# Patient Record
Sex: MALE | Race: WHITE | NOT HISPANIC OR LATINO | Employment: FULL TIME | URBAN - METROPOLITAN AREA
[De-identification: names, ages, dates, MRNs, and addresses within clinical notes are randomized per-mention and may not be internally consistent; named-entity substitution may affect disease eponyms.]

---

## 2023-05-21 ENCOUNTER — HOSPITAL ENCOUNTER (EMERGENCY)
Facility: HOSPITAL | Age: 43
Discharge: HOME | End: 2023-05-21
Attending: FAMILY MEDICINE

## 2023-05-21 ENCOUNTER — APPOINTMENT (EMERGENCY)
Dept: CT IMAGING | Facility: HOSPITAL | Age: 43
End: 2023-05-21

## 2023-05-21 VITALS
HEIGHT: 67 IN | HEART RATE: 98 BPM | RESPIRATION RATE: 18 BRPM | OXYGEN SATURATION: 98 % | TEMPERATURE: 99.7 F | BODY MASS INDEX: 32.18 KG/M2 | WEIGHT: 205 LBS | DIASTOLIC BLOOD PRESSURE: 78 MMHG | SYSTOLIC BLOOD PRESSURE: 145 MMHG

## 2023-05-21 DIAGNOSIS — K57.92 DIVERTICULITIS: Primary | ICD-10-CM

## 2023-05-21 DIAGNOSIS — R10.9 ABDOMINAL PAIN: ICD-10-CM

## 2023-05-21 LAB
ALBUMIN SERPL BCP-MCNC: 4.4 G/DL (ref 3.5–5)
ALP SERPL-CCNC: 79 U/L (ref 34–104)
ALT SERPL W P-5'-P-CCNC: 19 U/L (ref 7–52)
ANION GAP SERPL CALCULATED.3IONS-SCNC: 9 MMOL/L (ref 4–13)
AST SERPL W P-5'-P-CCNC: 19 U/L (ref 13–39)
BASOPHILS # BLD AUTO: 0.02 THOUSANDS/ÂΜL (ref 0–0.1)
BASOPHILS NFR BLD AUTO: 0 % (ref 0–1)
BILIRUB SERPL-MCNC: 1.06 MG/DL (ref 0.2–1)
BUN SERPL-MCNC: 10 MG/DL (ref 5–25)
CALCIUM SERPL-MCNC: 9.6 MG/DL (ref 8.4–10.2)
CHLORIDE SERPL-SCNC: 97 MMOL/L (ref 96–108)
CO2 SERPL-SCNC: 26 MMOL/L (ref 21–32)
CREAT SERPL-MCNC: 0.96 MG/DL (ref 0.6–1.3)
EOSINOPHIL # BLD AUTO: 0.06 THOUSAND/ÂΜL (ref 0–0.61)
EOSINOPHIL NFR BLD AUTO: 0 % (ref 0–6)
ERYTHROCYTE [DISTWIDTH] IN BLOOD BY AUTOMATED COUNT: 12.8 % (ref 11.6–15.1)
GFR SERPL CREATININE-BSD FRML MDRD: 98 ML/MIN/1.73SQ M
GLUCOSE SERPL-MCNC: 154 MG/DL (ref 65–140)
HCT VFR BLD AUTO: 47.6 % (ref 36.5–49.3)
HGB BLD-MCNC: 15.5 G/DL (ref 12–17)
IMM GRANULOCYTES # BLD AUTO: 0.04 THOUSAND/UL (ref 0–0.2)
IMM GRANULOCYTES NFR BLD AUTO: 0 % (ref 0–2)
INR PPP: 0.98 (ref 0.84–1.19)
LYMPHOCYTES # BLD AUTO: 1.47 THOUSANDS/ÂΜL (ref 0.6–4.47)
LYMPHOCYTES NFR BLD AUTO: 11 % (ref 14–44)
MAGNESIUM SERPL-MCNC: 2.2 MG/DL (ref 1.9–2.7)
MCH RBC QN AUTO: 29.9 PG (ref 26.8–34.3)
MCHC RBC AUTO-ENTMCNC: 32.6 G/DL (ref 31.4–37.4)
MCV RBC AUTO: 92 FL (ref 82–98)
MONOCYTES # BLD AUTO: 1.22 THOUSAND/ÂΜL (ref 0.17–1.22)
MONOCYTES NFR BLD AUTO: 9 % (ref 4–12)
NEUTROPHILS # BLD AUTO: 10.77 THOUSANDS/ÂΜL (ref 1.85–7.62)
NEUTS SEG NFR BLD AUTO: 80 % (ref 43–75)
NRBC BLD AUTO-RTO: 0 /100 WBCS
PLATELET # BLD AUTO: 238 THOUSANDS/UL (ref 149–390)
PMV BLD AUTO: 10.8 FL (ref 8.9–12.7)
POTASSIUM SERPL-SCNC: 3.7 MMOL/L (ref 3.5–5.3)
PROT SERPL-MCNC: 7.6 G/DL (ref 6.4–8.4)
PROTHROMBIN TIME: 13 SECONDS (ref 11.6–14.5)
RBC # BLD AUTO: 5.19 MILLION/UL (ref 3.88–5.62)
SODIUM SERPL-SCNC: 132 MMOL/L (ref 135–147)
WBC # BLD AUTO: 13.58 THOUSAND/UL (ref 4.31–10.16)

## 2023-05-21 RX ORDER — AMOXICILLIN AND CLAVULANATE POTASSIUM 875; 125 MG/1; MG/1
1 TABLET, FILM COATED ORAL EVERY 12 HOURS
Qty: 14 TABLET | Refills: 0 | Status: SHIPPED | OUTPATIENT
Start: 2023-05-21 | End: 2023-05-28

## 2023-05-21 RX ORDER — AMOXICILLIN AND CLAVULANATE POTASSIUM 875; 125 MG/1; MG/1
1 TABLET, FILM COATED ORAL ONCE
Status: COMPLETED | OUTPATIENT
Start: 2023-05-21 | End: 2023-05-21

## 2023-05-21 RX ADMIN — SODIUM CHLORIDE 1000 ML: 0.9 INJECTION, SOLUTION INTRAVENOUS at 11:42

## 2023-05-21 RX ADMIN — AMOXICILLIN AND CLAVULANATE POTASSIUM 1 TABLET: 875; 125 TABLET, FILM COATED ORAL at 14:05

## 2023-05-21 RX ADMIN — IOHEXOL 100 ML: 350 INJECTION, SOLUTION INTRAVENOUS at 12:53

## 2023-05-21 RX ADMIN — SODIUM CHLORIDE 80 MG: 9 INJECTION, SOLUTION INTRAVENOUS at 11:53

## 2023-05-21 NOTE — ED PROVIDER NOTES
"History  Chief Complaint   Patient presents with   • Abdominal Pain   • Diarrhea     Pt presents to ER from home for reports of \"mucousy\" stools x3 weeks, now reports lower abd aching x3 days and small amount of blood in stool  History provided by:  Patient   used: No    This Is a 44-year-old male presenting with complaint abdominal pain  Patient states has been having mucousy stool for 3 weeks and started with blood in his stool for past 3 days  He is also complaining of intermittent abdominal pain rating his pain 5 out of 10 at this time  States nothing makes the pain better or worse and he did not take anything for pain  Patient denies any chest pain shortness of breath  Denies any vomiting  He denies any constipation states has not had to strain when having a bowel movement  None       History reviewed  No pertinent past medical history  History reviewed  No pertinent surgical history  History reviewed  No pertinent family history  I have reviewed and agree with the history as documented  E-Cigarette/Vaping     E-Cigarette/Vaping Substances     Social History     Tobacco Use   • Smoking status: Every Day     Packs/day: 0 50     Types: Cigarettes   Substance Use Topics   • Alcohol use: Never   • Drug use: Never       Review of Systems   Constitutional: Negative  HENT: Negative  Eyes: Negative  Respiratory: Negative  Cardiovascular: Negative  Gastrointestinal: Positive for abdominal pain and blood in stool  Negative for rectal pain and vomiting  Endocrine: Negative  Genitourinary: Negative  Musculoskeletal: Negative  Skin: Negative  Allergic/Immunologic: Negative  Neurological: Negative  Hematological: Negative  Psychiatric/Behavioral: Negative  Physical Exam  Physical Exam  Vitals and nursing note reviewed  Constitutional:       General: He is not in acute distress  Appearance: He is well-developed   He is not " diaphoretic  HENT:      Head: Normocephalic and atraumatic  Right Ear: External ear normal       Left Ear: External ear normal       Nose: Nose normal    Eyes:      Conjunctiva/sclera: Conjunctivae normal       Pupils: Pupils are equal, round, and reactive to light  Cardiovascular:      Rate and Rhythm: Normal rate and regular rhythm  Pulmonary:      Effort: Pulmonary effort is normal  No respiratory distress  Breath sounds: Normal breath sounds  No wheezing  Abdominal:      General: Bowel sounds are normal  There is no distension  Tenderness: There is generalized abdominal tenderness  There is no guarding or rebound  Genitourinary:     Rectum: Guaiac result positive  Musculoskeletal:         General: Normal range of motion  Cervical back: Normal range of motion and neck supple  Lymphadenopathy:      Cervical: No cervical adenopathy  Skin:     General: Skin is warm and dry  Capillary Refill: Capillary refill takes less than 2 seconds  Neurological:      General: No focal deficit present  Mental Status: He is alert and oriented to person, place, and time     Psychiatric:         Behavior: Behavior normal          Vital Signs  ED Triage Vitals [05/21/23 1132]   Temperature Pulse Respirations Blood Pressure SpO2   99 7 °F (37 6 °C) (!) 112 20 (!) 173/91 96 %      Temp Source Heart Rate Source Patient Position - Orthostatic VS BP Location FiO2 (%)   Oral Monitor Sitting Left arm --      Pain Score       3           Vitals:    05/21/23 1200 05/21/23 1300 05/21/23 1330 05/21/23 1400   BP: (!) 172/95 145/78 148/82 145/78   Pulse: 105 101 100 98   Patient Position - Orthostatic VS: Sitting Lying Lying Lying         Visual Acuity      ED Medications  Medications   sodium chloride 0 9 % bolus 1,000 mL (0 mL Intravenous Stopped 5/21/23 1242)   pantoprazole (PROTONIX) 80 mg in sodium chloride 0 9 % 100 mL IVPB (0 mg Intravenous Stopped 5/21/23 1208)   iohexol (OMNIPAQUE) 350 MG/ML injection (SINGLE-DOSE) 100 mL (100 mL Intravenous Given 5/21/23 1253)   amoxicillin-clavulanate (AUGMENTIN) 875-125 mg per tablet 1 tablet (1 tablet Oral Given 5/21/23 1405)       Diagnostic Studies  Results Reviewed     Procedure Component Value Units Date/Time    Comprehensive metabolic panel [[id number]]  (Abnormal) Collected: 05/21/23 1142    Lab Status: Final result Specimen: Blood from Arm, Right Updated: 05/21/23 1227     Sodium 132 mmol/L      Potassium 3 7 mmol/L      Chloride 97 mmol/L      CO2 26 mmol/L      ANION GAP 9 mmol/L      BUN 10 mg/dL      Creatinine 0 96 mg/dL      Glucose 154 mg/dL      Calcium 9 6 mg/dL      AST 19 U/L      ALT 19 U/L      Alkaline Phosphatase 79 U/L      Total Protein 7 6 g/dL      Albumin 4 4 g/dL      Total Bilirubin 1 06 mg/dL      eGFR 98 ml/min/1 73sq m     Narrative:      [place] guidelines for Chronic Kidney Disease (CKD):   •  Stage 1 with normal or high GFR (GFR > 90 mL/min/1 73 square meters)  •  Stage 2 Mild CKD (GFR = 60-89 mL/min/1 73 square meters)  •  Stage 3A Moderate CKD (GFR = 45-59 mL/min/1 73 square meters)  •  Stage 3B Moderate CKD (GFR = 30-44 mL/min/1 73 square meters)  •  Stage 4 Severe CKD (GFR = 15-29 mL/min/1 73 square meters)  •  Stage 5 End Stage CKD (GFR <15 mL/min/1 73 square meters)  Note: GFR calculation is accurate only with a steady state creatinine    Magnesium [[id number]]  (Normal) Collected: 05/21/23 1142    Lab Status: Final result Specimen: Blood from Arm, Right Updated: 05/21/23 1227     Magnesium 2 2 mg/dL     Protime-INR [[id number]]  (Normal) Collected: 05/21/23 1142    Lab Status: Final result Specimen: Blood from Arm, Right Updated: 05/21/23 1202     Protime 13 0 seconds      INR 0 98    CBC and differential [[id number]]  (Abnormal) Collected: 05/21/23 1142    Lab Status: Final result Specimen: Blood from Arm, Right Updated: 05/21/23 1154     WBC 13 58 Thousand/uL      RBC 5 19 Million/uL      Hemoglobin 15 5 g/dL      Hematocrit 47 6 %      MCV 92 fL      MCH 29 9 pg      MCHC 32 6 g/dL      RDW 12 8 %      MPV 10 8 fL      Platelets 857 Thousands/uL      nRBC 0 /100 WBCs      Neutrophils Relative 80 %      Immat GRANS % 0 %      Lymphocytes Relative 11 %      Monocytes Relative 9 %      Eosinophils Relative 0 %      Basophils Relative 0 %      Neutrophils Absolute 10 77 Thousands/µL      Immature Grans Absolute 0 04 Thousand/uL      Lymphocytes Absolute 1 47 Thousands/µL      Monocytes Absolute 1 22 Thousand/µL      Eosinophils Absolute 0 06 Thousand/µL      Basophils Absolute 0 02 Thousands/µL                  CT high volume bleeding scan abdomen pelvis   Final Result by Sania Lynn MD (05/21 1326)      No CT evidence of active high volume gastrointestinal hemorrhage  Large amount of pericolonic fat stranding surrounding the sigmoid colon, where there is a probable inflamed diverticulum, and a short segment of bowel wall thickening  (Series 601 images 47-78 ) These findings most likely represent diverticulitis,    without abscess or free air  If not already performed recently, colonoscopy after the acute illness is recommended to rule out possibility of colon cancer mimicking diverticulitis  Moderate fatty liver  There is a 1 cm arterial phase hyperenhancing lesion in caudate lobe of the liver (series 6 image 30 ) Recommend characterization with abdomen MRI with and without IV contrast on a nonemergent basis  Workstation performed: [id number]                    Procedures  Procedures         ED Course  ED Course as of 05/21/23 1421   Sun May 21, 2023   1345    No CT evidence of active high volume gastrointestinal hemorrhage      Large amount of pericolonic fat stranding surrounding the sigmoid colon, where there is a probable inflamed diverticulum, and a short segment of bowel wall thickening   (Series 601 images 47-78 ) These findings most likely represent diverticulitis,   without abscess or free air  If not already performed recently, colonoscopy after the acute illness is recommended to rule out possibility of colon cancer mimicking diverticulitis      Moderate fatty liver  67 Discussed with general surgery Dr Motley agree with discharge home with antibiotics and follow-up outpatient  1359 Reached out to GI Noemi Hendrix waiting for call back    [address] Call ([facility]))  [employer]  I agree with discharge on antibiotics  I will arrange GI follow up  He will need colonoscopy   2:01 PM  20 days left   [address] [facility]-Call (Jerrod Motley([facility]))  [facility], I think it's reasonable for him to be discharged  Just close follow up with G I  as outpatient  He'll need colonoscopy  Also, you know make it clear that if he starts to feel worse, he should come back to the ER immediately  Also, I would tell him to stay on clear liquids for the next couple of days just to give us Maru us  But if he's OK with being discharged antibiotics, I think that's reasonable  The CT scan does show some information but it's an isolated segment and I don't see anything that makes me think that there's something terrible going on  SBIRT 22yo+    Flowsheet Row Most Recent Value   Initial Alcohol Screen: US AUDIT-C     1  How often do you have a drink containing alcohol? 0 Filed at: 05/21/2023 1137   2  How many drinks containing alcohol do you have on a typical day you are drinking? 0 Filed at: 05/21/2023 1137   3a  Male UNDER 65: How often do you have five or more drinks on one occasion? 0 Filed at: 05/21/2023 1137   Audit-C Score 0 Filed at: 05/21/2023 1137   WILLA: How many times in the past year have you    Used an illegal drug or used a prescription medication for non-medical reasons?  Never Filed at: 05/21/2023 1137                    Medical Decision Making  56-year-old male presented with abdominal pain and bright red blood per rectum  History is taken from patient  Will obtain labs and CT for GI bleed  Patient started on Protonix 80 mg IV x1  Labs CT reviewed Labs shows WBC of 13,000 CT shows acute diverticulitis without any perforation or abscess  I have discussed this case with the general surgery Dr Avani Feliciano and GI on-call Caden Munguia recommending discharge on Augmentin and follow-up as an outpatient  I had detailed discussion with patient regarding his his CT finding as well as precaution  Strict precaution regarding return if symptoms does not improve or worsen  I also recommend that he should be on a clear liquid diet  Amount and/or Complexity of Data Reviewed  Labs: ordered  Radiology: ordered  Risk  Prescription drug management  Disposition  Final diagnoses:   Diverticulitis   Abdominal pain     Time reflects when diagnosis was documented in both MDM as applicable and the Disposition within this note     Time User Action Codes Description Comment    5/21/2023  2:04 PM Bowen Odonnell [K57 92] Diverticulitis     5/21/2023  2:04 PM Nikki Lagos Add [R10 9] Abdominal pain       ED Disposition     ED Disposition   Discharge    Condition   Stable    Date/Time   Sun May 21, 2023  2:15 PM    Comment   [facility] discharge to home/self care                 Follow-up Information     Follow up With Specialties Details Why Contact Info    Tiki Simon MD Gastroenterology Schedule an appointment as soon as possible for a visit in 2 days for follow up [address] [facility] [address] [phone]            Patient's Medications   Discharge Prescriptions    AMOXICILLIN-CLAVULANATE (AUGMENTIN) 875-125 MG PER TABLET    Take 1 tablet by mouth every 12 (twelve) hours for 7 days       Start Date: 5/21/2023 End Date: 5/28/2023       Order Dose: 1 tablet       Quantity: 14 tablet    Refills: 0           PDMP Review     None          ED Provider  Electronically Signed by           Philip Bo MD  05/21/23 1385

## 2023-05-21 NOTE — Clinical Note
Kate Pena was seen and treated in our emergency department on 5/21/2023  Diagnosis:     Amador Mendoza  may return to work on return date  He may return on this date: 05/23/2023         If you have any questions or concerns, please don't hesitate to call        Carla Mahan MD    ______________________________           _______________          _______________  JO ANN [facility] Representative                              Date                                Time

## 2023-05-21 NOTE — DISCHARGE INSTRUCTIONS
Make sure you have close follow up with FELECIA  I  you will need colonoscopy  Please return to the ED if you start starts to feel worse, come back to the ER immediately    Patient is advised to stay on clear liquids for the next couple of days just to give us bowel rest

## 2023-05-22 ENCOUNTER — TELEPHONE (OUTPATIENT)
Dept: GASTROENTEROLOGY | Facility: CLINIC | Age: 43
End: 2023-05-22

## 2023-05-22 NOTE — TELEPHONE ENCOUNTER
Pt scheduled for next soonest     ----- Message from Roberto Mccartney MD sent at 5/21/2023  2:02 PM EDT -----  Please schedule GI clinic follow-up within a month  Patient presented with diverticulitis and will need colonoscopy  Thanks

## 2023-05-30 ENCOUNTER — APPOINTMENT (EMERGENCY)
Dept: CT IMAGING | Facility: HOSPITAL | Age: 43
DRG: 379 | End: 2023-05-30
Payer: OTHER GOVERNMENT

## 2023-05-30 ENCOUNTER — HOSPITAL ENCOUNTER (INPATIENT)
Facility: HOSPITAL | Age: 43
LOS: 2 days | Discharge: HOME | DRG: 379 | End: 2023-06-01
Attending: EMERGENCY MEDICINE | Admitting: SURGERY
Payer: OTHER GOVERNMENT

## 2023-05-30 ENCOUNTER — APPOINTMENT (INPATIENT)
Dept: CT IMAGING | Facility: HOSPITAL | Age: 43
DRG: 379 | End: 2023-05-30
Attending: INTERNAL MEDICINE
Payer: OTHER GOVERNMENT

## 2023-05-30 DIAGNOSIS — K57.92 DIVERTICULITIS: ICD-10-CM

## 2023-05-30 DIAGNOSIS — K57.20 COLONIC DIVERTICULAR ABSCESS: ICD-10-CM

## 2023-05-30 DIAGNOSIS — K57.80 DIVERTICULITIS OF INTESTINE WITH ABSCESS: ICD-10-CM

## 2023-05-30 DIAGNOSIS — K92.1 BLOOD IN STOOL: ICD-10-CM

## 2023-05-30 DIAGNOSIS — K57.21 DIVERTICULITIS OF LARGE INTESTINE WITH ABSCESS WITH BLEEDING: Primary | ICD-10-CM

## 2023-05-30 LAB
ALBUMIN SERPL BCP-MCNC: 4.3 G/DL (ref 3.5–5)
ALP SERPL-CCNC: 89 U/L (ref 34–104)
ALT SERPL W P-5'-P-CCNC: 18 U/L (ref 7–52)
ANION GAP SERPL CALCULATED.3IONS-SCNC: 6 MMOL/L (ref 4–13)
AST SERPL W P-5'-P-CCNC: 16 U/L (ref 13–39)
BASOPHILS # BLD AUTO: 0.01 THOUSANDS/ÂΜL (ref 0–0.1)
BASOPHILS NFR BLD AUTO: 0 % (ref 0–1)
BILIRUB SERPL-MCNC: 0.51 MG/DL (ref 0.2–1)
BUN SERPL-MCNC: 8 MG/DL (ref 5–25)
CALCIUM SERPL-MCNC: 9.4 MG/DL (ref 8.4–10.2)
CHLORIDE SERPL-SCNC: 99 MMOL/L (ref 96–108)
CO2 SERPL-SCNC: 31 MMOL/L (ref 21–32)
CREAT SERPL-MCNC: 0.91 MG/DL (ref 0.6–1.3)
EOSINOPHIL # BLD AUTO: 0.08 THOUSAND/ÂΜL (ref 0–0.61)
EOSINOPHIL NFR BLD AUTO: 1 % (ref 0–6)
ERYTHROCYTE [DISTWIDTH] IN BLOOD BY AUTOMATED COUNT: 12.6 % (ref 11.6–15.1)
GFR SERPL CREATININE-BSD FRML MDRD: 105 ML/MIN/1.73SQ M
GLUCOSE SERPL-MCNC: 103 MG/DL (ref 65–140)
HCT VFR BLD AUTO: 44.3 % (ref 36.5–49.3)
HCT VFR BLD AUTO: 47.7 % (ref 36.5–49.3)
HGB BLD-MCNC: 14.5 G/DL (ref 12–17)
HGB BLD-MCNC: 15.3 G/DL (ref 12–17)
IMM GRANULOCYTES # BLD AUTO: 0.03 THOUSAND/UL (ref 0–0.2)
IMM GRANULOCYTES NFR BLD AUTO: 0 % (ref 0–2)
LYMPHOCYTES # BLD AUTO: 1.45 THOUSANDS/ÂΜL (ref 0.6–4.47)
LYMPHOCYTES NFR BLD AUTO: 14 % (ref 14–44)
MAGNESIUM SERPL-MCNC: 2.5 MG/DL (ref 1.9–2.7)
MCH RBC QN AUTO: 29.4 PG (ref 26.8–34.3)
MCHC RBC AUTO-ENTMCNC: 32.1 G/DL (ref 31.4–37.4)
MCV RBC AUTO: 92 FL (ref 82–98)
MONOCYTES # BLD AUTO: 0.74 THOUSAND/ÂΜL (ref 0.17–1.22)
MONOCYTES NFR BLD AUTO: 7 % (ref 4–12)
NEUTROPHILS # BLD AUTO: 8.03 THOUSANDS/ÂΜL (ref 1.85–7.62)
NEUTS SEG NFR BLD AUTO: 78 % (ref 43–75)
NRBC BLD AUTO-RTO: 0 /100 WBCS
PLATELET # BLD AUTO: 265 THOUSANDS/UL (ref 149–390)
PMV BLD AUTO: 10.6 FL (ref 8.9–12.7)
POTASSIUM SERPL-SCNC: 4.3 MMOL/L (ref 3.5–5.3)
PROT SERPL-MCNC: 7.5 G/DL (ref 6.4–8.4)
RBC # BLD AUTO: 5.2 MILLION/UL (ref 3.88–5.62)
SODIUM SERPL-SCNC: 136 MMOL/L (ref 135–147)
WBC # BLD AUTO: 10.34 THOUSAND/UL (ref 4.31–10.16)

## 2023-05-30 PROCEDURE — 85018 HEMOGLOBIN: CPT | Performed by: PHYSICIAN ASSISTANT

## 2023-05-30 PROCEDURE — 96360 HYDRATION IV INFUSION INIT: CPT

## 2023-05-30 PROCEDURE — 85025 COMPLETE CBC W/AUTO DIFF WBC: CPT

## 2023-05-30 PROCEDURE — 99152 MOD SED SAME PHYS/QHP 5/>YRS: CPT | Performed by: INTERNAL MEDICINE

## 2023-05-30 PROCEDURE — 0D9N30Z DRAINAGE OF SIGMOID COLON WITH DRAINAGE DEVICE, PERCUTANEOUS APPROACH: ICD-10-PCS | Performed by: INTERNAL MEDICINE

## 2023-05-30 PROCEDURE — 74177 CT ABD & PELVIS W/CONTRAST: CPT

## 2023-05-30 PROCEDURE — 99285 EMERGENCY DEPT VISIT HI MDM: CPT

## 2023-05-30 PROCEDURE — 87205 SMEAR GRAM STAIN: CPT | Performed by: SURGERY

## 2023-05-30 PROCEDURE — 99223 1ST HOSP IP/OBS HIGH 75: CPT | Performed by: SURGERY

## 2023-05-30 PROCEDURE — 10030 IMG GID FLU COLL DRG SFT TIS: CPT

## 2023-05-30 PROCEDURE — 83735 ASSAY OF MAGNESIUM: CPT

## 2023-05-30 PROCEDURE — 85014 HEMATOCRIT: CPT | Performed by: PHYSICIAN ASSISTANT

## 2023-05-30 PROCEDURE — NC001 PR NO CHARGE: Performed by: INTERNAL MEDICINE

## 2023-05-30 PROCEDURE — C1769 GUIDE WIRE: HCPCS

## 2023-05-30 PROCEDURE — 36415 COLL VENOUS BLD VENIPUNCTURE: CPT

## 2023-05-30 PROCEDURE — 87070 CULTURE OTHR SPECIMN AEROBIC: CPT | Performed by: SURGERY

## 2023-05-30 PROCEDURE — 99153 MOD SED SAME PHYS/QHP EA: CPT

## 2023-05-30 PROCEDURE — 87186 SC STD MICRODIL/AGAR DIL: CPT | Performed by: SURGERY

## 2023-05-30 PROCEDURE — 80053 COMPREHEN METABOLIC PANEL: CPT

## 2023-05-30 PROCEDURE — 87077 CULTURE AEROBIC IDENTIFY: CPT | Performed by: SURGERY

## 2023-05-30 PROCEDURE — 87147 CULTURE TYPE IMMUNOLOGIC: CPT | Performed by: SURGERY

## 2023-05-30 PROCEDURE — 99152 MOD SED SAME PHYS/QHP 5/>YRS: CPT

## 2023-05-30 PROCEDURE — 96361 HYDRATE IV INFUSION ADD-ON: CPT

## 2023-05-30 PROCEDURE — 49406 IMAGE CATH FLUID PERI/RETRO: CPT | Performed by: INTERNAL MEDICINE

## 2023-05-30 PROCEDURE — C1729 CATH, DRAINAGE: HCPCS

## 2023-05-30 RX ORDER — KETOROLAC TROMETHAMINE 30 MG/ML
15 INJECTION, SOLUTION INTRAMUSCULAR; INTRAVENOUS EVERY 8 HOURS SCHEDULED
Status: DISCONTINUED | OUTPATIENT
Start: 2023-05-30 | End: 2023-05-30

## 2023-05-30 RX ORDER — MIDAZOLAM HYDROCHLORIDE 2 MG/2ML
INJECTION, SOLUTION INTRAMUSCULAR; INTRAVENOUS AS NEEDED
Status: COMPLETED | OUTPATIENT
Start: 2023-05-30 | End: 2023-05-30

## 2023-05-30 RX ORDER — SODIUM CHLORIDE, SODIUM LACTATE, POTASSIUM CHLORIDE, CALCIUM CHLORIDE 600; 310; 30; 20 MG/100ML; MG/100ML; MG/100ML; MG/100ML
75 INJECTION, SOLUTION INTRAVENOUS CONTINUOUS
Status: DISCONTINUED | OUTPATIENT
Start: 2023-05-30 | End: 2023-06-01

## 2023-05-30 RX ORDER — NICOTINE 21 MG/24HR
1 PATCH, TRANSDERMAL 24 HOURS TRANSDERMAL DAILY
Status: DISCONTINUED | OUTPATIENT
Start: 2023-05-30 | End: 2023-06-01 | Stop reason: HOSPADM

## 2023-05-30 RX ORDER — LIDOCAINE HYDROCHLORIDE 10 MG/ML
INJECTION, SOLUTION EPIDURAL; INFILTRATION; INTRACAUDAL; PERINEURAL AS NEEDED
Status: COMPLETED | OUTPATIENT
Start: 2023-05-30 | End: 2023-05-30

## 2023-05-30 RX ORDER — HEPARIN SODIUM 5000 [USP'U]/ML
5000 INJECTION, SOLUTION INTRAVENOUS; SUBCUTANEOUS EVERY 8 HOURS SCHEDULED
Status: DISCONTINUED | OUTPATIENT
Start: 2023-05-31 | End: 2023-05-30

## 2023-05-30 RX ORDER — FENTANYL CITRATE 50 UG/ML
INJECTION, SOLUTION INTRAMUSCULAR; INTRAVENOUS AS NEEDED
Status: COMPLETED | OUTPATIENT
Start: 2023-05-30 | End: 2023-05-30

## 2023-05-30 RX ORDER — SODIUM CHLORIDE 9 MG/ML
10 INJECTION INTRAVENOUS DAILY
Qty: 300 ML | Refills: 3 | Status: ON HOLD | OUTPATIENT
Start: 2023-05-30 | End: 2023-06-08

## 2023-05-30 RX ORDER — ONDANSETRON 2 MG/ML
4 INJECTION INTRAMUSCULAR; INTRAVENOUS EVERY 6 HOURS PRN
Status: DISCONTINUED | OUTPATIENT
Start: 2023-05-30 | End: 2023-06-01 | Stop reason: HOSPADM

## 2023-05-30 RX ADMIN — FENTANYL CITRATE 50 MCG: 50 INJECTION, SOLUTION INTRAMUSCULAR; INTRAVENOUS at 17:47

## 2023-05-30 RX ADMIN — SODIUM CHLORIDE 1000 ML: 0.9 INJECTION, SOLUTION INTRAVENOUS at 10:29

## 2023-05-30 RX ADMIN — PIPERACILLIN AND TAZOBACTAM 4.5 G: 4; .5 INJECTION, POWDER, FOR SOLUTION INTRAVENOUS at 15:43

## 2023-05-30 RX ADMIN — MIDAZOLAM HYDROCHLORIDE 1 MG: 1 INJECTION, SOLUTION INTRAMUSCULAR; INTRAVENOUS at 17:47

## 2023-05-30 RX ADMIN — IOHEXOL 100 ML: 350 INJECTION, SOLUTION INTRAVENOUS at 11:19

## 2023-05-30 RX ADMIN — LIDOCAINE HYDROCHLORIDE 10 ML: 10 INJECTION, SOLUTION EPIDURAL; INFILTRATION; INTRACAUDAL; PERINEURAL at 17:51

## 2023-05-30 RX ADMIN — FENTANYL CITRATE 50 MCG: 50 INJECTION, SOLUTION INTRAMUSCULAR; INTRAVENOUS at 17:54

## 2023-05-30 RX ADMIN — MIDAZOLAM HYDROCHLORIDE 1 MG: 1 INJECTION, SOLUTION INTRAMUSCULAR; INTRAVENOUS at 17:54

## 2023-05-30 RX ADMIN — SODIUM CHLORIDE, SODIUM LACTATE, POTASSIUM CHLORIDE, AND CALCIUM CHLORIDE 125 ML/HR: .6; .31; .03; .02 INJECTION, SOLUTION INTRAVENOUS at 16:09

## 2023-05-30 RX ADMIN — PIPERACILLIN AND TAZOBACTAM 4.5 G: 4; .5 INJECTION, POWDER, FOR SOLUTION INTRAVENOUS at 21:40

## 2023-05-30 NOTE — TELEMEDICINE
e-Consult (IPC)  - Interventional Radiology  Kevin Plascencia 38 y o  male MRN: [id number]  Unit/Bed#: ED 13 Encounter: [id number]          Interventional Radiology has been consulted to evaluate Delonad to IR  Consult performed by: Ted Causey MD  Consult ordered by: Juana Enamorado PA-C        05/30/23    Assessment/Recommendation:     38year old male with an enlarging diverticular abscess identified on CT from today and a week prior  Plan for CT guided drain placement  Labs/coags acceptable to proceed  Patient has been NPO today  11-20 minutes, >50% of the total time devoted to medical consultative verbal/EMR discussion between providers  Written report will be generated in the EMR  Thank you for allowing Interventional Radiology to participate in the care of [address]  Please don't hesitate to call or TigerText us with any questions       Ted Causey MD

## 2023-05-30 NOTE — ASSESSMENT & PLAN NOTE
37 yo M returns to the ER with s/sx complicated diverticulitis now with abscess on CT and ongoing blood per rectum  Pain 2/10, no N/V, no diarrhea  Abdomen flat/soft TTP in lower abdomen with guarding  AFVSS  WBC 10 34  Assessment:  Acute sigmoid diverticulitis with abscess  Lower GI bleed suspect 2/2 diverticulitis  Plan:  Hemodynamically stable without evidence of large volume GI bleed  IR consult for drain placement for diverticular abscess  Admit for Abx, bowel rest, pain control, and close observation  Will hold on Toradol/Heparin due to suspected bleeding

## 2023-05-30 NOTE — H&P
Kota U  68   H&P  Name: Manju Peacock 38 y o  male I MRN: [id number]  Unit/Bed#: ED 15 I Date of Admission: 5/30/2023   Date of Service: 5/30/2023 I Hospital Day: 0      Assessment/Plan   * Diverticulitis of large intestine with abscess with bleeding  Assessment & Plan  39 yo M returns to the ER with s/sx complicated diverticulitis now with abscess on CT and ongoing blood per rectum  Pain 2/10, no N/V, no diarrhea  Abdomen flat/soft TTP in lower abdomen with guarding  AFVSS  WBC 10 34  Assessment:  Acute sigmoid diverticulitis with abscess  Lower GI bleed suspect 2/2 diverticulitis  Plan:  Hemodynamically stable without evidence of large volume GI bleed  IR consult for drain placement for diverticular abscess  Admit for Abx, bowel rest, pain control, and close observation  Will hold on Toradol/Heparin due to suspected bleeding  History of Present Illness   HPI:  Manju Peacock is a 38 y o  male who presents with persistent lower abdominal pain and blood per rectum after previous diagnosis of diverticulitis on 5/21  Reports symptoms returned after resuming solid food diet  No N/V  No fever  No history of diverticulitis or colonosocpy  Review of Systems   Gastrointestinal: Positive for abdominal pain and blood in stool  Negative for constipation, diarrhea, nausea and vomiting  All other systems reviewed and are negative  Historical Information   History reviewed  No pertinent past medical history  History reviewed  No pertinent surgical history  Social History   Social History     Substance and Sexual Activity   Alcohol Use Never     Social History     Substance and Sexual Activity   Drug Use Never     Social History     Tobacco Use   Smoking Status Every Day   • Packs/day: 0 50   • Types: Cigarettes   Smokeless Tobacco Not on file     E-Cigarette/Vaping     E-Cigarette/Vaping Substances     Family History: History reviewed   No pertinent family "history  Meds/Allergies   PTA meds:   None     No Known Allergies    Objective   First Vitals:   Blood Pressure: 158/83 (05/30/23 0919)  Pulse: 80 (05/30/23 0919)  Temperature: 98 2 °F (36 8 °C) (05/30/23 0919)  Temp Source: Oral (05/30/23 0919)  Respirations: 16 (05/30/23 0919)  Height: 5' 7\" (170 2 cm) (05/30/23 0955)  Weight - Scale: 93 kg (205 lb) (05/30/23 0955)  SpO2: 98 % (05/30/23 0919)    Current Vitals:   Blood Pressure: 120/77 (05/30/23 1400)  Pulse: 77 (05/30/23 1400)  Temperature: 98 2 °F (36 8 °C) (05/30/23 0919)  Temp Source: Oral (05/30/23 0919)  Respirations: 18 (05/30/23 1400)  Height: 5' 7\" (170 2 cm) (05/30/23 0955)  Weight - Scale: 93 kg (205 lb) (05/30/23 0955)  SpO2: 96 % (05/30/23 1400)    No intake or output data in the 24 hours ending 05/30/23 1453    Invasive Devices     Peripheral Intravenous Line  Duration           Peripheral IV 05/30/23 Distal;Right;Upper;Ventral (anterior) Arm <1 day                Physical Exam  Vitals and nursing note reviewed  Constitutional:       General: He is not in acute distress  Appearance: He is well-developed  He is not diaphoretic  HENT:      Head: Normocephalic and atraumatic  Eyes:      Conjunctiva/sclera: Conjunctivae normal       Pupils: Pupils are equal, round, and reactive to light  Cardiovascular:      Rate and Rhythm: Normal rate  Heart sounds: No murmur heard  Pulmonary:      Effort: Pulmonary effort is normal  No respiratory distress  Breath sounds: Normal breath sounds  Abdominal:      General: Abdomen is flat  Palpations: Abdomen is soft  Tenderness: There is abdominal tenderness (RLQ, LLQ, suprapubic, voluntary guarding)  Musculoskeletal:         General: Normal range of motion  Cervical back: Normal range of motion  Skin:     General: Skin is warm and dry  Capillary Refill: Capillary refill takes less than 2 seconds     Neurological:      Mental Status: He is alert and oriented to person, " place, and time  Psychiatric:         Behavior: Behavior normal          Lab Results:   I have personally reviewed pertinent lab results  , CBC:   Lab Results   Component Value Date    HCT 47 7 05/30/2023    HGB 15 3 05/30/2023    MCH 29 4 05/30/2023    MCHC 32 1 05/30/2023    MCV 92 05/30/2023    MPV 10 6 05/30/2023    NRBC 0 05/30/2023     05/30/2023    RBC 5 20 05/30/2023    RDW 12 6 05/30/2023    WBC 10 34 (H) 05/30/2023   , CMP:   Lab Results   Component Value Date    ALKPHOS 89 05/30/2023    ALT 18 05/30/2023    AST 16 05/30/2023    BUN 8 05/30/2023    CALCIUM 9 4 05/30/2023    CL 99 05/30/2023    CO2 31 05/30/2023    CREATININE 0 91 05/30/2023    EGFR 105 05/30/2023    K 4 3 05/30/2023    SODIUM 136 05/30/2023     Imaging: I have personally reviewed pertinent reports  EKG, Pathology, and Other Studies: I have personally reviewed pertinent reports  Code Status: Level 1 - Full Code  Advance Directive and Living Will:      Power of :    POLST:      Counseling / Coordination of Care  Total floor / unit time spent today 15 minutes  Greater than 50% of total time was spent with the patient and / or family counseling and / or coordination of care  A description of the counseling / coordination of care: treatment planning

## 2023-05-30 NOTE — ED PROVIDER NOTES
Biliary drain, 8 Korean placed per Dr. Romeo without difficulty, patient tolerated well. Fentanyl 50 mcg and versed 1 mg total given for procedure. 1 cc bloody fluid aspirated and sent to lab for cultures per Dr. Romeo. Patient transferred to room via cart in stable condition, report given to Esther CREWS on floor. See also post procedure orders placed per Dr. Romeo.   History  Chief Complaint   Patient presents with   • Rectal Bleeding     Patient is a 45-year-old male with a recent diagnosis of diverticulitis last week  Patient reports that he completed his antibiotics, and changed his diet to clear liquids for 3 days as he was recommended  Patient states that when he advance his diet back the pain returned  Patient reports bloody bowel movements also returned  Patient states that he has bloody diarrhea  Patient states no rectal pain  Patient reports that he has never had diverticulitis prior to last week  Patient reports no history of colon cancer in his family  Patient denies no prior colonoscopies  Patient denies any fevers or chills  Patient denies any difficulty urinating  Patient denies any pain to testes  None       History reviewed  No pertinent past medical history  History reviewed  No pertinent surgical history  History reviewed  No pertinent family history  I have reviewed and agree with the history as documented  E-Cigarette/Vaping   • E-Cigarette Use Never User      E-Cigarette/Vaping Substances   • Nicotine No    • THC No    • CBD No    • Flavoring No    • Other No    • Unknown No      Social History     Tobacco Use   • Smoking status: Every Day     Packs/day: 0 50     Years: 24 00     Total pack years: 12 00     Types: Cigarettes   Vaping Use   • Vaping Use: Never used   Substance Use Topics   • Alcohol use: Not Currently   • Drug use: Never       Review of Systems   Constitutional: Negative  HENT: Negative  Eyes: Negative  Respiratory: Negative  Cardiovascular: Negative  Negative for chest pain  Gastrointestinal: Positive for abdominal pain and blood in stool  Negative for constipation, nausea and vomiting  Endocrine: Negative  Genitourinary: Negative  Musculoskeletal: Negative  Allergic/Immunologic: Negative  Neurological: Negative  Hematological: Negative  Psychiatric/Behavioral: Negative      All other systems reviewed and are negative  Physical Exam  Physical Exam  Vitals and nursing note reviewed  Constitutional:       Appearance: Normal appearance  He is normal weight  HENT:      Head: Normocephalic  Right Ear: External ear normal       Left Ear: External ear normal       Nose: Nose normal       Mouth/Throat:      Mouth: Mucous membranes are moist    Eyes:      General:         Right eye: No discharge  Extraocular Movements: Extraocular movements intact  Conjunctiva/sclera: Conjunctivae normal       Pupils: Pupils are equal, round, and reactive to light  Cardiovascular:      Rate and Rhythm: Normal rate and regular rhythm  Pulses: Normal pulses  Heart sounds: Normal heart sounds  Pulmonary:      Effort: Pulmonary effort is normal       Breath sounds: Normal breath sounds  Abdominal:      General: Abdomen is flat  Bowel sounds are normal       Palpations: Abdomen is soft  Tenderness: There is abdominal tenderness in the right lower quadrant and left lower quadrant  Musculoskeletal:      Cervical back: Normal range of motion and neck supple  Skin:     General: Skin is warm  Capillary Refill: Capillary refill takes less than 2 seconds  Neurological:      General: No focal deficit present  Mental Status: He is alert and oriented to person, place, and time     Psychiatric:         Mood and Affect: Mood normal          Vital Signs  ED Triage Vitals   Temperature Pulse Respirations Blood Pressure SpO2   05/30/23 0919 05/30/23 0919 05/30/23 0919 05/30/23 0919 05/30/23 0919   98 2 °F (36 8 °C) 80 16 158/83 98 %      Temp Source Heart Rate Source Patient Position - Orthostatic VS BP Location FiO2 (%)   05/30/23 0919 05/30/23 0919 05/30/23 1201 05/30/23 0919 --   Oral Monitor Sitting Right arm       Pain Score       05/30/23 0955       3           Vitals:    05/30/23 1805 05/30/23 1810 05/30/23 1815 05/30/23 1921   BP: 121/59 127/70 127/70 138/93 Pulse: 71 70 71 71   Patient Position - Orthostatic VS:    Lying         Visual Acuity      ED Medications  Medications   lactated ringers infusion (125 mL/hr Intravenous New Bag 5/30/23 1609)   ondansetron (ZOFRAN) injection 4 mg (has no administration in time range)   nicotine (NICODERM CQ) 14 mg/24hr TD 24 hr patch 1 patch (0 patches Transdermal Hold 5/30/23 1543)   piperacillin-tazobactam (ZOSYN) IVPB 4 5 g (4 5 g Intravenous New Bag 5/30/23 2140)   sodium chloride 0 9 % bolus 1,000 mL (0 mL Intravenous Stopped 5/30/23 1501)   iohexol (OMNIPAQUE) 350 MG/ML injection (SINGLE-DOSE) 100 mL (100 mL Intravenous Given 5/30/23 1119)   midazolam (VERSED) injection (1 mg Intravenous Given 5/30/23 1754)   fentanyl citrate (PF) 100 MCG/2ML (50 mcg Intravenous Given 5/30/23 1754)   lidocaine (PF) (XYLOCAINE-MPF) 1 % injection (10 mL Infiltration Given 5/30/23 1751)       Diagnostic Studies  Results Reviewed     Procedure Component Value Units Date/Time    Hemoglobin and hematocrit, blood [[id number]]  (Normal) Collected: 05/30/23 1951    Lab Status: Final result Specimen: Blood from Arm, Left Updated: 05/30/23 2000     Hemoglobin 14 5 g/dL      Hematocrit 44 3 %     Body fluid culture and Gram stain [[id number]] Collected: 05/30/23 1819    Lab Status: In process Specimen:  Body Fluid from Abscess Updated: 05/30/23 1824    CMP [[id number]] Collected: 05/30/23 1030    Lab Status: Final result Specimen: Blood from Arm, Right Updated: 05/30/23 1107     Sodium 136 mmol/L      Potassium 4 3 mmol/L      Chloride 99 mmol/L      CO2 31 mmol/L      ANION GAP 6 mmol/L      BUN 8 mg/dL      Creatinine 0 91 mg/dL      Glucose 103 mg/dL      Calcium 9 4 mg/dL      AST 16 U/L      ALT 18 U/L      Alkaline Phosphatase 89 U/L      Total Protein 7 5 g/dL      Albumin 4 3 g/dL      Total Bilirubin 0 51 mg/dL      eGFR 105 ml/min/1 73sq m     Narrative:      [place] guidelines for Chronic Kidney Disease (CKD):   • Stage 1 with normal or high GFR (GFR > 90 mL/min/1 73 square meters)  •  Stage 2 Mild CKD (GFR = 60-89 mL/min/1 73 square meters)  •  Stage 3A Moderate CKD (GFR = 45-59 mL/min/1 73 square meters)  •  Stage 3B Moderate CKD (GFR = 30-44 mL/min/1 73 square meters)  •  Stage 4 Severe CKD (GFR = 15-29 mL/min/1 73 square meters)  •  Stage 5 End Stage CKD (GFR <15 mL/min/1 73 square meters)  Note: GFR calculation is accurate only with a steady state creatinine    Magnesium [[id number]]  (Normal) Collected: 05/30/23 1030    Lab Status: Final result Specimen: Blood from Arm, Right Updated: 05/30/23 1107     Magnesium 2 5 mg/dL     CBC and differential [[id number]]  (Abnormal) Collected: 05/30/23 1030    Lab Status: Final result Specimen: Blood from Arm, Right Updated: 05/30/23 1034     WBC 10 34 Thousand/uL      RBC 5 20 Million/uL      Hemoglobin 15 3 g/dL      Hematocrit 47 7 %      MCV 92 fL      MCH 29 4 pg      MCHC 32 1 g/dL      RDW 12 6 %      MPV 10 6 fL      Platelets 025 Thousands/uL      nRBC 0 /100 WBCs      Neutrophils Relative 78 %      Immat GRANS % 0 %      Lymphocytes Relative 14 %      Monocytes Relative 7 %      Eosinophils Relative 1 %      Basophils Relative 0 %      Neutrophils Absolute 8 03 Thousands/µL      Immature Grans Absolute 0 03 Thousand/uL      Lymphocytes Absolute 1 45 Thousands/µL      Monocytes Absolute 0 74 Thousand/µL      Eosinophils Absolute 0 08 Thousand/µL      Basophils Absolute 0 01 Thousands/µL                  CT Abdomen pelvis with contrast   Final Result by Yara Lassiter MD (05/30 1222)      Persistent advanced focal inflammatory changes of the proximal sigmoid colon at the site of recently suspected acute colonic diverticulitis  Colonic malignancy is part of the differential diagnosis  Extensive colonic intramural fluid at the site of inflammation in keeping with and intramural abscess   This measures a maximum of 4 9 x 4 8 cm on #3/132 and is contiguous with a 2 2 cm paracolonic abscess on the same image  The study was marked in [facility] for immediate notification  Workstation performed: [id number]         IR drainage tube placement    (Results Pending)   IR drainage tube check/change/reposition/reinsertion/upsize    (Results Pending)              Procedures  Procedures  Conscious Sedation Assessment    Flowsheet Row Classification Score   ASA Scale Assessment 2-Mild to moderate systemic disease, medically well controlled, with no functional limitation filed at 05/30/2023 1735   Mallampati Classification Class II: soft palate, uvula, fauces visible - No Difficulty filed at 05/30/2023 1735             ED Course  ED Course as of 05/30/23 2258   Tue May 30, 2023   1113 Pt to CT                                             Medical Decision Making  DDx: anemia, electrolyte abnormality, diverticulitis with abscess   Patient arriving today with return of symptoms after resolution including abdominal pain, bloody BMs  Patient is tender in his right and left lower quadrants, worse in the right  Patient declines pain medication  Due to return of symptoms will repeat CT scan a/p  Patient has a very mild leukocytosis, no anemia, no chaitanya, no electrolyte abnormality  CT findings: Persistent advanced focal inflammatory changes of the proximal sigmoid colon at the site of recently suspected acute colonic diverticulitis  Colonic malignancy is part of the differential diagnosis  Extensive colonic intramural fluid at the site of inflammation in keeping with and intramural abscess  This measures a maximum of 4 9 x 4 8 cm on #3/132 and is contiguous with a 2 2 cm paracolonic abscess on the same image  Results discussed with patient  Patient continued to deny need for pain medication  Patient stable throughout ED evaluation as he has no hypotension, no tachycardia, no fever     These findings were discussed with Dr Sil Spears and general surgery AP with recommendation for admission to general surgery service, reporting patient to go to IR for procedure  Blood in stool: acute illness or injury  Diverticulitis of intestine with abscess: acute illness or injury  Diverticulitis: acute illness or injury  Amount and/or Complexity of Data Reviewed  Labs: ordered  Radiology: ordered  Risk  Prescription drug management  Decision regarding hospitalization  Disposition  Final diagnoses:   Diverticulitis   Diverticulitis of intestine with abscess   Blood in stool     Time reflects when diagnosis was documented in both MDM as applicable and the Disposition within this note     Time User Action Codes Description Comment    5/30/2023  1:34 PM Lyssa Enamorado Add [K57 20] Colonic diverticular abscess     5/30/2023  1:41 PM Brenda Madrigal Add [K57 92] Diverticulitis     5/30/2023  1:42 PM Brenda Madrigal Add [K57 80] Diverticulitis of intestine with abscess     5/30/2023  1:42 PM Brenda Trevizo [K92 1] Blood in stool       ED Disposition     ED Disposition   Admit    Condition   Stable    Date/Time   Tue May 30, 2023  1:41 PM    Comment   Case was discussed with Dr Kiesha Joaquin and the patient's admission status was agreed to be Admission Status: inpatient status to the service of Dr Ventura Orellana   Follow-up Information    None         Current Discharge Medication List      START taking these medications    Details   sodium chloride, PF, 0 9 % 10 mL by Intracatheter route daily for 120 doses Intracatheter flushing daily  May substitute prefilled syringe with normal saline 10 mL vials, 10 mL syringes, and 18 g blunt needles  Qty: 300 mL, Refills: 3    Associated Diagnoses: Colonic diverticular abscess             Outpatient Discharge Orders   IR drainage tube check/change/reposition/reinsertion/upsize   Standing Status: Future Standing Exp   Date: 05/30/27     Drainage Tube Home Care - (Does not include  Asept/Tenkhoff/PD catheters and G/GJ tubes)       PDMP Review     None          ED Provider  Electronically Signed by           DEBBIE Juarez  05/30/23 9382

## 2023-05-30 NOTE — DISCHARGE INSTRUCTIONS
"[facility]  Interventional Radiology  [phone] after your procedure:    Resume your normal diet  Small sips of flat soda will help with nausea  1  The properly functioning catheter should be forward flushed once (1x) daily with 10ml of normal saline using clean technique  You will be given a prescription for flushes  To flush the tube, clean both connections with alcohol swab  Twist off the drainage bag/ bulb  tubing and twist the saline syringe into the drainage tube and flush  Remove the syringe and twist the drainage bag / bulb tubing tubing back on     2  The drainage bag/bulb may be emptied as necessary  Keep a record of the amount of fluid you drain from your tube  This should be done with clean technique as well  3  A fresh dressing should be applied daily over the tube insertion site  4  As the tube is secured to the skin with only a suture,try not to pull on your tube  Tub baths are not permitted  Showers are permitted if the patient's skin entry site is prevented from getting wet  Similarly, washcloth \"baths\" are acceptable  Contact Interventional Radiology at [phone] Gio PATIENTS: Contact Interventional Radiology at [phone]) Jc Patel PATIENTS: Contact Interventional Radiology at [phone]) if:    1  Leakage or large amounts of liquid around the catheter  2  Fever of 101 degrees lasting several hours without other obvious cause (such as sore throat, flu, etc)  3  Persistent nausea or vomiting  4  Diminished drainage, which may be associated with pressure or pain  Or when the     drainage from your tube is less than 10mls for 48 hours  5  Catheter pulled back or falls out  The following pharmacies carry the flush syringes         [facility]                     [place] Salas [address] " [address]  Phone [phone]            Phone [phone] [address] [facility]                                [phone] [address] [facility] [place]                      [place]  Phone [phone]            Phone [phone]                      Michael Schroeder                                                                                                          [phone]  [facility]  [facility] [address]    [address]  Phone [phone]

## 2023-05-30 NOTE — BRIEF OP NOTE (RAD/CATH)
INTERVENTIONAL RADIOLOGY PROCEDURE NOTE    Date: 5/30/2023    Procedure:   Procedure Summary     Date:  Room / Location:     Anesthesia Start:  Anesthesia Stop:     Procedure:  Diagnosis:     Scheduled Providers:  Responsible Provider:     Anesthesia Type: Not recorded ASA Status: Not recorded          Preoperative diagnosis:   1  Colonic diverticular abscess    2  Diverticulitis    3  Diverticulitis of intestine with abscess    4  Blood in stool         Postoperative diagnosis: Same  Surgeon: Dayana Vera MD     Assistant: None  No qualified resident was available  Blood loss: Minimal    Specimens:     3cc hemorrhagic/purulent  Findings:     CT guided 10F drain placement within a sigmoid diverticular abscess  The wire / drain easily slipped into bowel, indicating a wide fistula  Minimal aspirate able to be obtained  Complications: None immediate      Anesthesia: conscious sedation

## 2023-05-31 LAB
ANION GAP SERPL CALCULATED.3IONS-SCNC: 7 MMOL/L (ref 4–13)
BUN SERPL-MCNC: 9 MG/DL (ref 5–25)
CALCIUM SERPL-MCNC: 8.6 MG/DL (ref 8.4–10.2)
CHLORIDE SERPL-SCNC: 101 MMOL/L (ref 96–108)
CO2 SERPL-SCNC: 27 MMOL/L (ref 21–32)
CREAT SERPL-MCNC: 0.89 MG/DL (ref 0.6–1.3)
ERYTHROCYTE [DISTWIDTH] IN BLOOD BY AUTOMATED COUNT: 12.5 % (ref 11.6–15.1)
GFR SERPL CREATININE-BSD FRML MDRD: 106 ML/MIN/1.73SQ M
GLUCOSE SERPL-MCNC: 90 MG/DL (ref 65–140)
HCT VFR BLD AUTO: 46.5 % (ref 36.5–49.3)
HGB BLD-MCNC: 15.1 G/DL (ref 12–17)
MCH RBC QN AUTO: 29.8 PG (ref 26.8–34.3)
MCHC RBC AUTO-ENTMCNC: 32.5 G/DL (ref 31.4–37.4)
MCV RBC AUTO: 92 FL (ref 82–98)
PLATELET # BLD AUTO: 247 THOUSANDS/UL (ref 149–390)
PMV BLD AUTO: 10.7 FL (ref 8.9–12.7)
POTASSIUM SERPL-SCNC: 3.8 MMOL/L (ref 3.5–5.3)
RBC # BLD AUTO: 5.07 MILLION/UL (ref 3.88–5.62)
SODIUM SERPL-SCNC: 135 MMOL/L (ref 135–147)
WBC # BLD AUTO: 9.32 THOUSAND/UL (ref 4.31–10.16)

## 2023-05-31 PROCEDURE — 85027 COMPLETE CBC AUTOMATED: CPT | Performed by: PHYSICIAN ASSISTANT

## 2023-05-31 PROCEDURE — 99232 SBSQ HOSP IP/OBS MODERATE 35: CPT | Performed by: SURGERY

## 2023-05-31 PROCEDURE — 80048 BASIC METABOLIC PNL TOTAL CA: CPT | Performed by: PHYSICIAN ASSISTANT

## 2023-05-31 RX ORDER — KETOROLAC TROMETHAMINE 30 MG/ML
15 INJECTION, SOLUTION INTRAMUSCULAR; INTRAVENOUS EVERY 6 HOURS SCHEDULED
Status: DISCONTINUED | OUTPATIENT
Start: 2023-05-31 | End: 2023-06-01 | Stop reason: HOSPADM

## 2023-05-31 RX ADMIN — PIPERACILLIN AND TAZOBACTAM 4.5 G: 4; .5 INJECTION, POWDER, FOR SOLUTION INTRAVENOUS at 10:27

## 2023-05-31 RX ADMIN — PIPERACILLIN AND TAZOBACTAM 4.5 G: 4; .5 INJECTION, POWDER, FOR SOLUTION INTRAVENOUS at 21:28

## 2023-05-31 RX ADMIN — PIPERACILLIN AND TAZOBACTAM 4.5 G: 4; .5 INJECTION, POWDER, FOR SOLUTION INTRAVENOUS at 03:33

## 2023-05-31 RX ADMIN — SODIUM CHLORIDE, SODIUM LACTATE, POTASSIUM CHLORIDE, AND CALCIUM CHLORIDE 125 ML/HR: .6; .31; .03; .02 INJECTION, SOLUTION INTRAVENOUS at 02:12

## 2023-05-31 RX ADMIN — KETOROLAC TROMETHAMINE 15 MG: 30 INJECTION, SOLUTION INTRAMUSCULAR; INTRAVENOUS at 18:07

## 2023-05-31 RX ADMIN — SODIUM CHLORIDE, SODIUM LACTATE, POTASSIUM CHLORIDE, AND CALCIUM CHLORIDE 75 ML/HR: .6; .31; .03; .02 INJECTION, SOLUTION INTRAVENOUS at 12:12

## 2023-05-31 RX ADMIN — PIPERACILLIN AND TAZOBACTAM 4.5 G: 4; .5 INJECTION, POWDER, FOR SOLUTION INTRAVENOUS at 16:33

## 2023-05-31 RX ADMIN — KETOROLAC TROMETHAMINE 15 MG: 30 INJECTION, SOLUTION INTRAMUSCULAR; INTRAVENOUS at 12:10

## 2023-05-31 NOTE — PLAN OF CARE
Problem: INFECTION - ADULT  Goal: Absence or prevention of progression during hospitalization  Description: INTERVENTIONS:  - Assess and monitor for signs and symptoms of infection  - Monitor lab/diagnostic results  - Monitor all insertion sites, i e  indwelling lines, tubes, and drains  - Monitor endotracheal if appropriate and nasal secretions for changes in amount and color  - [place] appropriate cooling/warming therapies per order  - Administer medications as ordered  - Instruct and encourage patient and family to use good hand hygiene technique  - Identify and instruct in appropriate isolation precautions for identified infection/condition  Monitor lab values and administer antibiotic as ordered  Outcome: Progressing  Goal: Absence of fever/infection during neutropenic period  Description: INTERVENTIONS:  - Monitor WBC    Outcome: Progressing

## 2023-05-31 NOTE — UTILIZATION REVIEW
NOTIFICATION OF INPATIENT ADMISSION   AUTHORIZATION REQUEST   SERVICING FACILITY:   [facility] [address]  [address] [facility] AFFILIATED WITH [facility], [address]  Tax ID: [id number]  NPI: [id number]   ATTENDING PROVIDER:  Attending Name and NPI#: Tran Mo Md [[id number]]  Address: [address] [place] [address] AFFILIATED WITH [facility], [address]  Phone: [phone]     ADMISSION INFORMATION:  Place of Service: Inpatient [address] [facility]  [address]  Place of Service Code: 21  Inpatient Admission Date/Time: 5/30/23  1:43 PM  Discharge Date/Time: No discharge date for patient encounter  Admitting Diagnosis Code/Description:  Blood in stool [K92 1]  Diverticulitis [K57 92]  Rectal bleeding [K62 5]  Colonic diverticular abscess [K57 20]  Diverticulitis of intestine with abscess [K57 80]     UTILIZATION REVIEW CONTACT:  Sowmya Hernández Utilization   Network Utilization Review Department  Phone: [phone]  Fax [phone]  Email: [email or web address]  Contact for approvals/pending authorizations, clinical reviews, and discharge  PHYSICIAN ADVISORY SERVICES:  Medical Necessity Denial & [employer] Review  Phone: [phone]  Fax: [phone]  Email: [email or web address]

## 2023-05-31 NOTE — UTILIZATION REVIEW
Initial Clinical Review    Admission: Date/Time/Statement:   Admission Orders (From admission, onward)     Ordered        05/30/23 1342  INPATIENT ADMISSION  Once                      Orders Placed This Encounter   Procedures   • INPATIENT ADMISSION     Standing Status:   Standing     Number of Occurrences:   1     Order Specific Question:   Level of Care     Answer:   Med Surg [16]     Order Specific Question:   Estimated length of stay     Answer:   More than 2 Midnights     Order Specific Question:   Certification     Answer:   I certify that inpatient services are medically necessary for this patient for a duration of greater than two midnights  See H&P and MD Progress Notes for additional information about the patient's course of treatment  ED Arrival Information     Expected   -    Arrival   5/30/2023 09:09    Acuity   Urgent            Means of arrival   Walk-In    Escorted by   Spouse    Service   Surgery-General    Admission type   Emergency            Arrival complaint   bloody stools           Chief Complaint   Patient presents with   • Rectal Bleeding       Initial Presentation: 38 y o  male to the ED from home with complaints of rectal bleeding  Admitted to inpatient for diverticulitis of large intenstine with abscess and bleeding  Diagnosed with diverticulitis the week prior, completed his abx  Bleeding started when he changed from clear liquid to regular diet  Arrives with lower abdominal pain  CT a/p shows diverticulitis, intramural abscess  IR consult for drainage  IN the ED, given IV fentanyl, started on iv fluids, iv abx  hgb stable  BRIEF OP note:  5/30    Findings:     CT guided 10F drain placement within a sigmoid diverticular abscess  The wire / drain easily slipped into bowel, indicating a wide fistula  Minimal aspirate able to be obtained       Date: 5/31   Day 2:   Continue ir drain to bulb suction  Continue iv abx  Start toradol  Hold heparin  hgb stable  Pain controlled  Abdominal exam:Flat and soft, normoactive bowel sounds, mild-moderate TTP in lower abdomen  Drain in place with scant output  ED Triage Vitals   Temperature Pulse Respirations Blood Pressure SpO2   05/30/23 0919 05/30/23 0919 05/30/23 0919 05/30/23 0919 05/30/23 0919   98 2 °F (36 8 °C) 80 16 158/83 98 %      Temp Source Heart Rate Source Patient Position - Orthostatic VS BP Location FiO2 (%)   05/30/23 0919 05/30/23 0919 05/30/23 1201 05/30/23 0919 --   Oral Monitor Sitting Right arm       Pain Score       05/30/23 0955       3          Wt Readings from Last 1 Encounters:   05/30/23 86 1 kg (189 lb 13 1 oz)     Additional Vital Signs:   Date/Time Temp Pulse Resp BP MAP (mmHg) SpO2 O2 Device Patient Position - Orthostatic VS   05/31/23 07:32:43 99 4 °F (37 4 °C) 75 17 133/86 102 96 % -- --   05/31/23 03:21:41 98 4 °F (36 9 °C) 71 18 132/85 101 96 % -- --   05/30/23 23:27:59 98 1 °F (36 7 °C) 69 18 140/80 100 96 % -- --   05/30/23 2017 -- -- -- -- -- -- None (Room air) --   05/30/23 19:21:47 97 6 °F (36 4 °C) 71 19 138/93 108 97 % None (Room air) Lying   05/30/23 1815 -- 71 18 127/70 93 97 % None (Room air) --   05/30/23 1810 -- 70 18 127/70 93 96 % None (Room air) --   05/30/23 1805 -- 71 18 121/59 84 97 % None (Room air) --   05/30/23 1800 -- 73 20 122/63 88 95 % None (Room air) --   05/30/23 1755 -- 73 16 122/63 88 97 % None (Room air) --   05/30/23 1750 -- 75 17 123/67 90 98 % None (Room air) --   05/30/23 1745 -- 70 17 131/72 96 99 % None (Room air) --   05/30/23 1740 -- 71 18 131/72 -- 99 % None (Room air) --   05/30/23 1400 -- 77 18 120/77 94 96 % -- --   05/30/23 1201 -- 76 18 125/75 -- 98 % None (Room air) Sitting   05/30/23 0919 98 2 °F (36 8 °C) 80 16 158/83 -- 98 % None (Room air) --       Pertinent Labs/Diagnostic Test Results:     IR drainage tube placement   Final Result by Steffi Lugo MD (05/31 4514)      CT-guided abscess drainage catheter placement        PLAN:      Note that the drain has traversed through the adjacent collection and entered the bowel  This is likely due to a large defect in the bowel wall, as the wire easily traversed the abscess to enter the bowel  This was discussed with Dr Feng Hartman from    surgery  Workstation performed: [id number]         CT Abdomen pelvis with contrast   Final Result by Majel Cowden, MD (05/30 1222)      Persistent advanced focal inflammatory changes of the proximal sigmoid colon at the site of recently suspected acute colonic diverticulitis  Colonic malignancy is part of the differential diagnosis  Extensive colonic intramural fluid at the site of inflammation in keeping with and intramural abscess  This measures a maximum of 4 9 x 4 8 cm on #3/132 and is contiguous with a 2 2 cm paracolonic abscess on the same image  The study was marked in [facility] for immediate notification              Workstation performed: [id number]         IR drainage tube check/change/reposition/reinsertion/upsize    (Results Pending)         Results from last 7 days   Lab Units 05/31/23  0506 05/30/23  1951 05/30/23  1030   HEMATOCRIT % 46 5 44 3 47 7   HEMOGLOBIN g/dL 15 1 14 5 15 3   NEUTROS ABS Thousands/µL  --   --  8 03*   PLATELETS Thousands/uL 247  --  265   WBC Thousand/uL 9 32  --  10 34*         Results from last 7 days   Lab Units 05/31/23  0506 05/30/23  1030   ANION GAP mmol/L 7 6   BUN mg/dL 9 8   CALCIUM mg/dL 8 6 9 4   CHLORIDE mmol/L 101 99   CO2 mmol/L 27 31   CREATININE mg/dL 0 89 0 91   EGFR ml/min/1 73sq m 106 105   POTASSIUM mmol/L 3 8 4 3   MAGNESIUM mg/dL  --  2 5   SODIUM mmol/L 135 136     Results from last 7 days   Lab Units 05/30/23  1030   ALBUMIN g/dL 4 3   ALK PHOS U/L 89   ALT U/L 18   AST U/L 16   TOTAL BILIRUBIN mg/dL 0 51   TOTAL PROTEIN g/dL 7 5         Results from last 7 days   Lab Units 05/31/23  0506 05/30/23  1030   GLUCOSE RANDOM mg/dL 90 103             Results from last 7 days   Lab Units 05/30/23  1819   GRAM STAIN RESULT  3+ Polys*  2+ Gram positive rods*  2+ Gram positive cocci in pairs*     ED Treatment:   Medication Administration from 05/30/2023 0909 to 05/30/2023 1914       Date/Time Order Dose Route Action     05/30/2023 1029 EDT sodium chloride 0 9 % bolus 1,000 mL 1,000 mL Intravenous New Bag     05/30/2023 1609 EDT lactated ringers infusion 125 mL/hr Intravenous New Bag     05/30/2023 1543 EDT piperacillin-tazobactam (ZOSYN) IVPB 4 5 g 4 5 g Intravenous New Bag     05/30/2023 1754 EDT midazolam (VERSED) injection 1 mg Intravenous Given     05/30/2023 1747 EDT midazolam (VERSED) injection 1 mg Intravenous Given     05/30/2023 1754 EDT fentanyl citrate (PF) 100 MCG/2ML 50 mcg Intravenous Given     05/30/2023 1747 EDT fentanyl citrate (PF) 100 MCG/2ML 50 mcg Intravenous Given     05/30/2023 1751 EDT lidocaine (PF) (XYLOCAINE-MPF) 1 % injection 10 mL Infiltration Given        History reviewed  No pertinent past medical history  Present on Admission:  • Diverticulitis of large intestine with abscess with bleeding      Admitting Diagnosis: Blood in stool [K92 1]  Diverticulitis [K57 92]  Rectal bleeding [K62 5]  Colonic diverticular abscess [K57 20]  Diverticulitis of intestine with abscess [K57 80]  Age/Sex: 38 y o  male  Admission Orders:  SCDs   up and oob  Clear liquid diet  Scheduled Medications:  ketorolac, 15 mg, Intravenous, Q6H [address]  nicotine, 1 patch, Transdermal, Daily  piperacillin-tazobactam, 4 5 g, Intravenous, Q6H      Continuous IV Infusions:  lactated ringers, 75 mL/hr, Intravenous, Continuous      PRN Meds:  ondansetron, 4 mg, Intravenous, Q6H PRN        INPATIENT CONSULT TO IR  IP CONSULT TO CASE MANAGEMENT    Network Utilization Review Department  ATTENTION: Please call with any questions or concerns to [phone] and carefully listen to the prompts so that you are directed to the right person   All voicemails are confidential   Martjackie Rain all requests for admission clinical reviews, approved or denied determinations and any other requests to dedicated fax number below belonging to the campus where the patient is receiving treatment   List of dedicated fax numbers for the Facilities:  [address] [place] [address] DENIALS (Administrative/Medical Necessity) [phone]   [address] (Maternity/NICU/Pediatrics) [phone]   [address] [phone]   [facility] [address] [phone]   [phone]   [address] [facility] 134 815 [facility] [phone]

## 2023-05-31 NOTE — PLAN OF CARE
Problem: PAIN - ADULT  Goal: Verbalizes/displays adequate comfort level or baseline comfort level  Description: Interventions:  - Encourage patient to monitor pain and request assistance  - Assess pain using appropriate pain scale  - Administer analgesics based on type and severity of pain and evaluate response  - Implement non-pharmacological measures as appropriate and evaluate response  - Consider cultural and social influences on pain and pain management  - Notify physician/advanced practitioner if interventions unsuccessful or patient reports new pain  Outcome: Progressing     Problem: INFECTION - ADULT  Goal: Absence or prevention of progression during hospitalization  Description: INTERVENTIONS:  - Assess and monitor for signs and symptoms of infection  - Monitor lab/diagnostic results  - Monitor all insertion sites, i e  indwelling lines, tubes, and drains  - Monitor endotracheal if appropriate and nasal secretions for changes in amount and color  - [place] appropriate cooling/warming therapies per order  - Administer medications as ordered  - Instruct and encourage patient and family to use good hand hygiene technique  - Identify and instruct in appropriate isolation precautions for identified infection/condition  Outcome: Progressing  Goal: Absence of fever/infection during neutropenic period  Description: INTERVENTIONS:  - Monitor WBC    Outcome: Progressing     Problem: SAFETY ADULT  Goal: Patient will remain free of falls  Description: INTERVENTIONS:  - Educate patient/family on patient safety including physical limitations  - Instruct patient to call for assistance with activity   - Consult OT/PT to assist with strengthening/mobility   - Keep Call bell within reach  - Keep bed low and locked with side rails adjusted as appropriate  - Keep care items and personal belongings within reach  - Initiate and maintain comfort rounds  - Make Fall Risk Sign visible to staff  - Offer Toileting every 2 Hours, in advance of need  - Apply yellow socks and bracelet for high fall risk patients  - Consider moving patient to room near nurses station  Outcome: Progressing  Goal: Maintain or return to baseline ADL function  Description: INTERVENTIONS:  -  Assess patient's ability to carry out ADLs; assess patient's baseline for ADL function and identify physical deficits which impact ability to perform ADLs (bathing, care of mouth/teeth, toileting, grooming, dressing, etc )  - Assess/evaluate cause of self-care deficits   - Assess range of motion  - Assess patient's mobility; develop plan if impaired  - Assess patient's need for assistive devices and provide as appropriate  - Encourage maximum independence but intervene and supervise when necessary  - Involve family in performance of ADLs  - Assess for home care needs following discharge   - Consider OT consult to assist with ADL evaluation and planning for discharge  - Provide patient education as appropriate  Outcome: Progressing  Goal: Maintains/Returns to pre admission functional level  Description: INTERVENTIONS:  - Perform BMAT or MOVE assessment daily    - Set and communicate daily mobility goal to care team and patient/family/caregiver  - Collaborate with rehabilitation services on mobility goals if consulted  - Perform Range of Motion 3 times a day  - Reposition patient every 2 hours    - Dangle patient 3 times a day  - Stand patient 3 times a day  - Ambulate patient 3 times a day  - Out of bed to chair 3 times a day   - Out of bed for meals 3 times a day  - Out of bed for toileting  - Record patient progress and toleration of activity level   Outcome: Progressing     Problem: DISCHARGE PLANNING  Goal: Discharge to home or other facility with appropriate resources  Description: INTERVENTIONS:  - Identify barriers to discharge w/patient and caregiver  - Arrange for needed discharge resources and transportation as appropriate  - Identify discharge learning needs (meds, wound care, etc )  - Arrange for interpretive services to assist at discharge as needed  - Refer to Case Management Department for coordinating discharge planning if the patient needs post-hospital services based on physician/advanced practitioner order or complex needs related to functional status, cognitive ability, or social support system  Outcome: Progressing     Problem: Knowledge Deficit  Goal: Patient/family/caregiver demonstrates understanding of disease process, treatment plan, medications, and discharge instructions  Description: Complete learning assessment and assess knowledge base    Interventions:  - Provide teaching at level of understanding  - Provide teaching via preferred learning methods  Outcome: Progressing

## 2023-05-31 NOTE — CASE MANAGEMENT
Case Management Assessment & Discharge Planning Note    Patient name Manju Peacock  Location /-89 MRN [id number]  : 1980 Date 2023       Current Admission Date: 2023  Current Admission Diagnosis:Diverticulitis of large intestine with abscess with bleeding   Patient Active Problem List    Diagnosis Date Noted   • Diverticulitis of large intestine with abscess with bleeding 2023      LOS (days): 1  Geometric Mean LOS (GMLOS) (days):   Days to GMLOS:     OBJECTIVE:    Risk of Unplanned Readmission Score: 4 4         Current admission status: Inpatient  Referral Reason: Other    Preferred Pharmacy:   [address] [place] [address] [address] [facility] [place], [facility] 2  [address] [facility] [address] [facility] [address]  Phone: [phone] Fax: [phone]    Primary Care Provider: No primary care provider on file  Primary Insurance: ERWIN MCNALLY  Secondary Insurance:     ASSESSMENT:  [place] Proxies    There are no active Health Care Proxies on file  Readmission Root Cause  30 Day Readmission: No    Patient Information  Admitted from[de-identified] Home  Mental Status: Alert  During Assessment patient was accompanied by: Not accompanied during assessment  Assessment information provided by[de-identified] Patient  Primary Caregiver: Self  Support Systems: Spouse/significant other  [place] of Residence: Other (specify in comment box) (Oniel)  What city do you live in?: [facility] entry access options   Select all that apply : Stairs  Type of Current Residence: 2 story home  In the last 12 months, was there a time when you were not able to pay the mortgage or rent on time?: No  In the last 12 months, how many places have you lived?: 1  In the last 12 months, was there a time when you did not have a steady place to sleep or slept in a shelter (including now)?: No  Homeless/housing insecurity resource given?: N/A  Living Arrangements: Lives w/ Spouse/significant other  Is patient a ?: No    Activities of Daily Living Prior to Admission  Functional Status: Independent  Completes ADLs independently?: Yes  Ambulates independently?: Yes  Does patient use assisted devices?: No  Does patient currently own DME?: No  Does patient have a history of Outpatient Therapy (PT/OT)?: No  Does the patient have a history of Short-Term Rehab?: No  Does patient have a history of HHC?: No  [facility] No         Patient Information Continued  Income Source: Employed  Does patient have prescription coverage?: Yes  Within the past 12 months, you worried that your food would run out before you got the money to buy more : Never true  Within the past 12 months, the food you bought just didn't last and you didn't have money to get more : Never true  Food insecurity resource given?: N/A  Does patient receive dialysis treatments?: No  Does patient have a history of substance abuse?: No  Does patient have a history of Mental Health Diagnosis?: No         Means of Transportation  Means of Transport to Appts[de-identified] Drives Self  In the past 12 months, has lack of transportation kept you from medical appointments or from getting medications?: No  In the past 12 months, has lack of transportation kept you from meetings, work, or from getting things needed for daily living?: No  Was application for public transport provided?: N/A        DISCHARGE DETAILS:    Discharge planning discussed with[de-identified] Patient  Freedom of Choice: Yes     CM contacted family/caregiver?: Yes  Were Treatment Team discharge recommendations reviewed with patient/caregiver?: Yes  Did patient/caregiver verbalize understanding of patient care needs?: Yes  Were patient/caregiver advised of the risks associated with not following Treatment Team discharge recommendations?: Yes    Contacts  Patient Contacts: Jerzy Bass - spouse  Relationship to Patient[de-identified] Family  Contact Method:  In Person  Reason/Outcome: Discharge [address]         Is the patient interested in [facility] at discharge?: Yes  [address] requested[de-identified] [facility] Provider[de-identified] PCP  Home Health Services Needed[de-identified] Other (comment) (drain care)  Homebound Criteria Met[de-identified] Requires the Assistance of Another Person for Safe Ambulation or to Leave the Home  Supporting Clincal Findings[de-identified] Fatigues Easliy in [employer], [employer] Endurance    DME Referral Provided  Referral made for DME?: No              Treatment Team Recommendation: Home with [address]  Discharge Destination Plan[de-identified] Home with [place] at Discharge : Family (Spouse)                                      Additional Comments: CM received consult from surgery for [facility] for drain care  CM met with pt and spouse at bedside  Both agreeable to [facility]  Further agreeable to blanket referrals  Referral sent as requested  Awaiting accepting agency

## 2023-05-31 NOTE — PROGRESS NOTES
"[facility]  Progress Note  Name: Sally Beaver  MRN: [id number]  Unit/Bed#: -01 I Date of Admission: 5/30/2023   Date of Service: 5/31/2023 I Hospital Day: 1    Assessment/Plan   * Diverticulitis of large intestine with abscess with bleeding  Assessment & Plan  39 yo M POD 1 s/p IR drain placement for diverticular abscess  Pain ongoing, but controlled  Abdomen soft with moderate TTP in lower abdomen, drain with scant output  AFVSS  WBC 9 32, Hgb 15 1    Assessment:  Acute sigmoid diverticulitis with abscess  Lower GI bleed suspect 2/2 diverticulitis  Plan:  Hemodynamically stable without evidence of large volume GI bleed  Continue IR drain to bulb suction, flush tube per IR instruction  Continue Zosyn day#2, bowel rest, pain control, and close observation  Will start Toradol today for anti-inflammatory properties, monitor for bleeding  Hold on Heparin for now  Subjective/Objective   Chief Complaint: pain ongoing    Subjective: ongoing mild-moderate pain, no N/V, no further BM since admission    Objective: IR drain last night    Blood pressure 133/86, pulse 75, temperature 99 4 °F (37 4 °C), resp  rate 17, height 5' 7\" (1 702 m), weight 86 1 kg (189 lb 13 1 oz), SpO2 96 %  ,Body mass index is 29 73 kg/m²  Intake/Output Summary (Last 24 hours) at 5/31/2023 0906  Last data filed at 5/31/2023 0810  Gross per 24 hour   Intake 1261 25 ml   Output 1010 ml   Net 251 25 ml       Invasive Devices     Peripheral Intravenous Line  Duration           Peripheral IV 05/30/23 Distal;Right;Upper;Ventral (anterior) Arm <1 day          Drain  Duration           Abscess Drain LLQ <1 day                Physical Exam  Vitals and nursing note reviewed  Constitutional:       General: He is not in acute distress  Appearance: He is well-developed  He is not diaphoretic  HENT:      Head: Normocephalic and atraumatic     Eyes:      Conjunctiva/sclera: Conjunctivae " normal       Pupils: Pupils are equal, round, and reactive to light  Pulmonary:      Effort: No respiratory distress  Abdominal:      Comments: Flat and soft, normoactive bowel sounds, mild-moderate TTP in lower abdomen  Drain in place with scant output  Genitourinary:     Comments: External rectal exam normal   Musculoskeletal:         General: Normal range of motion  Cervical back: Normal range of motion  Skin:     General: Skin is warm and dry  Capillary Refill: Capillary refill takes less than 2 seconds  Neurological:      Mental Status: He is alert and oriented to person, place, and time  Psychiatric:         Behavior: Behavior normal            Lab, Imaging and other studies:  I have personally reviewed pertinent lab results    , CBC:   Lab Results   Component Value Date    HCT 46 5 05/31/2023    HGB 15 1 05/31/2023    MCH 29 8 05/31/2023    MCHC 32 5 05/31/2023    MCV 92 05/31/2023    MPV 10 7 05/31/2023    NRBC 0 05/30/2023     05/31/2023    RBC 5 07 05/31/2023    RDW 12 5 05/31/2023    WBC 9 32 05/31/2023   , CMP:   Lab Results   Component Value Date    ALKPHOS 89 05/30/2023    ALT 18 05/30/2023    AST 16 05/30/2023    BUN 9 05/31/2023    CALCIUM 8 6 05/31/2023     05/31/2023    CO2 27 05/31/2023    CREATININE 0 89 05/31/2023    EGFR 106 05/31/2023    K 3 8 05/31/2023    SODIUM 135 05/31/2023     VTE Pharmacologic Prophylaxis: Reason for no pharmacologic prophylaxis GI bleed  VTE Mechanical Prophylaxis: sequential compression device

## 2023-05-31 NOTE — ASSESSMENT & PLAN NOTE
37 yo M POD 1 s/p IR drain placement for diverticular abscess  Pain ongoing, but controlled  Abdomen soft with moderate TTP in lower abdomen, drain with scant output  AFVSS  WBC 9 32, Hgb 15 1    Assessment:  Acute sigmoid diverticulitis with abscess  Lower GI bleed suspect 2/2 diverticulitis  Plan:  Hemodynamically stable without evidence of large volume GI bleed  Continue IR drain to bulb suction, flush tube per IR instruction  Continue Zosyn day#2, bowel rest, pain control, and close observation  Will start Toradol today for anti-inflammatory properties, monitor for bleeding  Hold on Heparin for now

## 2023-06-01 VITALS
HEIGHT: 67 IN | SYSTOLIC BLOOD PRESSURE: 120 MMHG | HEART RATE: 71 BPM | DIASTOLIC BLOOD PRESSURE: 78 MMHG | WEIGHT: 189.82 LBS | TEMPERATURE: 98.1 F | RESPIRATION RATE: 18 BRPM | OXYGEN SATURATION: 94 % | BODY MASS INDEX: 29.79 KG/M2

## 2023-06-01 LAB
ANION GAP SERPL CALCULATED.3IONS-SCNC: 8 MMOL/L (ref 4–13)
BACTERIA SPEC BFLD CULT: ABNORMAL
BUN SERPL-MCNC: 8 MG/DL (ref 5–25)
CALCIUM SERPL-MCNC: 8.5 MG/DL (ref 8.4–10.2)
CHLORIDE SERPL-SCNC: 103 MMOL/L (ref 96–108)
CO2 SERPL-SCNC: 27 MMOL/L (ref 21–32)
CREAT SERPL-MCNC: 0.99 MG/DL (ref 0.6–1.3)
ERYTHROCYTE [DISTWIDTH] IN BLOOD BY AUTOMATED COUNT: 12.4 % (ref 11.6–15.1)
GFR SERPL CREATININE-BSD FRML MDRD: 94 ML/MIN/1.73SQ M
GLUCOSE SERPL-MCNC: 90 MG/DL (ref 65–140)
GRAM STN SPEC: ABNORMAL
HCT VFR BLD AUTO: 42.6 % (ref 36.5–49.3)
HGB BLD-MCNC: 14 G/DL (ref 12–17)
MCH RBC QN AUTO: 30 PG (ref 26.8–34.3)
MCHC RBC AUTO-ENTMCNC: 32.9 G/DL (ref 31.4–37.4)
MCV RBC AUTO: 91 FL (ref 82–98)
PLATELET # BLD AUTO: 228 THOUSANDS/UL (ref 149–390)
PMV BLD AUTO: 11 FL (ref 8.9–12.7)
POTASSIUM SERPL-SCNC: 3.8 MMOL/L (ref 3.5–5.3)
RBC # BLD AUTO: 4.66 MILLION/UL (ref 3.88–5.62)
SODIUM SERPL-SCNC: 138 MMOL/L (ref 135–147)
WBC # BLD AUTO: 8.95 THOUSAND/UL (ref 4.31–10.16)

## 2023-06-01 PROCEDURE — 99232 SBSQ HOSP IP/OBS MODERATE 35: CPT | Performed by: SURGERY

## 2023-06-01 PROCEDURE — NC001 PR NO CHARGE: Performed by: SURGERY

## 2023-06-01 PROCEDURE — 85027 COMPLETE CBC AUTOMATED: CPT | Performed by: PHYSICIAN ASSISTANT

## 2023-06-01 PROCEDURE — 80048 BASIC METABOLIC PNL TOTAL CA: CPT | Performed by: PHYSICIAN ASSISTANT

## 2023-06-01 RX ORDER — AMOXICILLIN AND CLAVULANATE POTASSIUM 875; 125 MG/1; MG/1
1 TABLET, FILM COATED ORAL EVERY 12 HOURS SCHEDULED
Qty: 14 TABLET | Refills: 0 | Status: ON HOLD | OUTPATIENT
Start: 2023-06-01 | End: 2023-06-08

## 2023-06-01 RX ADMIN — PIPERACILLIN AND TAZOBACTAM 4.5 G: 4; .5 INJECTION, POWDER, FOR SOLUTION INTRAVENOUS at 09:09

## 2023-06-01 RX ADMIN — KETOROLAC TROMETHAMINE 15 MG: 30 INJECTION, SOLUTION INTRAMUSCULAR; INTRAVENOUS at 00:02

## 2023-06-01 RX ADMIN — SODIUM CHLORIDE, SODIUM LACTATE, POTASSIUM CHLORIDE, AND CALCIUM CHLORIDE 75 ML/HR: .6; .31; .03; .02 INJECTION, SOLUTION INTRAVENOUS at 03:14

## 2023-06-01 RX ADMIN — KETOROLAC TROMETHAMINE 15 MG: 30 INJECTION, SOLUTION INTRAMUSCULAR; INTRAVENOUS at 05:57

## 2023-06-01 RX ADMIN — PIPERACILLIN AND TAZOBACTAM 4.5 G: 4; .5 INJECTION, POWDER, FOR SOLUTION INTRAVENOUS at 03:14

## 2023-06-01 NOTE — ASSESSMENT & PLAN NOTE
39 yo M POD 2 s/p IR drain placement for diverticular abscess  Assessment:  Acute sigmoid diverticulitis with abscess  Lower GI bleed 2/2 diverticulitis, resolved  Plan:  Stable after IR drain placement with improved pain and abdominal exam   Will discuss with Dr Maximino Werner regarding d/c home today on oral Augmentin x 7 days, low fiber diet  Follow-up with surgery in 1 week  Follow-up with IR for tube check in 2 weeks  Discussed with Dr Maximino Werner  D/C home today

## 2023-06-01 NOTE — PLAN OF CARE
Problem: PAIN - ADULT  Goal: Verbalizes/displays adequate comfort level or baseline comfort level  Description: Interventions:  - Encourage patient to monitor pain and request assistance  - Assess pain using appropriate pain scale  - Administer analgesics based on type and severity of pain and evaluate response  - Implement non-pharmacological measures as appropriate and evaluate response  - Consider cultural and social influences on pain and pain management  - Notify physician/advanced practitioner if interventions unsuccessful or patient reports new pain  Outcome: Progressing     Problem: INFECTION - ADULT  Goal: Absence or prevention of progression during hospitalization  Description: INTERVENTIONS:  - Assess and monitor for signs and symptoms of infection  - Monitor lab/diagnostic results  - Monitor all insertion sites, i e  indwelling lines, tubes, and drains  - Monitor endotracheal if appropriate and nasal secretions for changes in amount and color  - [place] appropriate cooling/warming therapies per order  - Administer medications as ordered  - Instruct and encourage patient and family to use good hand hygiene technique  - Identify and instruct in appropriate isolation precautions for identified infection/condition  Outcome: Progressing  Goal: Absence of fever/infection during neutropenic period  Description: INTERVENTIONS:  - Monitor WBC    Outcome: Progressing     Problem: SAFETY ADULT  Goal: Patient will remain free of falls  Description: INTERVENTIONS:  - Educate patient/family on patient safety including physical limitations  - Instruct patient to call for assistance with activity   - Consult OT/PT to assist with strengthening/mobility   - Keep Call bell within reach  - Keep bed low and locked with side rails adjusted as appropriate  - Keep care items and personal belongings within reach  - Initiate and maintain comfort rounds  - Make Fall Risk Sign visible to staff  - Offer Toileting every 2 Hours, in advance of need  - Apply yellow socks and bracelet for high fall risk patients  - Consider moving patient to room near nurses station  Outcome: Progressing  Goal: Maintain or return to baseline ADL function  Description: INTERVENTIONS:  -  Assess patient's ability to carry out ADLs; assess patient's baseline for ADL function and identify physical deficits which impact ability to perform ADLs (bathing, care of mouth/teeth, toileting, grooming, dressing, etc )  - Assess/evaluate cause of self-care deficits   - Assess range of motion  - Assess patient's mobility; develop plan if impaired  - Assess patient's need for assistive devices and provide as appropriate  - Encourage maximum independence but intervene and supervise when necessary  - Involve family in performance of ADLs  - Assess for home care needs following discharge   - Consider OT consult to assist with ADL evaluation and planning for discharge  - Provide patient education as appropriate  Outcome: Progressing  Goal: Maintains/Returns to pre admission functional level  Description: INTERVENTIONS:  - Perform BMAT or MOVE assessment daily    - Set and communicate daily mobility goal to care team and patient/family/caregiver  - Collaborate with rehabilitation services on mobility goals if consulted  - Perform Range of Motion 3 times a day  - Reposition patient every 2 hours    - Dangle patient 3 times a day  - Stand patient 3 times a day  - Ambulate patient 3 times a day  - Out of bed to chair 3 times a day   - Out of bed for meals 3 times a day  - Out of bed for toileting  - Record patient progress and toleration of activity level   Outcome: Progressing     Problem: DISCHARGE PLANNING  Goal: Discharge to home or other facility with appropriate resources  Description: INTERVENTIONS:  - Identify barriers to discharge w/patient and caregiver  - Arrange for needed discharge resources and transportation as appropriate  - Identify discharge learning needs (meds, wound care, etc )  - Arrange for interpretive services to assist at discharge as needed  - Refer to Case Management Department for coordinating discharge planning if the patient needs post-hospital services based on physician/advanced practitioner order or complex needs related to functional status, cognitive ability, or social support system  Outcome: Progressing     Problem: Knowledge Deficit  Goal: Patient/family/caregiver demonstrates understanding of disease process, treatment plan, medications, and discharge instructions  Description: Complete learning assessment and assess knowledge base    Interventions:  - Provide teaching at level of understanding  - Provide teaching via preferred learning methods  Outcome: Progressing

## 2023-06-01 NOTE — ASSESSMENT & PLAN NOTE
37 yo M POD 2 s/p IR drain placement for diverticular abscess  Assessment:  Acute sigmoid diverticulitis with abscess  Lower GI bleed 2/2 diverticulitis, resolved  Plan:  Stable after IR drain placement with improved pain and abdominal exam   Will discuss with Dr Adry Dodge regarding d/c home today on oral Augmentin x 7 days, low fiber diet  Follow-up with surgery in 1 week  Follow-up with IR for tube check in 2 weeks

## 2023-06-01 NOTE — DISCHARGE SUMMARY
Yeison 47  Discharge- [address] [place] [address] 1980, 38 y o  male MRN: [id number]  Unit/Bed#: MS Cain-01 Encounter: [id number]  Primary Care Provider: No primary care provider on file  Date and time admitted to hospital: 5/30/2023  9:52 AM    * Diverticulitis of large intestine with abscess with bleeding  Assessment & Plan  39 yo M POD 2 s/p IR drain placement for diverticular abscess  Assessment:  Acute sigmoid diverticulitis with abscess  Lower GI bleed 2/2 diverticulitis, resolved  Plan:  Stable after IR drain placement with improved pain and abdominal exam   Will discuss with Dr Fifi Flores regarding d/c home today on oral Augmentin x 7 days, low fiber diet  Follow-up with surgery in 1 week  Follow-up with IR for tube check in 2 weeks  Discussed with Dr Fifi Flores  D/C home today  Medical Problems     Resolved Problems  Date Reviewed: 5/30/2023   None         Admission Date:   Admission Orders (From admission, onward)     Ordered        05/30/23 1342  INPATIENT ADMISSION  Once                        Admitting Diagnosis: Blood in stool [K92 1]  Diverticulitis [K57 92]  Rectal bleeding [K62 5]  Colonic diverticular abscess [K57 20]  Diverticulitis of intestine with abscess [K57 80]    HPI: 39 yo M initially seen in the ER for uncomplicated diverticulitis returning to the ER with worsening pain where CT revealed complicated diverticulitis  Procedures Performed: No orders of the defined types were placed in this encounter  Summary of Hospital Course: IR consulted for drain placement which was done on day of admission  Procedural findings more compatible with phlegmon rather than true abscess  Kept in the hospital for 2 night for IV abx, pain control, bowel rest, and IVF  On HD#3 found to be clinically improved for d/c home with drain in place, Darío Fatima for drain care, oral antibiotics, 1 week surgical follow-up, 2 week IR follow-up      Significant Findings, Care, Treatment and Services Provided: IR drain placement, IV abx for complicated diverticulitis    Complications: none    Condition at Discharge: stable         Discharge instructions/Information to patient and family:   See after visit summary for information provided to patient and family  Provisions for Follow-Up Care:  See after visit summary for information related to follow-up care and any pertinent home health orders  PCP: No primary care provider on file  Disposition: Home    Planned Readmission: No    Discharge Statement   I spent 5 minutes discharging the patient  This time was spent on the day of discharge  I had direct contact with the patient on the day of discharge  Additional documentation is required if more than 30 minutes were spent on discharge  Discharge Medications:  See after visit summary for reconciled discharge medications provided to patient and family

## 2023-06-01 NOTE — CASE MANAGEMENT
Case Management Discharge Planning Note    Patient name Reed Mccrary  Location /-24 MRN [id number]  : 1980 Date 2023       Current Admission Date: 2023  Current Admission Diagnosis:Diverticulitis of large intestine with abscess with bleeding   Patient Active Problem List    Diagnosis Date Noted   • Diverticulitis of large intestine with abscess with bleeding 2023      LOS (days): 2  Geometric Mean LOS (GMLOS) (days):   Days to GMLOS:     OBJECTIVE:  Risk of Unplanned Readmission Score: 4 25         Current admission status: Inpatient   Preferred Pharmacy:   [address] [place] [address] [facility], [address] [place] [address]  Phone: [phone] Fax: [phone]    Primary Care Provider: No primary care provider on file  Primary Insurance: ERWIN MCNALLY  Secondary Insurance:     DISCHARGE DETAILS:    [address] [facility] Name[de-identified]  [facility] Provider[de-identified] Referring Provider         Other Referral/Resources/Interventions Provided:  Interventions: PCP, Darío Fatima  Referral Comments: Pt shown Darío Fatima list from [address] [facility] unable to accept as they do not contract with pt insurance  Pt chose [facility] and they were reserved viai Natalie BARTH also provided pt with  [employer] card to establish new PCP           Treatment Team [facility]: [facility]  Discharge Destination Plan[de-identified] Home with [place] at Discharge : Family (Spouse)

## 2023-06-01 NOTE — PROGRESS NOTES
"[place]  68   Progress Note  Name: Courtney Christianson  MRN: [id number]  Unit/Bed#: -01 I Date of Admission: 5/30/2023   Date of Service: 6/1/2023 I Hospital Day: 2    Assessment/Plan   * Diverticulitis of large intestine with abscess with bleeding  Assessment & Plan  39 yo M POD 2 s/p IR drain placement for diverticular abscess  Assessment:  Acute sigmoid diverticulitis with abscess  Lower GI bleed 2/2 diverticulitis, resolved  Plan:  Stable after IR drain placement with improved pain and abdominal exam   Will discuss with Dr Ventura Orellana regarding d/c home today on oral Augmentin x 7 days, low fiber diet  Follow-up with surgery in 1 week  Follow-up with IR for tube check in 2 weeks  Subjective/Objective   Chief Complaint: none    Subjective: feeling well, overall improvement since admission, no N/V, semiformed BM today without blood    Objective: no overnight events    Blood pressure 120/78, pulse 71, temperature 98 1 °F (36 7 °C), resp  rate 18, height 5' 7\" (1 702 m), weight 86 1 kg (189 lb 13 1 oz), SpO2 94 %  ,Body mass index is 29 73 kg/m²  Intake/Output Summary (Last 24 hours) at 6/1/2023 0948  Last data filed at 6/1/2023 0851  Gross per 24 hour   Intake 2795 42 ml   Output 25 ml   Net 2770 42 ml       Invasive Devices     Peripheral Intravenous Line  Duration           Peripheral IV 05/30/23 Distal;Right;Upper;Ventral (anterior) Arm 1 day          Drain  Duration           Abscess Drain LLQ 1 day                Physical Exam  Vitals and nursing note reviewed  Constitutional:       General: He is not in acute distress  Appearance: He is well-developed  He is not diaphoretic  HENT:      Head: Normocephalic and atraumatic  Eyes:      Conjunctiva/sclera: Conjunctivae normal       Pupils: Pupils are equal, round, and reactive to light  Pulmonary:      Effort: No respiratory distress     Abdominal:      Comments: Flat, soft, mild focal TTP in " suprapubic region without guarding or rigidity  Musculoskeletal:         General: Normal range of motion  Cervical back: Normal range of motion  Skin:     General: Skin is warm and dry  Capillary Refill: Capillary refill takes less than 2 seconds  Neurological:      Mental Status: He is alert and oriented to person, place, and time  Psychiatric:         Behavior: Behavior normal            Lab, Imaging and other studies:  I have personally reviewed pertinent lab results    , CBC:   Lab Results   Component Value Date    HCT 42 6 06/01/2023    HGB 14 0 06/01/2023    MCH 30 0 06/01/2023    MCHC 32 9 06/01/2023    MCV 91 06/01/2023    MPV 11 0 06/01/2023     06/01/2023    RBC 4 66 06/01/2023    RDW 12 4 06/01/2023    WBC 8 95 06/01/2023   , CMP:   Lab Results   Component Value Date    BUN 8 06/01/2023    CALCIUM 8 5 06/01/2023     06/01/2023    CO2 27 06/01/2023    CREATININE 0 99 06/01/2023    EGFR 94 06/01/2023    K 3 8 06/01/2023    SODIUM 138 06/01/2023     VTE Pharmacologic Prophylaxis: Reason for no pharmacologic prophylaxis GI bleed, ambulatory  VTE Mechanical Prophylaxis: sequential compression device

## 2023-06-01 NOTE — APP STUDENT NOTE
Pt is a 37 yo male with significant pmh of acute sigmoid diverticulitis complicated with abscess  Pt is on hospital day 2 s/p IR guided drain placement  Today pt is overall doing well, he reports 0/10 pain when resting and 2/10 pain with movement  He reports loose BM this morning and urinating appropriately  He is able to ambulate without difficulty and tolerating full liquid diet  He denies fever, chills, chest pain, SOB, nausea, vomiting  On exam patient is alert and oriented, lying comfortable in bed  Heart rrr with no murmurs, rubs or gallops  Lung equal expansion with inspiration and clear to auscultation bilaterally, no adventitious sounds of wheezes, rales or rhonchi  Abdomen is soft and non distended  Drain is intact with dry dressing in LLQ  Suprapubic and RLQ tenderness to deep palpation  +BS in all 4 quadrants  Total drain output yesterday, 25cc  CBC unremarkable  Assessment is a 37 yo male with sigmoid diverticulitis complicated with abscess s/p IR guided drain placement  Pt is clinically stable to discharge today with drain  Plan is to DC patient today following transition to oral pain medication and antibiotics  He should advance diet from full liquids at tolerated and continue ambulating  He is to f/u with GI at scheduled appointment for colonoscopy  F/u with surgery in one week and IR for drain check

## 2023-06-01 NOTE — NURSING NOTE
Discharge instructions given to Pt  Pt verbalizes understanding of instructions  Pt left with all belongings, in no acute distress  Pt was educated on how to flush and empty [place] drain, and daily dressing changes  Pt verbalized understanding  Pt walked out of facility

## 2023-06-02 NOTE — UTILIZATION REVIEW
NOTIFICATION OF ADMISSION DISCHARGE   This is a Notification of Discharge from [address]  Please be advised that this patient has been discharge from our facility  Below you will find the admission and discharge date and time including the patient’s disposition  UTILIZATION REVIEW CONTACT:  Laurent Kelley  Utilization   Network Utilization Review Department  Phone: [phone] carefully listen to the prompts  All voicemails are confidential   Email: [email or web address]     ADMISSION INFORMATION  PRESENTATION DATE: 5/30/2023  9:52 AM  OBERVATION ADMISSION DATE:   INPATIENT ADMISSION DATE: 5/30/23  1:43 PM   DISCHARGE DATE: 6/1/2023 11:16 AM   DISPOSITION:Home/Self Care    IMPORTANT INFORMATION:  Send all requests for admission clinical reviews, approved or denied determinations and any other requests to dedicated fax number below belonging to the campus where the patient is receiving treatment   List of dedicated fax numbers:  [address] DENIALS (Administrative/Medical Necessity) [phone]   [address] (Maternity/NICU/Pediatrics) [phone]   [facility] [phone]   [facility] [phone]   [facility] [address] [phone]   [facility] [phone] 90 [facility] [phone]   [facility] 119 [phone]   [facility]  [phone] [facility] [phone]   [facility] [address] [facility] [phone]

## 2023-06-04 ENCOUNTER — APPOINTMENT (EMERGENCY)
Dept: CT IMAGING | Facility: HOSPITAL | Age: 43
DRG: 854 | End: 2023-06-04
Payer: OTHER GOVERNMENT

## 2023-06-04 ENCOUNTER — HOSPITAL ENCOUNTER (INPATIENT)
Facility: HOSPITAL | Age: 43
LOS: 6 days | Discharge: HOME HEALTH | DRG: 854 | End: 2023-06-10
Attending: EMERGENCY MEDICINE | Admitting: STUDENT IN AN ORGANIZED HEALTH CARE EDUCATION/TRAINING PROGRAM
Payer: OTHER GOVERNMENT

## 2023-06-04 DIAGNOSIS — R10.9 ABDOMINAL PAIN: Primary | ICD-10-CM

## 2023-06-04 DIAGNOSIS — K57.21 DIVERTICULITIS OF LARGE INTESTINE WITH ABSCESS WITH BLEEDING: ICD-10-CM

## 2023-06-04 DIAGNOSIS — L02.211 ABDOMINAL WALL ABSCESS: ICD-10-CM

## 2023-06-04 DIAGNOSIS — K57.92 DIVERTICULITIS: ICD-10-CM

## 2023-06-04 LAB
ALBUMIN SERPL BCP-MCNC: 4.3 G/DL (ref 3.5–5)
ALP SERPL-CCNC: 92 U/L (ref 34–104)
ALT SERPL W P-5'-P-CCNC: 23 U/L (ref 7–52)
ANION GAP SERPL CALCULATED.3IONS-SCNC: 10 MMOL/L (ref 4–13)
APTT PPP: 30 SECONDS (ref 23–37)
AST SERPL W P-5'-P-CCNC: 23 U/L (ref 13–39)
ATRIAL RATE: 113 BPM
BACTERIA UR QL AUTO: ABNORMAL /HPF
BASOPHILS # BLD AUTO: 0.03 THOUSANDS/ÂΜL (ref 0–0.1)
BASOPHILS NFR BLD AUTO: 0 % (ref 0–1)
BILIRUB SERPL-MCNC: 0.7 MG/DL (ref 0.2–1)
BILIRUB UR QL STRIP: NEGATIVE
BUN SERPL-MCNC: 8 MG/DL (ref 5–25)
CALCIUM SERPL-MCNC: 9.5 MG/DL (ref 8.4–10.2)
CHLORIDE SERPL-SCNC: 96 MMOL/L (ref 96–108)
CLARITY UR: CLEAR
CO2 SERPL-SCNC: 28 MMOL/L (ref 21–32)
COLOR UR: YELLOW
CREAT SERPL-MCNC: 0.98 MG/DL (ref 0.6–1.3)
EOSINOPHIL # BLD AUTO: 0.09 THOUSAND/ÂΜL (ref 0–0.61)
EOSINOPHIL NFR BLD AUTO: 1 % (ref 0–6)
ERYTHROCYTE [DISTWIDTH] IN BLOOD BY AUTOMATED COUNT: 12.5 % (ref 11.6–15.1)
GFR SERPL CREATININE-BSD FRML MDRD: 96 ML/MIN/1.73SQ M
GLUCOSE SERPL-MCNC: 112 MG/DL (ref 65–140)
GLUCOSE UR STRIP-MCNC: NEGATIVE MG/DL
HCT VFR BLD AUTO: 44.7 % (ref 36.5–49.3)
HGB BLD-MCNC: 14.7 G/DL (ref 12–17)
HGB UR QL STRIP.AUTO: ABNORMAL
IMM GRANULOCYTES # BLD AUTO: 0.06 THOUSAND/UL (ref 0–0.2)
IMM GRANULOCYTES NFR BLD AUTO: 0 % (ref 0–2)
INR PPP: 1.05 (ref 0.84–1.19)
KETONES UR STRIP-MCNC: NEGATIVE MG/DL
LACTATE SERPL-SCNC: 1.4 MMOL/L (ref 0.5–2)
LEUKOCYTE ESTERASE UR QL STRIP: NEGATIVE
LYMPHOCYTES # BLD AUTO: 1.35 THOUSANDS/ÂΜL (ref 0.6–4.47)
LYMPHOCYTES NFR BLD AUTO: 10 % (ref 14–44)
MCH RBC QN AUTO: 29.9 PG (ref 26.8–34.3)
MCHC RBC AUTO-ENTMCNC: 32.9 G/DL (ref 31.4–37.4)
MCV RBC AUTO: 91 FL (ref 82–98)
MONOCYTES # BLD AUTO: 1.36 THOUSAND/ÂΜL (ref 0.17–1.22)
MONOCYTES NFR BLD AUTO: 10 % (ref 4–12)
NEUTROPHILS # BLD AUTO: 10.99 THOUSANDS/ÂΜL (ref 1.85–7.62)
NEUTS SEG NFR BLD AUTO: 79 % (ref 43–75)
NITRITE UR QL STRIP: NEGATIVE
NON-SQ EPI CELLS URNS QL MICRO: ABNORMAL /HPF
NRBC BLD AUTO-RTO: 0 /100 WBCS
P AXIS: 42 DEGREES
PH UR STRIP.AUTO: 6.5 [PH]
PLATELET # BLD AUTO: 332 THOUSANDS/UL (ref 149–390)
PMV BLD AUTO: 10.6 FL (ref 8.9–12.7)
POTASSIUM SERPL-SCNC: 4 MMOL/L (ref 3.5–5.3)
PR INTERVAL: 146 MS
PROCALCITONIN SERPL-MCNC: 0.09 NG/ML
PROT SERPL-MCNC: 7.7 G/DL (ref 6.4–8.4)
PROT UR STRIP-MCNC: NEGATIVE MG/DL
PROTHROMBIN TIME: 13.8 SECONDS (ref 11.6–14.5)
QRS AXIS: 37 DEGREES
QRSD INTERVAL: 92 MS
QT INTERVAL: 322 MS
QTC INTERVAL: 441 MS
RBC # BLD AUTO: 4.92 MILLION/UL (ref 3.88–5.62)
RBC #/AREA URNS AUTO: ABNORMAL /HPF
SODIUM SERPL-SCNC: 134 MMOL/L (ref 135–147)
SP GR UR STRIP.AUTO: <=1.005
T WAVE AXIS: 62 DEGREES
UROBILINOGEN UR QL STRIP.AUTO: 0.2 E.U./DL
VENTRICULAR RATE: 113 BPM
WBC # BLD AUTO: 13.88 THOUSAND/UL (ref 4.31–10.16)
WBC #/AREA URNS AUTO: ABNORMAL /HPF

## 2023-06-04 PROCEDURE — 81001 URINALYSIS AUTO W/SCOPE: CPT | Performed by: STUDENT IN AN ORGANIZED HEALTH CARE EDUCATION/TRAINING PROGRAM

## 2023-06-04 PROCEDURE — 93005 ELECTROCARDIOGRAM TRACING: CPT

## 2023-06-04 PROCEDURE — 96375 TX/PRO/DX INJ NEW DRUG ADDON: CPT

## 2023-06-04 PROCEDURE — 74177 CT ABD & PELVIS W/CONTRAST: CPT

## 2023-06-04 PROCEDURE — 80053 COMPREHEN METABOLIC PANEL: CPT | Performed by: PHYSICIAN ASSISTANT

## 2023-06-04 PROCEDURE — 85025 COMPLETE CBC W/AUTO DIFF WBC: CPT | Performed by: PHYSICIAN ASSISTANT

## 2023-06-04 PROCEDURE — 85610 PROTHROMBIN TIME: CPT | Performed by: PHYSICIAN ASSISTANT

## 2023-06-04 PROCEDURE — 36415 COLL VENOUS BLD VENIPUNCTURE: CPT | Performed by: PHYSICIAN ASSISTANT

## 2023-06-04 PROCEDURE — 99284 EMERGENCY DEPT VISIT MOD MDM: CPT

## 2023-06-04 PROCEDURE — 83605 ASSAY OF LACTIC ACID: CPT | Performed by: PHYSICIAN ASSISTANT

## 2023-06-04 PROCEDURE — 93010 ELECTROCARDIOGRAM REPORT: CPT | Performed by: INTERNAL MEDICINE

## 2023-06-04 PROCEDURE — 96365 THER/PROPH/DIAG IV INF INIT: CPT

## 2023-06-04 PROCEDURE — 84145 PROCALCITONIN (PCT): CPT | Performed by: PHYSICIAN ASSISTANT

## 2023-06-04 PROCEDURE — 87040 BLOOD CULTURE FOR BACTERIA: CPT | Performed by: PHYSICIAN ASSISTANT

## 2023-06-04 PROCEDURE — 85730 THROMBOPLASTIN TIME PARTIAL: CPT | Performed by: PHYSICIAN ASSISTANT

## 2023-06-04 RX ORDER — CEFTRIAXONE 1 G/50ML
1000 INJECTION, SOLUTION INTRAVENOUS EVERY 24 HOURS
Status: DISCONTINUED | OUTPATIENT
Start: 2023-06-04 | End: 2023-06-08

## 2023-06-04 RX ORDER — NICOTINE 21 MG/24HR
1 PATCH, TRANSDERMAL 24 HOURS TRANSDERMAL DAILY
Status: DISCONTINUED | OUTPATIENT
Start: 2023-06-05 | End: 2023-06-10 | Stop reason: HOSPADM

## 2023-06-04 RX ORDER — ACETAMINOPHEN 325 MG/1
650 TABLET ORAL EVERY 6 HOURS PRN
Status: DISCONTINUED | OUTPATIENT
Start: 2023-06-04 | End: 2023-06-09

## 2023-06-04 RX ORDER — SODIUM CHLORIDE, SODIUM LACTATE, POTASSIUM CHLORIDE, CALCIUM CHLORIDE 600; 310; 30; 20 MG/100ML; MG/100ML; MG/100ML; MG/100ML
125 INJECTION, SOLUTION INTRAVENOUS CONTINUOUS
Status: DISCONTINUED | OUTPATIENT
Start: 2023-06-04 | End: 2023-06-06

## 2023-06-04 RX ORDER — HYDROMORPHONE HCL/PF 1 MG/ML
0.5 SYRINGE (ML) INJECTION ONCE
Status: COMPLETED | OUTPATIENT
Start: 2023-06-04 | End: 2023-06-04

## 2023-06-04 RX ORDER — METRONIDAZOLE 500 MG/100ML
500 INJECTION, SOLUTION INTRAVENOUS EVERY 8 HOURS
Status: DISCONTINUED | OUTPATIENT
Start: 2023-06-04 | End: 2023-06-08

## 2023-06-04 RX ORDER — KETOROLAC TROMETHAMINE 30 MG/ML
15 INJECTION, SOLUTION INTRAMUSCULAR; INTRAVENOUS EVERY 6 HOURS PRN
Status: DISCONTINUED | OUTPATIENT
Start: 2023-06-04 | End: 2023-06-05

## 2023-06-04 RX ORDER — HYDROMORPHONE HCL/PF 1 MG/ML
0.5 SYRINGE (ML) INJECTION
Status: DISCONTINUED | OUTPATIENT
Start: 2023-06-04 | End: 2023-06-09

## 2023-06-04 RX ADMIN — SODIUM CHLORIDE 1000 ML: 0.9 INJECTION, SOLUTION INTRAVENOUS at 16:27

## 2023-06-04 RX ADMIN — HYDROMORPHONE HYDROCHLORIDE 0.5 MG: 1 INJECTION, SOLUTION INTRAMUSCULAR; INTRAVENOUS; SUBCUTANEOUS at 16:27

## 2023-06-04 RX ADMIN — SODIUM CHLORIDE, SODIUM LACTATE, POTASSIUM CHLORIDE, AND CALCIUM CHLORIDE 125 ML/HR: .6; .31; .03; .02 INJECTION, SOLUTION INTRAVENOUS at 20:46

## 2023-06-04 RX ADMIN — METRONIDAZOLE 500 MG: 500 INJECTION, SOLUTION INTRAVENOUS at 21:00

## 2023-06-04 RX ADMIN — CEFTRIAXONE 1000 MG: 1 INJECTION, SOLUTION INTRAVENOUS at 20:47

## 2023-06-04 RX ADMIN — PIPERACILLIN AND TAZOBACTAM 3.38 G: 3; .375 INJECTION, POWDER, FOR SOLUTION INTRAVENOUS at 16:29

## 2023-06-04 RX ADMIN — IOHEXOL 100 ML: 350 INJECTION, SOLUTION INTRAVENOUS at 16:59

## 2023-06-04 NOTE — ED PROVIDER NOTES
History  Chief Complaint   Patient presents with   • Fever   • Abdominal Pain     Patient was discharged on Thursday after getting a drain from an abscess in intestines/ diverticulitis  Fever of 101 started on Saturday  14-year-old male with a history of diverticulitis presents to the emergency department seeking evaluation for fevers chills worsening abdominal pain  Patient was recently admitted for his diverticulitis and associated abscess  He was treated with antibiotics and had a drain placed by interventional radiology  He states that he now has severe pain with any attempts to flush the drain and decrease drain output  He denies any complaints about the site of the drain but reports having abdominal pain deeper in the abdomen where he previously had pain initially  He is accompanied by significant other  Despite his complaints he does appear clinically well overall  Initial vitals are significant for tachycardia tachypnea and borderline febrile  Prior to Admission Medications   Prescriptions Last Dose Informant Patient Reported? Taking?   amoxicillin-clavulanate (AUGMENTIN) 875-125 mg per tablet   No No   Sig: Take 1 tablet by mouth every 12 (twelve) hours for 7 days   sodium chloride, PF, 0 9 %   No No   Sig: 10 mL by Intracatheter route daily for 120 doses Intracatheter flushing daily  May substitute prefilled syringe with normal saline 10 mL vials, 10 mL syringes, and 18 g blunt needles      Facility-Administered Medications: None       History reviewed  No pertinent past medical history  Past Surgical History:   Procedure Laterality Date   • IR DRAINAGE TUBE PLACEMENT  5/30/2023       History reviewed  No pertinent family history  I have reviewed and agree with the history as documented      E-Cigarette/Vaping   • E-Cigarette Use Never User      E-Cigarette/Vaping Substances   • Nicotine No    • THC No    • CBD No    • Flavoring No    • Other No    • Unknown No      Social History Tobacco Use   • Smoking status: Every Day     Packs/day: 0 50     Years: 24 00     Total pack years: 12 00     Types: Cigarettes   Vaping Use   • Vaping Use: Never used   Substance Use Topics   • Alcohol use: Not Currently   • Drug use: Never       Review of Systems   Constitutional: Positive for chills, fatigue and fever  HENT: Negative for congestion, ear pain, rhinorrhea, sinus pressure, sneezing and sore throat  Eyes: Negative for pain and discharge  Respiratory: Negative for cough, choking, chest tightness, shortness of breath and wheezing  Cardiovascular: Negative for chest pain and palpitations  Gastrointestinal: Positive for abdominal pain  Negative for constipation, diarrhea, nausea and vomiting  Genitourinary: Negative for difficulty urinating and dysuria  Musculoskeletal: Negative for back pain, gait problem, neck pain and neck stiffness  Neurological: Negative for dizziness, light-headedness and headaches  All other systems reviewed and are negative  Physical Exam  Physical Exam  Vitals and nursing note reviewed  Constitutional:       General: He is not in acute distress  Appearance: He is well-developed  He is not ill-appearing  HENT:      Head: Normocephalic and atraumatic  Right Ear: External ear normal       Left Ear: External ear normal       Nose: Nose normal    Eyes:      Pupils: Pupils are equal, round, and reactive to light  Cardiovascular:      Rate and Rhythm: Normal rate and regular rhythm  Heart sounds: Normal heart sounds  No murmur heard  No friction rub  No gallop  Pulmonary:      Effort: Pulmonary effort is normal  No respiratory distress  Breath sounds: Normal breath sounds  No stridor  No wheezing or rales  Abdominal:      General: Bowel sounds are normal  There is no distension  Palpations: Abdomen is soft  Tenderness: There is abdominal tenderness in the periumbilical area and suprapubic area   There is no guarding  Comments: Severe mid lower abdominal pain   Musculoskeletal:         General: No tenderness  Normal range of motion  Cervical back: Normal range of motion and neck supple  Skin:     General: Skin is warm  Capillary Refill: Capillary refill takes less than 2 seconds  Neurological:      Mental Status: He is alert and oriented to person, place, and time  Psychiatric:         Behavior: Behavior is cooperative           Vital Signs  ED Triage Vitals [06/04/23 1601]   Temperature Pulse Respirations Blood Pressure SpO2   99 °F (37 2 °C) 103 18 128/86 95 %      Temp Source Heart Rate Source Patient Position - Orthostatic VS BP Location FiO2 (%)   Temporal Monitor Lying Left arm --      Pain Score       5           Vitals:    06/04/23 1601   BP: 128/86   Pulse: 103   Patient Position - Orthostatic VS: Lying         Visual Acuity      ED Medications  Medications   lactated ringers infusion (has no administration in time range)   nicotine (NICODERM CQ) 21 mg/24 hr TD 24 hr patch 1 patch (has no administration in time range)   cefTRIAXone (ROCEPHIN) IVPB (premix in dextrose) 1,000 mg 50 mL (has no administration in time range)   metroNIDAZOLE (FLAGYL) IVPB (premix) 500 mg 100 mL (has no administration in time range)   acetaminophen (TYLENOL) tablet 650 mg (has no administration in time range)   ketorolac (TORADOL) injection 15 mg (has no administration in time range)   HYDROmorphone (DILAUDID) injection 0 5 mg (has no administration in time range)   piperacillin-tazobactam (ZOSYN) IVPB 3 375 g (0 g Intravenous Stopped 6/4/23 1659)   HYDROmorphone (DILAUDID) injection 0 5 mg (0 5 mg Intravenous Given 6/4/23 1627)   sodium chloride 0 9 % bolus 1,000 mL (0 mL Intravenous Stopped 6/4/23 1727)   iohexol (OMNIPAQUE) 350 MG/ML injection (SINGLE-DOSE) 100 mL (100 mL Intravenous Given 6/4/23 1659)       Diagnostic Studies  Results Reviewed     Procedure Component Value Units Date/Time    Procalcitonin [[id number]]  (Normal) Collected: 06/04/23 1617    Lab Status: Final result Specimen: Blood from Arm, Right Updated: 06/04/23 1708     Procalcitonin 0 09 ng/ml     Comprehensive metabolic panel [[id number]]  (Abnormal) Collected: 06/04/23 1617    Lab Status: Final result Specimen: Blood from Arm, Right Updated: 06/04/23 1702     Sodium 134 mmol/L      Potassium 4 0 mmol/L      Chloride 96 mmol/L      CO2 28 mmol/L      ANION GAP 10 mmol/L      BUN 8 mg/dL      Creatinine 0 98 mg/dL      Glucose 112 mg/dL      Calcium 9 5 mg/dL      AST 23 U/L      ALT 23 U/L      Alkaline Phosphatase 92 U/L      Total Protein 7 7 g/dL      Albumin 4 3 g/dL      Total Bilirubin 0 70 mg/dL      eGFR 96 ml/min/1 73sq m     Narrative:      [place] guidelines for Chronic Kidney Disease (CKD):   •  Stage 1 with normal or high GFR (GFR > 90 mL/min/1 73 square meters)  •  Stage 2 Mild CKD (GFR = 60-89 mL/min/1 73 square meters)  •  Stage 3A Moderate CKD (GFR = 45-59 mL/min/1 73 square meters)  •  Stage 3B Moderate CKD (GFR = 30-44 mL/min/1 73 square meters)  •  Stage 4 Severe CKD (GFR = 15-29 mL/min/1 73 square meters)  •  Stage 5 End Stage CKD (GFR <15 mL/min/1 73 square meters)  Note: GFR calculation is accurate only with a steady state creatinine    Lactic acid [[id number]]  (Normal) Collected: 06/04/23 1617    Lab Status: Final result Specimen: Blood from Arm, Right Updated: 06/04/23 1700     LACTIC ACID 1 4 mmol/L     Narrative:      Result may be elevated if tourniquet was used during collection      Sheba Haji [[id number]]  (Normal) Collected: 06/04/23 1617    Lab Status: Final result Specimen: Blood from Arm, Right Updated: 06/04/23 1652     Protime 13 8 seconds      INR 1 05    APTT [[id number]]  (Normal) Collected: 06/04/23 1617    Lab Status: Final result Specimen: Blood from Arm, Right Updated: 06/04/23 1651     PTT 30 seconds     CBC and differential [[id number]]  (Abnormal) Collected: 06/04/23 1617    Lab Status: Final result Specimen: Blood from Arm, Right Updated: 06/04/23 1641     WBC 13 88 Thousand/uL      RBC 4 92 Million/uL      Hemoglobin 14 7 g/dL      Hematocrit 44 7 %      MCV 91 fL      MCH 29 9 pg      MCHC 32 9 g/dL      RDW 12 5 %      MPV 10 6 fL      Platelets 385 Thousands/uL      nRBC 0 /100 WBCs      Neutrophils Relative 79 %      Immat GRANS % 0 %      Lymphocytes Relative 10 %      Monocytes Relative 10 %      Eosinophils Relative 1 %      Basophils Relative 0 %      Neutrophils Absolute 10 99 Thousands/µL      Immature Grans Absolute 0 06 Thousand/uL      Lymphocytes Absolute 1 35 Thousands/µL      Monocytes Absolute 1 36 Thousand/µL      Eosinophils Absolute 0 09 Thousand/µL      Basophils Absolute 0 03 Thousands/µL     Blood culture #2 [[id number]] Collected: 06/04/23 1617    Lab Status: In process Specimen: Blood from Arm, Right Updated: 06/04/23 1640    Blood culture #1 [[id number]] Collected: 06/04/23 1625    Lab Status: In process Specimen: Blood from Hand, Left Updated: 06/04/23 1637    Urinalysis with microscopic [[id number]]     Lab Status: No result Specimen: Urine                  CT abdomen pelvis with contrast   Final Result by Alycia Lima MD (06/04 1831)      1  Worsening acute sigmoid diverticulitis with adjacent contained perforation and interval placement of a drainage catheter within the intramural abscess which is slightly increased in size since prior study  The intramural abscess abuts the superior    aspect of the urinary bladder without evidence of a colovesical fistula  Follow-up colonoscopy recommended after the acute episode  2  Stable hepatic lesion in the caudate lobe  Recommend follow-up nonemergent contrast-enhanced MRI abdomen within 3 months for further evaluation  The study was marked in [facility] for immediate notification        Workstation performed: [id number]                    Procedures  Procedures         ED Course  ED Course as of 06/04/23 1958 Alejandra Montague Jun 04, 2023   [id number] Discussed with surgery Dr Claude Moss and IR Dr Marycarmen Crump  Plan to admit for management  IR tube check tomorrow if possible                               SBIRT 20yo+    Flowsheet Row Most Recent Value   Initial Alcohol Screen: US AUDIT-C     1  How often do you have a drink containing alcohol? 0 Filed at: 06/04/2023 1600   2  How many drinks containing alcohol do you have on a typical day you are drinking? 0 Filed at: 06/04/2023 1600   3a  Male UNDER 65: How often do you have five or more drinks on one occasion? 0 Filed at: 06/04/2023 1600   3b  FEMALE Any Age, or MALE 65+: How often do you have 4 or more drinks on one occassion? 0 Filed at: 06/04/2023 1600   Audit-C Score 0 Filed at: 06/04/2023 1600   WILLA: How many times in the past year have you    Used an illegal drug or used a prescription medication for non-medical reasons? Never Filed at: 06/04/2023 1600                    Medical Decision Making  CT scan results noted  Patient with worsening diverticulitis and enlarging associated abscess  Case was reviewed with on-call general surgeon Dr Claude Moss as well as interventional radiologist Dr Marycarmen Crump  Plan to admit to surgery service for further medical management of diverticulitis with plan to formally consult interventional radiology for further drain evaluation  Patient is agreeable to this plan  Amount and/or Complexity of Data Reviewed  Labs: ordered  Radiology: ordered  Risk  OTC drugs  Prescription drug management  Decision regarding hospitalization            Disposition  Final diagnoses:   Abdominal pain   Diverticulitis   Abdominal wall abscess     Time reflects when diagnosis was documented in both MDM as applicable and the Disposition within this note     Time User Action Codes Description Comment    6/4/2023  7:39 PM Angelica Lees Add [R10 9] Abdominal pain     6/4/2023  7:40 PM Angelica Lees Add [K57 92] Diverticulitis     6/4/2023  7:40 PM Sparkle Terry Bassam [place] Add [G22 890] Abdominal wall abscess       ED Disposition     ED Disposition   Admit    Condition   Stable    Date/Time   Sun Jun 4, 2023  7:39 PM    Comment   Case was discussed with Dr Hugh Kim and the patient's admission status was agreed to be Admission Status: inpatient status to the service of Dr Hugh Kim   Follow-up Information    None         Patient's Medications   Discharge Prescriptions    No medications on file       No discharge procedures on file      PDMP Review       Value Time User    PDMP Reviewed  Yes 6/1/2023 10:31 AM Ann Marie Enamorado PA-C          ED Provider  Electronically Signed by           Jessica Loredo PA-C  06/04/23 1958

## 2023-06-05 ENCOUNTER — APPOINTMENT (INPATIENT)
Dept: INTERVENTIONAL RADIOLOGY/VASCULAR | Facility: HOSPITAL | Age: 43
DRG: 854 | End: 2023-06-05
Payer: OTHER GOVERNMENT

## 2023-06-05 LAB
ANION GAP SERPL CALCULATED.3IONS-SCNC: 8 MMOL/L (ref 4–13)
BASOPHILS # BLD AUTO: 0.02 THOUSANDS/ÂΜL (ref 0–0.1)
BASOPHILS NFR BLD AUTO: 0 % (ref 0–1)
BUN SERPL-MCNC: 7 MG/DL (ref 5–25)
CALCIUM SERPL-MCNC: 8.9 MG/DL (ref 8.4–10.2)
CHLORIDE SERPL-SCNC: 100 MMOL/L (ref 96–108)
CO2 SERPL-SCNC: 28 MMOL/L (ref 21–32)
CREAT SERPL-MCNC: 0.96 MG/DL (ref 0.6–1.3)
EOSINOPHIL # BLD AUTO: 0.12 THOUSAND/ÂΜL (ref 0–0.61)
EOSINOPHIL NFR BLD AUTO: 1 % (ref 0–6)
ERYTHROCYTE [DISTWIDTH] IN BLOOD BY AUTOMATED COUNT: 12.4 % (ref 11.6–15.1)
GFR SERPL CREATININE-BSD FRML MDRD: 98 ML/MIN/1.73SQ M
GLUCOSE SERPL-MCNC: 94 MG/DL (ref 65–140)
HCT VFR BLD AUTO: 42.7 % (ref 36.5–49.3)
HGB BLD-MCNC: 13.7 G/DL (ref 12–17)
IMM GRANULOCYTES # BLD AUTO: 0.04 THOUSAND/UL (ref 0–0.2)
IMM GRANULOCYTES NFR BLD AUTO: 0 % (ref 0–2)
LYMPHOCYTES # BLD AUTO: 1.52 THOUSANDS/ÂΜL (ref 0.6–4.47)
LYMPHOCYTES NFR BLD AUTO: 15 % (ref 14–44)
MCH RBC QN AUTO: 29.2 PG (ref 26.8–34.3)
MCHC RBC AUTO-ENTMCNC: 32.1 G/DL (ref 31.4–37.4)
MCV RBC AUTO: 91 FL (ref 82–98)
MONOCYTES # BLD AUTO: 1.33 THOUSAND/ÂΜL (ref 0.17–1.22)
MONOCYTES NFR BLD AUTO: 13 % (ref 4–12)
NEUTROPHILS # BLD AUTO: 7.24 THOUSANDS/ÂΜL (ref 1.85–7.62)
NEUTS SEG NFR BLD AUTO: 71 % (ref 43–75)
NRBC BLD AUTO-RTO: 0 /100 WBCS
PLATELET # BLD AUTO: 285 THOUSANDS/UL (ref 149–390)
PMV BLD AUTO: 10.4 FL (ref 8.9–12.7)
POTASSIUM SERPL-SCNC: 3.8 MMOL/L (ref 3.5–5.3)
RBC # BLD AUTO: 4.69 MILLION/UL (ref 3.88–5.62)
SODIUM SERPL-SCNC: 136 MMOL/L (ref 135–147)
WBC # BLD AUTO: 10.27 THOUSAND/UL (ref 4.31–10.16)

## 2023-06-05 PROCEDURE — C1729 CATH, DRAINAGE: HCPCS

## 2023-06-05 PROCEDURE — 75984 XRAY CONTROL CATHETER CHANGE: CPT | Performed by: RADIOLOGY

## 2023-06-05 PROCEDURE — 49423 EXCHANGE DRAINAGE CATHETER: CPT | Performed by: RADIOLOGY

## 2023-06-05 PROCEDURE — 99152 MOD SED SAME PHYS/QHP 5/>YRS: CPT | Performed by: RADIOLOGY

## 2023-06-05 PROCEDURE — 49423 EXCHANGE DRAINAGE CATHETER: CPT

## 2023-06-05 PROCEDURE — C1894 INTRO/SHEATH, NON-LASER: HCPCS

## 2023-06-05 PROCEDURE — C1769 GUIDE WIRE: HCPCS

## 2023-06-05 PROCEDURE — 99223 1ST HOSP IP/OBS HIGH 75: CPT | Performed by: SURGERY

## 2023-06-05 PROCEDURE — 75984 XRAY CONTROL CATHETER CHANGE: CPT

## 2023-06-05 PROCEDURE — 99024 POSTOP FOLLOW-UP VISIT: CPT | Performed by: RADIOLOGY

## 2023-06-05 PROCEDURE — 80048 BASIC METABOLIC PNL TOTAL CA: CPT | Performed by: STUDENT IN AN ORGANIZED HEALTH CARE EDUCATION/TRAINING PROGRAM

## 2023-06-05 PROCEDURE — 85025 COMPLETE CBC W/AUTO DIFF WBC: CPT | Performed by: STUDENT IN AN ORGANIZED HEALTH CARE EDUCATION/TRAINING PROGRAM

## 2023-06-05 RX ORDER — FENTANYL CITRATE 50 UG/ML
INJECTION, SOLUTION INTRAMUSCULAR; INTRAVENOUS AS NEEDED
Status: COMPLETED | OUTPATIENT
Start: 2023-06-05 | End: 2023-06-05

## 2023-06-05 RX ORDER — MIDAZOLAM HYDROCHLORIDE 2 MG/2ML
INJECTION, SOLUTION INTRAMUSCULAR; INTRAVENOUS AS NEEDED
Status: COMPLETED | OUTPATIENT
Start: 2023-06-05 | End: 2023-06-05

## 2023-06-05 RX ORDER — LIDOCAINE HYDROCHLORIDE 10 MG/ML
INJECTION, SOLUTION EPIDURAL; INFILTRATION; INTRACAUDAL; PERINEURAL AS NEEDED
Status: COMPLETED | OUTPATIENT
Start: 2023-06-05 | End: 2023-06-05

## 2023-06-05 RX ORDER — KETOROLAC TROMETHAMINE 30 MG/ML
15 INJECTION, SOLUTION INTRAMUSCULAR; INTRAVENOUS EVERY 6 HOURS SCHEDULED
Status: COMPLETED | OUTPATIENT
Start: 2023-06-05 | End: 2023-06-08

## 2023-06-05 RX ORDER — SODIUM CHLORIDE 9 MG/ML
5 INJECTION INTRAVENOUS DAILY
Qty: 300 ML | Refills: 3 | Status: SHIPPED | OUTPATIENT
Start: 2023-06-05 | End: 2024-01-31

## 2023-06-05 RX ADMIN — KETOROLAC TROMETHAMINE 15 MG: 30 INJECTION, SOLUTION INTRAMUSCULAR; INTRAVENOUS at 17:51

## 2023-06-05 RX ADMIN — KETOROLAC TROMETHAMINE 15 MG: 30 INJECTION, SOLUTION INTRAMUSCULAR; INTRAVENOUS at 23:21

## 2023-06-05 RX ADMIN — IOHEXOL 15 ML: 350 INJECTION, SOLUTION INTRAVENOUS at 17:32

## 2023-06-05 RX ADMIN — METRONIDAZOLE 500 MG: 500 INJECTION, SOLUTION INTRAVENOUS at 04:52

## 2023-06-05 RX ADMIN — KETOROLAC TROMETHAMINE 15 MG: 30 INJECTION, SOLUTION INTRAMUSCULAR at 10:36

## 2023-06-05 RX ADMIN — METRONIDAZOLE 500 MG: 500 INJECTION, SOLUTION INTRAVENOUS at 11:59

## 2023-06-05 RX ADMIN — SODIUM CHLORIDE, SODIUM LACTATE, POTASSIUM CHLORIDE, AND CALCIUM CHLORIDE 125 ML/HR: .6; .31; .03; .02 INJECTION, SOLUTION INTRAVENOUS at 06:25

## 2023-06-05 RX ADMIN — CEFTRIAXONE 1000 MG: 1 INJECTION, SOLUTION INTRAVENOUS at 20:17

## 2023-06-05 RX ADMIN — LIDOCAINE HYDROCHLORIDE 20 ML: 10 INJECTION, SOLUTION EPIDURAL; INFILTRATION; INTRACAUDAL; PERINEURAL at 17:03

## 2023-06-05 RX ADMIN — METRONIDAZOLE 500 MG: 500 INJECTION, SOLUTION INTRAVENOUS at 20:25

## 2023-06-05 RX ADMIN — SODIUM CHLORIDE, SODIUM LACTATE, POTASSIUM CHLORIDE, AND CALCIUM CHLORIDE 125 ML/HR: .6; .31; .03; .02 INJECTION, SOLUTION INTRAVENOUS at 14:43

## 2023-06-05 RX ADMIN — MIDAZOLAM HYDROCHLORIDE 1 MG: 1 INJECTION, SOLUTION INTRAMUSCULAR; INTRAVENOUS at 17:01

## 2023-06-05 RX ADMIN — FENTANYL CITRATE 50 MCG: 50 INJECTION INTRAMUSCULAR; INTRAVENOUS at 17:01

## 2023-06-05 NOTE — TELEMEDICINE
e-Consult (IPC)  - Interventional Radiology  Midge  Thais 38 y o  male MRN: [id number]  Unit/Bed#: -01 Encounter: [id number]          Interventional Radiology has been consulted to evaluate Breonna Medina    We were consulted by Surgery concerning this patient with complicated diverticulitis w/ associated abscess  Patient presented to the emergency department last evening complaining of fevers and chills at home with worsening abdominal pain and pain with flushing his recently placed IR drain for his diverticular abscess  Inpatient Consult to IR  Consult performed by: Natacha Temple PA-C  Consult ordered by: Natacha Temple PA-C        06/05/23    Assessment/Recommendation:     1  Diverticulitis with Abscess  -Plan for tube check today with possible repositioning   -Maintain n p o   -Case reviewed with Dr Cory Rosado     11-20 minutes, >50% of the total time devoted to medical consultative verbal/EMR discussion between providers  Written report will be generated in the EMR  Thank you for allowing Interventional Radiology to participate in the care of Breonna Medina  Please don't hesitate to call or TigerText us with any questions       Natacha Temple PA-C

## 2023-06-05 NOTE — H&P
"H&P Exam - General Surgery   Abelardo Calhoun Ivanaliya 38 y o  male MRN: [id number]  Unit/Bed#: -01 Encounter: [id number]    Assessment/Plan     Assessment:  44yo male with no significant PMH returns to the hospital for complicated diverticulitis with abscess   -patient was discharged on 6/1 after IR placed LLQ drain and patient's pain was controlled   -patient returned to ED last night for increased LLQ pain and issues flushing LLQ drain   -CT abd/pelvis 6/4: \"Short segment acute diverticulitis of the proximal sigmoid colon with interval placement of drainage catheter within the intramural abscess measuring 5 5 x 2 5 x 4 1 cm\"   -T max 100 9 last night, currently afebrile, VSS on room air   -WBC 10 27 (13 88)   -Hgb stable   -electrolytes unremarkable    Plan:  -IR drain check today  -NPO until after procedure  -continue IV abx  -pain and nausea meds prn  -monitor labs  -serial abdominal exams  -will discuss with attending    History of Present Illness     HPI:  Fatmata Thakkar is a 38 y o  male who presents with worsening LLQ pain  Patient was discharged on 6/1 after LLQ drain placed by IR and pain was controlled  Patient says that he started to have fevers at home starting Saturday and his LLQ pain became more intense  He was also having issues flushing his drain  He says that he was only able to get 7cc in and pain became more intense  Patient says that the pain is band like across the right and lower quadrants and becomes more intense in the LLQ, which is different from his first admission  He had a bowel movement over the weekend, no blood  He has been tolerating a regular diet since discharge  Patient denies nausea, vomiting, chest pain, shortness of breath  Review of Systems   Constitutional: Positive for chills and fever  HENT: Negative  Respiratory: Negative  Cardiovascular: Negative  Gastrointestinal: Positive for abdominal distention and abdominal pain   Negative for nausea and " "vomiting  Genitourinary: Negative  Musculoskeletal: Negative  Skin: Negative  Neurological: Negative  Psychiatric/Behavioral: Negative  Historical Information   History reviewed  No pertinent past medical history    Past Surgical History:   Procedure Laterality Date   • IR DRAINAGE TUBE PLACEMENT  5/30/2023     Social History   Social History     Substance and Sexual Activity   Alcohol Use Not Currently     Social History     Substance and Sexual Activity   Drug Use Never     Social History     Tobacco Use   Smoking Status Every Day   • Packs/day: 0 50   • Years: 24 00   • Total pack years: 12 00   • Types: Cigarettes   Smokeless Tobacco Not on file     E-Cigarette/Vaping   • E-Cigarette Use Never User      E-Cigarette/Vaping Substances   • Nicotine No    • THC No    • CBD No    • Flavoring No    • Other No    • Unknown No      Family History: non-contributory    Meds/Allergies   all medications and allergies reviewed  No Known Allergies    Objective   First Vitals:   Blood Pressure: 128/86 (06/04/23 1601)  Pulse: 103 (06/04/23 1601)  Temperature: 99 °F (37 2 °C) (06/04/23 1601)  Temp Source: Temporal (06/04/23 1601)  Respirations: 18 (06/04/23 1601)  Height: 5' 7\" (170 2 cm) (06/04/23 1601)  Weight - Scale: 93 kg (205 lb) (06/04/23 1601)  SpO2: 95 % (06/04/23 1601)    Current Vitals:   Blood Pressure: 119/78 (06/05/23 0805)  Pulse: 90 (06/05/23 0805)  Temperature: 99 7 °F (37 6 °C) (06/05/23 0805)  Temp Source: Oral (06/05/23 0805)  Respirations: 20 (06/05/23 0805)  Height: 5' 7\" (170 2 cm) (06/04/23 1601)  Weight - Scale: 86 kg (189 lb 11 3 oz) (06/04/23 2018)  SpO2: 97 % (06/05/23 0805)      Intake/Output Summary (Last 24 hours) at 6/5/2023 0900  Last data filed at 6/5/2023 0452  Gross per 24 hour   Intake 1050 ml   Output --   Net 1050 ml       Invasive Devices     Peripheral Intravenous Line  Duration           Peripheral IV 06/04/23 Right Antecubital <1 day          Drain  Duration        " Abscess Drain LLQ 5 days                Physical Exam  Constitutional:       Appearance: Normal appearance  He is not ill-appearing  HENT:      Head: Normocephalic and atraumatic  Nose: Nose normal       Mouth/Throat:      Mouth: Mucous membranes are moist       Pharynx: Oropharynx is clear  Eyes:      Conjunctiva/sclera: Conjunctivae normal       Pupils: Pupils are equal, round, and reactive to light  Cardiovascular:      Rate and Rhythm: Normal rate and regular rhythm  Pulses: Normal pulses  Heart sounds: Normal heart sounds  Pulmonary:      Effort: Pulmonary effort is normal       Breath sounds: Normal breath sounds  Abdominal:      General: Bowel sounds are normal  There is distension  Palpations: Abdomen is soft  Tenderness: There is abdominal tenderness  Comments: Moderate LLQ tenderness  Mild distention  LLQ drain intact, draining brown a small amount of brown fluid  Musculoskeletal:         General: Normal range of motion  Skin:     General: Skin is warm and dry  Neurological:      General: No focal deficit present  Mental Status: He is alert  Psychiatric:         Mood and Affect: Mood normal          Lab Results:   CBC:   Lab Results   Component Value Date    HCT 42 7 06/05/2023    HGB 13 7 06/05/2023    MCH 29 2 06/05/2023    MCHC 32 1 06/05/2023    MCV 91 06/05/2023    MPV 10 4 06/05/2023    NRBC 0 06/05/2023     06/05/2023    RBC 4 69 06/05/2023    RDW 12 4 06/05/2023    WBC 10 27 (H) 06/05/2023   , CMP:   Lab Results   Component Value Date    ALKPHOS 92 06/04/2023    ALT 23 06/04/2023    AST 23 06/04/2023    BUN 7 06/05/2023    CALCIUM 8 9 06/05/2023     06/05/2023    CO2 28 06/05/2023    CREATININE 0 96 06/05/2023    EGFR 98 06/05/2023    K 3 8 06/05/2023    SODIUM 136 06/05/2023       Code Status: Prior    Counseling / Coordination of Care  Total floor / unit time spent today 20 minutes    Greater than 50% of total time was spent with the patient and / or family counseling and / or coordination of care       Andrea Quintanilla PA-C

## 2023-06-05 NOTE — NURSING NOTE
RECEIVED FROM ED VIA W/C WITH DX ABD PAIN/DIVERICULITIS  PAIN IS 3/10 NOW  ABD SOFT AND BSX4  LBM WAS TODAY  DENIES N/V  IVF STARTED AS ORDERED AND IVAB INFUSING  CALL BELL NEAR AND NO DISTRESS

## 2023-06-05 NOTE — BRIEF OP NOTE (RAD/CATH)
INTERVENTIONAL RADIOLOGY PROCEDURE NOTE    Date: 6/5/2023    Procedure: DRAIN REPOSITION EXCHANGE  Procedure Summary     Date:  Room / Location:     Anesthesia Start:  Anesthesia Stop:     Procedure:  Diagnosis:     Scheduled Providers:  Responsible Provider:     Anesthesia Type: Not recorded ASA Status: Not recorded          Preoperative diagnosis:   1  Abdominal pain    2  Diverticulitis    3  Abdominal wall abscess         Postoperative diagnosis: Same  Surgeon: Luly Mcneal MD     Assistant: None  No qualified resident was available  Blood loss: 0    Specimens: 0     Findings:     As seen on prior imaging drain within the colon    Tract examined with the catheter and sheath new 10 Romansh Amplatz anchor placed within Probable bowel wall abscess    If there is failure of clinical improvement recommend short interval CT imaging    Flush 5 cc/day    Complications: None immediate      Anesthesia: conscious sedation

## 2023-06-05 NOTE — UTILIZATION REVIEW
NOTIFICATION OF INPATIENT ADMISSION   AUTHORIZATION REQUEST   SERVICING FACILITY:   [facility] [address]  [address] [facility] AFFILIATED WITH [facility], [address]  Tax ID: [id number]  NPI: [id number]   ATTENDING PROVIDER:  Attending Name and NPI#: Dean Severe [[id number]]  Address: [address] [place] [address] WITH [facility], [address] [facility]  Phone: [phone]     ADMISSION INFORMATION:  Place of Service: [place] [address]  Place of Service Code: 21  Inpatient Admission Date/Time: 6/4/23  7:43 PM  Discharge Date/Time: No discharge date for patient encounter  Admitting Diagnosis Code/Description:  Diverticulitis [K57 92]  Abdominal pain [R10 9]  Abdominal wall abscess [L02 211]  Fever [R50 9]     UTILIZATION REVIEW CONTACT:  Amrit Chan Utilization   Network Utilization Review Department  Phone: [phone]  Fax [phone]  Email: [email or web address]  Contact for approvals/pending authorizations, clinical reviews, and discharge  PHYSICIAN ADVISORY SERVICES:  Medical Necessity Denial & Qwpj-xy-Htuh Review  Phone: [phone]  Fax: [phone]  Email: [email or web address]

## 2023-06-05 NOTE — CASE MANAGEMENT
Case Management Assessment & Discharge Planning Note    Patient name Bella Gu  Location /-41 MRN [id number]  : 1980 Date 2023       Current Admission Date: 2023  Current Admission Diagnosis:Diverticulitis, Abdominal pain, Abdominal wall abscess, Fever   Patient Active Problem List    Diagnosis Date Noted   • Diverticulitis of large intestine with abscess with bleeding 2023      LOS (days): 1  Geometric Mean LOS (GMLOS) (days):   Days to GMLOS:     OBJECTIVE:  PATIENT READMITTED TO HOSPITAL  Risk of Unplanned Readmission Score: 6 36         Current admission status: Inpatient       Preferred Pharmacy:   [address] [place] [address] 103 [facility], [address] [place] [address] 2  720 [facility] 2  [address] [place] [address]  Phone: [phone] Fax: [phone]    Primary Care Provider: No primary care provider on file  Primary Insurance: ERWIN MCNALLY  Secondary Insurance:     ASSESSMENT:  [place] Proxies    There are no active Health Care Proxies on file  Readmission Root Cause  30 Day Readmission: Yes  Who directed you to return to the hospital?: Self  Did you understand whom to contact if you had questions or problems?: Yes  Did you get your prescriptions before you left the hospital?: No  Reason[de-identified] Declined service  Were you able to get your prescriptions filled when you left the hospital?: Yes  Did you take your medications as prescribed?: Yes  Were you able to get to your follow-up appointments?: No  Reason[de-identified] Readmitted prior to appointment  During previous admission, was a post-acute recommendation made?: Yes  What post-acute resources were offered?: Darío Fatima  Patient was readmitted due to:  Abdominal pain, fever  Action Plan: Return home to [facility]        Patient Information  Admitted from[de-identified] Home  Mental Status: Alert  During Assessment patient was accompanied by: Not accompanied during assessment  Assessment information provided by[de-identified] Patient  Primary Caregiver: Self  Support Systems: Spouse/significant other  [place] of Residence: Other (specify in comment box) (Oniel)  What city do you live in?: [facility] entry access options   Select all that apply : Stairs  Type of Current Residence: 2 story home  In the last 12 months, was there a time when you were not able to pay the mortgage or rent on time?: No  In the last 12 months, how many places have you lived?: 1  In the last 12 months, was there a time when you did not have a steady place to sleep or slept in a shelter (including now)?: No  Homeless/housing insecurity resource given?: N/A  Living Arrangements: Lives w/ Spouse/significant other  Is patient a ?: No     Activities of Daily Living Prior to Admission  Functional Status: Independent  Completes ADLs independently?: Yes  Ambulates independently?: Yes  Does patient use assisted devices?: No  Does patient currently own DME?: No  Does patient have a history of Outpatient Therapy (PT/OT)?: No  Does the patient have a history of Short-Term Rehab?: No  Does patient have a history of HHC?: No  Does patient currently have [facility]?: Yes [facility]        Patient Information Continued  Income Source: Employed  Does patient have prescription coverage?: Yes  Within the past 12 months, you worried that your food would run out before you got the money to buy more : Never true  Within the past 12 months, the food you bought just didn't last and you didn't have money to get more : Never true  Food insecurity resource given?: N/A  Does patient receive dialysis treatments?: No  Does patient have a history of substance abuse?: No  Does patient have a history of Mental Health Diagnosis?: No        Means of Transportation  Means of Transport to Appts[de-identified] Drives Self  In the past 12 months, has lack of transportation kept you from medical appointments or from getting medications?: No  In the past 12 months, has lack of transportation kept you from meetings, work, or from getting things needed for daily living?: No  Was application for public transport provided?: N/A           DISCHARGE DETAILS:     Discharge planning discussed with[de-identified] Patient  Freedom of Choice: Yes  CM contacted family/caregiver?: Yes  Were Treatment Team discharge recommendations reviewed with patient/caregiver?: Yes  Did patient/caregiver verbalize understanding of patient care needs?: Yes  Were patient/caregiver advised of the risks associated with not following Treatment Team discharge recommendations?: Yes     Contacts  Patient Contacts: Jimmy Telles - spouse  Relationship to Patient[de-identified] Family  Contact Method: In Person  Reason/Outcome: Discharge [address]         Is the patient interested in Darío 78 at discharge?: Yes  Via Judy Shafer 19 requested[de-identified] [facility] Provider[de-identified] PCP  Home Health Services Needed[de-identified] Other (comment) (drain care)  Homebound Criteria Met[de-identified] Requires the Assistance of Another Person for Safe Ambulation or to Leave the Home  Supporting Clincal Findings[de-identified] Fatigues Easliy in [employer], Limited Endurance     DME Referral Provided  Referral made for DME?: No        Treatment Team Recommendation: Home with [address] [employer]  Discharge Destination Plan[de-identified] Home with [place] at Discharge : Family (Spouse)  Additional Comments: Pt will return home to resume [address] when stable for discharge  Referral sent to [facility] via Aidin for HARMAN

## 2023-06-05 NOTE — UTILIZATION REVIEW
Initial Clinical Review    Admission: Date/Time/Statement:   Admission Orders (From admission, onward)     Ordered        06/04/23 1943  INPATIENT ADMISSION  Once                      Orders Placed This Encounter   Procedures   • INPATIENT ADMISSION     Standing Status:   Standing     Number of Occurrences:   1     Order Specific Question:   Level of Care     Answer:   Med Surg [16]     Order Specific Question:   Estimated length of stay     Answer:   More than 2 Midnights     Order Specific Question:   Certification     Answer:   I certify that inpatient services are medically necessary for this patient for a duration of greater than two midnights  See H&P and MD Progress Notes for additional information about the patient's course of treatment  ED Arrival Information     Expected   -    Arrival   6/4/2023 15:55    Acuity   Urgent            Means of arrival   Walk-In    Escorted by   Spouse    Service   Surgery-General    Admission type   Emergency            Arrival complaint   abdominal pain, fever           Chief Complaint   Patient presents with   • Fever   • Abdominal Pain     Patient was discharged on Thursday after getting a drain from an abscess in intestines/ diverticulitis  Fever of 101 started on Saturday  Initial Presentation: 38 y o  male to the ED from home with complaints of abdominal pain, fever  Admitted to inpatient for diverticulitis with abscess  Admitted from 5/30/23-6/1 for diverticulitis with abscess and bleeding  Had IR drain placed 6/1  Now with issues flushing drain  CT a/p shows: worsening Acute diverticulitis with abscess  Keep NPO for IR procedure  Having LLQ abdominal pain  GIven IV dilaudid in the ED  Arrives tachycardic  Given IV fluid bolus in the ED  Date: 6/5   Day 2: IR drain check  Keep NPO  Continue with IV abx  Flush drain as per IR  Abdominal tenderness, distention       BRIEF OP note:   IR DRain reposition, exchange  Tract examined with the catheter and sheath new 10 [place] Amplatz anchor placed within Probable bowel wall abscess      ED Triage Vitals [06/04/23 1601]   Temperature Pulse Respirations Blood Pressure SpO2   99 °F (37 2 °C) 103 18 128/86 95 %      Temp Source Heart Rate Source Patient Position - Orthostatic VS BP Location FiO2 (%)   Temporal Monitor Lying Left arm --      Pain Score       5          Wt Readings from Last 1 Encounters:   06/04/23 86 kg (189 lb 11 3 oz)     Additional Vital Signs:    Temp Pulse Resp BP MAP (mmHg) SpO2 O2 Device Patient Position - Orthostatic VS   06/05/23 08:05:19 99 7 °F (37 6 °C) 90 20 119/78 92 97 % -- Lying   06/04/23 22:26:01 99 7 °F (37 6 °C) 94 19 122/76 91 96 % -- --   06/04/23 2032 -- -- -- -- -- 97 % None (Room air) --   06/04/23 20:18:03 100 9 °F (38 3 °C) Abnormal  107 Abnormal  18 145/82 103 96 % -- --   06/04/23 1602 -- -- -- -- -- -- None (Room air) --   06/04/23 1601 99 °F (37 2 °C) 103 18 128/86 -- 95 % None (Room air) Lying       Pertinent Labs/Diagnostic Test Results:   6/4 EKG: Sinus tachycardia  Otherwise normal ECG  No previous ECGs available  CT abdomen pelvis with contrast   Final Result by Tien Tan MD (06/04 1831)      1  Worsening acute sigmoid diverticulitis with adjacent contained perforation and interval placement of a drainage catheter within the intramural abscess which is slightly increased in size since prior study  The intramural abscess abuts the superior    aspect of the urinary bladder without evidence of a colovesical fistula  Follow-up colonoscopy recommended after the acute episode  2  Stable hepatic lesion in the caudate lobe  Recommend follow-up nonemergent contrast-enhanced MRI abdomen within 3 months for further evaluation  The study was marked in [facility] for immediate notification        Workstation performed: [id number]         IR drainage tube check/change/reposition/reinsertion/upsize    (Results Pending)         Results from last 7 days   Lab Units 06/05/23  0425 06/04/23  1617 06/01/23  0431 05/31/23  0506 05/30/23  1951 05/30/23  1030   HEMATOCRIT % 42 7 44 7 42 6 46 5 44 3 47 7   HEMOGLOBIN g/dL 13 7 14 7 14 0 15 1 14 5 15 3   NEUTROS ABS Thousands/µL 7 24 10 99*  --   --   --  8 03*   PLATELETS Thousands/uL 285 332 228 247  --  265   WBC Thousand/uL 10 27* 13 88* 8 95 9 32  --  10 34*         Results from last 7 days   Lab Units 06/05/23  0425 06/04/23  1617 06/01/23  0431 05/31/23  0506 05/30/23  1030   ANION GAP mmol/L 8 10 8 7 6   BUN mg/dL 7 8 8 9 8   CALCIUM mg/dL 8 9 9 5 8 5 8 6 9 4   CHLORIDE mmol/L 100 96 103 101 99   CO2 mmol/L 28 28 27 27 31   CREATININE mg/dL 0 96 0 98 0 99 0 89 0 91   EGFR ml/min/1 73sq m 98 96 94 106 105   POTASSIUM mmol/L 3 8 4 0 3 8 3 8 4 3   MAGNESIUM mg/dL  --   --   --   --  2 5   SODIUM mmol/L 136 134* 138 135 136     Results from last 7 days   Lab Units 06/04/23  1617 05/30/23  1030   ALBUMIN g/dL 4 3 4 3   ALK PHOS U/L 92 89   ALT U/L 23 18   AST U/L 23 16   TOTAL BILIRUBIN mg/dL 0 70 0 51   TOTAL PROTEIN g/dL 7 7 7 5         Results from last 7 days   Lab Units 06/05/23  0425 06/04/23  1617 06/01/23  0431 05/31/23  0506 05/30/23  1030   GLUCOSE RANDOM mg/dL 94 112 90 90 103       Results from last 7 days   Lab Units 06/04/23  1617   INR  1 05   PROTIME seconds 13 8   PTT seconds 30         Results from last 7 days   Lab Units 06/04/23  1617   PROCALCITONIN ng/ml 0 09     Results from last 7 days   Lab Units 06/04/23  1617   LACTIC ACID mmol/L 1 4         Results from last 7 days   Lab Units 06/04/23  2010   BACTERIA UA /hpf None Seen   BILIRUBIN UA  Negative   BLOOD UA  Trace-Intact*   CLARITY UA  Clear   COLOR UA  Yellow   EPITHELIAL CELLS WET PREP /hpf Occasional   GLUCOSE UA mg/dl Negative   KETONES UA mg/dl Negative   LEUKOCYTES UA  Negative   NITRITE UA  Negative   PH UA  6 5   PROTEIN UA mg/dl Negative   RBC UA /hpf 0-1*   SPEC GRAV UA  <=1 005*   UROBILINOGEN UA E U /dl 0 2   WBC UA /hpf 0-1*         Results from last 7 days   Lab Units 06/04/23  1625 06/04/23  1617 05/30/23  1819   BLOOD CULTURE  Received in Microbiology Lab  Culture in Progress  Received in Microbiology Lab  Culture in Progress  --    BODY FLUID CULTURE, STERILE   --   --  3+ Growth of Escherichia coli*  3+ Growth of Beta Hemolytic Streptococcus Group C*  2+ Growth of   GRAM STAIN RESULT   --   --  3+ Polys*  2+ Gram positive rods*  2+ Gram positive cocci in pairs*       ED Treatment:   Medication Administration from 06/04/2023 1555 to 06/04/2023 2016       Date/Time Order Dose Route Action Comments     06/04/2023 1629 EDT piperacillin-tazobactam (ZOSYN) IVPB 3 375 g 3 375 g Intravenous New Bag --     06/04/2023 1627 EDT HYDROmorphone (DILAUDID) injection 0 5 mg 0 5 mg Intravenous Given --     06/04/2023 1627 EDT sodium chloride 0 9 % bolus 1,000 mL 1,000 mL Intravenous New Bag --          Admitting Diagnosis: Diverticulitis [K57 92]  Abdominal pain [R10 9]  Abdominal wall abscess [L02 211]  Fever [R50 9]  Age/Sex: 38 y o  male  Admission Orders:  Scheduled Medications:  cefTRIAXone, 1,000 mg, Intravenous, Q24H  metroNIDAZOLE, 500 mg, Intravenous, Q8H  nicotine, 1 patch, Transdermal, Daily      Continuous IV Infusions:  lactated ringers, 125 mL/hr, Intravenous, Continuous      PRN Meds:  acetaminophen, 650 mg, Oral, Q6H PRN  HYDROmorphone, 0 5 mg, Intravenous, Q3H PRN   ketorolac, 15 mg, Intravenous, Q6H PRN        INPATIENT CONSULT TO     Network Utilization Review Department  ATTENTION: Please call with any questions or concerns to [phone] and carefully listen to the prompts so that you are directed to the right person  All voicemails are confidential   Osvaldo Kessler all requests for admission clinical reviews, approved or denied determinations and any other requests to dedicated fax number below belonging to the campus where the patient is receiving treatment   List of dedicated fax numbers for the Facilities:  [facility] ADMISSION DENIALS (Administrative/Medical Necessity) [phone]   [address] (Maternity/NICU/Pediatrics) Ayala Ramachandran [address] Jada Quach  [phone]   [address] [place] [address] Medical [place] [address] Jia [facility] [address] Olav [facility] [place] 134 815 [facility] [phone]

## 2023-06-05 NOTE — PLAN OF CARE
Problem: INFECTION - ADULT  Goal: Absence or prevention of progression during hospitalization  Description: INTERVENTIONS:  - Assess and monitor for signs and symptoms of infection  - Monitor lab/diagnostic results  - Monitor all insertion sites, i e  indwelling lines, tubes, and drains  - Monitor endotracheal if appropriate and nasal secretions for changes in amount and color  - [place] appropriate cooling/warming therapies per order  - Administer medications as ordered  - Instruct and encourage patient and family to use good hand hygiene technique  - Identify and instruct in appropriate isolation precautions for identified infection/condition  Outcome: Progressing

## 2023-06-06 ENCOUNTER — TELEPHONE (OUTPATIENT)
Dept: INTERVENTIONAL RADIOLOGY/VASCULAR | Facility: HOSPITAL | Age: 43
End: 2023-06-06

## 2023-06-06 LAB
ANION GAP SERPL CALCULATED.3IONS-SCNC: 10 MMOL/L (ref 4–13)
BASOPHILS # BLD AUTO: 0.02 THOUSANDS/ÂΜL (ref 0–0.1)
BASOPHILS NFR BLD AUTO: 0 % (ref 0–1)
BUN SERPL-MCNC: 10 MG/DL (ref 5–25)
CALCIUM SERPL-MCNC: 8.8 MG/DL (ref 8.4–10.2)
CHLORIDE SERPL-SCNC: 101 MMOL/L (ref 96–108)
CO2 SERPL-SCNC: 26 MMOL/L (ref 21–32)
CREAT SERPL-MCNC: 0.93 MG/DL (ref 0.6–1.3)
EOSINOPHIL # BLD AUTO: 0.12 THOUSAND/ÂΜL (ref 0–0.61)
EOSINOPHIL NFR BLD AUTO: 1 % (ref 0–6)
ERYTHROCYTE [DISTWIDTH] IN BLOOD BY AUTOMATED COUNT: 12.3 % (ref 11.6–15.1)
GFR SERPL CREATININE-BSD FRML MDRD: 102 ML/MIN/1.73SQ M
GLUCOSE SERPL-MCNC: 80 MG/DL (ref 65–140)
HCT VFR BLD AUTO: 41.7 % (ref 36.5–49.3)
HGB BLD-MCNC: 13.5 G/DL (ref 12–17)
IMM GRANULOCYTES # BLD AUTO: 0.04 THOUSAND/UL (ref 0–0.2)
IMM GRANULOCYTES NFR BLD AUTO: 0 % (ref 0–2)
LYMPHOCYTES # BLD AUTO: 1.29 THOUSANDS/ÂΜL (ref 0.6–4.47)
LYMPHOCYTES NFR BLD AUTO: 14 % (ref 14–44)
MCH RBC QN AUTO: 29.4 PG (ref 26.8–34.3)
MCHC RBC AUTO-ENTMCNC: 32.4 G/DL (ref 31.4–37.4)
MCV RBC AUTO: 91 FL (ref 82–98)
MONOCYTES # BLD AUTO: 1.02 THOUSAND/ÂΜL (ref 0.17–1.22)
MONOCYTES NFR BLD AUTO: 11 % (ref 4–12)
NEUTROPHILS # BLD AUTO: 7.01 THOUSANDS/ÂΜL (ref 1.85–7.62)
NEUTS SEG NFR BLD AUTO: 74 % (ref 43–75)
NRBC BLD AUTO-RTO: 0 /100 WBCS
PLATELET # BLD AUTO: 281 THOUSANDS/UL (ref 149–390)
PMV BLD AUTO: 10.6 FL (ref 8.9–12.7)
POTASSIUM SERPL-SCNC: 4.2 MMOL/L (ref 3.5–5.3)
RBC # BLD AUTO: 4.59 MILLION/UL (ref 3.88–5.62)
SODIUM SERPL-SCNC: 137 MMOL/L (ref 135–147)
WBC # BLD AUTO: 9.5 THOUSAND/UL (ref 4.31–10.16)

## 2023-06-06 PROCEDURE — 99232 SBSQ HOSP IP/OBS MODERATE 35: CPT | Performed by: SURGERY

## 2023-06-06 PROCEDURE — 80048 BASIC METABOLIC PNL TOTAL CA: CPT

## 2023-06-06 PROCEDURE — 85025 COMPLETE CBC W/AUTO DIFF WBC: CPT

## 2023-06-06 RX ORDER — SODIUM CHLORIDE, SODIUM LACTATE, POTASSIUM CHLORIDE, CALCIUM CHLORIDE 600; 310; 30; 20 MG/100ML; MG/100ML; MG/100ML; MG/100ML
125 INJECTION, SOLUTION INTRAVENOUS CONTINUOUS
Status: DISCONTINUED | OUTPATIENT
Start: 2023-06-06 | End: 2023-06-08

## 2023-06-06 RX ORDER — SIMETHICONE 80 MG
80 TABLET,CHEWABLE ORAL EVERY 6 HOURS PRN
Status: DISCONTINUED | OUTPATIENT
Start: 2023-06-06 | End: 2023-06-09

## 2023-06-06 RX ORDER — HEPARIN SODIUM 5000 [USP'U]/ML
5000 INJECTION, SOLUTION INTRAVENOUS; SUBCUTANEOUS EVERY 8 HOURS SCHEDULED
Status: DISCONTINUED | OUTPATIENT
Start: 2023-06-06 | End: 2023-06-08

## 2023-06-06 RX ADMIN — HYDROMORPHONE HYDROCHLORIDE 0.5 MG: 1 INJECTION, SOLUTION INTRAMUSCULAR; INTRAVENOUS; SUBCUTANEOUS at 03:15

## 2023-06-06 RX ADMIN — METRONIDAZOLE 500 MG: 500 INJECTION, SOLUTION INTRAVENOUS at 20:21

## 2023-06-06 RX ADMIN — SODIUM CHLORIDE, SODIUM LACTATE, POTASSIUM CHLORIDE, AND CALCIUM CHLORIDE 125 ML/HR: .6; .31; .03; .02 INJECTION, SOLUTION INTRAVENOUS at 10:13

## 2023-06-06 RX ADMIN — KETOROLAC TROMETHAMINE 15 MG: 30 INJECTION, SOLUTION INTRAMUSCULAR; INTRAVENOUS at 12:00

## 2023-06-06 RX ADMIN — HEPARIN SODIUM 5000 UNITS: 5000 INJECTION INTRAVENOUS; SUBCUTANEOUS at 13:30

## 2023-06-06 RX ADMIN — HEPARIN SODIUM 5000 UNITS: 5000 INJECTION INTRAVENOUS; SUBCUTANEOUS at 21:03

## 2023-06-06 RX ADMIN — METRONIDAZOLE 500 MG: 500 INJECTION, SOLUTION INTRAVENOUS at 12:00

## 2023-06-06 RX ADMIN — HYDROMORPHONE HYDROCHLORIDE 0.5 MG: 1 INJECTION, SOLUTION INTRAMUSCULAR; INTRAVENOUS; SUBCUTANEOUS at 07:19

## 2023-06-06 RX ADMIN — SODIUM CHLORIDE, SODIUM LACTATE, POTASSIUM CHLORIDE, AND CALCIUM CHLORIDE 125 ML/HR: .6; .31; .03; .02 INJECTION, SOLUTION INTRAVENOUS at 00:14

## 2023-06-06 RX ADMIN — CEFTRIAXONE 1000 MG: 1 INJECTION, SOLUTION INTRAVENOUS at 19:27

## 2023-06-06 RX ADMIN — KETOROLAC TROMETHAMINE 15 MG: 30 INJECTION, SOLUTION INTRAMUSCULAR; INTRAVENOUS at 17:12

## 2023-06-06 RX ADMIN — METRONIDAZOLE 500 MG: 500 INJECTION, SOLUTION INTRAVENOUS at 03:15

## 2023-06-06 RX ADMIN — HYDROMORPHONE HYDROCHLORIDE 0.5 MG: 1 INJECTION, SOLUTION INTRAMUSCULAR; INTRAVENOUS; SUBCUTANEOUS at 21:35

## 2023-06-06 RX ADMIN — SIMETHICONE 80 MG: 80 TABLET, CHEWABLE ORAL at 18:02

## 2023-06-06 RX ADMIN — KETOROLAC TROMETHAMINE 15 MG: 30 INJECTION, SOLUTION INTRAMUSCULAR; INTRAVENOUS at 23:34

## 2023-06-06 RX ADMIN — KETOROLAC TROMETHAMINE 15 MG: 30 INJECTION, SOLUTION INTRAMUSCULAR; INTRAVENOUS at 05:29

## 2023-06-06 NOTE — UTILIZATION REVIEW
NOTIFICATION OF INPATIENT ADMISSION   AUTHORIZATION REQUEST   SERVICING FACILITY:   [address] [place], [address]  Tax ID: [id number]  NPI: [id number]   ATTENDING PROVIDER:  Attending Name and NPI#: Balbir Ramirez [[id number]]  Address: [address] [place] [address] [place], [address]  Phone: [phone]     ADMISSION INFORMATION:  Place of Service: [place] [address]  Place of Service Code: 21  Inpatient Admission Date/Time: 6/4/23  7:43 PM  Discharge Date/Time: No discharge date for patient encounter  Admitting Diagnosis Code/Description:  Diverticulitis [K57 92]  Abdominal pain [R10 9]  Abdominal wall abscess [L02 211]  Fever [R50 9]     UTILIZATION REVIEW CONTACT:  Brayan Lam Utilization   Network Utilization Review Department  Phone: [phone]  Fax [phone]  Email: [email or web address]  Contact for approvals/pending authorizations, clinical reviews, and discharge  PHYSICIAN ADVISORY SERVICES:  Medical Necessity Denial & [id number] Review  Phone: [phone]  Fax: [phone]  Email: [email or web address]

## 2023-06-06 NOTE — UTILIZATION REVIEW
Continued Stay Review    Date: 6/6                          Current Patient Class: Inpatient   Current Level of Care: Med/surg    HPI:42 y o  male initially admitted on 6/4     Assessment/Plan:   Continue with clear liquid diet, medical therapy for now  May require surgical intervention  LLQ drain in place with cloudy/bloody drainage  Blood cultures show no growth x 24 hours  Hgb stable  Continue with IV abx  Serial abdominal exams  Vital Signs:   Time Temp Pulse Resp BP MAP (mmHg) SpO2 Calculated FIO2 (%) - Nasal Cannula Nasal Cannula O2 Flow Rate (L/min) O2 Device Patient Position - Orthostatic VS   06/06/23 10:57:11 98 7 °F (37 1 °C) 87 17 150/83 105 100 % -- -- None (Room air) Lying   06/06/23 07:04:56 98 1 °F (36 7 °C) 88 18 126/74 91 96 % -- -- -- --   06/06/23 0300 98 1 °F (36 7 °C) 87 18 129/81 97 98 % -- -- None (Room air)          Pertinent Labs/Diagnostic Results:   6/5 IR tube check: Contrast injection along the tract of the previously placed catheter reveals intramural abscess at the site of bowel perforation       Placement of a new 10 [place] Amplatz anchor drain in this presumably intramural abscess component     Removal of previously placed drain that was in the sigmoid colon     If there is failure of clinical improvement recommend short interval repeat CT imaging to assess for any drainable collection     Flush 5 cc/day to avoid spreading feculent material       Results from last 7 days   Lab Units 06/06/23 0421 06/05/23 0425 06/04/23  1617 06/01/23  0431 05/31/23  0506   HEMATOCRIT % 41 7 42 7 44 7 42 6 46 5   HEMOGLOBIN g/dL 13 5 13 7 14 7 14 0 15 1   NEUTROS ABS Thousands/µL 7 01 7 24 10 99*  --   --    PLATELETS Thousands/uL 281 285 332 228 247   WBC Thousand/uL 9 50 10 27* 13 88* 8 95 9 32         Results from last 7 days   Lab Units 06/06/23 0421 06/05/23  0425 06/04/23  1617 06/01/23  0431 05/31/23  0506   ANION GAP mmol/L 10 8 10 8 7   BUN mg/dL 10 7 8 8 9   CALCIUM mg/dL 8 8 8 9 9 5 8 5 8 6   CHLORIDE mmol/L 101 100 96 103 101   CO2 mmol/L 26 28 28 27 27   CREATININE mg/dL 0 93 0 96 0 98 0 99 0 89   EGFR ml/min/1 73sq m 102 98 96 94 106   POTASSIUM mmol/L 4 2 3 8 4 0 3 8 3 8   SODIUM mmol/L 137 136 134* 138 135     Results from last 7 days   Lab Units 06/04/23  1617   ALBUMIN g/dL 4 3   ALK PHOS U/L 92   ALT U/L 23   AST U/L 23   TOTAL BILIRUBIN mg/dL 0 70   TOTAL PROTEIN g/dL 7 7         Results from last 7 days   Lab Units 06/06/23  0421 06/05/23  0425 06/04/23  1617 06/01/23  0431 05/31/23  0506   GLUCOSE RANDOM mg/dL 80 94 112 90 90             Results from last 7 days   Lab Units 06/04/23  1617   INR  1 05   PROTIME seconds 13 8   PTT seconds 30         Results from last 7 days   Lab Units 06/04/23  1617   PROCALCITONIN ng/ml 0 09     Results from last 7 days   Lab Units 06/04/23  1617   LACTIC ACID mmol/L 1 4       Results from last 7 days   Lab Units 06/04/23  2010   BACTERIA UA /hpf None Seen   BILIRUBIN UA  Negative   BLOOD UA  Trace-Intact*   CLARITY UA  Clear   COLOR UA  Yellow   EPITHELIAL CELLS WET PREP /hpf Occasional   GLUCOSE UA mg/dl Negative   KETONES UA mg/dl Negative   LEUKOCYTES UA  Negative   NITRITE UA  Negative   PH UA  6 5   PROTEIN UA mg/dl Negative   RBC UA /hpf 0-1*   SPEC GRAV UA  <=1 005*   UROBILINOGEN UA E U /dl 0 2   WBC UA /hpf 0-1*           Results from last 7 days   Lab Units 06/04/23  1625 06/04/23  1617 05/30/23  1819   BLOOD CULTURE  No Growth at 24 hrs   No Growth at 24 hrs   --    BODY FLUID CULTURE, STERILE   --   --  3+ Growth of Escherichia coli*  3+ Growth of Beta Hemolytic Streptococcus Group C*  2+ Growth of   GRAM STAIN RESULT   --   --  3+ Polys*  2+ Gram positive rods*  2+ Gram positive cocci in pairs*       Medications:   Scheduled Medications:  cefTRIAXone, 1,000 mg, Intravenous, Q24H  heparin (porcine), 5,000 Units, Subcutaneous, Q8H KINSEY  ketorolac, 15 mg, Intravenous, Q6H KINSEY  metroNIDAZOLE, 500 mg, Intravenous, Q8H  nicotine, 1 patch, Transdermal, Daily      Continuous IV Infusions:  lactated ringers, 75 mL/hr, Intravenous, Continuous      PRN Meds:  acetaminophen, 650 mg, Oral, Q6H PRN  HYDROmorphone, 0 5 mg, Intravenous, Q3H PRN x 2 6/6        Discharge Plan: [facility]    Network Utilization Review Department  ATTENTION: Please call with any questions or concerns to [phone] and carefully listen to the prompts so that you are directed to the right person  All voicemails are confidential   Peterson Rome all requests for admission clinical reviews, approved or denied determinations and any other requests to dedicated fax number below belonging to the campus where the patient is receiving treatment   List of dedicated fax numbers for the Facilities:  [address] DENIALS (Administrative/Medical Necessity) [phone]   [address] (Maternity/NICU/Pediatrics) [phone]   913 Jimena Elias [phone]   [facility] Main 77 [phone]   [address] [place] [address] Medical [place] [address] Jia Melo [facility] 28 [phone]   [address] [place] Olav [facility] 134 815 [facility] [phone]

## 2023-06-06 NOTE — QUICK NOTE
Possible Sepsis present on admission 2/2 diverticulitis w/ abscess a/e/b leukocytosis, fever, tachycardia treated with NSS bolus in ED, IV Rocephin/Flagyl, CT a/p        Findings: WBC 13 88, -107, T 100 9    Jaimie Menchaca PA-C

## 2023-06-06 NOTE — PLAN OF CARE
Problem: PAIN - ADULT  Goal: Verbalizes/displays adequate comfort level or baseline comfort level  Description: Interventions:  - Encourage patient to monitor pain and request assistance  - Assess pain using appropriate pain scale  - Administer analgesics based on type and severity of pain and evaluate response  - Implement non-pharmacological measures as appropriate and evaluate response  - Consider cultural and social influences on pain and pain management  - Notify physician/advanced practitioner if interventions unsuccessful or patient reports new pain  Outcome: Progressing     Problem: INFECTION - ADULT  Goal: Absence or prevention of progression during hospitalization  Description: INTERVENTIONS:  - Assess and monitor for signs and symptoms of infection  - Monitor lab/diagnostic results  - Monitor all insertion sites, i e  indwelling lines, tubes, and drains  - Monitor endotracheal if appropriate and nasal secretions for changes in amount and color  - [place] appropriate cooling/warming therapies per order  - Administer medications as ordered  - Instruct and encourage patient and family to use good hand hygiene technique  - Identify and instruct in appropriate isolation precautions for identified infection/condition  Outcome: Progressing

## 2023-06-06 NOTE — PROGRESS NOTES
"Progress Note - General Surgery   Sin Plascencia 38 y o  male MRN: [id number]  Unit/Bed#: -01 Encounter: [id number]    Assessment:  44yo male with no significant PMH returns to the hospital for complicated diverticulitis with abscess   -IR replaced LLQ drain yesterday, concern that drain will not be adequate and patient may require surgical intervention   -LLQ drain output 10cc, cloudy/bloody   -blood culture no growth x 24h   -afebrile, VSS on room air   -WBC 9 5 (10 2)   -Hgb stable   -electrolytes unremarkable    Plan:  -clear liquids, will likely start two day bowel prep in anticipation of surgical intervention for complicated diverticulitis later this week  -continue IV abx  -monitor labs  -pain and nausea meds prn  -serial abdominal exams  -DVT prophylaxis  -will discuss with attending    Subjective/Objective     Subjective: Patient says pain localized now to the LLQ, and is more intense than yesterday  Patient tolerating regular diet  Denies nausea, vomiting  Objective:     Blood pressure 150/83, pulse 87, temperature 98 7 °F (37 1 °C), temperature source Oral, resp  rate 17, height 5' 7\" (1 702 m), weight 86 kg (189 lb 11 3 oz), SpO2 100 %  ,Body mass index is 29 71 kg/m²  Intake/Output Summary (Last 24 hours) at 6/6/2023 1145  Last data filed at 6/6/2023 0900  Gross per 24 hour   Intake 580 ml   Output 1110 ml   Net -530 ml       Invasive Devices     Peripheral Intravenous Line  Duration           Peripheral IV 06/04/23 Right Antecubital 1 day          Drain  Duration           Abscess Drain LLQ <1 day              Physical Exam  Constitutional:       General: He is not in acute distress  Appearance: Normal appearance  HENT:      Head: Normocephalic and atraumatic  Nose: Nose normal       Mouth/Throat:      Mouth: Mucous membranes are moist       Pharynx: Oropharynx is clear     Eyes:      Conjunctiva/sclera: Conjunctivae normal       Pupils: Pupils are equal, round, and " reactive to light  Cardiovascular:      Rate and Rhythm: Normal rate and regular rhythm  Pulses: Normal pulses  Heart sounds: Normal heart sounds  Pulmonary:      Effort: Pulmonary effort is normal       Breath sounds: Normal breath sounds  Abdominal:      General: Bowel sounds are normal  There is distension  Tenderness: There is abdominal tenderness  Comments: Moderate distention  Moderate LLQ tenderness  LLQ drain intact  Musculoskeletal:         General: Normal range of motion  Skin:     General: Skin is warm and dry  Neurological:      General: No focal deficit present  Mental Status: He is alert     Psychiatric:         Mood and Affect: Mood normal          Lab, Imaging and other studies:  CBC:   Lab Results   Component Value Date    HCT 41 7 06/06/2023    HGB 13 5 06/06/2023    MCH 29 4 06/06/2023    MCHC 32 4 06/06/2023    MCV 91 06/06/2023    MPV 10 6 06/06/2023    NRBC 0 06/06/2023     06/06/2023    RBC 4 59 06/06/2023    RDW 12 3 06/06/2023    WBC 9 50 06/06/2023   , CMP:   Lab Results   Component Value Date    BUN 10 06/06/2023    CALCIUM 8 8 06/06/2023     06/06/2023    CO2 26 06/06/2023    CREATININE 0 93 06/06/2023    EGFR 102 06/06/2023    K 4 2 06/06/2023    SODIUM 137 06/06/2023     VTE Pharmacologic Prophylaxis: Heparin  VTE Mechanical Prophylaxis: sequential compression device     Lazarus Busman, PA-C

## 2023-06-06 NOTE — NURSING NOTE
LLQ [place] drain flushed with 5 mL of NSS as ordered  Flushed well  Pt tolerated well  Medicated for pain 7/10 abdominal pain  Dressing remain clean, dry, and intact  Will monitor

## 2023-06-07 ENCOUNTER — ANESTHESIA EVENT (INPATIENT)
Dept: PERIOP | Facility: HOSPITAL | Age: 43
DRG: 854 | End: 2023-06-07
Payer: OTHER GOVERNMENT

## 2023-06-07 LAB
ABO GROUP BLD: NORMAL
ABO GROUP BLD: NORMAL
ANION GAP SERPL CALCULATED.3IONS-SCNC: 8 MMOL/L (ref 4–13)
BASOPHILS # BLD AUTO: 0.01 THOUSANDS/ÂΜL (ref 0–0.1)
BASOPHILS NFR BLD AUTO: 0 % (ref 0–1)
BLD GP AB SCN SERPL QL: NEGATIVE
BUN SERPL-MCNC: 8 MG/DL (ref 5–25)
CALCIUM SERPL-MCNC: 8.3 MG/DL (ref 8.4–10.2)
CHLORIDE SERPL-SCNC: 101 MMOL/L (ref 96–108)
CO2 SERPL-SCNC: 26 MMOL/L (ref 21–32)
CREAT SERPL-MCNC: 0.82 MG/DL (ref 0.6–1.3)
EOSINOPHIL # BLD AUTO: 0.15 THOUSAND/ÂΜL (ref 0–0.61)
EOSINOPHIL NFR BLD AUTO: 1 % (ref 0–6)
ERYTHROCYTE [DISTWIDTH] IN BLOOD BY AUTOMATED COUNT: 12.3 % (ref 11.6–15.1)
GFR SERPL CREATININE-BSD FRML MDRD: 110 ML/MIN/1.73SQ M
GLUCOSE SERPL-MCNC: 72 MG/DL (ref 65–140)
GLUCOSE SERPL-MCNC: 93 MG/DL (ref 65–140)
HCT VFR BLD AUTO: 38.1 % (ref 36.5–49.3)
HGB BLD-MCNC: 12.4 G/DL (ref 12–17)
IMM GRANULOCYTES # BLD AUTO: 0.04 THOUSAND/UL (ref 0–0.2)
IMM GRANULOCYTES NFR BLD AUTO: 0 % (ref 0–2)
LYMPHOCYTES # BLD AUTO: 1.15 THOUSANDS/ÂΜL (ref 0.6–4.47)
LYMPHOCYTES NFR BLD AUTO: 10 % (ref 14–44)
MCH RBC QN AUTO: 29.6 PG (ref 26.8–34.3)
MCHC RBC AUTO-ENTMCNC: 32.5 G/DL (ref 31.4–37.4)
MCV RBC AUTO: 91 FL (ref 82–98)
MONOCYTES # BLD AUTO: 1.41 THOUSAND/ÂΜL (ref 0.17–1.22)
MONOCYTES NFR BLD AUTO: 13 % (ref 4–12)
NEUTROPHILS # BLD AUTO: 8.51 THOUSANDS/ÂΜL (ref 1.85–7.62)
NEUTS SEG NFR BLD AUTO: 76 % (ref 43–75)
NRBC BLD AUTO-RTO: 0 /100 WBCS
PLATELET # BLD AUTO: 250 THOUSANDS/UL (ref 149–390)
PMV BLD AUTO: 10.9 FL (ref 8.9–12.7)
POTASSIUM SERPL-SCNC: 4.8 MMOL/L (ref 3.5–5.3)
RBC # BLD AUTO: 4.19 MILLION/UL (ref 3.88–5.62)
RH BLD: POSITIVE
RH BLD: POSITIVE
SODIUM SERPL-SCNC: 135 MMOL/L (ref 135–147)
SPECIMEN EXPIRATION DATE: NORMAL
WBC # BLD AUTO: 11.27 THOUSAND/UL (ref 4.31–10.16)

## 2023-06-07 PROCEDURE — 82948 REAGENT STRIP/BLOOD GLUCOSE: CPT

## 2023-06-07 PROCEDURE — 86901 BLOOD TYPING SEROLOGIC RH(D): CPT | Performed by: PHYSICIAN ASSISTANT

## 2023-06-07 PROCEDURE — 86850 RBC ANTIBODY SCREEN: CPT | Performed by: PHYSICIAN ASSISTANT

## 2023-06-07 PROCEDURE — 99233 SBSQ HOSP IP/OBS HIGH 50: CPT | Performed by: SURGERY

## 2023-06-07 PROCEDURE — 85025 COMPLETE CBC W/AUTO DIFF WBC: CPT

## 2023-06-07 PROCEDURE — 80048 BASIC METABOLIC PNL TOTAL CA: CPT

## 2023-06-07 PROCEDURE — 86900 BLOOD TYPING SEROLOGIC ABO: CPT | Performed by: PHYSICIAN ASSISTANT

## 2023-06-07 RX ORDER — CEFAZOLIN SODIUM 2 G/50ML
2000 SOLUTION INTRAVENOUS ONCE
Status: DISCONTINUED | OUTPATIENT
Start: 2023-06-08 | End: 2023-06-07

## 2023-06-07 RX ORDER — CEFAZOLIN SODIUM 2 G/50ML
2000 SOLUTION INTRAVENOUS ONCE
Status: COMPLETED | OUTPATIENT
Start: 2023-06-08 | End: 2023-06-08

## 2023-06-07 RX ORDER — NEOMYCIN SULFATE 500 MG/1
1000 TABLET ORAL 3 TIMES DAILY
Status: DISPENSED | OUTPATIENT
Start: 2023-06-07 | End: 2023-06-08

## 2023-06-07 RX ORDER — ONDANSETRON 2 MG/ML
4 INJECTION INTRAMUSCULAR; INTRAVENOUS EVERY 6 HOURS PRN
Status: DISCONTINUED | OUTPATIENT
Start: 2023-06-07 | End: 2023-06-10 | Stop reason: HOSPADM

## 2023-06-07 RX ORDER — METRONIDAZOLE 500 MG/100ML
500 INJECTION, SOLUTION INTRAVENOUS ONCE
Status: DISCONTINUED | OUTPATIENT
Start: 2023-06-08 | End: 2023-06-07

## 2023-06-07 RX ORDER — METRONIDAZOLE 500 MG/100ML
500 INJECTION, SOLUTION INTRAVENOUS
Status: DISCONTINUED | OUTPATIENT
Start: 2023-06-08 | End: 2023-06-08 | Stop reason: HOSPADM

## 2023-06-07 RX ORDER — ERYTHROMYCIN 250 MG/1
1000 TABLET, COATED ORAL 3 TIMES DAILY
Status: DISPENSED | OUTPATIENT
Start: 2023-06-07 | End: 2023-06-08

## 2023-06-07 RX ADMIN — NEOMYCIN SULFATE 1000 MG: 500 TABLET ORAL at 16:51

## 2023-06-07 RX ADMIN — HEPARIN SODIUM 5000 UNITS: 5000 INJECTION INTRAVENOUS; SUBCUTANEOUS at 05:44

## 2023-06-07 RX ADMIN — METRONIDAZOLE 500 MG: 500 INJECTION, SOLUTION INTRAVENOUS at 04:42

## 2023-06-07 RX ADMIN — KETOROLAC TROMETHAMINE 15 MG: 30 INJECTION, SOLUTION INTRAMUSCULAR; INTRAVENOUS at 23:54

## 2023-06-07 RX ADMIN — NEOMYCIN SULFATE 1000 MG: 500 TABLET ORAL at 22:36

## 2023-06-07 RX ADMIN — KETOROLAC TROMETHAMINE 15 MG: 30 INJECTION, SOLUTION INTRAMUSCULAR; INTRAVENOUS at 05:45

## 2023-06-07 RX ADMIN — ERYTHROMYCIN 1000 MG: 250 TABLET, FILM COATED ORAL at 16:54

## 2023-06-07 RX ADMIN — ONDANSETRON 4 MG: 2 INJECTION INTRAMUSCULAR; INTRAVENOUS at 19:01

## 2023-06-07 RX ADMIN — SODIUM CHLORIDE, SODIUM LACTATE, POTASSIUM CHLORIDE, AND CALCIUM CHLORIDE 125 ML/HR: .6; .31; .03; .02 INJECTION, SOLUTION INTRAVENOUS at 23:46

## 2023-06-07 RX ADMIN — ERYTHROMYCIN 1000 MG: 250 TABLET, FILM COATED ORAL at 22:29

## 2023-06-07 RX ADMIN — METRONIDAZOLE 500 MG: 500 INJECTION, SOLUTION INTRAVENOUS at 20:00

## 2023-06-07 RX ADMIN — METRONIDAZOLE 500 MG: 500 INJECTION, SOLUTION INTRAVENOUS at 11:32

## 2023-06-07 RX ADMIN — KETOROLAC TROMETHAMINE 15 MG: 30 INJECTION, SOLUTION INTRAMUSCULAR; INTRAVENOUS at 18:09

## 2023-06-07 RX ADMIN — CEFTRIAXONE 1000 MG: 1 INJECTION, SOLUTION INTRAVENOUS at 20:47

## 2023-06-07 RX ADMIN — SODIUM CHLORIDE, SODIUM LACTATE, POTASSIUM CHLORIDE, AND CALCIUM CHLORIDE 75 ML/HR: .6; .31; .03; .02 INJECTION, SOLUTION INTRAVENOUS at 14:42

## 2023-06-07 RX ADMIN — HEPARIN SODIUM 5000 UNITS: 5000 INJECTION INTRAVENOUS; SUBCUTANEOUS at 21:37

## 2023-06-07 RX ADMIN — KETOROLAC TROMETHAMINE 15 MG: 30 INJECTION, SOLUTION INTRAMUSCULAR; INTRAVENOUS at 11:27

## 2023-06-07 RX ADMIN — POLYETHYLENE GLYCOL 3350, SODIUM SULFATE ANHYDROUS, SODIUM BICARBONATE, SODIUM CHLORIDE, POTASSIUM CHLORIDE 4000 ML: 236; 22.74; 6.74; 5.86; 2.97 POWDER, FOR SOLUTION ORAL at 16:51

## 2023-06-07 RX ADMIN — HEPARIN SODIUM 5000 UNITS: 5000 INJECTION INTRAVENOUS; SUBCUTANEOUS at 14:42

## 2023-06-07 NOTE — NURSING NOTE
Flushed [place] drain LLQ as per order  Pt tolerated well  (+) return noted  Emptied for 5mL of bloody drainage  Dressing remains clean, dry, and intact

## 2023-06-07 NOTE — PLAN OF CARE
Problem: PAIN - ADULT  Goal: Verbalizes/displays adequate comfort level or baseline comfort level  Description: Interventions:  - Encourage patient to monitor pain and request assistance  - Assess pain using appropriate pain scale  - Administer analgesics based on type and severity of pain and evaluate response  - Implement non-pharmacological measures as appropriate and evaluate response  - Consider cultural and social influences on pain and pain management  - Notify physician/advanced practitioner if interventions unsuccessful or patient reports new pain  Outcome: Progressing     Problem: GASTROINTESTINAL - ADULT  Goal: Minimal or absence of nausea and/or vomiting  Description: INTERVENTIONS:  - Administer IV fluids if ordered to ensure adequate hydration  - Maintain NPO status until nausea and vomiting are resolved  - Nasogastric tube if ordered  - Administer ordered antiemetic medications as needed  - Provide nonpharmacologic comfort measures as appropriate  - Advance diet as tolerated, if ordered  - Consider nutrition services referral to assist patient with adequate nutrition and appropriate food choices  Outcome: Progressing     Problem: GASTROINTESTINAL - ADULT  Goal: Maintains or returns to baseline bowel function  Description: INTERVENTIONS:  - Assess bowel function  - Encourage oral fluids to ensure adequate hydration  - Administer IV fluids if ordered to ensure adequate hydration  - Administer ordered medications as needed  - Encourage mobilization and activity  - Consider nutritional services referral to assist patient with adequate nutrition and appropriate food choices  Outcome: Progressing     Problem: GASTROINTESTINAL - ADULT  Goal: Maintains adequate nutritional intake  Description: INTERVENTIONS:  - Monitor percentage of each meal consumed  - Identify factors contributing to decreased intake, treat as appropriate  - Assist with meals as needed  - Monitor I&O, weight, and lab values if indicated  - Obtain nutrition services referral as needed  Outcome: Progressing

## 2023-06-07 NOTE — PROGRESS NOTES
"Progress Note - General Surgery   Sharee Plascencia 38 y o  male MRN: [id number]  Unit/Bed#: -01 Encounter: [id number]    Assessment:  44yo male with no significant PMH returns to the hospital for complicated diverticulitis with abscess   -LLQ drain output 20cc, bloody/cloudy drainage   -blood cultures no growth x 48h   -afebrile, VSS on room air   -WBC 11 2 (9 5)   -Hgb stable   -electrolytes unremarkable    Plan:  -continue clear liquids, will continue bowel prep in anticipation of surgery  -will continue to monitor patient's exam, vitals, and labs  -continue IV abx  -pain and nausea meds prn  -DVT prophylaxis  -will discuss with attending    Subjective/Objective     Subjective: Patient having multiple loose bowel movements last night and this morning, no blood  LLQ abdominal pain is the same as yesterday, waxes and wanes  Pain medication is helping  Patient does feel bloated  Denies fever, chills, nausea, vomiting, chest pain, shortness of breath  Objective:     Blood pressure 128/80, pulse 81, temperature 98 °F (36 7 °C), resp  rate 18, height 5' 7\" (1 702 m), weight 86 kg (189 lb 11 3 oz), SpO2 95 %  ,Body mass index is 29 71 kg/m²  Intake/Output Summary (Last 24 hours) at 6/7/2023 0900  Last data filed at 6/7/2023 0074  Gross per 24 hour   Intake 1140 ml   Output 15 ml   Net 1125 ml       Invasive Devices     Peripheral Intravenous Line  Duration           Peripheral IV 06/04/23 Right Antecubital 2 days          Drain  Duration           Abscess Drain LLQ 1 day              Physical Exam  Constitutional:       Appearance: Normal appearance  HENT:      Head: Normocephalic and atraumatic  Nose: Nose normal       Mouth/Throat:      Mouth: Mucous membranes are moist       Pharynx: Oropharynx is clear  Eyes:      Conjunctiva/sclera: Conjunctivae normal       Pupils: Pupils are equal, round, and reactive to light  Cardiovascular:      Rate and Rhythm: Normal rate and regular rhythm        " Pulses: Normal pulses  Heart sounds: Normal heart sounds  Pulmonary:      Effort: Pulmonary effort is normal       Breath sounds: Normal breath sounds  Abdominal:      General: Bowel sounds are normal  There is distension  Tenderness: There is abdominal tenderness  Comments: Mild RLQ tenderness  Moderate LLQ tenderness  LLQ drain intact  Musculoskeletal:         General: Normal range of motion  Skin:     General: Skin is warm and dry  Neurological:      General: No focal deficit present  Mental Status: He is alert     Psychiatric:         Mood and Affect: Mood normal          Lab, Imaging and other studies:  CBC:   Lab Results   Component Value Date    HCT 38 1 06/07/2023    HGB 12 4 06/07/2023    MCH 29 6 06/07/2023    MCHC 32 5 06/07/2023    MCV 91 06/07/2023    MPV 10 9 06/07/2023    NRBC 0 06/07/2023     06/07/2023    RBC 4 19 06/07/2023    RDW 12 3 06/07/2023    WBC 11 27 (H) 06/07/2023   , CMP:   Lab Results   Component Value Date    BUN 8 06/07/2023    CALCIUM 8 3 (L) 06/07/2023     06/07/2023    CO2 26 06/07/2023    CREATININE 0 82 06/07/2023    EGFR 110 06/07/2023    K 4 8 06/07/2023    SODIUM 135 06/07/2023     VTE Pharmacologic Prophylaxis: Heparin  VTE Mechanical Prophylaxis: sequential compression device     Patricia Aceves PA-C

## 2023-06-08 ENCOUNTER — ANESTHESIA (INPATIENT)
Dept: PERIOP | Facility: HOSPITAL | Age: 43
DRG: 854 | End: 2023-06-08
Payer: OTHER GOVERNMENT

## 2023-06-08 PROBLEM — E66.9 OBESITY, CLASS I, BMI 30.0-34.9 (SEE ACTUAL BMI): Status: ACTIVE | Noted: 2019-06-28

## 2023-06-08 PROBLEM — Z72.0 TOBACCO ABUSE: Status: ACTIVE | Noted: 2017-03-09

## 2023-06-08 PROBLEM — E66.811 OBESITY, CLASS I, BMI 30.0-34.9 (SEE ACTUAL BMI): Status: ACTIVE | Noted: 2019-06-28

## 2023-06-08 LAB
ALBUMIN SERPL BCP-MCNC: 3.3 G/DL (ref 3.5–5)
ALP SERPL-CCNC: 61 U/L (ref 34–104)
ALT SERPL W P-5'-P-CCNC: 11 U/L (ref 7–52)
ANION GAP SERPL CALCULATED.3IONS-SCNC: 8 MMOL/L (ref 4–13)
AST SERPL W P-5'-P-CCNC: 16 U/L (ref 13–39)
BASOPHILS # BLD AUTO: 0.02 THOUSANDS/ÂΜL (ref 0–0.1)
BASOPHILS NFR BLD AUTO: 0 % (ref 0–1)
BILIRUB SERPL-MCNC: 0.39 MG/DL (ref 0.2–1)
BUN SERPL-MCNC: 6 MG/DL (ref 5–25)
CALCIUM ALBUM COR SERPL-MCNC: 9.1 MG/DL (ref 8.3–10.1)
CALCIUM SERPL-MCNC: 8.5 MG/DL (ref 8.4–10.2)
CHLORIDE SERPL-SCNC: 101 MMOL/L (ref 96–108)
CO2 SERPL-SCNC: 28 MMOL/L (ref 21–32)
CREAT SERPL-MCNC: 0.84 MG/DL (ref 0.6–1.3)
EOSINOPHIL # BLD AUTO: 0.12 THOUSAND/ÂΜL (ref 0–0.61)
EOSINOPHIL NFR BLD AUTO: 1 % (ref 0–6)
ERYTHROCYTE [DISTWIDTH] IN BLOOD BY AUTOMATED COUNT: 12.4 % (ref 11.6–15.1)
GFR SERPL CREATININE-BSD FRML MDRD: 109 ML/MIN/1.73SQ M
GLUCOSE SERPL-MCNC: 114 MG/DL (ref 65–140)
GLUCOSE SERPL-MCNC: 83 MG/DL (ref 65–140)
GLUCOSE SERPL-MCNC: 89 MG/DL (ref 65–140)
HCT VFR BLD AUTO: 39.8 % (ref 36.5–49.3)
HGB BLD-MCNC: 12.5 G/DL (ref 12–17)
IMM GRANULOCYTES # BLD AUTO: 0.05 THOUSAND/UL (ref 0–0.2)
IMM GRANULOCYTES NFR BLD AUTO: 1 % (ref 0–2)
LYMPHOCYTES # BLD AUTO: 1.25 THOUSANDS/ÂΜL (ref 0.6–4.47)
LYMPHOCYTES NFR BLD AUTO: 12 % (ref 14–44)
MAGNESIUM SERPL-MCNC: 2.2 MG/DL (ref 1.9–2.7)
MCH RBC QN AUTO: 28.9 PG (ref 26.8–34.3)
MCHC RBC AUTO-ENTMCNC: 31.4 G/DL (ref 31.4–37.4)
MCV RBC AUTO: 92 FL (ref 82–98)
MONOCYTES # BLD AUTO: 1.09 THOUSAND/ÂΜL (ref 0.17–1.22)
MONOCYTES NFR BLD AUTO: 11 % (ref 4–12)
NEUTROPHILS # BLD AUTO: 7.81 THOUSANDS/ÂΜL (ref 1.85–7.62)
NEUTS SEG NFR BLD AUTO: 75 % (ref 43–75)
NRBC BLD AUTO-RTO: 0 /100 WBCS
PHOSPHATE SERPL-MCNC: 2.9 MG/DL (ref 2.7–4.5)
PLATELET # BLD AUTO: 276 THOUSANDS/UL (ref 149–390)
PMV BLD AUTO: 10.7 FL (ref 8.9–12.7)
POTASSIUM SERPL-SCNC: 3.9 MMOL/L (ref 3.5–5.3)
PROT SERPL-MCNC: 6.4 G/DL (ref 6.4–8.4)
RBC # BLD AUTO: 4.32 MILLION/UL (ref 3.88–5.62)
SODIUM SERPL-SCNC: 137 MMOL/L (ref 135–147)
WBC # BLD AUTO: 10.34 THOUSAND/UL (ref 4.31–10.16)

## 2023-06-08 PROCEDURE — 82948 REAGENT STRIP/BLOOD GLUCOSE: CPT

## 2023-06-08 PROCEDURE — 84100 ASSAY OF PHOSPHORUS: CPT | Performed by: PHYSICIAN ASSISTANT

## 2023-06-08 PROCEDURE — 87147 CULTURE TYPE IMMUNOLOGIC: CPT | Performed by: SURGERY

## 2023-06-08 PROCEDURE — 80053 COMPREHEN METABOLIC PANEL: CPT | Performed by: PHYSICIAN ASSISTANT

## 2023-06-08 PROCEDURE — 87205 SMEAR GRAM STAIN: CPT | Performed by: SURGERY

## 2023-06-08 PROCEDURE — 0D9M30Z DRAINAGE OF DESCENDING COLON WITH DRAINAGE DEVICE, PERCUTANEOUS APPROACH: ICD-10-PCS | Performed by: SURGERY

## 2023-06-08 PROCEDURE — 87185 SC STD ENZYME DETCJ PER NZM: CPT | Performed by: SURGERY

## 2023-06-08 PROCEDURE — 87076 CULTURE ANAEROBE IDENT EACH: CPT | Performed by: SURGERY

## 2023-06-08 PROCEDURE — 87075 CULTR BACTERIA EXCEPT BLOOD: CPT | Performed by: SURGERY

## 2023-06-08 PROCEDURE — 0DBN0ZZ EXCISION OF SIGMOID COLON, OPEN APPROACH: ICD-10-PCS | Performed by: SURGERY

## 2023-06-08 PROCEDURE — 83735 ASSAY OF MAGNESIUM: CPT | Performed by: PHYSICIAN ASSISTANT

## 2023-06-08 PROCEDURE — NC001 PR NO CHARGE: Performed by: SURGERY

## 2023-06-08 PROCEDURE — 87070 CULTURE OTHR SPECIMN AEROBIC: CPT | Performed by: SURGERY

## 2023-06-08 PROCEDURE — 85025 COMPLETE CBC W/AUTO DIFF WBC: CPT | Performed by: PHYSICIAN ASSISTANT

## 2023-06-08 PROCEDURE — 0DJD4ZZ INSPECTION OF LOWER INTESTINAL TRACT, PERCUTANEOUS ENDOSCOPIC APPROACH: ICD-10-PCS | Performed by: SURGERY

## 2023-06-08 RX ORDER — HEPARIN SODIUM 5000 [USP'U]/ML
5000 INJECTION, SOLUTION INTRAVENOUS; SUBCUTANEOUS EVERY 8 HOURS SCHEDULED
Status: DISCONTINUED | OUTPATIENT
Start: 2023-06-09 | End: 2023-06-10 | Stop reason: HOSPADM

## 2023-06-08 RX ORDER — HYDROMORPHONE HCL/PF 1 MG/ML
0.5 SYRINGE (ML) INJECTION
Status: DISCONTINUED | OUTPATIENT
Start: 2023-06-08 | End: 2023-06-10 | Stop reason: HOSPADM

## 2023-06-08 RX ORDER — KETOROLAC TROMETHAMINE 30 MG/ML
15 INJECTION, SOLUTION INTRAMUSCULAR; INTRAVENOUS EVERY 6 HOURS SCHEDULED
Status: DISCONTINUED | OUTPATIENT
Start: 2023-06-08 | End: 2023-06-10 | Stop reason: HOSPADM

## 2023-06-08 RX ORDER — ROCURONIUM BROMIDE 10 MG/ML
INJECTION, SOLUTION INTRAVENOUS AS NEEDED
Status: DISCONTINUED | OUTPATIENT
Start: 2023-06-08 | End: 2023-06-08

## 2023-06-08 RX ORDER — OXYCODONE HYDROCHLORIDE 5 MG/1
5 TABLET ORAL EVERY 6 HOURS PRN
Status: DISCONTINUED | OUTPATIENT
Start: 2023-06-08 | End: 2023-06-10 | Stop reason: HOSPADM

## 2023-06-08 RX ORDER — OXYCODONE HYDROCHLORIDE 5 MG/1
10 TABLET ORAL EVERY 6 HOURS PRN
Status: DISCONTINUED | OUTPATIENT
Start: 2023-06-08 | End: 2023-06-10 | Stop reason: HOSPADM

## 2023-06-08 RX ORDER — HYDROMORPHONE HCL/PF 1 MG/ML
0.5 SYRINGE (ML) INJECTION
Status: DISCONTINUED | OUTPATIENT
Start: 2023-06-08 | End: 2023-06-08 | Stop reason: HOSPADM

## 2023-06-08 RX ORDER — DEXAMETHASONE SODIUM PHOSPHATE 10 MG/ML
INJECTION, SOLUTION INTRAMUSCULAR; INTRAVENOUS AS NEEDED
Status: DISCONTINUED | OUTPATIENT
Start: 2023-06-08 | End: 2023-06-08

## 2023-06-08 RX ORDER — CEFTRIAXONE 1 G/50ML
1000 INJECTION, SOLUTION INTRAVENOUS EVERY 24 HOURS
Status: DISCONTINUED | OUTPATIENT
Start: 2023-06-08 | End: 2023-06-10 | Stop reason: HOSPADM

## 2023-06-08 RX ORDER — PROPOFOL 10 MG/ML
INJECTION, EMULSION INTRAVENOUS AS NEEDED
Status: DISCONTINUED | OUTPATIENT
Start: 2023-06-08 | End: 2023-06-08

## 2023-06-08 RX ORDER — KETAMINE HCL IN NACL, ISO-OSM 100MG/10ML
SYRINGE (ML) INJECTION AS NEEDED
Status: DISCONTINUED | OUTPATIENT
Start: 2023-06-08 | End: 2023-06-08

## 2023-06-08 RX ORDER — DEXMEDETOMIDINE HYDROCHLORIDE 100 UG/ML
INJECTION, SOLUTION INTRAVENOUS AS NEEDED
Status: DISCONTINUED | OUTPATIENT
Start: 2023-06-08 | End: 2023-06-08

## 2023-06-08 RX ORDER — MAGNESIUM HYDROXIDE/ALUMINUM HYDROXICE/SIMETHICONE 120; 1200; 1200 MG/30ML; MG/30ML; MG/30ML
30 SUSPENSION ORAL EVERY 4 HOURS PRN
Status: DISCONTINUED | OUTPATIENT
Start: 2023-06-08 | End: 2023-06-10 | Stop reason: HOSPADM

## 2023-06-08 RX ORDER — METRONIDAZOLE 500 MG/100ML
INJECTION, SOLUTION INTRAVENOUS CONTINUOUS PRN
Status: DISCONTINUED | OUTPATIENT
Start: 2023-06-08 | End: 2023-06-08

## 2023-06-08 RX ORDER — SODIUM CHLORIDE, SODIUM LACTATE, POTASSIUM CHLORIDE, CALCIUM CHLORIDE 600; 310; 30; 20 MG/100ML; MG/100ML; MG/100ML; MG/100ML
100 INJECTION, SOLUTION INTRAVENOUS CONTINUOUS
Status: DISCONTINUED | OUTPATIENT
Start: 2023-06-08 | End: 2023-06-09

## 2023-06-08 RX ORDER — LIDOCAINE HYDROCHLORIDE 20 MG/ML
INJECTION, SOLUTION EPIDURAL; INFILTRATION; INTRACAUDAL; PERINEURAL AS NEEDED
Status: DISCONTINUED | OUTPATIENT
Start: 2023-06-08 | End: 2023-06-08

## 2023-06-08 RX ORDER — LANOLIN ALCOHOL/MO/W.PET/CERES
3 CREAM (GRAM) TOPICAL
Status: DISCONTINUED | OUTPATIENT
Start: 2023-06-08 | End: 2023-06-10 | Stop reason: HOSPADM

## 2023-06-08 RX ORDER — MIDAZOLAM HYDROCHLORIDE 2 MG/2ML
INJECTION, SOLUTION INTRAMUSCULAR; INTRAVENOUS AS NEEDED
Status: DISCONTINUED | OUTPATIENT
Start: 2023-06-08 | End: 2023-06-08

## 2023-06-08 RX ORDER — SODIUM CHLORIDE, SODIUM LACTATE, POTASSIUM CHLORIDE, CALCIUM CHLORIDE 600; 310; 30; 20 MG/100ML; MG/100ML; MG/100ML; MG/100ML
INJECTION, SOLUTION INTRAVENOUS CONTINUOUS PRN
Status: DISCONTINUED | OUTPATIENT
Start: 2023-06-08 | End: 2023-06-08

## 2023-06-08 RX ORDER — ACETAMINOPHEN 325 MG/1
650 TABLET ORAL EVERY 6 HOURS SCHEDULED
Status: DISPENSED | OUTPATIENT
Start: 2023-06-08 | End: 2023-06-09

## 2023-06-08 RX ORDER — MAGNESIUM HYDROXIDE 1200 MG/15ML
LIQUID ORAL AS NEEDED
Status: DISCONTINUED | OUTPATIENT
Start: 2023-06-08 | End: 2023-06-08 | Stop reason: HOSPADM

## 2023-06-08 RX ORDER — FENTANYL CITRATE 50 UG/ML
INJECTION, SOLUTION INTRAMUSCULAR; INTRAVENOUS AS NEEDED
Status: DISCONTINUED | OUTPATIENT
Start: 2023-06-08 | End: 2023-06-08

## 2023-06-08 RX ORDER — FENTANYL CITRATE/PF 50 MCG/ML
50 SYRINGE (ML) INJECTION
Status: DISCONTINUED | OUTPATIENT
Start: 2023-06-08 | End: 2023-06-08 | Stop reason: HOSPADM

## 2023-06-08 RX ORDER — HEPARIN SODIUM 5000 [USP'U]/ML
INJECTION, SOLUTION INTRAVENOUS; SUBCUTANEOUS
Status: DISCONTINUED
Start: 2023-06-08 | End: 2023-06-08 | Stop reason: WASHOUT

## 2023-06-08 RX ORDER — SUCCINYLCHOLINE/SOD CL,ISO/PF 100 MG/5ML
SYRINGE (ML) INTRAVENOUS AS NEEDED
Status: DISCONTINUED | OUTPATIENT
Start: 2023-06-08 | End: 2023-06-08

## 2023-06-08 RX ORDER — METRONIDAZOLE 500 MG/100ML
500 INJECTION, SOLUTION INTRAVENOUS EVERY 8 HOURS
Status: DISCONTINUED | OUTPATIENT
Start: 2023-06-08 | End: 2023-06-10 | Stop reason: HOSPADM

## 2023-06-08 RX ADMIN — FENTANYL CITRATE 50 MCG: 50 INJECTION, SOLUTION INTRAMUSCULAR; INTRAVENOUS at 13:53

## 2023-06-08 RX ADMIN — SODIUM CHLORIDE, SODIUM LACTATE, POTASSIUM CHLORIDE, AND CALCIUM CHLORIDE: .6; .31; .03; .02 INJECTION, SOLUTION INTRAVENOUS at 14:05

## 2023-06-08 RX ADMIN — ROCURONIUM BROMIDE 30 MG: 10 INJECTION, SOLUTION INTRAVENOUS at 14:08

## 2023-06-08 RX ADMIN — METRONIDAZOLE: 500 INJECTION, SOLUTION INTRAVENOUS at 14:05

## 2023-06-08 RX ADMIN — ACETAMINOPHEN 650 MG: 325 TABLET ORAL at 17:56

## 2023-06-08 RX ADMIN — DEXMEDETOMIDINE HYDROCHLORIDE 4 MCG: 100 INJECTION, SOLUTION INTRAVENOUS at 14:15

## 2023-06-08 RX ADMIN — METRONIDAZOLE 500 MG: 500 INJECTION, SOLUTION INTRAVENOUS at 21:00

## 2023-06-08 RX ADMIN — MIDAZOLAM 2 MG: 1 INJECTION INTRAMUSCULAR; INTRAVENOUS at 13:53

## 2023-06-08 RX ADMIN — CEFAZOLIN SODIUM 2000 MG: 2 SOLUTION INTRAVENOUS at 13:53

## 2023-06-08 RX ADMIN — Medication 20 MG: at 14:29

## 2023-06-08 RX ADMIN — OXYCODONE HYDROCHLORIDE 5 MG: 5 TABLET ORAL at 21:17

## 2023-06-08 RX ADMIN — METRONIDAZOLE 500 MG: 500 INJECTION, SOLUTION INTRAVENOUS at 04:59

## 2023-06-08 RX ADMIN — KETOROLAC TROMETHAMINE 15 MG: 30 INJECTION, SOLUTION INTRAMUSCULAR; INTRAVENOUS at 06:03

## 2023-06-08 RX ADMIN — HYDROMORPHONE HYDROCHLORIDE 1 MG: 1 INJECTION, SOLUTION INTRAMUSCULAR; INTRAVENOUS; SUBCUTANEOUS at 14:51

## 2023-06-08 RX ADMIN — Medication 100 MG: at 13:58

## 2023-06-08 RX ADMIN — SUGAMMADEX 200 MG: 100 INJECTION, SOLUTION INTRAVENOUS at 16:23

## 2023-06-08 RX ADMIN — ROCURONIUM BROMIDE 20 MG: 10 INJECTION, SOLUTION INTRAVENOUS at 14:29

## 2023-06-08 RX ADMIN — DEXAMETHASONE SODIUM PHOSPHATE 10 MG: 10 INJECTION, SOLUTION INTRAMUSCULAR; INTRAVENOUS at 13:58

## 2023-06-08 RX ADMIN — KETOROLAC TROMETHAMINE 15 MG: 30 INJECTION, SOLUTION INTRAMUSCULAR; INTRAVENOUS at 17:55

## 2023-06-08 RX ADMIN — ROCURONIUM BROMIDE 10 MG: 10 INJECTION, SOLUTION INTRAVENOUS at 15:10

## 2023-06-08 RX ADMIN — Medication 30 MG: at 14:10

## 2023-06-08 RX ADMIN — DEXMEDETOMIDINE HYDROCHLORIDE 4 MCG: 100 INJECTION, SOLUTION INTRAVENOUS at 14:18

## 2023-06-08 RX ADMIN — Medication 100 MG: at 16:41

## 2023-06-08 RX ADMIN — LIDOCAINE HYDROCHLORIDE 100 MG: 20 INJECTION, SOLUTION EPIDURAL; INFILTRATION; INTRACAUDAL; PERINEURAL at 13:58

## 2023-06-08 RX ADMIN — ACETAMINOPHEN 650 MG: 325 TABLET ORAL at 23:31

## 2023-06-08 RX ADMIN — CEFTRIAXONE 1000 MG: 1 INJECTION, SOLUTION INTRAVENOUS at 20:08

## 2023-06-08 RX ADMIN — KETOROLAC TROMETHAMINE 15 MG: 30 INJECTION, SOLUTION INTRAMUSCULAR at 23:31

## 2023-06-08 RX ADMIN — PROPOFOL 250 MG: 10 INJECTION, EMULSION INTRAVENOUS at 13:58

## 2023-06-08 RX ADMIN — SODIUM CHLORIDE, SODIUM LACTATE, POTASSIUM CHLORIDE, AND CALCIUM CHLORIDE 125 ML/HR: .6; .31; .03; .02 INJECTION, SOLUTION INTRAVENOUS at 12:12

## 2023-06-08 RX ADMIN — FENTANYL CITRATE 50 MCG: 50 INJECTION, SOLUTION INTRAMUSCULAR; INTRAVENOUS at 14:34

## 2023-06-08 RX ADMIN — KETOROLAC TROMETHAMINE 15 MG: 30 INJECTION, SOLUTION INTRAMUSCULAR at 18:00

## 2023-06-08 RX ADMIN — SODIUM CHLORIDE, SODIUM LACTATE, POTASSIUM CHLORIDE, AND CALCIUM CHLORIDE 125 ML/HR: .6; .31; .03; .02 INJECTION, SOLUTION INTRAVENOUS at 10:16

## 2023-06-08 NOTE — NURSING NOTE
AWAKE AND TOLERATING GOLYTELY  LIQUID WATERY BROWN STOOL IVF CONTINUES DENIES PAIN AND CALL LEA NEAR

## 2023-06-08 NOTE — DISCHARGE INSTR - AVS FIRST PAGE
[facility]    Discharge Instructions  Light activity for 2 weeks  No heavy lifting for 2 weeks  Max 10 lbs for 2 weeks  No driving for 3-7 days or until pain is well controlled  Take discharge medications as prescribed  Notify our office for nausea, vomiting, fever, diarrhea, chest pain, trouble breathing  Follow up in our office in 2 weeks or sooner if needed  Call with additional questions or concerns [phone]  For pain you may take ibuprofen 600 mg every 6 hours scheduled for 3 days then as needed  You can also take acetaminophen 650 mg every 6 hours scheduled for 3 days then as needed  For ongoing pain you can alternate ibuprofen and acetaminophen every 3 hours  If pain not controlled with this regimen you can use Oxycodone as prescribed  Ice packs may be helpful, 20 min on, 20 min off alternating and monitoring skin  DISCHARGE INSTRUCTIONS:    FOLLOW UP APPOINTMENT: Following discharge from the hospital call the office in the next 1-2 days to set up a post-operative appointment to be seen in 2 weeks by Dr Farshad Huertas: Following discharge from the hospital, you may have some questions about your procedure, your activities or your general condition  Your medical team will be happy to answer all questions for you  You can expect to be sore and tender mostly around the incisions  This pain will last a few days and should gradually improve  INCISION SITES:  - You may apply ice to the incisions to help with pain  - It is normal to have some bruising, swelling or mild discoloration around the incision  If increasing redness or pain develops, call our office immediately    -Do not apply any creams, lotions, or ointments  If you have dressings:  - You may remove the gauze dressing from your incisions 48 hours after surgery  You have staples in place to close your incisions   You will need to schedule a follow up appointment for removal at the surgical office     WOUND CARE:  -Avoid tight adhering, irritative clothing   -You may gently shower, let water and soap run down and pat dry; do not scrub the incision sites    -No baths, hot tubs, or swimming x 6 weeks or until cleared after follow up appointment  PAIN CONTROL/MEDICATIONS:  -Recommend taking over the counter pain medication such as Tylenol OR Ibuprofen first; read and follow labels on bottles  -Only use prescribed pain medications as needed and try to taper down use overtime  Do not drive or operate heavy machinery while on prescription pain medications  Do not take with alcohol   - If you were given a prescription for Percocet, Norco, or Vicodin for pain be sure to eat prior to taking as these medications as they may cause nausea and vomiting on an empty stomach  -DO NOT take Tylenol  (acetaminophen) with prescribed pain medication for a fever or for further pain control as these medications already contain Tylenol in them  Do not exceed more than 4000 mg of acetaminophen in 24 hours or 3000 mg if you have liver disease    -You may apply ice at the incision site as needed for 20-30 minutes on/off to decrease pain or swelling    - If you were given an antibiotic take it until it is finished  DIET/LIFE HABITS:  -Advance diet as tolerated  Start with bland foods  Once tolerating normal diet, include fiber-rich foods such as fruits and vegetables to help with bowel movements   -Stay hydrated  Drink plenty of non-caffienated, non-alcoholic beverages such as water, juices, popsicles, etc   -Abstain from drinking alcohol as this can interrupt wound healing and increase chance of unwanted bleeding    -No smoking at least 2 weeks post surgery, this can delay wound healing  Smoking cessation is encouraged and we can provide you with options to facilitate cessation      ACTIVITY/RESTRICTIONS:  -No strenuous exercise and no heavy lifting, pulling, or pushing >10-15 lbs x 4 weeks or until cleared by surgeon  -Gradually increase your activity daily  Walking 3-4 times daily is good and stairs are ok  Listen to your body  If you start to get tired or sore then rest    -No driving for 7 days or while taking narcotics for pain  RETURN TO WORK:  -You may return to work or other activities as soon as your pain is controlled and you feel comfortable  For many people, this is a few days after surgery  If your job requires heavy lifting you will need to be on light duty for 2-3 weeks       CALL THE OFFICE IF/RETURN TO ED:  -Fevers/chills with uncontrolled temperature > 100 4 F   -Increasing severe pain uncontrolled with pain medicine   -Incision site is having thick, yellow drainage, increasing redness, is warm to the touch, or becoming increasingly tender   -Unexplained bleeding   -Any changes in overall keily such as: nausea/vomitting, fevers/chills, diarrhea/constipation, inability/difficulty urinating, chest pains, palpations, trouble breathing, coughing, excessive fatigue/weakness, etc

## 2023-06-08 NOTE — CASE MANAGEMENT
Case Management Discharge Planning Note    Patient name Destin Bourgeois  Location /-83 MRN [id number]  : 1980 Date 2023       Current Admission Date: 2023  Current Admission Diagnosis:Diverticulitis, Abdominal pain, Abdominal wall abscess, Fever   Patient Active Problem List    Diagnosis Date Noted   • Diverticulitis of large intestine with abscess with bleeding 2023      LOS (days): 4  Geometric Mean LOS (GMLOS) (days): 5 10  Days to GMLOS:1 6     OBJECTIVE:  Risk of Unplanned Readmission Score: 8 42         Current admission status: Inpatient   Preferred Pharmacy:   [address] [place] [address] TERENCE Ludwig  [address] [address]  Phone: [phone] Fax: [phone]    Primary Care Provider: No primary care provider on file  Primary Insurance: ERWIN MCNALLY  Secondary Insurance:     DISCHARGE DETAILS:  Additional Comments: CM continuning to follow for discharge planning  Pt for surgical intervention for complicated diverticulitis per surgical team  Discharge plan at this time remains for pt to return home with [facility] to resume when stable  CM to follow to assess for any additional discharge needs

## 2023-06-08 NOTE — NUTRITION
06/08/23 1355   Biochemical Data,Medical Tests, and Procedures   Biochemical Data/Medical Tests/Procedures Lab values reviewed; Meds reviewed   Labs (Comment) Reviewed; WNL   Meds (Comment) LR infusion, dilaudid, zofran   Nutrition-Focused Physical Exam   Nutrition-Focused Physical Exam Findings RN skin assessment reviewed; No edema documented; No skin issues documented   Medical-Related Concerns diverticulitis, abdominal pain, abdominal wall abscess   Current PO Intake   Current Diet Order NPO   Current Meal Intake Other (Comment)  (NPO)   Estimated calorie intake compared to estimated need Nutrient needs are not met  PES Statement   Oral or Nutritional Support Intake (2) Inadequate oral intake NI-2 1   Related to Other (Comment)  (diverticulitis)   As evidenced by: Per patient/family interview; Intake < estimated needs   Recommendations/Interventions   Malnutrition/BMI Present No   Summary NPO/Clears  Presents with fever and abdominal pain  Drain in place for drainage of abdominal abscess - placed 6/1  LLQ drain placed with IR 6/5  Clear liquids/NPO with bowel prep for likely surgical intervention in setting of complicated diverticulitis  Past medical history significant for diverticulitis, abdominal pain, abdominal wall abscess  Weight history reviewed  Minimal data available per EMR  No edema  Pt reports feeling hungry  Appetite intact  Pt usually has 2 meals daily - lunch and dinner with snacks  He follows a regular diet  NKFA  Reports no difficulty chewing or swallowing  Pt's wife grocery shops  Both pt and his wife cook  He reports his usual body weight is 195#-205# - reports weight as stable  Recommend advancing to Lo Fiber/Lo Residue diet as clinically indicated  RD to monitor diet initiation and subsequent PO intakes     Interventions/Recommendations Monitor I & O's;Initiate diet   Education Assessment   Education Patient/caregiver not appropriate for education at this time   Patient Nutrition Goals   Goal Transition to PO diet

## 2023-06-08 NOTE — NURSING NOTE
AWAKE AND PLEASANT  02 IS MAINTAINED 2 LITERS N/C AND 02 SAT 98%  HR 97/MIN  PAIN TO ABD IS 3/10  DRESSINGS TO ABD AND LAP SITES DRY AND INTACT  IVF CONTINUE  CALL LEA GIVEN

## 2023-06-08 NOTE — ANESTHESIA POSTPROCEDURE EVALUATION
Post-Op Assessment Note    CV Status:  Stable  Pain Score: 0    Pain management: adequate     Mental Status:  Alert   Hydration Status:  Stable   PONV Controlled:  Controlled   Airway Patency:  Patent      Post Op Vitals Reviewed: Yes      Staff: CRNA         No notable events documented      BP   145/71   Temp   98 1   Pulse  97   Resp   20   SpO2   100

## 2023-06-08 NOTE — PROGRESS NOTES
"Progress Note - General Surgery   Yovany Plascencia 38 y o  male MRN: [id number]  Unit/Bed#: -01 Encounter: [id number]    Assessment:  44yo male with no significant PMH returns to the hospital for complicated diverticulitis with abscess   -LLQ drain output 15cc, cloudy drainage   -blood culture no growth x 72h   -patient completed bowel prep for scheduled OR this afternoon, type and screen completed   -afebrile, VSS on room air   -WBC 10 3 (11 2)   -Hgb stable    Plan:  -OR today for lap hand assisted sigmoid resection  -NPO, bowel prep completed  -IV abx  -pain and nausea meds prn  -DVT prophylaxis   -will discuss with attending    Subjective/Objective     Subjective: Patient is prepared for procedure  All questions answered this morning  Bowel prep completed last night  Denies fever, chills, chest pain, shortness of breath  Objective:     Blood pressure 132/77, pulse 85, temperature 99 °F (37 2 °C), temperature source Oral, resp  rate 18, height 5' 7\" (1 702 m), weight 86 kg (189 lb 11 3 oz), SpO2 96 %  ,Body mass index is 29 71 kg/m²  Intake/Output Summary (Last 24 hours) at 6/8/2023 1129  Last data filed at 6/8/2023 0601  Gross per 24 hour   Intake 3620 ml   Output 10 ml   Net 3610 ml       Invasive Devices     Peripheral Intravenous Line  Duration           Peripheral IV 06/07/23 Left;Proximal;Ventral (anterior) Forearm <1 day          Drain  Duration           Abscess Drain LLQ 2 days              Physical Exam  Constitutional:       Appearance: Normal appearance  HENT:      Head: Normocephalic and atraumatic  Nose: Nose normal       Mouth/Throat:      Mouth: Mucous membranes are moist       Pharynx: Oropharynx is clear  Eyes:      Conjunctiva/sclera: Conjunctivae normal       Pupils: Pupils are equal, round, and reactive to light  Cardiovascular:      Rate and Rhythm: Normal rate and regular rhythm  Pulses: Normal pulses  Heart sounds: Normal heart sounds     Pulmonary:     " Effort: Pulmonary effort is normal       Breath sounds: Normal breath sounds  Abdominal:      General: Bowel sounds are normal  There is distension  Tenderness: There is abdominal tenderness  Comments: LLQ and RLQ quadrants mildly tender  Musculoskeletal:         General: Normal range of motion  Skin:     General: Skin is warm and dry  Neurological:      General: No focal deficit present  Mental Status: He is alert     Psychiatric:         Mood and Affect: Mood normal          Lab, Imaging and other studies:  CBC:   Lab Results   Component Value Date    HCT 39 8 06/08/2023    HGB 12 5 06/08/2023    MCH 28 9 06/08/2023    MCHC 31 4 06/08/2023    MCV 92 06/08/2023    MPV 10 7 06/08/2023    NRBC 0 06/08/2023     06/08/2023    RBC 4 32 06/08/2023    RDW 12 4 06/08/2023    WBC 10 34 (H) 06/08/2023   , CMP:   Lab Results   Component Value Date    ALKPHOS 61 06/08/2023    ALT 11 06/08/2023    AST 16 06/08/2023    BUN 6 06/08/2023    CALCIUM 8 5 06/08/2023     06/08/2023    CO2 28 06/08/2023    CREATININE 0 84 06/08/2023    EGFR 109 06/08/2023    K 3 9 06/08/2023    SODIUM 137 06/08/2023     VTE Pharmacologic Prophylaxis: Heparin  VTE Mechanical Prophylaxis: sequential compression device     Sujatha Roldan PA-C

## 2023-06-08 NOTE — ANESTHESIA PREPROCEDURE EVALUATION
Procedure:  RESECTION COLON SIGMOID LAPAROSCOPIC HAND-ASSISTED (Abdomen)    Relevant Problems   Digestive   (+) Diverticulitis of large intestine with abscess with bleeding      Other   (+) Obesity, Class I, BMI 30 0-34 9 (see actual BMI)   (+) Tobacco abuse        Physical Exam    Airway  Comment: gordon  Mallampati score: II  TM Distance: >3 FB  Neck ROM: full     Dental   No notable dental hx     Cardiovascular  Cardiovascular exam normal    Pulmonary  Pulmonary exam normal     Other Findings        Anesthesia Plan  ASA Score- 3     Anesthesia Type- general with ASA Monitors  Additional Monitors:   Airway Plan: ETT  Plan Factors-Exercise tolerance (METS): >4 METS  Chart reviewed  EKG reviewed  Imaging results reviewed  Existing labs reviewed  Patient summary reviewed  Patient is a current smoker  Patient instructed to abstain from smoking on day of procedure  Patient did not smoke on day of surgery  Induction- intravenous  Postoperative Plan-     Informed Consent- Anesthetic plan and risks discussed with patient and spouse  I personally reviewed this patient with the CRNA  Discussed and agreed on the Anesthesia Plan with the CRNA  Justin Marie

## 2023-06-08 NOTE — OP NOTE
OPERATIVE REPORT  PATIENT NAME: Elray Cooks    :  1980  MRN: [id number]  Pt Location: [facility] OR ROOM 02    SURGERY DATE: 2023    Surgeon(s) and Role:     * Morena Omer MD - Primary     * Niko Enamorado PA-C - Assisting     * Krishna Lemus PA-C - Assisting  The PA was necessary to provide expert assistance; i e  in the form of providing optimal exposure with retraction, suturing, and assistance with dissection in order to perform the most efficient operation and in order to optimize patient safety in the abscence of a qualified surgical resident  Preop Diagnosis:  Diverticulitis of large intestine with abscess with bleeding [K57 21]    Post-Op Diagnosis Codes:     * Diverticulitis of large intestine with abscess with bleeding [K57 21]    Procedure(s):  RESECTION COLON SIGMOID LAPAROSCOPIC HAND-ASSISTED    Specimen(s):  ID Type Source Tests Collected by Time Destination   1 : sigmoid colon Tissue Large Intestine, Sigmoid Colon TISSUE Dustin Elliott MD 2023 1521    A : Abdominal abscess Wound Abdominal ANAEROBIC CULTURE AND GRAM STAIN, WOUND CULTURE Morena Omer MD 2023 1504        Estimated Blood Loss:   100 mL    Drains:  Urethral Catheter Double-lumen; Latex 16 Fr  (Active)   Number of days: 0       Anesthesia Type:   General    Operative Indications:  Diverticulitis of large intestine with abscess with bleeding [K57 21]  The patient is a 74-year-old male admitted for the third time in as many weeks with complicated acute sigmoid diverticulitis for which definitive treatment by hand-assisted laparoscopic sigmoid colectomy is now indicated  Operative Findings:  Acutely inflamed focal segment of sigmoid colon with a contained abscess walled off by greater omentum  Hand-assisted laparoscopic sigmoid colectomy was performed with a primary handsewn anastomosis in 2 layers      Complications:   None    Procedure and Technique:  Patient taken to the operating room where they are properly identified monitored and anesthetized  They received antibiotics perioperatively  Sequential compression device used for deep vein thrombosis prophylaxis  Vargas catheter placed preoperatively  Abdomen and perineum prepped and draped under sterile conditions using aseptic technique  Patient was placed in a modified lithotomy position  Timeout performed  Skin incised in the left upper quadrant  5 mm trocar advanced bluntly into the peritoneal cavity  Pneumoperitoneum established to 15 mmHg  Diagnostic laparoscopy performed  4 quadrants of the abdomen inspected laparoscopically as was the pelvis with the above findings noted  An additional 12 mm working port placed in the right lower quadrant  The greater omentum was dissected bluntly off of the anterior abdominal wall  Pus was immediately entered into an sampled for culture  A site selected for the HandPort in the left paramedian location  Skin incised  Dissection carried down using a muscle-splitting incision peritoneal cavity entered and HandPort placed  Hand advanced  Hand-assisted laparoscopic sigmoid colectomy performed with mobilization of the sigmoid colon  It was mobilized and freed from the left pelvic sidewall along the white line of Toldt  During the course of dissection the left ureter was confidently identified and preserved  Window made in the mesentery  The mesentery divided with 3 fires of the Endo KARINA stapler  The inflamed segment of colon delivered into the HandPort into the operative field  Proximal and distal extent of the resection defined :  Isolated and divided sharply with Metzenbaum scissors  Specimen sent for permanent pathologic evaluation  Hand-assisted anastomosis performed in 2 layers with use of 3-0 Vicryl on mucosa and muscularis mucosa  Second layer of interrupted 3-0 silk sutures used to reinforce the anastomosis with seromuscular bites      The bowel returned to the peritoneal cavity  Pneumoperitoneum reestablished  Laparoscopic inspection of the peritoneal cavity performed  Abdomen copiously irrigated and aspirated with warm saline  Procedure completed the closure of the right lower quadrant trocar site with a 0 Vicryl suture on fascia  Gowns and gloves changed  The left paramedian incision closed in 2 layers with running 0 Vicryl suture on the posterior sheath  Running #1 PDS used to close the anterior sheath  All wounds closed with staples  All wounds dressed sterilely  Patient extubated taken recovery in stable condition  All elements of the colorectal bundle were followed throughout the course of the operation  I was present for the entire procedure      Patient Disposition:  PACU     This procedure was not performed to treat colon cancer through resection      SIGNATURE: Keisha Beach MD  DATE: June 8, 2023  TIME: 4:25 PM

## 2023-06-08 NOTE — NURSING NOTE
Returned from the OR at this time in stable condition  Wife is at bedside  VSS  Alert and oriented times 4  Pt  is drowsy  Denies pain at present time  Abdomen is soft, tender, non-distended  Hypoactive BS noted times 4 quads  ICE applied to midline surgical site  Mepilex dressing is clean, dry, and intact  2 lap sites covered with 2x2s and secured with tegaderms  No drainage noted  Will monitor

## 2023-06-08 NOTE — PLAN OF CARE
Problem: INFECTION - ADULT  Goal: Absence or prevention of progression during hospitalization  Description: INTERVENTIONS:  - Assess and monitor for signs and symptoms of infection  - Monitor lab/diagnostic results  - Monitor all insertion sites, i e  indwelling lines, tubes, and drains  - Monitor endotracheal if appropriate and nasal secretions for changes in amount and color  - [place] appropriate cooling/warming therapies per order  - Administer medications as ordered  - Instruct and encourage patient and family to use good hand hygiene technique  - Identify and instruct in appropriate isolation precautions for identified infection/condition  Outcome: Progressing  Goal: Absence of fever/infection during neutropenic period  Description: INTERVENTIONS:  - Monitor WBC    Outcome: Progressing     Problem: PAIN - ADULT  Goal: Verbalizes/displays adequate comfort level or baseline comfort level  Description: Interventions:  - Encourage patient to monitor pain and request assistance  - Assess pain using appropriate pain scale  - Administer analgesics based on type and severity of pain and evaluate response  - Implement non-pharmacological measures as appropriate and evaluate response  - Consider cultural and social influences on pain and pain management  - Notify physician/advanced practitioner if interventions unsuccessful or patient reports new pain  Outcome: Progressing

## 2023-06-09 LAB
ALBUMIN SERPL BCP-MCNC: 3 G/DL (ref 3.5–5)
ALP SERPL-CCNC: 52 U/L (ref 34–104)
ALT SERPL W P-5'-P-CCNC: 10 U/L (ref 7–52)
ANION GAP SERPL CALCULATED.3IONS-SCNC: 11 MMOL/L (ref 4–13)
AST SERPL W P-5'-P-CCNC: 14 U/L (ref 13–39)
BILIRUB SERPL-MCNC: 0.47 MG/DL (ref 0.2–1)
BUN SERPL-MCNC: 7 MG/DL (ref 5–25)
CALCIUM ALBUM COR SERPL-MCNC: 9.1 MG/DL (ref 8.3–10.1)
CALCIUM SERPL-MCNC: 8.3 MG/DL (ref 8.4–10.2)
CHLORIDE SERPL-SCNC: 101 MMOL/L (ref 96–108)
CO2 SERPL-SCNC: 24 MMOL/L (ref 21–32)
CREAT SERPL-MCNC: 0.65 MG/DL (ref 0.6–1.3)
ERYTHROCYTE [DISTWIDTH] IN BLOOD BY AUTOMATED COUNT: 12.2 % (ref 11.6–15.1)
GFR SERPL CREATININE-BSD FRML MDRD: 121 ML/MIN/1.73SQ M
GLUCOSE SERPL-MCNC: 135 MG/DL (ref 65–140)
HCT VFR BLD AUTO: 38 % (ref 36.5–49.3)
HGB BLD-MCNC: 12.3 G/DL (ref 12–17)
MAGNESIUM SERPL-MCNC: 2 MG/DL (ref 1.9–2.7)
MCH RBC QN AUTO: 29 PG (ref 26.8–34.3)
MCHC RBC AUTO-ENTMCNC: 32.4 G/DL (ref 31.4–37.4)
MCV RBC AUTO: 90 FL (ref 82–98)
PHOSPHATE SERPL-MCNC: 3.5 MG/DL (ref 2.7–4.5)
PLATELET # BLD AUTO: 313 THOUSANDS/UL (ref 149–390)
PMV BLD AUTO: 10.4 FL (ref 8.9–12.7)
POTASSIUM SERPL-SCNC: 4.2 MMOL/L (ref 3.5–5.3)
PROT SERPL-MCNC: 5.9 G/DL (ref 6.4–8.4)
RBC # BLD AUTO: 4.24 MILLION/UL (ref 3.88–5.62)
SODIUM SERPL-SCNC: 136 MMOL/L (ref 135–147)
WBC # BLD AUTO: 15.19 THOUSAND/UL (ref 4.31–10.16)

## 2023-06-09 PROCEDURE — 85027 COMPLETE CBC AUTOMATED: CPT | Performed by: PHYSICIAN ASSISTANT

## 2023-06-09 PROCEDURE — 84100 ASSAY OF PHOSPHORUS: CPT | Performed by: PHYSICIAN ASSISTANT

## 2023-06-09 PROCEDURE — 80053 COMPREHEN METABOLIC PANEL: CPT | Performed by: PHYSICIAN ASSISTANT

## 2023-06-09 PROCEDURE — 83735 ASSAY OF MAGNESIUM: CPT | Performed by: PHYSICIAN ASSISTANT

## 2023-06-09 PROCEDURE — 99024 POSTOP FOLLOW-UP VISIT: CPT | Performed by: SURGERY

## 2023-06-09 RX ADMIN — SODIUM CHLORIDE, SODIUM LACTATE, POTASSIUM CHLORIDE, AND CALCIUM CHLORIDE 100 ML/HR: .6; .31; .03; .02 INJECTION, SOLUTION INTRAVENOUS at 04:46

## 2023-06-09 RX ADMIN — KETOROLAC TROMETHAMINE 15 MG: 30 INJECTION, SOLUTION INTRAMUSCULAR at 17:01

## 2023-06-09 RX ADMIN — CEFTRIAXONE 1000 MG: 1 INJECTION, SOLUTION INTRAVENOUS at 20:22

## 2023-06-09 RX ADMIN — ONDANSETRON 4 MG: 2 INJECTION INTRAMUSCULAR; INTRAVENOUS at 10:20

## 2023-06-09 RX ADMIN — ONDANSETRON 4 MG: 2 INJECTION INTRAMUSCULAR; INTRAVENOUS at 20:23

## 2023-06-09 RX ADMIN — OXYCODONE HYDROCHLORIDE 10 MG: 5 TABLET ORAL at 10:20

## 2023-06-09 RX ADMIN — HEPARIN SODIUM 5000 UNITS: 5000 INJECTION INTRAVENOUS; SUBCUTANEOUS at 14:01

## 2023-06-09 RX ADMIN — METRONIDAZOLE 500 MG: 500 INJECTION, SOLUTION INTRAVENOUS at 19:20

## 2023-06-09 RX ADMIN — HEPARIN SODIUM 5000 UNITS: 5000 INJECTION INTRAVENOUS; SUBCUTANEOUS at 05:15

## 2023-06-09 RX ADMIN — KETOROLAC TROMETHAMINE 15 MG: 30 INJECTION, SOLUTION INTRAMUSCULAR at 05:15

## 2023-06-09 RX ADMIN — METRONIDAZOLE 500 MG: 500 INJECTION, SOLUTION INTRAVENOUS at 12:06

## 2023-06-09 RX ADMIN — ACETAMINOPHEN 650 MG: 325 TABLET ORAL at 05:14

## 2023-06-09 RX ADMIN — METRONIDAZOLE 500 MG: 500 INJECTION, SOLUTION INTRAVENOUS at 04:46

## 2023-06-09 RX ADMIN — KETOROLAC TROMETHAMINE 15 MG: 30 INJECTION, SOLUTION INTRAMUSCULAR at 23:02

## 2023-06-09 RX ADMIN — KETOROLAC TROMETHAMINE 15 MG: 30 INJECTION, SOLUTION INTRAMUSCULAR at 11:59

## 2023-06-09 NOTE — CASE MANAGEMENT
Case Management Discharge Planning Note    Patient name Frandy Elizabeth  Location /-66 MRN [id number]  : 1980 Date 2023       Current Admission Date: 2023  Current Admission Diagnosis:Diverticulitis of large intestine with abscess with bleeding   Patient Active Problem List    Diagnosis Date Noted   • Diverticulitis    • Diverticulitis of large intestine with abscess with bleeding 2023   • Obesity, Class I, BMI 30 0-34 9 (see actual BMI) 2019   • Tobacco abuse 2017      LOS (days): 5  Geometric Mean LOS (GMLOS) (days): 5 10  Days to GMLOS:0 4     OBJECTIVE:  Risk of Unplanned Readmission Score: 10 29         Current admission status: Inpatient   Preferred Pharmacy:   [address] [place] [address] [address] [address]  Phone: [phone] Fax: [phone]    Primary Care Provider: No primary care provider on file  Primary Insurance: ERWIN MCNALLY  Secondary Insurance:     DISCHARGE DETAILS:  Additional Comments: Pt s/p colon resection with surgery  Discharge plan remains for pt to return home with spouse and [facility] to resume when stable  CM to follow through discharge to assess for any other discharge needs  [facility] was updated

## 2023-06-09 NOTE — CASE MANAGEMENT
Case Management Discharge Planning Note    Patient name Jules Grovestead  Location /-78 MRN [id number]  : 1980 Date 2023       Current Admission Date: 2023  Current Admission Diagnosis:Diverticulitis of large intestine with abscess with bleeding   Patient Active Problem List    Diagnosis Date Noted   • Diverticulitis    • Diverticulitis of large intestine with abscess with bleeding 2023   • Obesity, Class I, BMI 30 0-34 9 (see actual BMI) 2019   • Tobacco abuse 2017      LOS (days): 5  Geometric Mean LOS (GMLOS) (days): 5 10  Days to GMLOS:0 4     OBJECTIVE:  Risk of Unplanned Readmission Score: 10 2         Current admission status: Inpatient   Preferred Pharmacy:   [address] [place] [address] [place] [address]  [address] [address]  Phone: [phone] Fax: [phone]    Primary Care Provider: No primary care provider on file  Primary Insurance: ERWIN MCNALLY  Secondary Insurance:     DISCHARGE DETAILS:    Requested  [address]         Is the patient interested in [address] at discharge?: No      Additional Comments: Upon further discussion with surgery and pt, pt will not go home with drain on discharge and has no skilled [address] needs  Pt agrees he does not need or want HHC at this time  CM informed [facility] and cancelled referral  Pt will return home with spouse transport when stable, no further CM needs identified at this time

## 2023-06-09 NOTE — UTILIZATION REVIEW
Continued Stay Review    Date: 6/9                          Current Patient Class: INpatient  Current Level of Care: MEd/surg    HPI:42 y o  male initially admitted on 6/4     Assessment/Plan: INcrease in WBCs  Continue with IV abx  OPERATIVE NOTE  SURGERY DATE: 6/8/2023  Procedure(s):  RESECTION COLON SIGMOID LAPAROSCOPIC HAND-ASSISTED  Anesthesia Type:   General  Operative Findings:  Acutely inflamed focal segment of sigmoid colon with a contained abscess walled off by greater omentum    Vital Signs:   /Time Temp Pulse Resp BP MAP (mmHg) SpO2 O2 Flow Rate (L/min) O2 Device Cardiac (WDL) Patient Position - Orthostatic VS   06/09/23 13:17:14 98 2 °F (36 8 °C) 81 20 113/68 83 95 % -- -- -- --   06/09/23 10:59:50 98 4 °F (36 9 °C) 89 20 123/78 93 95 % -- -- -- --   06/09/23 0728 -- -- -- -- -- 94 % -- None (Room air) -- --   06/09/23 06:59:53 98 1 °F (36 7 °C) 89 20 145/84 104 96 % -- -- -- --   06/09/23 06:59:43 -- -- -- 145/84 104 -- -- -- -- --   06/09/23 06:59:09 -- 84 -- 145/84 104 94 % -- -- -- --   06/08/23 1920 -- -- -- -- -- 97 % 3 L/min Nasal cannula -- --   06/08/23 18:43:56 98 9 °F (37 2 °C) 94 18 151/87 108 93 % -- -- -- Lying   06/08/23 17:48:51 99 6 °F (37 6 °C) 96 18 140/81 101 95 % -- Nasal cannula -- Lying   06/08/23 1735 -- -- 18 143/78 104 98 % 3 L/min Nasal cannula WDL --   06/08/23 1720 -- 96 18 146/78 106 99 % 3 L/min Nasal cannula WDL --   06/08/23 1705 98 2 °F (36 8 °C) 94 18 145/78 -- 100 % 4 L/min Simple mask WDL --   06/08/23 1201 97 8 °F (36 6 °C) 81 18 162/83 -- 98 % -- None (Room air) -- --   06/08/23 07:01:58 99 °F (37 2 °C) 85 18 132/77 95 96 % -- None (Room air) --          Pertinent Labs/Diagnostic Results:       Results from last 7 days   Lab Units 06/09/23  0442 06/08/23  0602 06/07/23  0442 06/06/23  0421 06/05/23  0425   HEMATOCRIT % 38 0 39 8 38 1 41 7 42 7   HEMOGLOBIN g/dL 12 3 12 5 12 4 13 5 13 7   NEUTROS ABS Thousands/µL  --  7 81* 8 51* 7 01 7 24   PLATELETS Thousands/uL 313 276 250 281 285   WBC Thousand/uL 15 19* 10 34* 11 27* 9 50 10 27*         Results from last 7 days   Lab Units 06/09/23  0442 06/08/23  0602 06/07/23  0442 06/06/23  0421 06/05/23  0425   ANION GAP mmol/L 11 8 8 10 8   BUN mg/dL 7 6 8 10 7   CALCIUM mg/dL 8 3* 8 5 8 3* 8 8 8 9   CHLORIDE mmol/L 101 101 101 101 100   CO2 mmol/L 24 28 26 26 28   CREATININE mg/dL 0 65 0 84 0 82 0 93 0 96   EGFR ml/min/1 73sq m 121 109 110 102 98   POTASSIUM mmol/L 4 2 3 9 4 8 4 2 3 8   MAGNESIUM mg/dL 2 0 2 2  --   --   --    PHOSPHORUS mg/dL 3 5 2 9  --   --   --    SODIUM mmol/L 136 137 135 137 136     Results from last 7 days   Lab Units 06/09/23  0442 06/08/23  0602 06/04/23  1617   ALBUMIN g/dL 3 0* 3 3* 4 3   ALK PHOS U/L 52 61 92   ALT U/L 10 11 23   AST U/L 14 16 23   TOTAL BILIRUBIN mg/dL 0 47 0 39 0 70   TOTAL PROTEIN g/dL 5 9* 6 4 7 7     Results from last 7 days   Lab Units 06/08/23  1715 06/08/23  1232 06/07/23  1601   POC GLUCOSE mg/dl 114 83 72     Results from last 7 days   Lab Units 06/09/23  0442 06/08/23  0602 06/07/23  0442 06/06/23  0421 06/05/23  0425 06/04/23  1617   GLUCOSE RANDOM mg/dL 135 89 93 80 94 112       Results from last 7 days   Lab Units 06/04/23  1617   INR  1 05   PROTIME seconds 13 8   PTT seconds 30         Results from last 7 days   Lab Units 06/04/23  1617   PROCALCITONIN ng/ml 0 09     Results from last 7 days   Lab Units 06/04/23  1617   LACTIC ACID mmol/L 1 4             Results from last 7 days   Lab Units 06/04/23  2010   BACTERIA UA /hpf None Seen   BILIRUBIN UA  Negative   BLOOD UA  Trace-Intact*   CLARITY UA  Clear   COLOR UA  Yellow   EPITHELIAL CELLS WET PREP /hpf Occasional   GLUCOSE UA mg/dl Negative   KETONES UA mg/dl Negative   LEUKOCYTES UA  Negative   NITRITE UA  Negative   PH UA  6 5   PROTEIN UA mg/dl Negative   RBC UA /hpf 0-1*   SPEC GRAV UA  <=1 005*   UROBILINOGEN UA E U /dl 0 2   WBC UA /hpf 0-1*       Results from last 7 days   Lab Units 06/08/23  150 06/04/23  1625 06/04/23  1617   BLOOD CULTURE   --  No Growth After 4 Days  No Growth After 4 Days  GRAM STAIN RESULT  Rare Polys*  1+ Gram negative rods*  1+ Gram positive cocci in pairs*  Rare Gram positive rods*  --   --        Medications:   Scheduled Medications:  cefTRIAXone, 1,000 mg, Intravenous, Q24H  heparin (porcine), 5,000 Units, Subcutaneous, Q8H KINSEY  ketorolac, 15 mg, Intravenous, Q6H [address]  melatonin, 3 mg, Oral, HS  metroNIDAZOLE, 500 mg, Intravenous, Q8H  nicotine, 1 patch, Transdermal, Daily      Continuous IV Infusions:     PRN Meds:  acetaminophen, 650 mg, Oral, Q6H PRN  aluminum-magnesium hydroxide-simethicone, 30 mL, Oral, Q4H PRN  HYDROmorphone, 0 5 mg, Intravenous, Q3H PRN  HYDROmorphone, 0 5 mg, Intravenous, Q3H PRN  ondansetron, 4 mg, Intravenous, Q6H PRN  oxyCODONE, 5 mg, Oral, Q6H PRN   Or  oxyCODONE, 10 mg, Oral, Q6H PRN  simethicone, 80 mg, Oral, Q6H PRN        Discharge Plan: D    Network Utilization Review Department  ATTENTION: Please call with any questions or concerns to [phone] and carefully listen to the prompts so that you are directed to the right person  All voicemails are confidential   Cyntha Fairly all requests for admission clinical reviews, approved or denied determinations and any other requests to dedicated fax number below belonging to the campus where the patient is receiving treatment   List of dedicated fax numbers for the Facilities:  [address] [place] [address] DENIALS (Administrative/Medical Necessity) [phone]   [address] [place] [address] (Maternity/NICU/Pediatrics) [phone]   [address] [facility] [address] [place] [address] [id number] [place] [address] [address] [facility]  [address] [phone]

## 2023-06-09 NOTE — PROGRESS NOTES
"[facility]  Progress Note  Name: Kristian Smith  MRN: [id number]  Unit/Bed#: -01 I Date of Admission: 6/4/2023   Date of Service: 6/9/2023 I Hospital Day: 5    Assessment/Plan   * Diverticulitis of large intestine with abscess with bleeding  Assessment & Plan  37 yo M on 3rd admission for sigmoid diveritculitis s/p IR drain x 2 now POD 1 s/p hand assisted lap LAR with hand sewn anastomosis  Pain improved, no N/V, no flatus  Abdomen soft, mild distention, incisional TTP  AFVSS on room air  WBC 15 from 10  Assessment:  Perforated sigmoid diverticulitis, abscess controlled with IR drain, now s/p resection  Plan:  Surgically stable  Advance to full liquid diet  D/C IVF  Continue abx x 4 days  Repeat AM labs  Subjective/Objective   Chief Complaint: none    Subjective: feeling well, pain controlled, belching and bloating, no flatus    Objective:     Blood pressure 113/68, pulse 81, temperature 98 2 °F (36 8 °C), resp  rate 20, height 5' 7\" (1 702 m), weight 85 7 kg (189 lb), SpO2 95 %  ,Body mass index is 29 6 kg/m²  Intake/Output Summary (Last 24 hours) at 6/9/2023 1625  Last data filed at 6/9/2023 1159  Gross per 24 hour   Intake 4206 67 ml   Output 1475 ml   Net 2731 67 ml       Invasive Devices     Peripheral Intravenous Line  Duration           Peripheral IV 06/07/23 Left;Proximal;Ventral (anterior) Forearm 2 days    Peripheral IV 06/08/23 Left;Dorsal (posterior) Hand 1 day                Physical Exam  Vitals and nursing note reviewed  Constitutional:       General: He is not in acute distress  Appearance: He is well-developed  He is not diaphoretic  HENT:      Head: Normocephalic and atraumatic  Eyes:      Conjunctiva/sclera: Conjunctivae normal       Pupils: Pupils are equal, round, and reactive to light  Pulmonary:      Effort: No respiratory distress  Abdominal:      Comments: Soft, mild distention, incisional TTP, dressing dry   " Musculoskeletal:         General: Normal range of motion  Cervical back: Normal range of motion  Skin:     General: Skin is warm and dry  Capillary Refill: Capillary refill takes less than 2 seconds  Neurological:      Mental Status: He is alert and oriented to person, place, and time  Psychiatric:         Behavior: Behavior normal            Lab, Imaging and other studies:  I have personally reviewed pertinent lab results    , CBC:   Lab Results   Component Value Date    HCT 38 0 06/09/2023    HGB 12 3 06/09/2023    MCH 29 0 06/09/2023    MCHC 32 4 06/09/2023    MCV 90 06/09/2023    MPV 10 4 06/09/2023     06/09/2023    RBC 4 24 06/09/2023    RDW 12 2 06/09/2023    WBC 15 19 (H) 06/09/2023   , CMP:   Lab Results   Component Value Date    ALKPHOS 52 06/09/2023    ALT 10 06/09/2023    AST 14 06/09/2023    BUN 7 06/09/2023    CALCIUM 8 3 (L) 06/09/2023     06/09/2023    CO2 24 06/09/2023    CREATININE 0 65 06/09/2023    EGFR 121 06/09/2023    K 4 2 06/09/2023    SODIUM 136 06/09/2023     VTE Pharmacologic Prophylaxis: Heparin  VTE Mechanical Prophylaxis: sequential compression device

## 2023-06-09 NOTE — ASSESSMENT & PLAN NOTE
37 yo M on 3rd admission for sigmoid diveritculitis s/p IR drain x 2 now POD 1 s/p hand assisted lap LAR with hand sewn anastomosis  Pain improved, no N/V, no flatus  Abdomen soft, mild distention, incisional TTP  AFVSS on room air  WBC 15 from 10  Assessment:  Perforated sigmoid diverticulitis, abscess controlled with IR drain, now s/p resection  Plan:  Surgically stable  Advance to full liquid diet  D/C IVF  Continue abx x 4 days  Repeat AM labs

## 2023-06-09 NOTE — PLAN OF CARE
Problem: PAIN - ADULT  Goal: Verbalizes/displays adequate comfort level or baseline comfort level  Description: Interventions:  - Encourage patient to monitor pain and request assistance  - Assess pain using appropriate pain scale  - Administer analgesics based on type and severity of pain and evaluate response  - Implement non-pharmacological measures as appropriate and evaluate response  - Consider cultural and social influences on pain and pain management  - Notify physician/advanced practitioner if interventions unsuccessful or patient reports new pain  Outcome: Progressing     Problem: INFECTION - ADULT  Goal: Absence or prevention of progression during hospitalization  Description: INTERVENTIONS:  - Assess and monitor for signs and symptoms of infection  - Monitor lab/diagnostic results  - Monitor all insertion sites, i e  indwelling lines, tubes, and drains  - Monitor endotracheal if appropriate and nasal secretions for changes in amount and color  - [place] appropriate cooling/warming therapies per order  - Administer medications as ordered  - Instruct and encourage patient and family to use good hand hygiene technique  - Identify and instruct in appropriate isolation precautions for identified infection/condition  Outcome: Progressing     Problem: GASTROINTESTINAL - ADULT  Goal: Minimal or absence of nausea and/or vomiting  Description: INTERVENTIONS:  - Administer IV fluids if ordered to ensure adequate hydration  - Maintain NPO status until nausea and vomiting are resolved  - Nasogastric tube if ordered  - Administer ordered antiemetic medications as needed  - Provide nonpharmacologic comfort measures as appropriate  - Advance diet as tolerated, if ordered  - Consider nutrition services referral to assist patient with adequate nutrition and appropriate food choices  Outcome: Progressing

## 2023-06-09 NOTE — NURSING NOTE
CONTINUES TO TURN AND REPO  IVF CONTINUE AS ORDERED  PAIN LEVEL A 3/10  DRESSINGS TO ABDOMEN REMAIN DRY AND INTACT

## 2023-06-10 VITALS
DIASTOLIC BLOOD PRESSURE: 74 MMHG | OXYGEN SATURATION: 94 % | HEIGHT: 67 IN | BODY MASS INDEX: 29.66 KG/M2 | HEART RATE: 74 BPM | RESPIRATION RATE: 20 BRPM | SYSTOLIC BLOOD PRESSURE: 121 MMHG | WEIGHT: 189 LBS | TEMPERATURE: 97.9 F

## 2023-06-10 LAB
ANION GAP SERPL CALCULATED.3IONS-SCNC: 6 MMOL/L (ref 4–13)
BACTERIA BLD CULT: NORMAL
BACTERIA BLD CULT: NORMAL
BACTERIA WND AEROBE CULT: ABNORMAL
BACTERIA WND AEROBE CULT: ABNORMAL
BASOPHILS # BLD AUTO: 0.02 THOUSANDS/ÂΜL (ref 0–0.1)
BASOPHILS NFR BLD AUTO: 0 % (ref 0–1)
BUN SERPL-MCNC: 8 MG/DL (ref 5–25)
CALCIUM SERPL-MCNC: 8.4 MG/DL (ref 8.4–10.2)
CHLORIDE SERPL-SCNC: 99 MMOL/L (ref 96–108)
CO2 SERPL-SCNC: 30 MMOL/L (ref 21–32)
CREAT SERPL-MCNC: 0.66 MG/DL (ref 0.6–1.3)
EOSINOPHIL # BLD AUTO: 0.04 THOUSAND/ÂΜL (ref 0–0.61)
EOSINOPHIL NFR BLD AUTO: 0 % (ref 0–6)
ERYTHROCYTE [DISTWIDTH] IN BLOOD BY AUTOMATED COUNT: 12.3 % (ref 11.6–15.1)
GFR SERPL CREATININE-BSD FRML MDRD: 120 ML/MIN/1.73SQ M
GLUCOSE SERPL-MCNC: 141 MG/DL (ref 65–140)
GRAM STN SPEC: ABNORMAL
HCT VFR BLD AUTO: 35.1 % (ref 36.5–49.3)
HGB BLD-MCNC: 11.3 G/DL (ref 12–17)
IMM GRANULOCYTES # BLD AUTO: 0.05 THOUSAND/UL (ref 0–0.2)
IMM GRANULOCYTES NFR BLD AUTO: 0 % (ref 0–2)
LYMPHOCYTES # BLD AUTO: 1.42 THOUSANDS/ÂΜL (ref 0.6–4.47)
LYMPHOCYTES NFR BLD AUTO: 12 % (ref 14–44)
MCH RBC QN AUTO: 28.8 PG (ref 26.8–34.3)
MCHC RBC AUTO-ENTMCNC: 32.2 G/DL (ref 31.4–37.4)
MCV RBC AUTO: 90 FL (ref 82–98)
MONOCYTES # BLD AUTO: 1.11 THOUSAND/ÂΜL (ref 0.17–1.22)
MONOCYTES NFR BLD AUTO: 10 % (ref 4–12)
NEUTROPHILS # BLD AUTO: 9.05 THOUSANDS/ÂΜL (ref 1.85–7.62)
NEUTS SEG NFR BLD AUTO: 78 % (ref 43–75)
NRBC BLD AUTO-RTO: 0 /100 WBCS
PLATELET # BLD AUTO: 321 THOUSANDS/UL (ref 149–390)
PMV BLD AUTO: 10.5 FL (ref 8.9–12.7)
POTASSIUM SERPL-SCNC: 3.5 MMOL/L (ref 3.5–5.3)
RBC # BLD AUTO: 3.92 MILLION/UL (ref 3.88–5.62)
SODIUM SERPL-SCNC: 135 MMOL/L (ref 135–147)
WBC # BLD AUTO: 11.69 THOUSAND/UL (ref 4.31–10.16)

## 2023-06-10 PROCEDURE — NC001 PR NO CHARGE: Performed by: PHYSICIAN ASSISTANT

## 2023-06-10 PROCEDURE — 80048 BASIC METABOLIC PNL TOTAL CA: CPT

## 2023-06-10 PROCEDURE — 99024 POSTOP FOLLOW-UP VISIT: CPT | Performed by: PHYSICIAN ASSISTANT

## 2023-06-10 PROCEDURE — 85025 COMPLETE CBC W/AUTO DIFF WBC: CPT

## 2023-06-10 RX ORDER — CIPROFLOXACIN 500 MG/1
500 TABLET, FILM COATED ORAL EVERY 12 HOURS SCHEDULED
Qty: 8 TABLET | Refills: 0 | Status: SHIPPED | OUTPATIENT
Start: 2023-06-10 | End: 2023-06-14

## 2023-06-10 RX ORDER — METRONIDAZOLE 500 MG/1
500 TABLET ORAL EVERY 8 HOURS SCHEDULED
Qty: 12 TABLET | Refills: 0 | Status: SHIPPED | OUTPATIENT
Start: 2023-06-10 | End: 2023-06-14

## 2023-06-10 RX ORDER — OXYCODONE HYDROCHLORIDE 5 MG/1
5 TABLET ORAL EVERY 6 HOURS PRN
Qty: 10 TABLET | Refills: 0 | Status: SHIPPED | OUTPATIENT
Start: 2023-06-10 | End: 2023-06-13

## 2023-06-10 RX ADMIN — KETOROLAC TROMETHAMINE 15 MG: 30 INJECTION, SOLUTION INTRAMUSCULAR at 05:00

## 2023-06-10 RX ADMIN — METRONIDAZOLE 500 MG: 500 INJECTION, SOLUTION INTRAVENOUS at 04:57

## 2023-06-10 NOTE — PLAN OF CARE
Problem: PAIN - ADULT  Goal: Verbalizes/displays adequate comfort level or baseline comfort level  Description: Interventions:  - Encourage patient to monitor pain and request assistance  - Assess pain using appropriate pain scale  - Administer analgesics based on type and severity of pain and evaluate response  - Implement non-pharmacological measures as appropriate and evaluate response  - Consider cultural and social influences on pain and pain management  - Notify physician/advanced practitioner if interventions unsuccessful or patient reports new pain  6/10/2023 1008 by Khadar Bliss RN  Outcome: Adequate for Discharge  6/10/2023 0753 by Khadar Bliss RN  Outcome: Progressing     Problem: INFECTION - ADULT  Goal: Absence or prevention of progression during hospitalization  Description: INTERVENTIONS:  - Assess and monitor for signs and symptoms of infection  - Monitor lab/diagnostic results  - Monitor all insertion sites, i e  indwelling lines, tubes, and drains  - Monitor endotracheal if appropriate and nasal secretions for changes in amount and color  - [place] appropriate cooling/warming therapies per order  - Administer medications as ordered  - Instruct and encourage patient and family to use good hand hygiene technique  - Identify and instruct in appropriate isolation precautions for identified infection/condition  6/10/2023 1008 by Khadar Bliss RN  Outcome: Adequate for Discharge  6/10/2023 0753 by Khadar Bliss RN  Outcome: Progressing  Goal: Absence of fever/infection during neutropenic period  Description: INTERVENTIONS:  - Monitor WBC    6/10/2023 1008 by Khadar Bliss RN  Outcome: Adequate for Discharge  6/10/2023 0753 by Khadar Bliss RN  Outcome: Progressing     Problem: SAFETY ADULT  Goal: Patient will remain free of falls  Description: INTERVENTIONS:  - Educate patient/family on patient safety including physical limitations  - Instruct patient to call for assistance with activity   - Consult OT/PT to assist with strengthening/mobility   - Keep Call bell within reach  - Keep bed low and locked with side rails adjusted as appropriate  - Keep care items and personal belongings within reach  - Initiate and maintain comfort rounds  - Make Fall Risk Sign visible to staff  - Offer Toileting every 2 Hours, in advance of need  - Initiate/Maintain bed alarm  - Obtain necessary fall risk management equipment  - Apply yellow socks and bracelet for high fall risk patients  - Consider moving patient to room near nurses station  6/10/2023 1008 by Roberto Carpio RN  Outcome: Adequate for Discharge  6/10/2023 0753 by Roberto Carpio RN  Outcome: Progressing  Goal: Maintain or return to baseline ADL function  Description: INTERVENTIONS:  -  Assess patient's ability to carry out ADLs; assess patient's baseline for ADL function and identify physical deficits which impact ability to perform ADLs (bathing, care of mouth/teeth, toileting, grooming, dressing, etc )  - Assess/evaluate cause of self-care deficits   - Assess range of motion  - Assess patient's mobility; develop plan if impaired  - Assess patient's need for assistive devices and provide as appropriate  - Encourage maximum independence but intervene and supervise when necessary  - Involve family in performance of ADLs  - Assess for home care needs following discharge   - Consider OT consult to assist with ADL evaluation and planning for discharge  - Provide patient education as appropriate  6/10/2023 1008 by Roberto Carpio RN  Outcome: Adequate for Discharge  6/10/2023 0753 by Roberto Carpio RN  Outcome: Progressing  Goal: Maintains/Returns to pre admission functional level  Description: INTERVENTIONS:  - Perform BMAT or MOVE assessment daily    - Set and communicate daily mobility goal to care team and patient/family/caregiver  - Collaborate with rehabilitation services on mobility goals if consulted  - Perform Range of Motion 2 times a day  - Reposition patient every 2 hours    - Dangle patient 2 times a day  - Stand patient 2 times a day  - Ambulate patient 2 times a day  - Out of bed to chair 3 times a day   - Out of bed for meals 3 times a day  - Out of bed for toileting  - Record patient progress and toleration of activity level   6/10/2023 1008 by Tolu Quezada RN  Outcome: Adequate for Discharge  6/10/2023 0753 by Tolu Quezada RN  Outcome: Progressing     Problem: DISCHARGE PLANNING  Goal: Discharge to home or other facility with appropriate resources  Description: INTERVENTIONS:  - Identify barriers to discharge w/patient and caregiver  - Arrange for needed discharge resources and transportation as appropriate  - Identify discharge learning needs (meds, wound care, etc )  - Arrange for interpretive services to assist at discharge as needed  - Refer to Case Management Department for coordinating discharge planning if the patient needs post-hospital services based on physician/advanced practitioner order or complex needs related to functional status, cognitive ability, or social support system  6/10/2023 1008 by Tolu Quezada RN  Outcome: Adequate for Discharge  6/10/2023 0753 by Tolu Quezada RN  Outcome: Progressing     Problem: Knowledge Deficit  Goal: Patient/family/caregiver demonstrates understanding of disease process, treatment plan, medications, and discharge instructions  Description: Complete learning assessment and assess knowledge base  Interventions:  - Provide teaching at level of understanding  - Provide teaching via preferred learning methods  6/10/2023 1008 by Tolu Quezada RN  Outcome: Adequate for Discharge  6/10/2023 0753 by Tolu Quezada RN  Outcome: Progressing     Problem: Nutrition/Hydration-ADULT  Goal: Nutrient/Hydration intake appropriate for improving, restoring or maintaining nutritional needs  Description: Monitor and assess patient's nutrition/hydration status for malnutrition  Collaborate with interdisciplinary team and initiate plan and interventions as ordered  Monitor patient's weight and dietary intake as ordered or per policy  Utilize nutrition screening tool and intervene as necessary  Determine patient's food preferences and provide high-protein, high-caloric foods as appropriate       INTERVENTIONS:  - Monitor oral intake, urinary output, labs, and treatment plans  - Assess nutrition and hydration status and recommend course of action  - Evaluate amount of meals eaten  - Assist patient with eating if necessary   - Allow adequate time for meals  - Recommend/ encourage appropriate diets, oral nutritional supplements, and vitamin/mineral supplements  - Order, calculate, and assess calorie counts as needed  - Recommend, monitor, and adjust tube feedings and TPN/PPN based on assessed needs  - Assess need for intravenous fluids  - Provide specific nutrition/hydration education as appropriate  - Include patient/family/caregiver in decisions related to nutrition  6/10/2023 1008 by Ken Lynch RN  Outcome: Adequate for Discharge  6/10/2023 0753 by Ken Lynch RN  Outcome: Progressing     Problem: GASTROINTESTINAL - ADULT  Goal: Minimal or absence of nausea and/or vomiting  Description: INTERVENTIONS:  - Administer IV fluids if ordered to ensure adequate hydration  - Maintain NPO status until nausea and vomiting are resolved  - Nasogastric tube if ordered  - Administer ordered antiemetic medications as needed  - Provide nonpharmacologic comfort measures as appropriate  - Advance diet as tolerated, if ordered  - Consider nutrition services referral to assist patient with adequate nutrition and appropriate food choices  6/10/2023 1008 by Ken Lynch RN  Outcome: Adequate for Discharge  6/10/2023 0753 by Ken Lynch RN  Outcome: Progressing  Goal: Maintains or returns to baseline bowel function  Description: INTERVENTIONS:  - Assess bowel function  - Encourage oral fluids to ensure adequate hydration  - Administer IV fluids if ordered to ensure adequate hydration  - Administer ordered medications as needed  - Encourage mobilization and activity  - Consider nutritional services referral to assist patient with adequate nutrition and appropriate food choices  6/10/2023 1008 by Peter Ferreira RN  Outcome: Adequate for Discharge  6/10/2023 0753 by Peter Ferreira RN  Outcome: Progressing  Goal: Maintains adequate nutritional intake  Description: INTERVENTIONS:  - Monitor percentage of each meal consumed  - Identify factors contributing to decreased intake, treat as appropriate  - Assist with meals as needed  - Monitor I&O, weight, and lab values if indicated  - Obtain nutrition services referral as needed  6/10/2023 1008 by Peter Ferreira RN  Outcome: Adequate for Discharge  6/10/2023 0753 by Peter Ferreira RN  Outcome: Progressing     Problem: MOBILITY - ADULT  Goal: Maintain or return to baseline ADL function  Description: INTERVENTIONS:  -  Assess patient's ability to carry out ADLs; assess patient's baseline for ADL function and identify physical deficits which impact ability to perform ADLs (bathing, care of mouth/teeth, toileting, grooming, dressing, etc )  - Assess/evaluate cause of self-care deficits   - Assess range of motion  - Assess patient's mobility; develop plan if impaired  - Assess patient's need for assistive devices and provide as appropriate  - Encourage maximum independence but intervene and supervise when necessary  - Involve family in performance of ADLs  - Assess for home care needs following discharge   - Consider OT consult to assist with ADL evaluation and planning for discharge  - Provide patient education as appropriate  6/10/2023 1008 by Peter Ferreira RN  Outcome: Adequate for Discharge  6/10/2023 0753 by Peter Ferreira RN  Outcome: Progressing  Goal: Maintains/Returns to pre admission functional level  Description: INTERVENTIONS:  - Perform BMAT or MOVE assessment daily    - Set and communicate daily mobility goal to care team and patient/family/caregiver     - Collaborate with rehabilitation services on mobility goals if consulted  - Perform Range of Motion 2 times a day  - Reposition patient every 2 hours    - Dangle patient 2 times a day  - Stand patient 2 times a day  - Ambulate patient 2 times a day  - Out of bed to chair 3 times a day   - Out of bed for meals 3 times a day  - Out of bed for toileting  - Record patient progress and toleration of activity level   6/10/2023 1008 by Khadar Bliss RN  Outcome: Adequate for Discharge  6/10/2023 0753 by Khadar Bliss RN  Outcome: Progressing     Problem: Prexisting or High Potential for Compromised Skin Integrity  Goal: Skin integrity is maintained or improved  Description: INTERVENTIONS:  - Identify patients at risk for skin breakdown  - Assess and monitor skin integrity  - Assess and monitor nutrition and hydration status  - Monitor labs   - Assess for incontinence   - Turn and reposition patient  - Assist with mobility/ambulation  - Relieve pressure over bony prominences  - Avoid friction and shearing  - Provide appropriate hygiene as needed including keeping skin clean and dry  - Evaluate need for skin moisturizer/barrier cream  - Collaborate with interdisciplinary team   - Patient/family teaching  - Consider wound care consult   6/10/2023 1008 by Khadar Bliss RN  Outcome: Adequate for Discharge  6/10/2023 0753 by Khadar Bliss RN  Outcome: Progressing

## 2023-06-10 NOTE — INCIDENTAL FINDINGS
The following findings require follow up:  Radiographic finding   Finding: on CT abdomen/pelvis: Stable hepatic lesion in the caudate lobe   Recommend follow-up nonemergent contrast-enhanced MRI abdomen within 3 months for further evaluation     Follow up required: contrast enhanced MRI    Follow up should be done within 3 month(s)    Please contact your primary care provider to assist with follow-up

## 2023-06-10 NOTE — PLAN OF CARE
Problem: SAFETY ADULT  Goal: Patient will remain free of falls  Description: INTERVENTIONS:  - Educate patient/family on patient safety including physical limitations  - Instruct patient to call for assistance with activity   - Consult OT/PT to assist with strengthening/mobility   - Keep Call bell within reach  - Keep bed low and locked with side rails adjusted as appropriate  - Keep care items and personal belongings within reach  - Initiate and maintain comfort rounds  - Make Fall Risk Sign visible to staff  - Offer Toileting every *** Hours, in advance of need  - Initiate/Maintain ***alarm  - Obtain necessary fall risk management equipment: ***  - Apply yellow socks and bracelet for high fall risk patients  - Consider moving patient to room near nurses station  6/9/2023 2337 by Maki Brown RN  Outcome: Progressing  6/9/2023 2336 by Maki Brown RN  Outcome: Progressing  Goal: Maintain or return to baseline ADL function  Description: INTERVENTIONS:  -  Assess patient's ability to carry out ADLs; assess patient's baseline for ADL function and identify physical deficits which impact ability to perform ADLs (bathing, care of mouth/teeth, toileting, grooming, dressing, etc )  - Assess/evaluate cause of self-care deficits   - Assess range of motion  - Assess patient's mobility; develop plan if impaired  - Assess patient's need for assistive devices and provide as appropriate  - Encourage maximum independence but intervene and supervise when necessary  - Involve family in performance of ADLs  - Assess for home care needs following discharge   - Consider OT consult to assist with ADL evaluation and planning for discharge  - Provide patient education as appropriate  6/9/2023 2337 by Maki Brown RN  Outcome: Progressing  6/9/2023 2336 by Maki Brown, RN  Outcome: Progressing  Goal: Maintains/Returns to pre admission functional level  Description: INTERVENTIONS:  - Perform BMAT or MOVE assessment daily    - Set and communicate daily mobility goal to care team and patient/family/caregiver  - Collaborate with rehabilitation services on mobility goals if consulted  - Perform Range of Motion *** times a day  - Reposition patient every *** hours    - Dangle patient *** times a day  - Stand patient *** times a day  - Ambulate patient *** times a day  - Out of bed to chair *** times a day   - Out of bed for meals *** times a day  - Out of bed for toileting  - Record patient progress and toleration of activity level   6/9/2023 2337 by Priyank Swartz RN  Outcome: Progressing  6/9/2023 2336 by Priyank Swartz RN  Outcome: Progressing

## 2023-06-10 NOTE — DISCHARGE SUMMARY
"  Discharge Summary - Neda Fermin 38 y o  male MRN: [id number]    Unit/Bed#: -01 Encounter: [id number]    Admission Date:   Admission Orders (From admission, onward)     Ordered        06/04/23 [address]  Once                    Discharge date 6/10/2023    Admitting Diagnosis: Diverticulitis [K57 92]  Abdominal pain [R10 9]  Abdominal wall abscess [L02 211]  Fever [R50 9]    HPI as per [facility] \" Neda Fermin is a 38 y o  male who presents with worsening LLQ pain  Patient was discharged on 6/1 after LLQ drain placed by IR and pain was controlled  Patient says that he started to have fevers at home starting Saturday and his LLQ pain became more intense  He was also having issues flushing his drain  He says that he was only able to get 7cc in and pain became more intense  Patient says that the pain is band like across the right and lower quadrants and becomes more intense in the LLQ, which is different from his first admission  He had a bowel movement over the weekend, no blood  He has been tolerating a regular diet since discharge  Patient denies nausea, vomiting, chest pain, shortness of breath  \"    Procedures Performed:   6/5 -drain repositioning/exchange (IR)  6/8 -laparoscopic hand-assisted sigmoid colon resection (Dr Breonna Scott)    Summary of Hospital Course: Patient presented to [facility] on 6/4/2023 with complaints of abdominal pain  Met sepsis criteria at time of admission  He was recently hospitalized for same chief complaint at which time he underwent IR drain placement for drainage of diverticular abscess  Upon presentation CTAP was obtained and significant for worsening acute sigmoid diverticulitis with adjacent contained perforation  On 6/5 he underwent drain exchange/repositioning with IR    Although minimally invasive procedures and medical management kept him stable throughout hospitalization he did not note any significant improvement " "in symptoms  After discussion with surgeon, patient elected to undergo sigmoid colectomy for definitive treatment of symptomatology  Patient underwent laparoscopic hand-assisted sigmoid colon resection by Dr Breonna Scott on 6/8  Patient had an uncomplicated postoperative course  On POD#2 he was hemodynamically, serologically, clinically stable for discharge to home  Patient was given strict return criteria which was reviewed at bedside with patient and family  All questions and concerns answered to patient's satisfaction  Follow-up with Dr Broenna Scott in outpatient office in 1 to 2 weeks for staple removal and postoperative visit  Prescriptions for oral antibiotics sent to preferred pharmacy  Patient was instructed to take these as prescribed and complete entire course of antibiotics  Prescription for oral analgesia also sent to pharmacy  Oxycodone 5 mg every 6 hours as needed for severe pain dispense#10 tabs, #0 refills    Significant Findings, Care, Treatment and Services Provided:   6/4 -CTAP \"1  Worsening acute sigmoid diverticulitis with adjacent contained perforation and interval placement of a drainage catheter within the intramural abscess which is slightly increased in size since prior study  The intramural abscess abuts the superior   aspect of the urinary bladder without evidence of a colovesical fistula  Follow-up colonoscopy recommended after the acute episode  2  Stable hepatic lesion in the caudate lobe  Recommend follow-up nonemergent contrast-enhanced MRI abdomen within 3 months for further evaluation  \"  6/5 -drain repositioning/exchange (IR)  6/8 -laparoscopic hand-assisted sigmoid colon resection (Dr Breonna Scott)    Complications: None    Discharge Diagnosis: Acute sigmoid diverticulitis with abscess formation status post laparoscopic hand-assisted sigmoid colectomy    Condition at Discharge: good         Discharge instructions/Information to patient and family:   See after visit summary " "for information provided to patient and family  Provisions for Follow-Up Care:  See after visit summary for information related to follow-up care and any pertinent home health orders  PCP: No primary care provider on file  Disposition: See After Visit Summary for discharge disposition information  Planned Readmission: No      Discharge Statement   I spent 25 minutes discharging the patient  This time was spent on the day of discharge  I had direct contact with the patient on the day of discharge  Additional documentation is required if more than 30 minutes were spent on discharge  Discharge Medications:  See after visit summary for reconciled discharge medications provided to patient and family  Davis Valencia, [facility]  06/10/23  **Please Note: Portions of the record may have been created using voice recognition software  Occasional wrong word or \"sound a like\" substitutions may have occurred due to the inherent limitations of voice recognition software  Read the chart carefully and recognize, using context, where substitutions have occurred  **      "

## 2023-06-10 NOTE — PROGRESS NOTES
"Progress Note - General Surgery   Midge  Thais 38 y o  male MRN: [id number]  Unit/Bed#: -01 Encounter: [id number]    Assessment:  62-year-old male presenting with diverticulitis of large intestine with abscess collection s/p  IR drain x2 now POD#2 s/p laparoscopic hand-assisted LAR with handsewn anastomosis  -Afebrile, vital signs stable on room air  -Leukocytosis downtrending, WBC 11 6 from 15 1  - hemoglobin stable 11 3 (12 3)  - elytes WNL  -Tolerating fulls/toast/crackers, abdominal pain well controlled, ambulating without difficulty, no urinary complaints, no BM/flatus    Plan:  -Patient is surgically stable for discharge to home today  Strict return criteria outlined for patient and family at bedside    -Continue IV antibiotics while inpatient, will be transition to p o  equivalent at time of discharge  -Advance diet as tolerated  -As needed analgesia  -Encourage out of bed/ambulation  -Follow-up with Dr Audrey Simpson in 1 to 2 weeks for staple removal/postoperative visit    Subjective/Objective   Subjective: No acute events overnight  Patient reports feeling well this morning  Was seen ambulating hallways with family without difficulty  Abdominal pain well controlled with oral medications  Tolerating full liquid diet without nausea/vomiting  No urinary complaints  Has not had bowel movement or passed flatus since surgery, however does report increased \" gurgling\" in abdomen  Denies fever/chills, chest pain, shortness of breath, pain/swelling in extremities  Objective:   Blood pressure 121/74, pulse 74, temperature 97 9 °F (36 6 °C), resp  rate 20, height 5' 7\" (1 702 m), weight 85 7 kg (189 lb), SpO2 94 %  ,Body mass index is 29 6 kg/m²        Intake/Output Summary (Last 24 hours) at 6/10/2023 1004  Last data filed at 6/10/2023 0740  Gross per 24 hour   Intake 600 ml   Output --   Net 600 ml       Invasive Devices     Peripheral Intravenous Line  Duration           Peripheral IV 06/07/23 " Left;Proximal;Ventral (anterior) Forearm 2 days    Peripheral IV 06/08/23 Left;Dorsal (posterior) Hand 1 day                Physical Exam  Vitals reviewed  Constitutional:       General: He is awake  He is not in acute distress  Appearance: He is not ill-appearing or toxic-appearing  HENT:      Head: Normocephalic and atraumatic  Eyes:      General: No scleral icterus  Cardiovascular:      Rate and Rhythm: Normal rate and regular rhythm  Pulmonary:      Effort: No respiratory distress  Breath sounds: Normal breath sounds  Abdominal:      General: Bowel sounds are normal  There is distension  Palpations: Abdomen is soft  Tenderness: There is abdominal tenderness (Mildly tender surrounding incisional sites)  There is no guarding or rebound  Musculoskeletal:      Right lower leg: No edema  Left lower leg: No edema  Skin:     General: Skin is warm and dry  Neurological:      General: No focal deficit present  Mental Status: He is alert and oriented to person, place, and time  Psychiatric:         Mood and Affect: Mood normal          Behavior: Behavior normal  Behavior is cooperative  Lab, Imaging and other studies:  I have personally reviewed pertinent lab results    , CBC:   Lab Results   Component Value Date    HCT 35 1 (L) 06/10/2023    HGB 11 3 (L) 06/10/2023    MCH 28 8 06/10/2023    MCHC 32 2 06/10/2023    MCV 90 06/10/2023    MPV 10 5 06/10/2023    NRBC 0 06/10/2023     06/10/2023    RBC 3 92 06/10/2023    RDW 12 3 06/10/2023    WBC 11 69 (H) 06/10/2023   , CMP:   Lab Results   Component Value Date    BUN 8 06/10/2023    CALCIUM 8 4 06/10/2023    CL 99 06/10/2023    CO2 30 06/10/2023    CREATININE 0 66 06/10/2023    EGFR 120 06/10/2023    K 3 5 06/10/2023    SODIUM 135 06/10/2023     VTE Pharmacologic Prophylaxis: Heparin  VTE Mechanical Prophylaxis: sequential compression device      Zuleyka Jaime, [facility]   06/10/23  **Please Note: Portions of the "record may have been created using voice recognition software  Occasional wrong word or \"sound a like\" substitutions may have occurred due to the inherent limitations of voice recognition software  Read the chart carefully and recognize, using context, where substitutions have occurred  **    "

## 2023-06-10 NOTE — PLAN OF CARE
Problem: INFECTION - ADULT  Goal: Absence or prevention of progression during hospitalization  Description: INTERVENTIONS:  - Assess and monitor for signs and symptoms of infection  - Monitor lab/diagnostic results  - Monitor all insertion sites, i e  indwelling lines, tubes, and drains  - Monitor endotracheal if appropriate and nasal secretions for changes in amount and color  - [place] appropriate cooling/warming therapies per order  - Administer medications as ordered  - Instruct and encourage patient and family to use good hand hygiene technique  - Identify and instruct in appropriate isolation precautions for identified infection/condition  Outcome: Progressing  Goal: Absence of fever/infection during neutropenic period  Description: INTERVENTIONS:  - Monitor WBC    Outcome: Progressing

## 2023-06-10 NOTE — PLAN OF CARE
Problem: INFECTION - ADULT  Goal: Absence or prevention of progression during hospitalization  Description: INTERVENTIONS:  - Assess and monitor for signs and symptoms of infection  - Monitor lab/diagnostic results  - Monitor all insertion sites, i e  indwelling lines, tubes, and drains  - Monitor endotracheal if appropriate and nasal secretions for changes in amount and color  - [place] appropriate cooling/warming therapies per order  - Administer medications as ordered  - Instruct and encourage patient and family to use good hand hygiene technique  - Identify and instruct in appropriate isolation precautions for identified infection/condition  Outcome: Progressing     Problem: PAIN - ADULT  Goal: Verbalizes/displays adequate comfort level or baseline comfort level  Description: Interventions:  - Encourage patient to monitor pain and request assistance  - Assess pain using appropriate pain scale  - Administer analgesics based on type and severity of pain and evaluate response  - Implement non-pharmacological measures as appropriate and evaluate response  - Consider cultural and social influences on pain and pain management  - Notify physician/advanced practitioner if interventions unsuccessful or patient reports new pain  Outcome: Progressing

## 2023-06-11 LAB — BACTERIA SPEC ANAEROBE CULT: ABNORMAL

## 2023-06-12 NOTE — UTILIZATION REVIEW
NOTIFICATION OF ADMISSION DISCHARGE   This is a Notification of Discharge from [address]  Please be advised that this patient has been discharge from our facility  Below you will find the admission and discharge date and time including the patient’s disposition  UTILIZATION REVIEW CONTACT:  Rito Perdomo  Utilization   Network Utilization Review Department  Phone: [phone] carefully listen to the prompts  All voicemails are confidential   Email: [email or web address]     ADMISSION INFORMATION  PRESENTATION DATE: 5/30/2023  9:52 AM  OBERVATION ADMISSION DATE:   INPATIENT ADMISSION DATE: 5/30/23  1:43 PM   DISCHARGE DATE: 6/1/2023 11:16 AM   DISPOSITION:Home with Home Health Care    IMPORTANT INFORMATION:  Send all requests for admission clinical reviews, approved or denied determinations and any other requests to dedicated fax number below belonging to the campus where the patient is receiving treatment   List of dedicated fax numbers:  [address] DENIALS (Administrative/Medical Necessity) [phone]   [address] (Maternity/NICU/Pediatrics) [phone]   [facility] [phone]   [facility] 87 [phone]   [address] [phone]   220 [facility] [phone]   90 [facility] [phone]   1465 [facility] 119 [phone]   Nedra December [phone] 4058 [facility] [phone]   Curtiss Dakins [address] [facility] [phone]

## 2023-06-12 NOTE — UTILIZATION REVIEW
NOTIFICATION OF ADMISSION DISCHARGE   This is a Notification of Discharge from [address]  Please be advised that this patient has been discharge from our facility  Below you will find the admission and discharge date and time including the patient’s disposition  UTILIZATION REVIEW CONTACT:  Shania Prescott  Utilization   Network Utilization Review Department  Phone: [phone] carefully listen to the prompts  All voicemails are confidential   Email: [email or web address]     ADMISSION INFORMATION  PRESENTATION DATE: 6/4/2023  3:57 PM  OBERVATION ADMISSION DATE:  INPATIENT ADMISSION DATE: 6/4/23  7:43 PM   DISCHARGE DATE: 6/10/2023 11:02 AM   DISPOSITION:Home with Home Health Care    IMPORTANT INFORMATION:  Send all requests for admission clinical reviews, approved or denied determinations and any other requests to dedicated fax number below belonging to the campus where the patient is receiving treatment   List of dedicated fax numbers:  [address] DENIALS (Administrative/Medical Necessity) [phone]   [address] (Maternity/NICU/Pediatrics) [phone]   [facility] [phone]   [facility] 87 [phone]   Shon Dodson 134 [phone]   220 [facility] [phone] 90 [facility] [phone]   [facility]7 [place] [address] [phone]   [facility]  [phone]   4055 [facility] [phone]   412 [facility] 850 [facility] [phone]

## 2023-06-21 ENCOUNTER — OFFICE VISIT (OUTPATIENT)
Dept: SURGERY | Facility: CLINIC | Age: 43
End: 2023-06-21

## 2023-06-21 VITALS — TEMPERATURE: 97.9 F

## 2023-06-21 DIAGNOSIS — K76.9 LIVER LESION: Primary | ICD-10-CM

## 2023-06-21 DIAGNOSIS — C18.7 MALIGNANT NEOPLASM OF SIGMOID COLON (HCC): ICD-10-CM

## 2023-06-21 PROCEDURE — 99024 POSTOP FOLLOW-UP VISIT: CPT | Performed by: PHYSICIAN ASSISTANT

## 2023-06-22 PROBLEM — C18.7 MALIGNANT NEOPLASM OF SIGMOID COLON (HCC): Status: ACTIVE | Noted: 2023-06-22

## 2023-06-22 PROBLEM — K76.9 LIVER LESION: Status: ACTIVE | Noted: 2023-06-22

## 2023-06-22 NOTE — ASSESSMENT & PLAN NOTE
Mr Bridget Canales is a healthy 37 yo M with no chronic illness and no home meds who is now 2 weeks post-op s/p hand assisted sigmoid resection for suspected perforated diverticulitis after initial presentation on 5/21/23 and trial of antibiotics and then IR drain therapy  He reports feeling well with good appetite and normal BMs, pain is improving  On exam appears well, abdomen soft and non-tender, incisions C/D/I with staples  Half of staples removed from the paramedian incision, the rest removed fully, will plan removal of the rest in 1 week and daily cleansing  We discussed the preliminary report of his colon pathology which shows the presence of a 7 2 cm mass with signs of poorly differentiated malignancy and high ki-67 rate suggesting neuroendocrine differentiation, probable 14 lymph nodes harvested, final pathology pending  We will arrange for discussion at the GI tumor board where he will be presented by Dr Ebony Dawkins for further recommendations   Further review of his prior imaging suggest a 1 1 cm caudate lobe liver lesion for which we will pursue additional imaging at this time with MRI abdomen w/wo contrast     Follow-up in 1 week for remainder of staple removal

## 2023-06-22 NOTE — PROGRESS NOTES
Post-Op Note - General Surgery   Kesha Plascencia 38 y o  male MRN: [id number]  Encounter: [id number]    Assessment/Plan    Malignant neoplasm of sigmoid colon [facility])  Mr Tyler Awad is a healthy 37 yo M with no chronic illness and no home meds who is now 2 weeks post-op s/p hand assisted sigmoid resection for suspected perforated diverticulitis after initial presentation on 5/21/23 and trial of antibiotics and then IR drain therapy  He reports feeling well with good appetite and normal BMs, pain is improving  On exam appears well, abdomen soft and non-tender, incisions C/D/I with staples  Half of staples removed from the paramedian incision, the rest removed fully, will plan removal of the rest in 1 week and daily cleansing  We discussed the preliminary report of his colon pathology which shows the presence of a 7 2 cm mass with signs of poorly differentiated malignancy and high ki-67 rate suggesting neuroendocrine differentiation, probable 14 lymph nodes harvested, final pathology pending  We will arrange for discussion at the GI tumor board where he will be presented by Dr Shorty Rolon for further recommendations  Further review of his prior imaging suggest a 1 1 cm caudate lobe liver lesion for which we will pursue additional imaging at this time with MRI abdomen w/wo contrast     Follow-up in 1 week for remainder of staple removal     Diagnoses and all orders for this visit:    Liver lesion  -     MRI abdomen w wo contrast; Future    Malignant neoplasm of sigmoid colon ([facility] [address] )  -     MRI abdomen w wo contrast; Future        Subjective      Chief Complaint   Patient presents with   • Post-op     po sigmoid colon resection 6/8/2023     37 yo M with perforated diverticulitis presenting now 2 weeks s/p laparoscopic hand assisted sigmoid colon resection  Reports feeling well  Pain resolving  Good appetite, regular BMs  Review of Systems   All other systems reviewed and are negative        The following portions of the patient's history were reviewed and updated as appropriate: allergies, current medications, past family history, past medical history, past social history, past surgical history and problem list     Objective      Temperature 97 9 °F (36 6 °C), temperature source Temporal    Physical Exam  Vitals and nursing note reviewed  Constitutional:       General: He is not in acute distress  Appearance: He is well-developed  He is not diaphoretic  HENT:      Head: Normocephalic and atraumatic  Eyes:      Conjunctiva/sclera: Conjunctivae normal       Pupils: Pupils are equal, round, and reactive to light  Pulmonary:      Effort: No respiratory distress  Abdominal:      Comments: Flat soft nondistended nontender  Right-sided ports clean dry and intact and staples removed  Left paramedian incision with original surgical dressing still in place  Removed and noted to have mild superficial skin separation of the lower pole for which only one half of staples removed, the rest to be left in place x1 week  Musculoskeletal:         General: Normal range of motion  Cervical back: Normal range of motion  Skin:     General: Skin is warm and dry  Capillary Refill: Capillary refill takes less than 2 seconds  Neurological:      Mental Status: He is alert and oriented to person, place, and time     Psychiatric:         Behavior: Behavior normal          Signature:  Niko Enamorado PA-C  Date: 6/22/2023 Time: 3:10 PM

## 2023-06-23 ENCOUNTER — DOCUMENTATION (OUTPATIENT)
Dept: HEMATOLOGY ONCOLOGY | Facility: CLINIC | Age: 43
End: 2023-06-23

## 2023-06-23 NOTE — PROGRESS NOTES
In-basket message received from [facility] to add patient to [facility] on 7/13/2023  Chart reviewed and prep started  Pending MRI scheduled on 6/27  I will follow up on results

## 2023-06-27 ENCOUNTER — HOSPITAL ENCOUNTER (OUTPATIENT)
Dept: MRI IMAGING | Facility: HOSPITAL | Age: 43
Discharge: HOME | End: 2023-06-27
Payer: OTHER GOVERNMENT

## 2023-06-27 DIAGNOSIS — C18.7 MALIGNANT NEOPLASM OF SIGMOID COLON (HCC): ICD-10-CM

## 2023-06-27 DIAGNOSIS — K76.9 LIVER LESION: ICD-10-CM

## 2023-06-27 PROCEDURE — G1004 CDSM NDSC: HCPCS

## 2023-06-27 PROCEDURE — 74183 MRI ABD W/O CNTR FLWD CNTR: CPT

## 2023-06-27 PROCEDURE — A9585 GADOBUTROL INJECTION: HCPCS | Performed by: PHYSICIAN ASSISTANT

## 2023-06-27 RX ADMIN — GADOBUTROL 8 ML: 604.72 INJECTION INTRAVENOUS at 20:41

## 2023-06-30 ENCOUNTER — OFFICE VISIT (OUTPATIENT)
Dept: SURGERY | Facility: CLINIC | Age: 43
End: 2023-06-30

## 2023-06-30 VITALS — TEMPERATURE: 97.6 F

## 2023-06-30 DIAGNOSIS — C18.7 MALIGNANT NEOPLASM OF SIGMOID COLON (HCC): Primary | ICD-10-CM

## 2023-06-30 PROCEDURE — 99024 POSTOP FOLLOW-UP VISIT: CPT | Performed by: PHYSICIAN ASSISTANT

## 2023-06-30 NOTE — PROGRESS NOTES
Post-Op Note - General Surgery   Clari Plascencia 38 y o  male MRN: [id number]  Encounter: [id number]    Assessment/Plan    Malignant neoplasm of sigmoid colon ([address] )  Staples removed now 3 weeks post-op  Paramedian incision noted to have partial skin edge inversion/overlap, but with closed wound base  Bandage applied and instructed to clean daily in shower  Advised he may begin to do more activity as limited by his pain  He appears well following his surgery  Final tissue pathology is pending, preliminarily we are looking at neuroendocrine tumor of the sigmoid colon  MRI abdomen is pending  We will call him with both of these results when available  Next appointment will be an office visit immediately following [facility] Multidisciplinary GI Tumor Board where his case will be presented and we will receive consensus recommendations going forward  Questions answered, agreeable to plan  Diagnoses and all orders for this visit:    Malignant neoplasm of sigmoid colon [facility])        Subjective      Chief Complaint   Patient presents with   • Post-op     staple removal, s/p po sigmoid colon resection 6/8/2023     Patient came in today for a staple removal, s/p po sigmoid colon resection 6/8/2023  Patient denies drainage but it still red around the incision area  Patient states he suffered constipation yesterday but that has subsided   Patient denies diarrhea, nausea/vomiting, trouble eating/ drinking, abd pain or fevers/chills at this time  Review of Systems   All other systems reviewed and are negative  The following portions of the patient's history were reviewed and updated as appropriate: allergies, current medications, past family history, past medical history, past social history, past surgical history and problem list     Objective      Temperature 97 6 °F (36 4 °C), temperature source Temporal    Physical Exam  Vitals and nursing note reviewed     Constitutional:       General: He is not in acute distress  Appearance: He is well-developed  He is not diaphoretic  HENT:      Head: Normocephalic and atraumatic  Eyes:      Conjunctiva/sclera: Conjunctivae normal       Pupils: Pupils are equal, round, and reactive to light  Pulmonary:      Effort: No respiratory distress  Abdominal:      Comments: Soft, non-tender, left paramedian incision staples removed revealing skin inversion/overlap, but a healed wound base  Bandaid applied  Musculoskeletal:         General: Normal range of motion  Cervical back: Normal range of motion  Skin:     General: Skin is warm and dry  Capillary Refill: Capillary refill takes less than 2 seconds  Neurological:      Mental Status: He is alert and oriented to person, place, and time     Psychiatric:         Behavior: Behavior normal          Signature:  Niko Enamorado PA-C  Date: 6/30/2023 Time: 10:44 AM

## 2023-06-30 NOTE — ASSESSMENT & PLAN NOTE
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

## 2023-07-13 ENCOUNTER — TELEPHONE (OUTPATIENT)
Dept: SURGERY | Facility: CLINIC | Age: 43
End: 2023-07-13

## 2023-07-13 ENCOUNTER — TELEPHONE (OUTPATIENT)
Dept: GENETICS | Facility: CLINIC | Age: 43
End: 2023-07-13

## 2023-07-13 ENCOUNTER — DOCUMENTATION (OUTPATIENT)
Dept: HEMATOLOGY ONCOLOGY | Facility: CLINIC | Age: 43
End: 2023-07-13

## 2023-07-13 DIAGNOSIS — C18.7 MALIGNANT NEOPLASM OF SIGMOID COLON (HCC): Primary | ICD-10-CM

## 2023-07-13 NOTE — TELEPHONE ENCOUNTER
----- Message from Hyacinth Antonio Dr., PA-C sent at 7/13/2023  8:58 AM EDT -----  Tumor board recommends genetics referral, referral is placed. Harris Cassidy stated that his insurance sometimes requires authorization prior to seeing a specialist.    Can someone help him determine if that's the case for genetics and how to handle it?     Thanks,  [employer]

## 2023-07-13 NOTE — TELEPHONE ENCOUNTER
Called to discuss tumor boards recommendations based on preliminary path. 1. Genetics referral.  2. CEA and Chromogranin labs. 3. Colonoscopy. We also reviewed the results of his MRI showing benign liver lesion. We will update him by phone when final pathology is available. Will be set-up to discuss further at next tumor board when path available. Questions answered. Will see in follow-up tomorrow to discuss further.

## 2023-07-13 NOTE — TELEPHONE ENCOUNTER
I called Chalino Chavira to schedule a new patient appointment with the Cancer Risk and Genetics Program.      Outcome:  Genetics appointment scheduled for 8/17 at 1 pm    Personal/Family History Related to Appointment:  Recently dx colon cancer. Fhx lung cancer . Non-[place].      History of Genetic Testing:  Patient reports no personal or family history of genetic testing    Genetics Family History Questionnaire:  I confirmed the patient's e-mail on file as the best e-mail to send an invite link for our genetics family history intake

## 2023-07-13 NOTE — TELEPHONE ENCOUNTER
Called pt stating auth isn't needed since he has cancer, and someone will reach out to him from genetics to schedule an appt. If Carlitos Cardenas is needed for blood work the genetics center will work on that.

## 2023-07-13 NOTE — PROGRESS NOTES
RECTAL/GI MULTIDISCIPLINARY CASE REVIEW    DATE: 7/13/2023       PRESENTING DOCTOR: Rhys Cortez      DIAGNOSIS: Poorly differentiated malignancy of the sigmoid colon (Preliminary path)      Fadi Peralta was presented at the Rectal/GI Multidisciplinary Conference today. PHYSICIAN RECOMMENDED PLAN:    -Final pathology results are pending, under review with [facility]. -Recommendation for genetics referral.  -Recommend patient having CEA and Chromogranin labs drawn.  -Recommend that the patient have a colonoscopy for completion of workup.   -Patient is scheduled with Jerris Mortimer on 7/14/2023. Team agreed to plan. The final treatment plan will be left to the discretion of the patient and the treating physician. DISCLAIMERS:  TO THE TREATING PHYSICIAN:  This conference is a meeting of clinicians from various specialty areas who evaluate and discuss patients for whom a multidisciplinary treatment approach is being considered. Please note that the above opinion was a consensus of the conference attendees and is intended only to assist in quality care of the discussed patient. The responsibility for follow up on the input given during the conference, along with any final decisions regarding plan of care, is that of the patient and the patient's provider. Accordingly, appointments have only been recommended based on this information and have NOT been scheduled unless otherwise noted. TO THE PATIENT:  This summary is a brief record of major aspects of your cancer treatment. You may choose to share a copy with any of your doctors or nurses. However, this is not a detailed or comprehensive record of your care.       NCCN guidelines were readily available for review at this discussion

## 2023-07-14 ENCOUNTER — OFFICE VISIT (OUTPATIENT)
Dept: SURGERY | Facility: CLINIC | Age: 43
End: 2023-07-14

## 2023-07-14 VITALS — TEMPERATURE: 97.7 F

## 2023-07-14 DIAGNOSIS — C18.7 MALIGNANT NEOPLASM OF SIGMOID COLON (HCC): Primary | ICD-10-CM

## 2023-07-14 PROCEDURE — 99024 POSTOP FOLLOW-UP VISIT: CPT | Performed by: PHYSICIAN ASSISTANT

## 2023-07-14 NOTE — PROGRESS NOTES
Progress Note - General Surgery   Angelica Simin Plascencia 38 y.o. male MRN: [id number]  Encounter: [id number]    Assessment/Plan    Malignant neoplasm of sigmoid colon [facility])  Presents for follow-up after Tumor Board. Unfortunately pathology not finalized at time of Tumor Board or this appointment. Reviewed recommendations from board for CEA/Chromogranin testing, Genetics referral, and colonoscopy. All questions answered, together we decided to pursue Oncology consultation in the interim until final path available. Will be presented again at next tumor board. Diagnoses and all orders for this visit:    Malignant neoplasm of sigmoid colon [facility])  -     Ambulatory Referral to Hematology / Oncology; Future        Subjective       Chief Complaint   Patient presents with   • Post-op     s/p po sigmoid colon resection 6/8/2023, Tumor board 714/2023      Patient came in today for s/p po sigmoid colon resection 6/8/2023, Tumor board 714/2023. Patient states he is doing well. Pt denies nausea/vomiting, diarrhea/constipation, trouble eating/drinking, abd pain, or fevers/chills. Amilia. O,MA       Review of Systems   All other systems reviewed and are negative. The following portions of the patient's history were reviewed and updated as appropriate: allergies, current medications, past family history, past medical history, past social history, past surgical history and problem list.    Objective      Temperature 97.7 °F (36.5 °C), temperature source Temporal.   Physical Exam  Vitals and nursing note reviewed. Constitutional:       General: He is not in acute distress. Appearance: He is well-developed. He is not diaphoretic. HENT:      Head: Normocephalic and atraumatic. Eyes:      Conjunctiva/sclera: Conjunctivae normal.      Pupils: Pupils are equal, round, and reactive to light. Pulmonary:      Effort: No respiratory distress. Musculoskeletal:         General: Normal range of motion.       Cervical back: Normal range of motion. Skin:     General: Skin is warm and dry. Capillary Refill: Capillary refill takes less than 2 seconds. Neurological:      Mental Status: He is alert and oriented to person, place, and time.    Psychiatric:         Behavior: Behavior normal.         Signature:  Niko Enamorado PA-C  Date: 7/26/2023 Time: 12:43 PM

## 2023-07-17 ENCOUNTER — DOCUMENTATION (OUTPATIENT)
Dept: HEMATOLOGY ONCOLOGY | Facility: CLINIC | Age: 43
End: 2023-07-17

## 2023-07-17 ENCOUNTER — TELEPHONE (OUTPATIENT)
Dept: HEMATOLOGY ONCOLOGY | Facility: CLINIC | Age: 43
End: 2023-07-17

## 2023-07-17 ENCOUNTER — LAB (OUTPATIENT)
Dept: LAB | Facility: HOSPITAL | Age: 43
End: 2023-07-17
Payer: OTHER GOVERNMENT

## 2023-07-17 DIAGNOSIS — C18.7 MALIGNANT NEOPLASM OF SIGMOID COLON (HCC): ICD-10-CM

## 2023-07-17 LAB — CEA SERPL-MCNC: 2.2 NG/ML (ref 0–3)

## 2023-07-17 PROCEDURE — 82378 CARCINOEMBRYONIC ANTIGEN: CPT

## 2023-07-17 PROCEDURE — 86316 IMMUNOASSAY TUMOR OTHER: CPT

## 2023-07-17 PROCEDURE — 36415 COLL VENOUS BLD VENIPUNCTURE: CPT

## 2023-07-17 NOTE — TELEPHONE ENCOUNTER
Appointment Change  Cancel, Reschedule, Change to Virtual      Who are you speaking with? Patient   If it is not the patient, are they listed on an active communication consent form? N/A   Which provider is the appointment scheduled with? Dr. Sherley Mittal   When is the appointment scheduled? Please list date and time  8/10/23   At which location is the appointment scheduled [facility] take [facility]   Was the appointment rescheduled or changed from an in person visit to a virtual visit? If so, please list the details of the change. Eliza 7/19/23 1020AM   What is the reason for the appointment change? Sooner appointment needed    Was [facility]AR transport scheduled for this visit? No   Does ST[facility] transport need to be scheduled for the new visit (if applicable) N/A   Does the patient need an infusion appointment rescheduled? No   Does the patient have an infusion appointment scheduled? If so, when? No   Is the patient undergoing chemotherapy? No   Was the no-show policy reviewed for appointments being changed with less then 24 hours of notice?  N/A

## 2023-07-17 NOTE — PROGRESS NOTES
[facility] PA-C to add patient to [place] [address] on 7/27/2023. Chart reviewed and prep started. Pending blood work and final pathology review from [facility].  I will follow up

## 2023-07-18 PROBLEM — C80.1 SMALL CELL CARCINOMA (HCC): Chronic | Status: ACTIVE | Noted: 2023-07-18

## 2023-07-18 LAB — CGA SERPL-MCNC: 49.4 NG/ML (ref 0–101.8)

## 2023-07-18 NOTE — PROGRESS NOTES
Hematology/Oncology Outpatient Office Note    Date of Service: 2023    [facility]  [phone]    Reason for Consultation:   Chief Complaint   Patient presents with   • Follow-up       Cancer Stage at diagnosis: tbd    Referral Physician: [facility]    Primary Care Physician:  No primary care provider on file. Nickname: Yue Keita    Spouse: Naz    Original ECO    Today's ECO    Goals and Barriers:  Current Goal: Minimize effects of disease burden, extend life. Barriers to accomplishing this: None    Patient's Capacity to Self Care:  Patient is able to self care    Code Status: not addressed today    Advanced directives: not addressed today    ASSESSMENT & PLAN      Diagnosis ICD-10-CM Associated Orders   1. Malignant neoplasm of sigmoid colon ([address])  C18.7 Ambulatory Referral to Hematology / Oncology            This is a 38 y.o. c PMHx notable for obesity, being seen in consultation for large cell with neuroendocrine features found in the colon    Discussion of decision making  • Oncology history updated, accordingly, during this visit  • Goals of care/patient communication  o I discussed with the patient the clinical course leading up to their cancer diagnosis. I reviewed relevant office notes, imaging reports and pathology result as well.  o I told the patient that this is a case of curable versus incurable disease and what this means. We discussed that the goal of anti-cancer therapy is to provide best quality of life, extend overall survival, and progression free survival as shown in clinical trials.  We also discussed that there might be a point when the cancer will no longer respond to this anti-neoplastic therapy.   o I explained the risks/benefits of the proposed cancer therapy: Carboplatin-Etoposide +/- Durvalumab and after discussion including understanding risks of possible life-threatening complications and therapy-related malignancy development, informed consent for blood products and treatment has been signed and obtained. • TNM/Staging At Diagnosis  o TNM: tbd  Cancer Staging   tbd  • Disease Features/Tumor Markers/Genetics  o Tumor Marker:   - 7/17/2023: CEA 2.2  - CGA: 49.4  o Notable Path Features:   - 6/8/2023: Poorly differentiated malignancy, pending external expert consultation positive for synaptophysin, chromogranin, CD56, TTF-1, BCL2, and cyclin D1 with a high Ki-67 proliferative rate (approximately 90%) compatible with a poorly differentiated malignancy with features suggesting neuroendocrine differentiation  • Treatment: Carboplatin-Etoposide +/- Durvalumab  • Other Supportive care: EMLA, Zofran  • Treatment Team Members  o Surgeon  o Rad Onc  o Palliative  • Labs  • Diagnostics  6/4/2023 CT Abd pelv w/c: Worsening acute sigmoid diverticulitis with adjacent contained perforation and interval placement of a drainage catheter within the intramural abscess which is slightly increased in size since prior study. The intramural abscess abuts the superior aspect of the urinary bladder without evidence of a colovesical fistula. 6/27/2023 MRI Abd w/wo c: Benign flash-filling 11 mm hepatic and angioma in the caudate lobe of the liver accounting for the enhancing nodule in that location on recent CT examinations      Discussion of decision making    I personally reviewed the following lab results, the image studies, pathology, other specialty/physicians consult notes and recommendations, and outside medical records. I had a lengthy discussion with the patient and shared the work-up findings. We discussed the diagnosis and management plan as below.  I spent 62 minutes reviewing the records (labs, clinician notes, outside records, medical history, ordering medicine/tests/procedures, interpreting the imaging/labs previously done) and coordination of care as well as direct time with the patient today, of which greater than 50% of the time was spent in counseling and coordination of care with the patient/family. 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 If no tumor found in the lung then would omit this study        Follow Up: Every 3 weeks while on systemic therapy    Infusion chairs are at the [facility]    All questions were answered to the patient's satisfaction during this encounter. The patient knows the contact information for our office and knows to reach out for any relevant concerns related to this encounter. They are to call for any temperature 100.4 or higher, new symptoms including but not restricted to shaking chills, decreased appetite, nausea, vomiting, diarrhea, increased fatigue, shortness of breath or chest pain, confusion, and not feeling the strength to come to the clinic. For all other listed problems and medical diagnosis in their chart - they are managed by PCP and/or other specialists, which the patient acknowledges. Thank you very much for your consultation and making us a part of this patient's care. We are continuing to follow closely with you. Please do not hesitate to reach out to me with any additional questions or concerns. Josue Rolon MD  Hematology & Medical Oncology Staff Physician             Disclaimer: This document was prepared using [employer] Direct technology.  If a word or phrase is confusing, or does not make sense, this is likely due to recognition error which was not discovered during this clinician's review. If you believe an error has occurred, please contact me through [facility] line for lee? cation. ONCOLOGY HISTORY OF PRESENT ILLNESS        Oncology History    No history exists. 5/21/2023: BRBPR and pain in the lower abdomen for a week; leading to a ER visit, dx of diverticulitis     SUBJECTIVE  (INTERVAL HISTORY)      Clotting History None    Bleeding History None   Cancer History Colon   Family Cancer History pGrandfather (lung, smoker)   H/O Blood/Plt Transfusion None   Tobacco/etoh/drug abuse 1/2 PPD x 25 years (working on quitting), no etoh abuse or rec drug use       Cancer Screening history n/a   Occupation IT   New medications in the last month: none  Pain: none    No other acute issues at this time. I have reviewed the relevant past medical, surgical, social and family history. I have also reviewed allergies and medications for this patient. Review of Systems    Baseline weight: 200 lbs    Denies F/C, N/V, SOB, CP, LH, HA, rash, itching, gen weakness, melena, hematuria, hematochezia, falls, diarrhea, or constipation       A 10-point review of system was performed, pertinent positive and negative were detailed as above. Otherwise, the 10-point review of system was negative. No past medical history on file. Past Surgical History:   Procedure Laterality Date   • COLON SIGMOID RESECTION LAPAROSCOPIC N/A 6/8/2023    Procedure: RESECTION COLON SIGMOID LAPAROSCOPIC HAND-ASSISTED;  Surgeon: Elena Cormier MD;  Location: [facility];  Service: General   • IR DRAINAGE TUBE CHECK/CHANGE/REPOSITION/REINSERTION/UPSIZE  6/5/2023   • IR DRAINAGE TUBE PLACEMENT  5/30/2023       No family history on file.     Social History     Socioeconomic History   • Marital status: /Civil Union     Spouse name: Not on file   • Number of children: Not on file   • Years of education: Not on file   • Highest education level: Not on file   Occupational History   • Not on file   Tobacco Use   • Smoking status: Every Day     Packs/day: 0.50     Years: 25.00     Total pack years: 12.50     Types: Cigarettes     Passive exposure: Current (wife smokes)   • Smokeless tobacco: Not on file   • Tobacco comments:     Patient states Chantix was not effective. Vaping Use   • Vaping Use: Never used   Substance and Sexual Activity   • Alcohol use: Not Currently   • Drug use: Never   • Sexual activity: Not on file   Other Topics Concern   • Not on file   Social History Narrative   • Not on file     Social Determinants of Health     Financial Resource Strain: Not on file   Food Insecurity: No Food Insecurity (5/31/2023)    Hunger Vital Sign    • Worried About Running Out of Food in the Last Year: Never true    • Ran Out of Food in the Last Year: Never true   Transportation Needs: No Transportation Needs (5/31/2023)    PRAPARE - Transportation    • Lack of Transportation (Medical): No    • Lack of Transportation (Non-Medical): No   Physical Activity: Not on file   Stress: Not on file   Social Connections: Not on file   Intimate Partner Violence: Not on file   Housing Stability: Low Risk  (5/31/2023)    Housing Stability Vital Sign    • Unable to Pay for Housing in the Last Year: No    • Number of Places Lived in the Last Year: 1    • Unstable Housing in the Last Year: No       No Known Allergies    No current outpatient medications on file. No current facility-administered medications for this visit. (Not in a hospital admission)      Objective:     24 Hour Vitals Assessment:     Vitals:    07/19/23 1012   BP: 128/70   Pulse: 105   Resp: 16   Temp: 98.6 °F (37 °C)   SpO2: 96%       PHYSICIAN EXAM:    General: Appearance: alert, cooperative, no distress. HEENT: Normocephalic, atraumatic. No scleral icterus. conjunctivae clear. EOMI. Chest: No tenderness to palpation. No open wound noted.   Lungs: Clear to auscultation bilaterally, Respirations unlabored. Cardiac: Tachycardia, +S1and S2  Abdomen: Soft, non-tender, non-distended. Bowel sounds are normal.   Extremities:  No edema, cyanosis, clubbing. Skin: Skin color, turgor are normal. No rashes. Lymphatics: no palpable supra-cervical, axillary, or inguinal adenopathy  Neurologic: Awake, Alert, and oriented, no gross focal deficits noted b/l. DATA REVIEW:    Pathology Result:    No results found for: "FINALDX"     Image Results:   Image result are reviewed and documented in Hematology/Oncology history    MRI abdomen w wo contrast  Narrative: MRI - ABDOMEN - WITH AND WITHOUT CONTRAST    INDICATION: 38 years / Male  K69.7: Liver disease, unspecified  C18.7: Malignant neoplasm of sigmoid colon. COMPARISON: Prior CT examinations most recently June 4, 2023 and most remotely May 21, 2023    TECHNIQUE:  Multiplanar/multisequence MRI of the abdomen was performed before and after administration of contrast.    IV Contrast:  8 mL of Gadobutrol injection (SINGLE-DOSE)    FINDINGS:    LOWER CHEST:   Unremarkable. LIVER:  Liver is enlarged and there is signal dropout throughout hepatic parenchyma on out of phase gradient T1 imaging consistent with hepatic steatosis. Circumscribed T2 hyperintense 1.1 cm lesion in the caudate lobe of the liver demonstrates hyperenhancement on all dynamic phases of postcontrast imaging from arterial phase to delayed phase and is consistent with benign "flash filling" hemangioma. No   suspicious hepatic mass. The hepatic veins and portal veins are patent. BILE DUCTS: No intrahepatic or extrahepatic bile duct dilation. GALLBLADDER:  Normal.    PANCREAS:  Unremarkable. ADRENAL GLANDS:  Normal.    SPLEEN:  Normal.    KIDNEYS/PROXIMAL URETERS: No hydroureteronephrosis. No suspicious renal mass. BOWEL:   No dilated loops of bowel. PERITONEUM/RETROPERITONEUM: No mass. No ascites. LYMPH NODES: No abdominal lymphadenopathy.     VASCULAR STRUCTURES:  No aneurysm. ABDOMINAL WALL:  Unremarkable. OSSEOUS STRUCTURES: Hyperintensity on T1 and T2 in the anterior T6 vertebral body segment, incompletely characterized but likely representing benign hemangioma. No suspicious osseous lesion. Impression: Benign flash-filling 11 mm hepatic and angioma in the caudate lobe of the liver accounting for the enhancing nodule in that location on recent CT examinations. No MR findings to suggest metastatic tumor in the liver or upper abdomen. Hepatomegaly and hepatic steatosis. Workstation performed: [id number]      LABS:  Lab data are reviewed and documented in [facility] history. Lab Results   Component Value Date    HGB 11.3 (L) 06/10/2023    HCT 35.1 (L) 06/10/2023    MCV 90 06/10/2023     06/10/2023    WBC 11.69 (H) 06/10/2023    NRBC 0 06/10/2023     Lab Results   Component Value Date    K 3.5 06/10/2023    CL 99 06/10/2023    CO2 30 06/10/2023    BUN 8 06/10/2023    CREATININE 0.66 06/10/2023    CALCIUM 8.4 06/10/2023    CORRECTEDCA 9.1 06/09/2023    AST 14 06/09/2023    ALT 10 06/09/2023    ALKPHOS 52 06/09/2023    EGFR 120 06/10/2023       No results found for: "IRON", "TIBC", "FERRITIN"    No results found for: "Ayoub Stake"    No results for input(s): "WBC", "CREAT", "PLT" in the last 72 hours.     By:  Daija Gomez MD, 7/19/2023, 10:32 AM

## 2023-07-19 ENCOUNTER — DOCUMENTATION (OUTPATIENT)
Dept: HEMATOLOGY ONCOLOGY | Facility: CLINIC | Age: 43
End: 2023-07-19

## 2023-07-19 ENCOUNTER — CONSULT (OUTPATIENT)
Dept: HEMATOLOGY ONCOLOGY | Facility: CLINIC | Age: 43
End: 2023-07-19
Payer: OTHER GOVERNMENT

## 2023-07-19 ENCOUNTER — HOSPITAL ENCOUNTER (OUTPATIENT)
Dept: RADIOLOGY | Facility: HOSPITAL | Age: 43
Discharge: HOME | End: 2023-07-19
Attending: INTERNAL MEDICINE
Payer: OTHER GOVERNMENT

## 2023-07-19 ENCOUNTER — TELEPHONE (OUTPATIENT)
Dept: HEMATOLOGY ONCOLOGY | Facility: CLINIC | Age: 43
End: 2023-07-19

## 2023-07-19 VITALS
OXYGEN SATURATION: 96 % | DIASTOLIC BLOOD PRESSURE: 70 MMHG | RESPIRATION RATE: 16 BRPM | HEIGHT: 67 IN | SYSTOLIC BLOOD PRESSURE: 128 MMHG | WEIGHT: 197 LBS | HEART RATE: 105 BPM | TEMPERATURE: 98.6 F | BODY MASS INDEX: 30.92 KG/M2

## 2023-07-19 DIAGNOSIS — C18.7 MALIGNANT NEOPLASM OF SIGMOID COLON (HCC): ICD-10-CM

## 2023-07-19 DIAGNOSIS — Z72.0 TOBACCO ABUSE: ICD-10-CM

## 2023-07-19 DIAGNOSIS — C80.1 SMALL CELL CARCINOMA (HCC): ICD-10-CM

## 2023-07-19 DIAGNOSIS — C80.1 SMALL CELL CARCINOMA (HCC): Chronic | ICD-10-CM

## 2023-07-19 DIAGNOSIS — C18.7 MALIGNANT NEOPLASM OF SIGMOID COLON (HCC): Primary | ICD-10-CM

## 2023-07-19 DIAGNOSIS — R11.2 CHEMOTHERAPY INDUCED NAUSEA AND VOMITING: ICD-10-CM

## 2023-07-19 DIAGNOSIS — C80.1 SMALL CELL CARCINOMA (HCC): Primary | ICD-10-CM

## 2023-07-19 DIAGNOSIS — T45.1X5A CHEMOTHERAPY INDUCED NAUSEA AND VOMITING: ICD-10-CM

## 2023-07-19 PROCEDURE — 71250 CT THORAX DX C-: CPT

## 2023-07-19 PROCEDURE — 70553 MRI BRAIN STEM W/O & W/DYE: CPT

## 2023-07-19 PROCEDURE — G1004 CDSM NDSC: HCPCS

## 2023-07-19 PROCEDURE — A9585 GADOBUTROL INJECTION: HCPCS | Performed by: INTERNAL MEDICINE

## 2023-07-19 PROCEDURE — 99245 OFF/OP CONSLTJ NEW/EST HI 55: CPT | Performed by: INTERNAL MEDICINE

## 2023-07-19 RX ORDER — SODIUM CHLORIDE 9 MG/ML
20 INJECTION, SOLUTION INTRAVENOUS ONCE
Status: CANCELLED | OUTPATIENT
Start: 2023-07-27

## 2023-07-19 RX ORDER — SODIUM CHLORIDE 9 MG/ML
20 INJECTION, SOLUTION INTRAVENOUS ONCE
Status: CANCELLED | OUTPATIENT
Start: 2023-07-26

## 2023-07-19 RX ORDER — ONDANSETRON 8 MG/1
8 TABLET, ORALLY DISINTEGRATING ORAL EVERY 8 HOURS PRN
Qty: 20 TABLET | Refills: 0 | Status: SHIPPED | OUTPATIENT
Start: 2023-07-19

## 2023-07-19 RX ORDER — SODIUM CHLORIDE 9 MG/ML
20 INJECTION, SOLUTION INTRAVENOUS ONCE
Status: CANCELLED | OUTPATIENT
Start: 2023-07-28

## 2023-07-19 RX ORDER — LIDOCAINE AND PRILOCAINE 25; 25 MG/G; MG/G
CREAM TOPICAL AS NEEDED
Qty: 30 G | Refills: 0 | Status: SHIPPED | OUTPATIENT
Start: 2023-07-19

## 2023-07-19 RX ADMIN — GADOBUTROL 9 ML: 604.72 INJECTION INTRAVENOUS at 16:05

## 2023-07-19 NOTE — PROGRESS NOTES
In-basket message received from ANTONI Hayward, Pt was seen by Tracy Olsen and he suspects this pt has small cell lung cancer, path is still pending from [facility]. Orders were placed for full work up. CT Chest/ MRI Brain scheduled to day 7/19 and PET/ CT 8/8 pending auth. Will keep pt on my radar.
No...

## 2023-07-20 ENCOUNTER — TELEPHONE (OUTPATIENT)
Dept: HEMATOLOGY ONCOLOGY | Facility: CLINIC | Age: 43
End: 2023-07-20

## 2023-07-20 ENCOUNTER — TELEPHONE (OUTPATIENT)
Dept: SURGERY | Facility: CLINIC | Age: 43
End: 2023-07-20

## 2023-07-20 NOTE — TELEPHONE ENCOUNTER
Called pt because I noticed he canceled his appt for 07/28/23. Patient states he spoke with Dr. Ricci Salamanca today who stated as long as he didn't have any issues and he is feeling great he didn't need to keep the appt. Will call us back to see who will take over the tumor board.

## 2023-07-20 NOTE — TELEPHONE ENCOUNTER
Please schedule patient for treatment next week. It is urgent per Dr. Janey Chavez. Patient prefers [address] [facility] infusion but willing to travel if needed.

## 2023-07-21 ENCOUNTER — PATIENT OUTREACH (OUTPATIENT)
Dept: HEMATOLOGY ONCOLOGY | Facility: CLINIC | Age: 43
End: 2023-07-21

## 2023-07-21 ENCOUNTER — TELEPHONE (OUTPATIENT)
Dept: INTERVENTIONAL RADIOLOGY/VASCULAR | Facility: HOSPITAL | Age: 43
End: 2023-07-21

## 2023-07-21 NOTE — PROGRESS NOTES
Phone outreach to the pt, no answer, left VMm stated my name title and reason for call- detailed msg about calling to explain my role, discuss how his consult went, answer any questions hi might have at this time and to make sure he is all set up for his treatment, left my contact number for him to call me back. Pt rtrnd my call, I introduced myself, we discussed his consult meet w Dr. 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 Pt asked about our TB meeting and who would be presenting him if that was still happening, I explained that he is on the list for representation next wk and looks like Franklyn Jane would be presenting him again but I would make sure to let Dr. Nubia Mcduffie know as well that the pt will be presented in case he wants to join the meeting as well.

## 2023-07-21 NOTE — TELEPHONE ENCOUNTER
Spoke with patient, reviewed first 2 cycles and he will see the rest on MyChart. Has my phone number for any questions.

## 2023-07-21 NOTE — PROGRESS NOTES
Spoke with Susan Vela and confirmed appointment at the [facility] on 7/28 arrival time of 0900. Pt instructed to have nothing to eat or drink after midnight and to have a ride to and from. Pt informed that after the procedure there is a one hour recovery. All questions answered. Consult complete.

## 2023-07-24 ENCOUNTER — TELEPHONE (OUTPATIENT)
Dept: HEMATOLOGY ONCOLOGY | Facility: CLINIC | Age: 43
End: 2023-07-24

## 2023-07-24 ENCOUNTER — APPOINTMENT (OUTPATIENT)
Dept: LAB | Facility: HOSPITAL | Age: 43
End: 2023-07-24
Payer: OTHER GOVERNMENT

## 2023-07-24 ENCOUNTER — PATIENT OUTREACH (OUTPATIENT)
Dept: CASE MANAGEMENT | Facility: HOSPITAL | Age: 43
End: 2023-07-24

## 2023-07-24 DIAGNOSIS — C80.1 SMALL CELL CARCINOMA (HCC): Chronic | ICD-10-CM

## 2023-07-24 DIAGNOSIS — C18.7 MALIGNANT NEOPLASM OF SIGMOID COLON (HCC): Primary | ICD-10-CM

## 2023-07-24 LAB
ALBUMIN SERPL BCP-MCNC: 4.5 G/DL (ref 3.5–5)
ALP SERPL-CCNC: 81 U/L (ref 34–104)
ALT SERPL W P-5'-P-CCNC: 26 U/L (ref 7–52)
ANION GAP SERPL CALCULATED.3IONS-SCNC: 9 MMOL/L
AST SERPL W P-5'-P-CCNC: 25 U/L (ref 13–39)
BASOPHILS # BLD AUTO: 0.02 THOUSANDS/ÂΜL (ref 0–0.1)
BASOPHILS NFR BLD AUTO: 0 % (ref 0–1)
BILIRUB SERPL-MCNC: 0.56 MG/DL (ref 0.2–1)
BUN SERPL-MCNC: 9 MG/DL (ref 5–25)
CALCIUM SERPL-MCNC: 9.6 MG/DL (ref 8.4–10.2)
CHLORIDE SERPL-SCNC: 103 MMOL/L (ref 96–108)
CO2 SERPL-SCNC: 27 MMOL/L (ref 21–32)
CREAT SERPL-MCNC: 0.91 MG/DL (ref 0.6–1.3)
EOSINOPHIL # BLD AUTO: 0.09 THOUSAND/ÂΜL (ref 0–0.61)
EOSINOPHIL NFR BLD AUTO: 1 % (ref 0–6)
ERYTHROCYTE [DISTWIDTH] IN BLOOD BY AUTOMATED COUNT: 15.4 % (ref 11.6–15.1)
GFR SERPL CREATININE-BSD FRML MDRD: 105 ML/MIN/1.73SQ M
GLUCOSE P FAST SERPL-MCNC: 88 MG/DL (ref 65–99)
HCT VFR BLD AUTO: 48.8 % (ref 36.5–49.3)
HGB BLD-MCNC: 15.4 G/DL (ref 12–17)
IMM GRANULOCYTES # BLD AUTO: 0.02 THOUSAND/UL (ref 0–0.2)
IMM GRANULOCYTES NFR BLD AUTO: 0 % (ref 0–2)
LYMPHOCYTES # BLD AUTO: 1.77 THOUSANDS/ÂΜL (ref 0.6–4.47)
LYMPHOCYTES NFR BLD AUTO: 26 % (ref 14–44)
MCH RBC QN AUTO: 29.5 PG (ref 26.8–34.3)
MCHC RBC AUTO-ENTMCNC: 31.6 G/DL (ref 31.4–37.4)
MCV RBC AUTO: 94 FL (ref 82–98)
MONOCYTES # BLD AUTO: 0.68 THOUSAND/ÂΜL (ref 0.17–1.22)
MONOCYTES NFR BLD AUTO: 10 % (ref 4–12)
NEUTROPHILS # BLD AUTO: 4.31 THOUSANDS/ÂΜL (ref 1.85–7.62)
NEUTS SEG NFR BLD AUTO: 63 % (ref 43–75)
NRBC BLD AUTO-RTO: 0 /100 WBCS
PLATELET # BLD AUTO: 236 THOUSANDS/UL (ref 149–390)
PMV BLD AUTO: 10.8 FL (ref 8.9–12.7)
POTASSIUM SERPL-SCNC: 4.4 MMOL/L (ref 3.5–5.3)
PROT SERPL-MCNC: 7.5 G/DL (ref 6.4–8.4)
RBC # BLD AUTO: 5.22 MILLION/UL (ref 3.88–5.62)
SODIUM SERPL-SCNC: 139 MMOL/L (ref 135–147)
TSH SERPL DL<=0.05 MIU/L-ACNC: 0.69 UIU/ML (ref 0.45–4.5)
WBC # BLD AUTO: 6.89 THOUSAND/UL (ref 4.31–10.16)

## 2023-07-24 PROCEDURE — 80053 COMPREHEN METABOLIC PANEL: CPT

## 2023-07-24 PROCEDURE — 84443 ASSAY THYROID STIM HORMONE: CPT

## 2023-07-24 PROCEDURE — 36415 COLL VENOUS BLD VENIPUNCTURE: CPT

## 2023-07-24 PROCEDURE — 85025 COMPLETE CBC W/AUTO DIFF WBC: CPT

## 2023-07-24 NOTE — TELEPHONE ENCOUNTER
Title: Patients insurance prefers Yao Conchis over Emend    Date patient scheduled: 7/26    Original medication ordered: Emend    New Medication ordered: Yao Smithbs    Is the patient scheduled within 24 hours? ? If yes, follow up with verbal telephone call. No    Office RN to route to [facility]. Infusion  pool routes to [facility]. Infusion tech to receive message, confirm scheduled treatment duration matches ordered treatment duration or adjust accordingly, and re-link appointment request orders. Infusion tech to notify pharmacy and finance.

## 2023-07-24 NOTE — PROGRESS NOTES
LSW reviewed pt's chart and accepted the referral. Josh Buddhism will contact patient to complete the initial OSW assessment, DT and review available resources and offer support. Repair Type: Flap

## 2023-07-26 ENCOUNTER — HOSPITAL ENCOUNTER (OUTPATIENT)
Dept: INFUSION CENTER | Facility: HOSPITAL | Age: 43
Discharge: HOME | End: 2023-07-26
Attending: INTERNAL MEDICINE
Payer: OTHER GOVERNMENT

## 2023-07-26 VITALS
WEIGHT: 193.34 LBS | BODY MASS INDEX: 31.07 KG/M2 | TEMPERATURE: 96.4 F | DIASTOLIC BLOOD PRESSURE: 89 MMHG | HEIGHT: 66 IN | OXYGEN SATURATION: 97 % | RESPIRATION RATE: 16 BRPM | HEART RATE: 85 BPM | SYSTOLIC BLOOD PRESSURE: 155 MMHG

## 2023-07-26 DIAGNOSIS — C80.1 SMALL CELL CARCINOMA (HCC): Primary | Chronic | ICD-10-CM

## 2023-07-26 PROCEDURE — 96367 TX/PROPH/DG ADDL SEQ IV INF: CPT

## 2023-07-26 PROCEDURE — 96413 CHEMO IV INFUSION 1 HR: CPT

## 2023-07-26 PROCEDURE — 96417 CHEMO IV INFUS EACH ADDL SEQ: CPT

## 2023-07-26 RX ORDER — SODIUM CHLORIDE 9 MG/ML
20 INJECTION, SOLUTION INTRAVENOUS ONCE
Status: COMPLETED | OUTPATIENT
Start: 2023-07-26 | End: 2023-07-26

## 2023-07-26 RX ADMIN — ETOPOSIDE 160.8 MG: 20 INJECTION INTRAVENOUS at 10:55

## 2023-07-26 RX ADMIN — APREPITANT 130 MG: 130 INJECTION, EMULSION INTRAVENOUS at 09:10

## 2023-07-26 RX ADMIN — SODIUM CHLORIDE 20 ML/HR: 0.9 INJECTION, SOLUTION INTRAVENOUS at 08:45

## 2023-07-26 RX ADMIN — DURVALUMAB 1500 MG: 500 INJECTION, SOLUTION INTRAVENOUS at 09:54

## 2023-07-26 RX ADMIN — CARBOPLATIN 750 MG: 10 INJECTION, SOLUTION INTRAVENOUS at 12:08

## 2023-07-26 RX ADMIN — DEXAMETHASONE SODIUM PHOSPHATE: 10 INJECTION, SOLUTION INTRAMUSCULAR; INTRAVENOUS at 08:48

## 2023-07-26 NOTE — ASSESSMENT & PLAN NOTE
Presents for follow-up after Tumor Board. Unfortunately pathology not finalized at time of Tumor Board or this appointment. Reviewed recommendations from board for CEA/Chromogranin testing, Genetics referral, and colonoscopy. All questions answered, together we decided to pursue Oncology consultation in the interim until final path available. Will be presented again at next tumor board.

## 2023-07-26 NOTE — PROGRESS NOTES
Pt offers no complaints. Tolerated chemotherapy infusion well without incident. Discharged in stable condition and pt aware of next infusion appointment tomorrow at 11:30AM. AVS provided.

## 2023-07-27 ENCOUNTER — DOCUMENTATION (OUTPATIENT)
Dept: HEMATOLOGY ONCOLOGY | Facility: CLINIC | Age: 43
End: 2023-07-27

## 2023-07-27 ENCOUNTER — PATIENT OUTREACH (OUTPATIENT)
Dept: CASE MANAGEMENT | Facility: HOSPITAL | Age: 43
End: 2023-07-27

## 2023-07-27 ENCOUNTER — HOSPITAL ENCOUNTER (OUTPATIENT)
Dept: INFUSION CENTER | Facility: HOSPITAL | Age: 43
End: 2023-07-27
Attending: INTERNAL MEDICINE
Payer: OTHER GOVERNMENT

## 2023-07-27 ENCOUNTER — TELEPHONE (OUTPATIENT)
Dept: HEMATOLOGY ONCOLOGY | Facility: CLINIC | Age: 43
End: 2023-07-27

## 2023-07-27 ENCOUNTER — TELEPHONE (OUTPATIENT)
Dept: SURGERY | Facility: CLINIC | Age: 43
End: 2023-07-27

## 2023-07-27 VITALS
HEART RATE: 90 BPM | OXYGEN SATURATION: 97 % | BODY MASS INDEX: 30.35 KG/M2 | TEMPERATURE: 97.2 F | DIASTOLIC BLOOD PRESSURE: 74 MMHG | SYSTOLIC BLOOD PRESSURE: 122 MMHG | HEIGHT: 67 IN | RESPIRATION RATE: 16 BRPM | WEIGHT: 193.34 LBS

## 2023-07-27 DIAGNOSIS — C80.1 SMALL CELL CARCINOMA (HCC): Primary | Chronic | ICD-10-CM

## 2023-07-27 DIAGNOSIS — C34.90 SMALL CELL LUNG CANCER (HCC): Primary | ICD-10-CM

## 2023-07-27 PROCEDURE — 96413 CHEMO IV INFUSION 1 HR: CPT

## 2023-07-27 PROCEDURE — 96367 TX/PROPH/DG ADDL SEQ IV INF: CPT

## 2023-07-27 RX ORDER — SODIUM CHLORIDE 9 MG/ML
20 INJECTION, SOLUTION INTRAVENOUS ONCE
Status: COMPLETED | OUTPATIENT
Start: 2023-07-27 | End: 2023-07-27

## 2023-07-27 RX ADMIN — DEXAMETHASONE SODIUM PHOSPHATE: 10 INJECTION, SOLUTION INTRAMUSCULAR; INTRAVENOUS at 11:37

## 2023-07-27 RX ADMIN — ETOPOSIDE 160.8 MG: 20 INJECTION INTRAVENOUS at 12:11

## 2023-07-27 RX ADMIN — SODIUM CHLORIDE 20 ML/HR: 0.9 INJECTION, SOLUTION INTRAVENOUS at 11:36

## 2023-07-27 NOTE — TELEPHONE ENCOUNTER
Marizol Salcedo, it's [employer]. Sofia's YOB: 1980. Just had a quick question for you about my port placement tomorrow. So if you could give me a call back, I'd appreciate it. It's [phone]. Thank you. [facility]- [phone]    Call out to patient and reviewed IR port placement time for tomorrow. Patient appreciative of call.

## 2023-07-27 NOTE — PROGRESS NOTES
Biopsychosocial and Barriers Assessment    Cancer Diagnosis: Small cell ca location tbd  Home/Cell Phone: [phone]  Emergency Contact:LALI LEGER (Spouse) [phone]  Marital Status:   Interpretation concerns, speaks another language, preferred language: english  Cultural concerns: no  Ability to read or write: yes    Caregiver/Support: wife  Children: 4: ages, 25, 16, 12 and 10 yrs old  Child/Elder care: no    Housing: no issues  Home Setup: 2 floor   Lives With: wife, 4 children and wife's parents  Daily Living Activities: independent  Durable Medical Equipment: no  Ambulation: independent    Preferred Pharmacy:   High co-pays with insurance:   High co-pays with medication coverage:  No medication coverage:  from wife's plan    Primary Care Provider: no PCP listed  Hx of Home Health Care: no  Hx of Short term rehab: no  Mental Health Hx: no  Substance Abuse Hx: no  Employment: yes full time as IT, work from home   Status/Location: yes, no benefits  Ability to pay bills: yes  POA/LW/AD: no  Transportation Plan/Concerns: drives self and wife drives      What do you know about your Cancer Diagnosis    What has your doctor told you about your cancer diagnosis: diagnosed with small cell ca uncertain of location. Will be having a PET CT on 8/8 and a port placed tomorrow    What has your doctor told you about your cancer treatment: chemo started today, 7/27    What specific concerns do you have about your diagnosis and treatment: worried about the uncertainty right now    Have you been made aware of any hair loss associated with treatment: yes    Additional Comments:    LSW met with patient and his wife, Juan Roque this afternoon at the [facility] to complete the initial assessment, DT and review available resources and offer support. Patient is present for his first chemo treatment today. Patient lives at home with his wife, four children and his wife's parents.  They restored an old [employer] and it is big enough for everyone, including the parents having their own space. Patient showed me pictures of this beauty. Pt's children run from 10 yrs old to 25 yrs old. 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 Patient is on his wife's  insurance plan from her [employer]. Pt served in the [employer] but never applied for benefits. Patient is independent. He is not experiencing any pain. He sleeps well. Patient is smoking cigaretees, 1 ppd. He would like to quit, but is not sure how. LSW offered to send a request to our smoking cessation rogram and pt agreed. Orders sent to the smoking cessation program . Patient scored a 3 on the DT. His biggest concern is waiting to find out where the location of is cancer exists. He said there are a few possibilities. He is having a PET CT on 8/8 and the a follow up appointment 8/9. Pt's wife works full time. The couple does not have any financial concerns at this time. However they're not sure if they will incur medical expenses. LSW explained financial aid application with financial counselors. LSW also explained about the Cancer support community. Patient denies the need for any resources right now but placed my name and phone number in his phone if he has any questions or concerns arise. Emotional support was provided. LSW will follow up with patient at the [facility] again and see if any needs arise and offer support.

## 2023-07-27 NOTE — PROGRESS NOTES
RECTAL/GI MULTIDISCIPLINARY CASE REVIEW    DATE: 7/27/2023      PRESENTING DOCTOR: Dr. Gisela Vines      DIAGNOSIS: Malignant neoplasm of the sigmoid colon      Eva Upton was presented at the Rectal/GI Multidisciplinary Conference today. PHYSICIAN RECOMMENDED PLAN:    -Recommend 4 cycles of chemotherapy with Carboplatin/Etoposide and Durvalumab.  -Re-evaluate the patient.  -Recommendation for PET CT. Patient is scheduled for PET CT on 8/8/2023. Team agreed to plan. The final treatment plan will be left to the discretion of the patient and the treating physician. DISCLAIMERS:  TO THE TREATING PHYSICIAN:  This conference is a meeting of clinicians from various specialty areas who evaluate and discuss patients for whom a multidisciplinary treatment approach is being considered. Please note that the above opinion was a consensus of the conference attendees and is intended only to assist in quality care of the discussed patient. The responsibility for follow up on the input given during the conference, along with any final decisions regarding plan of care, is that of the patient and the patient's provider. Accordingly, appointments have only been recommended based on this information and have NOT been scheduled unless otherwise noted. TO THE PATIENT:  This summary is a brief record of major aspects of your cancer treatment. You may choose to share a copy with any of your doctors or nurses. However, this is not a detailed or comprehensive record of your care.       NCCN guidelines were readily available for review at this discussion

## 2023-07-28 ENCOUNTER — PATIENT OUTREACH (OUTPATIENT)
Dept: OTHER | Facility: CLINIC | Age: 43
End: 2023-07-28

## 2023-07-28 ENCOUNTER — HOSPITAL ENCOUNTER (OUTPATIENT)
Dept: INFUSION CENTER | Facility: HOSPITAL | Age: 43
End: 2023-07-28
Attending: INTERNAL MEDICINE
Payer: OTHER GOVERNMENT

## 2023-07-28 ENCOUNTER — DOCUMENTATION (OUTPATIENT)
Dept: GENETICS | Facility: CLINIC | Age: 43
End: 2023-07-28

## 2023-07-28 ENCOUNTER — HOSPITAL ENCOUNTER (OUTPATIENT)
Dept: INTERVENTIONAL RADIOLOGY/VASCULAR | Facility: HOSPITAL | Age: 43
End: 2023-07-28
Attending: INTERNAL MEDICINE
Payer: OTHER GOVERNMENT

## 2023-07-28 VITALS
SYSTOLIC BLOOD PRESSURE: 111 MMHG | TEMPERATURE: 97 F | HEART RATE: 77 BPM | BODY MASS INDEX: 30.53 KG/M2 | DIASTOLIC BLOOD PRESSURE: 55 MMHG | HEIGHT: 66 IN | RESPIRATION RATE: 16 BRPM | WEIGHT: 190 LBS | OXYGEN SATURATION: 99 %

## 2023-07-28 VITALS
SYSTOLIC BLOOD PRESSURE: 127 MMHG | HEIGHT: 66 IN | HEART RATE: 89 BPM | WEIGHT: 189.82 LBS | TEMPERATURE: 97.1 F | OXYGEN SATURATION: 96 % | DIASTOLIC BLOOD PRESSURE: 78 MMHG | RESPIRATION RATE: 16 BRPM | BODY MASS INDEX: 30.51 KG/M2

## 2023-07-28 DIAGNOSIS — C80.1 SMALL CELL CARCINOMA (HCC): Primary | Chronic | ICD-10-CM

## 2023-07-28 DIAGNOSIS — C18.7 MALIGNANT NEOPLASM OF SIGMOID COLON (HCC): ICD-10-CM

## 2023-07-28 DIAGNOSIS — C80.1 SMALL CELL CARCINOMA (HCC): ICD-10-CM

## 2023-07-28 PROCEDURE — 36561 INSERT TUNNELED CV CATH: CPT | Performed by: RADIOLOGY

## 2023-07-28 PROCEDURE — 77001 FLUOROGUIDE FOR VEIN DEVICE: CPT | Performed by: RADIOLOGY

## 2023-07-28 PROCEDURE — C1788 PORT, INDWELLING, IMP: HCPCS

## 2023-07-28 PROCEDURE — 77001 FLUOROGUIDE FOR VEIN DEVICE: CPT

## 2023-07-28 PROCEDURE — 36561 INSERT TUNNELED CV CATH: CPT

## 2023-07-28 PROCEDURE — 76937 US GUIDE VASCULAR ACCESS: CPT | Performed by: RADIOLOGY

## 2023-07-28 PROCEDURE — C1894 INTRO/SHEATH, NON-LASER: HCPCS

## 2023-07-28 PROCEDURE — 76937 US GUIDE VASCULAR ACCESS: CPT

## 2023-07-28 PROCEDURE — 96367 TX/PROPH/DG ADDL SEQ IV INF: CPT

## 2023-07-28 PROCEDURE — 96413 CHEMO IV INFUSION 1 HR: CPT

## 2023-07-28 PROCEDURE — 99152 MOD SED SAME PHYS/QHP 5/>YRS: CPT | Performed by: RADIOLOGY

## 2023-07-28 RX ORDER — SODIUM CHLORIDE 9 MG/ML
20 INJECTION, SOLUTION INTRAVENOUS ONCE
Status: COMPLETED | OUTPATIENT
Start: 2023-07-28 | End: 2023-07-28

## 2023-07-28 RX ORDER — MIDAZOLAM HYDROCHLORIDE 2 MG/2ML
INJECTION, SOLUTION INTRAMUSCULAR; INTRAVENOUS AS NEEDED
Status: COMPLETED | OUTPATIENT
Start: 2023-07-28 | End: 2023-07-28

## 2023-07-28 RX ORDER — FENTANYL CITRATE 50 UG/ML
INJECTION, SOLUTION INTRAMUSCULAR; INTRAVENOUS AS NEEDED
Status: COMPLETED | OUTPATIENT
Start: 2023-07-28 | End: 2023-07-28

## 2023-07-28 RX ORDER — SODIUM CHLORIDE 9 MG/ML
30 INJECTION, SOLUTION INTRAVENOUS CONTINUOUS
Status: DISCONTINUED | OUTPATIENT
Start: 2023-07-28 | End: 2023-08-01 | Stop reason: HOSPADM

## 2023-07-28 RX ORDER — CEFAZOLIN SODIUM 2 G/50ML
2000 SOLUTION INTRAVENOUS ONCE
Status: COMPLETED | OUTPATIENT
Start: 2023-07-28 | End: 2023-07-28

## 2023-07-28 RX ORDER — LIDOCAINE HYDROCHLORIDE AND EPINEPHRINE 10; 10 MG/ML; UG/ML
INJECTION, SOLUTION INFILTRATION; PERINEURAL AS NEEDED
Status: COMPLETED | OUTPATIENT
Start: 2023-07-28 | End: 2023-07-28

## 2023-07-28 RX ORDER — ACETAMINOPHEN 325 MG/1
650 TABLET ORAL EVERY 4 HOURS PRN
Status: DISCONTINUED | OUTPATIENT
Start: 2023-07-28 | End: 2023-08-01 | Stop reason: HOSPADM

## 2023-07-28 RX ADMIN — MIDAZOLAM HYDROCHLORIDE 1 MG: 1 INJECTION, SOLUTION INTRAMUSCULAR; INTRAVENOUS at 10:24

## 2023-07-28 RX ADMIN — FENTANYL CITRATE 50 MCG: 50 INJECTION, SOLUTION INTRAMUSCULAR; INTRAVENOUS at 10:17

## 2023-07-28 RX ADMIN — MIDAZOLAM HYDROCHLORIDE 1 MG: 1 INJECTION, SOLUTION INTRAMUSCULAR; INTRAVENOUS at 10:28

## 2023-07-28 RX ADMIN — ETOPOSIDE 160.8 MG: 20 INJECTION INTRAVENOUS at 13:52

## 2023-07-28 RX ADMIN — LIDOCAINE HYDROCHLORIDE AND EPINEPHRINE 20 ML: 10; 10 INJECTION, SOLUTION INFILTRATION; PERINEURAL at 10:31

## 2023-07-28 RX ADMIN — SODIUM CHLORIDE 20 ML/HR: 0.9 INJECTION, SOLUTION INTRAVENOUS at 13:10

## 2023-07-28 RX ADMIN — FENTANYL CITRATE 50 MCG: 50 INJECTION, SOLUTION INTRAMUSCULAR; INTRAVENOUS at 10:28

## 2023-07-28 RX ADMIN — CEFAZOLIN SODIUM 2000 MG: 2 SOLUTION INTRAVENOUS at 10:00

## 2023-07-28 RX ADMIN — DEXAMETHASONE SODIUM PHOSPHATE: 10 INJECTION, SOLUTION INTRAMUSCULAR; INTRAVENOUS at 13:14

## 2023-07-28 RX ADMIN — MIDAZOLAM HYDROCHLORIDE 1 MG: 1 INJECTION, SOLUTION INTRAMUSCULAR; INTRAVENOUS at 10:17

## 2023-07-28 RX ADMIN — FENTANYL CITRATE 50 MCG: 50 INJECTION, SOLUTION INTRAMUSCULAR; INTRAVENOUS at 10:24

## 2023-07-28 NOTE — DISCHARGE INSTRUCTIONS
[facility]  Interventional Radiology  [phone]             Implanted Venous Access Port     WHAT YOU NEED TO KNOW:   An implanted venous access port is a device used to give treatments and take blood. It may also be called a central venous access device (CVAD). The port is a small container that is placed under your skin, usually in your upper chest. The port is attached to a catheter that enters a large vein. DISCHARGE INSTRUCTIONS:   Resume your normal diet. Small sips of flat soda will help with mild nausea. Prevent an infection:   Wash your hands often. Use soap and water. Clean your hands before and after you care for your port. Remind everyone who cares for your port to wash their hands. Check your skin for infection every day. Look for redness, swelling, or fluid oozing from the port site. Care for your port:   1. You may shower beginning 48 hours after procedure. 2. Change dressing if it becomes wet. 3. Remove dressing after 24 hours. Leave glue in place. 4. It is normal for some bruising to occur. 5. Use Tylenol for pain. 6. Limit use of arm on the side that your port was placed. Lift nothing heavier than 5 pounds for 1 week, and then gradually increase activity as tolerated. 7. DO NOT apply ointment, lotion or cream to port site until incision is healed. Allow glue to fall off. DO NOT attempt to peel glue from skin even it it begins to flake. 8, After the port incision is healed you may swim, bathe. Notify the Interventional Radiologist if you have any of the followin. Fever above 101 F    2. Increased redness or swelling after 1st day. 3. Increased pain after 1st day. 4. Any sign of infection (drainage from port site, skin separation, hot to touch). 5. Persistent nausea or vomiting.     Contact Interventional Radiology at [phone] Gio PATIENTS: Contact Interventional Radiology at [phone]) Vinod Klein PATIENTS: Contact Interventional Radiology at [phone]).

## 2023-07-28 NOTE — BRIEF OP NOTE (RAD/CATH)
INTERVENTIONAL RADIOLOGY PROCEDURE NOTE    Date: 7/28/2023    Procedure:   Procedure Summary     Date: 07/28/23 Room / Location: [address] [facility] Interventional Radiology    Anesthesia Start:  Anesthesia Stop:     Procedure: IR PORT PLACEMENT Diagnosis:       Malignant neoplasm of sigmoid colon ([address])      Small cell carcinoma ([address])      ( Small cell carcinoma ([address]))    Scheduled Providers:  Responsible Provider:     Anesthesia Type: Not recorded ASA Status: Not recorded          Preoperative diagnosis:   1. Malignant neoplasm of sigmoid colon ([address])    2. Small cell carcinoma (HCC)         Postoperative diagnosis: Same. Surgeon: Zoya Carrera MD     Assistant: None. No qualified resident was available. Blood loss: none    Specimens: none     Findings: Right chest port placed with image guidance. OK to use immediately. Complications: None immediate.     Anesthesia: conscious sedation

## 2023-07-28 NOTE — PROGRESS NOTES
Pt care assumed from pierre otto rn. Tolerated etoposide without incident. Port flushed and deaccessed per routine. 4x4 gauze applied as port was inserted today and had scant drainage under glue. Pt instructed to leave on tonight and no shower per instructions from IR.

## 2023-07-28 NOTE — H&P
Interventional Radiology  History and Physical 7/28/2023     [facility]   1980   [phone]    H&P reviewed. There have been no interval changes since the time the H&P was written. /71   Pulse 78   Temp (!) 97.2 °F (36.2 °C) (Temporal)   Resp 18   Ht 5' 6" (1.676 m)   Wt 86.2 kg (190 lb)   SpO2 99%   BMI 30.67 kg/m²     Prior imaging was reviewed. 63-year-old with sigmoid cancer. Presents today for port insertion. Procedure discussed and all questions answered. He is getting chemotherapy today and we have been asked to leave the port accessed. Informed written consent was obtained.     Ady Alexander MD

## 2023-07-30 NOTE — PROGRESS NOTES
Hematology/Oncology Outpatient Office Note    Date of Service: 2023    [facility] HEMATOLOGY ONCOLOGY SPECIALISTS JERICA Smiley [facility]  [phone]    Reason for Consultation:   Chief Complaint   Patient presents with   • Follow-up       Cancer Stage at diagnosis: IV    Referral Physician: No ref. provider found    Primary Care Physician:  No primary care provider on file. Nickname: Jefferson Arzola    Spouse: Naz Dinh ECO    Today's ECO    Goals and Barriers:  Current Goal: Minimize effects of disease burden, extend life. Barriers to accomplishing this: None    Patient's Capacity to Self Care:  Patient is able to self care    Code Status: not addressed today    Advanced directives: not addressed today    ASSESSMENT & PLAN      Diagnosis ICD-10-CM Associated Orders   1. Small cell carcinoma (HCC)  C80.1 CT chest abdomen pelvis w contrast            This is a 38 y.o. c PMHx notable for obesity, being seen in consultation for large cell with neuroendocrine features found in the colon    Discussion of decision making  • Oncology history updated, accordingly, during this visit  • Goals of care/patient communication  o I discussed with the patient the clinical course leading up to their cancer diagnosis. I reviewed relevant office notes, imaging reports and pathology result as well.  o I told the patient that this is a case of curable versus incurable disease and what this means. We discussed that the goal of anti-cancer therapy is to provide best quality of life, extend overall survival, and progression free survival as shown in clinical trials.  We also discussed that there might be a point when the cancer will no longer respond to this anti-neoplastic therapy.   o I explained the risks/benefits of the proposed cancer therapy: Carboplatin-Etoposide +/- Durvalumab and after discussion including understanding risks of possible life-threatening complications and therapy-related malignancy development, informed consent for blood products and treatment has been signed and obtained. • TNM/Staging At Diagnosis  o TNM: tbd  Cancer Staging   tbd  • Disease Features/Tumor Markers/Genetics  o Tumor Marker:   - 7/17/2023: CEA 2.2  - CGA: 49.4  o Notable Path Features:   - 6/8/2023: Poorly differentiated malignancy, pending external expert consultation positive for synaptophysin, chromogranin, CD56, TTF-1, BCL2, and cyclin D1 with a high Ki-67 proliferative rate (approximately 90%) compatible with a poorly differentiated malignancy with features suggesting neuroendocrine differentiation. 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 The intramural abscess abuts the superior aspect of the urinary bladder without evidence of a colovesical fistula. 6/27/2023 MRI Abd w/wo c: Benign flash-filling 11 mm hepatic and angioma in the caudate lobe of the liver accounting for the enhancing nodule in that location on recent CT examinations  7/19/2023 CT Chest w/o c: There are multiple pulmonary nodules, which will be described on series 4:  Image 68, left lower lobe, solid, smooth bordered, 3 mm . Image 63, left upper lobe, solid, smooth bordered, 4 mm . Image 43, right upper lobe, solid, smooth bordered, 4 mm, may represent a fissural lymph node.   Image 55, right upper lobe, solid, smooth bordered, 4 mm, may represent a fissural lymph node  7/19/2023 MRI Brain w/wo c: No evidence of intracranial metastasis, A few foci of T2/FLAIR hyperintensity primarily involving left frontoparietal subcortical and deep white matter (maybe migraine related)  8/8/2023 PET/CT: Three new hypermetabolic solid nodules/masses in the abdomen as described, compatible with progression of disease. Mild uptake along the left anterior ninth rib corresponding to a mild fracture deformity. Findings are indeterminate and may represent the sequela of trauma or metastatic disease. Stable sub-4 mm pulmonary nodules     Discussion of decision making    I personally reviewed the following lab results, the image studies, pathology, other specialty/physicians consult notes and recommendations, and outside medical records. I had a lengthy discussion with the patient and shared the work-up findings. We discussed the diagnosis and management plan as below. I spent 42 minutes reviewing the records (labs, clinician notes, outside records, medical history, ordering medicine/tests/procedures, interpreting the imaging/labs previously done) and coordination of care as well as direct time with the patient today, of which greater than 50% of the time was spent in counseling and coordination of care with the patient/family. 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 The patient knows the contact information for our office and knows to reach out for any relevant concerns related to this encounter.  They are to call for any temperature 100.4 or higher, new symptoms including but not restricted to shaking chills, decreased appetite, nausea, vomiting, diarrhea, increased fatigue, shortness of breath or chest pain, confusion, and not feeling the strength to come to the clinic. For all other listed problems and medical diagnosis in their chart - they are managed by PCP and/or other specialists, which the patient acknowledges. Thank you very much for your consultation and making us a part of this patient's care. We are continuing to follow closely with you. Please do not hesitate to reach out to me with any additional questions or concerns. Josue Balderrama MD  Hematology & Medical Oncology Staff Physician             Disclaimer: This document was prepared using [employer] technology. If a word or phrase is confusing, or does not make sense, this is likely due to recognition error which was not discovered during this clinician's review. If you believe an error has occurred, please contact me through [facility] line for lee? cation.       ONCOLOGY HISTORY OF PRESENT ILLNESS        Oncology History   Small cell carcinoma ([address])   7/18/2023 Initial Diagnosis    Small cell carcinoma ([address])     7/26/2023 -  Chemotherapy    alteplase (CATHFLO), 2 mg, Intracatheter, Every 1 Minute as needed, 1 of 10 cycles  CARBOplatin (PARAPLATIN) IVPB (GOG AUC DOSING), 750 mg, Intravenous, Once, 1 of 4 cycles  Administration: 750 mg (7/26/2023)  durvalumab (IMFINZI) IVPB, 1,500 mg, Intravenous, Once, 1 of 10 cycles  Administration: 1,500 mg (7/26/2023)  aprepitant (CINVANTI) in  mL IVPB, 130 mg, Intravenous, Once, 1 of 4 cycles  Administration: 130 mg (7/26/2023)       5/21/2023: BRBPR and pain in the lower abdomen for a week; leading to a ER visit, dx of diverticulitis     SUBJECTIVE  (INTERVAL HISTORY)      Clotting History None    Bleeding History None   Cancer History Colon   Family Cancer History pGrandfather (lung, smoker)   H/O Blood/Plt Transfusion None   Tobacco/etoh/drug abuse 1/2 PPD x 25 years (working on quitting), no etoh abuse or rec drug use       Cancer Screening history n/a   Occupation IT       Interval events:     No acute issues at this time. Tolerated C1 very well aside from first few days    I have reviewed the relevant past medical, surgical, social and family history. I have also reviewed allergies and medications for this patient. Review of Systems    Baseline weight: 200 lbs    Denies F/C, N/V, SOB, CP, LH, HA, rash, itching, gen weakness, melena, hematuria, hematochezia, falls, diarrhea, or constipation       A 10-point review of system was performed, pertinent positive and negative were detailed as above. Otherwise, the 10-point review of system was negative. No past medical history on file. Past Surgical History:   Procedure Laterality Date   • COLON SIGMOID RESECTION LAPAROSCOPIC N/A 6/8/2023    Procedure: RESECTION COLON SIGMOID LAPAROSCOPIC HAND-ASSISTED;  Surgeon: Nikki Brown MD;  Location: [facility];  Service: General   • IR DRAINAGE TUBE CHECK/CHANGE/REPOSITION/REINSERTION/UPSIZE  6/5/2023   • IR DRAINAGE TUBE PLACEMENT  5/30/2023   • IR PORT PLACEMENT  7/28/2023       No family history on file. Social History     Socioeconomic History   • Marital status: /Civil Union     Spouse name: Not on file   • Number of children: Not on file   • Years of education: Not on file   • Highest education level: Not on file   Occupational History   • Not on file   Tobacco Use   • Smoking status: Every Day     Packs/day: 1.00     Years: 25.00     Total pack years: 25.00     Types: Cigarettes     Passive exposure: Current (wife smokes)   • Smokeless tobacco: Not on file   • Tobacco comments:     Patient states Chantix was not effective.    Vaping Use   • Vaping Use: Never used   Substance and Sexual Activity   • Alcohol use: Not Currently   • Drug use: Never   • Sexual activity: Not on file   Other Topics Concern   • Not on file   Social History Narrative   • Not on file     Social Determinants of Health     Financial Resource Strain: Not on file   Food Insecurity: No Food Insecurity (5/31/2023)    Hunger Vital Sign    • Worried About Running Out of Food in the Last Year: Never true    • Ran Out of Food in the Last Year: Never true   Transportation Needs: No Transportation Needs (5/31/2023)    PRAPARE - Transportation    • Lack of Transportation (Medical): No    • Lack of Transportation (Non-Medical): No   Physical Activity: Not on file   Stress: Not on file   Social Connections: Not on file   Intimate Partner Violence: Not on file   Housing Stability: Low Risk  (5/31/2023)    Housing Stability Vital Sign    • Unable to Pay for Housing in the Last Year: No    • Number of Places Lived in the Last Year: 1    • Unstable Housing in the Last Year: No       No Known Allergies    Current Outpatient Medications   Medication Sig Dispense Refill   • lidocaine-prilocaine (EMLA) cream Apply topically as needed for mild pain 30 g 0   • ondansetron (ZOFRAN-ODT) 8 mg disintegrating tablet Take 1 tablet (8 mg total) by mouth every 8 (eight) hours as needed for nausea or vomiting 20 tablet 0     No current facility-administered medications for this visit. (Not in a hospital admission)      Objective:     24 Hour Vitals Assessment:     Vitals:    08/09/23 1411   BP: 132/68   Pulse: 102   Resp: 18   Temp: 98.5 °F (36.9 °C)   SpO2: 98%       PHYSICIAN EXAM:    General: Appearance: alert, cooperative, no distress. HEENT: Normocephalic, atraumatic. No scleral icterus. conjunctivae clear. EOMI. Chest: No tenderness to palpation. No open wound noted. Lungs: Clear to auscultation bilaterally, Respirations unlabored. Cardiac: Tachycardia, +S1and S2  Abdomen: Soft, non-tender, non-distended. Bowel sounds are normal.   Extremities:  No edema, cyanosis, clubbing. Skin: Skin color, turgor are normal. No rashes.   Lymphatics: no palpable supra-cervical, axillary, or inguinal adenopathy  Neurologic: Awake, Alert, and oriented, no gross focal deficits noted b/l. DATA REVIEW:    Pathology Result:    Final Diagnosis   Date Value Ref Range Status   06/08/2023   Final    A. 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 Comment: This is an appended report. These results have been appended to a previously preliminary verified report. 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 TECHNIQUE:   10.6 mCi F-18-FDG administered IV. Multiplanar attenuation corrected and non attenuation corrected PET images are available for interpretation, and contiguous, low dose, axial CT sections were obtained from the skull base through the femurs. Intravenous contrast material was not utilized. This examination, like all CT scans performed in the [facility], was performed utilizing techniques to minimize radiation dose exposure, including the use of iterative reconstruction and   automated exposure control. Fasting serum glucose: 96 mg/dl    FINDINGS:    VISUALIZED BRAIN:  No acute abnormalities are seen. HEAD/NECK:  There is a physiologic distribution of FDG. No FDG avid cervical adenopathy is seen. CT images: Unremarkable. CHEST:  No FDG avid soft tissue lesions are seen. CT images: Right chest port catheter in satisfactory position.  There are scattered sub-4 mm pulmonary nodules including in the left upper lobe (series 4, images 65, 72, 81), right upper lobe (series 4, images 71, 77), and left lower lobe (series 4, image   85), which are stable from July 19, 2023. ABDOMEN/PELVIS:   Three new hypermetabolic nodules/masses in the abdomen as follows:    -3.1 x 3.1 cm hypermetabolic mass in the left anterior abdomen abutting a loop of small bowel (series 4, image 147; SUV max 12). -1.3 x 1.2 cm hypermetabolic soft tissue nodule in the left mid abdomen posterior to a loop of small bowel (series 4, image 154; SUV max 17)    -1.8 x 2.1 cm hypermetabolic soft tissue nodule anterior to the left psoas muscle (series 4, image 160; SUV max 9.7)    CT images: Postsurgical changes with mild uptake in the left abdominal wall from laparoscopic sigmoid resection. Suture material in the left lower abdomen. Vasectomy related changes. OSSEOUS STRUCTURES:  Increased uptake along the anterior aspect of the left ninth rib (SUV max 4.7),, which corresponds to a mild fracture deformity. CT images: No other significant findings. Impression: 1. Three new hypermetabolic solid nodules/masses in the abdomen as described, compatible with progression of disease. 2. Mild uptake along the left anterior ninth rib corresponding to a mild fracture deformity. Findings are indeterminate and may represent the sequela of trauma or metastatic disease. Correlate for history of trauma in this region and recommend attention   to on subsequent follow-up. 3. Stable sub-4 mm pulmonary nodules compared to the CT chest of July 19, 2023. Recommend a follow-up CT chest in approximately 3 months for reassessment. The study was marked in EPIC for significant notification. Workstation performed: [id number]      LABS:  Lab data are reviewed and documented in HemOnc history.        Lab Results   Component Value Date    HGB 15.4 07/24/2023    HCT 48.8 07/24/2023    MCV 94 07/24/2023     07/24/2023    WBC 6.89 07/24/2023    NRBC 0 07/24/2023     Lab Results   Component Value Date    K 4.4 07/24/2023     07/24/2023    CO2 27 07/24/2023    BUN 9 07/24/2023    CREATININE 0.91 07/24/2023    GLUF 88 07/24/2023    CALCIUM 9.6 07/24/2023    CORRECTEDCA 9.1 06/09/2023    AST 25 07/24/2023    ALT 26 07/24/2023    ALKPHOS 81 07/24/2023    EGFR 105 07/24/2023       No results found for: "IRON", "TIBC", "FERRITIN"    No results found for: "Heloise Bran"    No results for input(s): "WBC", "CREAT", "PLT" in the last 72 hours.     By:  Gloria Bhat MD, 8/9/2023, 2:29 PM

## 2023-08-02 ENCOUNTER — TELEPHONE (OUTPATIENT)
Dept: HEMATOLOGY ONCOLOGY | Facility: CLINIC | Age: 43
End: 2023-08-02

## 2023-08-02 NOTE — TELEPHONE ENCOUNTER
Patient Call    Who are you speaking with? homero    If it is not the patient, are they listed on an active communication consent form? N/A   What is the reason for this call? Akiko Remy called because pt doesn't need an auth but he does need a referral to be seen at [facility]   Does this require a call back? N/A   If a call back is required, please list best call back number N/a   If a call back is required, advise that a message will be forwarded to their care team and someone will return their call as soon as possible. Did you relay this information to the patient?  Yes

## 2023-08-02 NOTE — TELEPHONE ENCOUNTER
Patient Call    Who are you speaking with? homero    If it is not the patient, are they listed on an active communication consent form? N/A   What is the reason for this call? Pt needs an out of network auth. Cpt codes [id number] x3; [id number]; [id number] ; [id number] x1; [id number]; [id number]. npi [id number]   Does this require a call back? N/A   If a call back is required, please list best call back number n/a   If a call back is required, advise that a message will be forwarded to their care team and someone will return their call as soon as possible. Did you relay this information to the patient?  N/A

## 2023-08-03 ENCOUNTER — PATIENT OUTREACH (OUTPATIENT)
Dept: HEMATOLOGY ONCOLOGY | Facility: CLINIC | Age: 43
End: 2023-08-03

## 2023-08-03 NOTE — PROGRESS NOTES
Called pt to get information on the referral needed for him to be seen at [facility]. He stated he is going to reach out to his [facility] insurance and ask how to obtain the form that is needed for him to be seen. He stated the form may be on the [facility] web site. I  Gave him my e mail address and instructed him to e mail me the form and I will make sure the oncology office receives it. He was thankful for the call, and has my contact information if he needs anything     E mail received from pt with information and a link for the pts insurance referrals .  E mailed forwarded to  Medical oncology team

## 2023-08-08 ENCOUNTER — TELEPHONE (OUTPATIENT)
Dept: HEMATOLOGY ONCOLOGY | Facility: CLINIC | Age: 43
End: 2023-08-08

## 2023-08-08 ENCOUNTER — HOSPITAL ENCOUNTER (OUTPATIENT)
Dept: NUCLEAR MEDICINE | Facility: HOSPITAL | Age: 43
Discharge: HOME | End: 2023-08-08
Attending: INTERNAL MEDICINE
Payer: OTHER GOVERNMENT

## 2023-08-08 DIAGNOSIS — C80.1 SMALL CELL CARCINOMA (HCC): ICD-10-CM

## 2023-08-08 DIAGNOSIS — C18.7 MALIGNANT NEOPLASM OF SIGMOID COLON (HCC): ICD-10-CM

## 2023-08-08 LAB — GLUCOSE SERPL-MCNC: 96 MG/DL (ref 65–140)

## 2023-08-08 PROCEDURE — 78815 PET IMAGE W/CT SKULL-THIGH: CPT

## 2023-08-08 PROCEDURE — G1004 CDSM NDSC: HCPCS

## 2023-08-08 PROCEDURE — A9552 F18 FDG: HCPCS

## 2023-08-08 PROCEDURE — 82948 REAGENT STRIP/BLOOD GLUCOSE: CPT

## 2023-08-09 ENCOUNTER — OFFICE VISIT (OUTPATIENT)
Dept: HEMATOLOGY ONCOLOGY | Facility: CLINIC | Age: 43
End: 2023-08-09
Payer: OTHER GOVERNMENT

## 2023-08-09 VITALS
RESPIRATION RATE: 18 BRPM | TEMPERATURE: 98.5 F | HEART RATE: 102 BPM | WEIGHT: 195 LBS | DIASTOLIC BLOOD PRESSURE: 68 MMHG | HEIGHT: 66 IN | OXYGEN SATURATION: 98 % | SYSTOLIC BLOOD PRESSURE: 132 MMHG | BODY MASS INDEX: 31.34 KG/M2

## 2023-08-09 DIAGNOSIS — C80.1 SMALL CELL CARCINOMA (HCC): Primary | Chronic | ICD-10-CM

## 2023-08-09 PROCEDURE — 99215 OFFICE O/P EST HI 40 MIN: CPT | Performed by: INTERNAL MEDICINE

## 2023-08-09 RX ORDER — SODIUM CHLORIDE 9 MG/ML
20 INJECTION, SOLUTION INTRAVENOUS ONCE
Status: CANCELLED | OUTPATIENT
Start: 2023-08-17

## 2023-08-09 RX ORDER — SODIUM CHLORIDE 9 MG/ML
20 INJECTION, SOLUTION INTRAVENOUS ONCE
Status: CANCELLED | OUTPATIENT
Start: 2023-08-18

## 2023-08-09 RX ORDER — SODIUM CHLORIDE 9 MG/ML
20 INJECTION, SOLUTION INTRAVENOUS ONCE
Status: CANCELLED | OUTPATIENT
Start: 2023-08-16

## 2023-08-10 PROBLEM — Z45.2 ENCOUNTER FOR CARE RELATED TO PORT-A-CATH: Status: ACTIVE | Noted: 2023-08-10

## 2023-08-14 ENCOUNTER — TELEPHONE (OUTPATIENT)
Dept: GENETICS | Facility: CLINIC | Age: 43
End: 2023-08-14

## 2023-08-14 NOTE — TELEPHONE ENCOUNTER
Spoke with Win Forrester to confirm his upcoming appointment, he confirmed and stated he knew where our office was located

## 2023-08-15 ENCOUNTER — HOSPITAL ENCOUNTER (OUTPATIENT)
Dept: INFUSION CENTER | Facility: HOSPITAL | Age: 43
Discharge: HOME | End: 2023-08-15
Payer: OTHER GOVERNMENT

## 2023-08-15 DIAGNOSIS — C80.1 SMALL CELL CARCINOMA (HCC): Chronic | ICD-10-CM

## 2023-08-15 DIAGNOSIS — Z45.2 ENCOUNTER FOR CARE RELATED TO PORT-A-CATH: Primary | ICD-10-CM

## 2023-08-15 LAB
ALBUMIN SERPL BCP-MCNC: 4.2 G/DL (ref 3.5–5)
ALP SERPL-CCNC: 70 U/L (ref 34–104)
ALT SERPL W P-5'-P-CCNC: 21 U/L (ref 7–52)
ANION GAP SERPL CALCULATED.3IONS-SCNC: 8 MMOL/L
AST SERPL W P-5'-P-CCNC: 19 U/L (ref 13–39)
BASOPHILS # BLD AUTO: 0.01 THOUSANDS/ÂΜL (ref 0–0.1)
BASOPHILS NFR BLD AUTO: 0 % (ref 0–1)
BILIRUB SERPL-MCNC: 0.29 MG/DL (ref 0.2–1)
BUN SERPL-MCNC: 13 MG/DL (ref 5–25)
CALCIUM SERPL-MCNC: 9.2 MG/DL (ref 8.4–10.2)
CHLORIDE SERPL-SCNC: 102 MMOL/L (ref 96–108)
CO2 SERPL-SCNC: 27 MMOL/L (ref 21–32)
CREAT SERPL-MCNC: 0.85 MG/DL (ref 0.6–1.3)
EOSINOPHIL # BLD AUTO: 0.03 THOUSAND/ÂΜL (ref 0–0.61)
EOSINOPHIL NFR BLD AUTO: 1 % (ref 0–6)
ERYTHROCYTE [DISTWIDTH] IN BLOOD BY AUTOMATED COUNT: 15.7 % (ref 11.6–15.1)
GFR SERPL CREATININE-BSD FRML MDRD: 108 ML/MIN/1.73SQ M
GLUCOSE SERPL-MCNC: 107 MG/DL (ref 65–140)
HCT VFR BLD AUTO: 44.1 % (ref 36.5–49.3)
HGB BLD-MCNC: 14.4 G/DL (ref 12–17)
IMM GRANULOCYTES # BLD AUTO: 0.02 THOUSAND/UL (ref 0–0.2)
IMM GRANULOCYTES NFR BLD AUTO: 0 % (ref 0–2)
LYMPHOCYTES # BLD AUTO: 1.17 THOUSANDS/ÂΜL (ref 0.6–4.47)
LYMPHOCYTES NFR BLD AUTO: 24 % (ref 14–44)
MCH RBC QN AUTO: 30.4 PG (ref 26.8–34.3)
MCHC RBC AUTO-ENTMCNC: 32.7 G/DL (ref 31.4–37.4)
MCV RBC AUTO: 93 FL (ref 82–98)
MONOCYTES # BLD AUTO: 0.72 THOUSAND/ÂΜL (ref 0.17–1.22)
MONOCYTES NFR BLD AUTO: 15 % (ref 4–12)
NEUTROPHILS # BLD AUTO: 3.02 THOUSANDS/ÂΜL (ref 1.85–7.62)
NEUTS SEG NFR BLD AUTO: 60 % (ref 43–75)
NRBC BLD AUTO-RTO: 0 /100 WBCS
PLATELET # BLD AUTO: 208 THOUSANDS/UL (ref 149–390)
PMV BLD AUTO: 9.6 FL (ref 8.9–12.7)
POTASSIUM SERPL-SCNC: 3.9 MMOL/L (ref 3.5–5.3)
PROT SERPL-MCNC: 7 G/DL (ref 6.4–8.4)
RBC # BLD AUTO: 4.74 MILLION/UL (ref 3.88–5.62)
SODIUM SERPL-SCNC: 137 MMOL/L (ref 135–147)
TSH SERPL DL<=0.05 MIU/L-ACNC: 1.2 UIU/ML (ref 0.45–4.5)
WBC # BLD AUTO: 4.97 THOUSAND/UL (ref 4.31–10.16)

## 2023-08-15 PROCEDURE — 85025 COMPLETE CBC W/AUTO DIFF WBC: CPT | Performed by: INTERNAL MEDICINE

## 2023-08-15 PROCEDURE — 80053 COMPREHEN METABOLIC PANEL: CPT | Performed by: INTERNAL MEDICINE

## 2023-08-15 PROCEDURE — 84443 ASSAY THYROID STIM HORMONE: CPT | Performed by: INTERNAL MEDICINE

## 2023-08-15 NOTE — PROGRESS NOTES
Labs obtained as ordered via [facility] port. Port flushed per protocol & clamped. CHG dressing intact for tx tomorrow. Discharged in stable condition.

## 2023-08-16 ENCOUNTER — PATIENT OUTREACH (OUTPATIENT)
Dept: HEMATOLOGY ONCOLOGY | Facility: CLINIC | Age: 43
End: 2023-08-16

## 2023-08-16 ENCOUNTER — HOSPITAL ENCOUNTER (OUTPATIENT)
Dept: INFUSION CENTER | Facility: HOSPITAL | Age: 43
Discharge: HOME | End: 2023-08-16
Attending: INTERNAL MEDICINE
Payer: OTHER GOVERNMENT

## 2023-08-16 VITALS
DIASTOLIC BLOOD PRESSURE: 88 MMHG | WEIGHT: 193.12 LBS | BODY MASS INDEX: 31.04 KG/M2 | RESPIRATION RATE: 18 BRPM | HEIGHT: 66 IN | TEMPERATURE: 96.5 F | HEART RATE: 85 BPM | SYSTOLIC BLOOD PRESSURE: 150 MMHG | OXYGEN SATURATION: 99 %

## 2023-08-16 DIAGNOSIS — C80.1 SMALL CELL CARCINOMA (HCC): Primary | ICD-10-CM

## 2023-08-16 PROCEDURE — 96413 CHEMO IV INFUSION 1 HR: CPT

## 2023-08-16 PROCEDURE — 96367 TX/PROPH/DG ADDL SEQ IV INF: CPT

## 2023-08-16 PROCEDURE — 96417 CHEMO IV INFUS EACH ADDL SEQ: CPT

## 2023-08-16 RX ORDER — SODIUM CHLORIDE 9 MG/ML
20 INJECTION, SOLUTION INTRAVENOUS ONCE
Status: COMPLETED | OUTPATIENT
Start: 2023-08-16 | End: 2023-08-16

## 2023-08-16 RX ADMIN — APREPITANT 130 MG: 130 INJECTION, EMULSION INTRAVENOUS at 08:02

## 2023-08-16 RX ADMIN — CARBOPLATIN 700.5 MG: 10 INJECTION, SOLUTION INTRAVENOUS at 09:47

## 2023-08-16 RX ADMIN — SODIUM CHLORIDE 20 ML/HR: 9 INJECTION, SOLUTION INTRAVENOUS at 07:38

## 2023-08-16 RX ADMIN — ETOPOSIDE 160.8 MG: 20 INJECTION INTRAVENOUS at 10:51

## 2023-08-16 RX ADMIN — DURVALUMAB 1500 MG: 500 INJECTION, SOLUTION INTRAVENOUS at 08:40

## 2023-08-16 RX ADMIN — DEXAMETHASONE SODIUM PHOSPHATE: 10 INJECTION, SOLUTION INTRAMUSCULAR; INTRAVENOUS at 07:40

## 2023-08-16 NOTE — PROGRESS NOTES
Labs reviewed. Chemotherapy tolerated without incident. Port flushed per protocol. Discharged in stable condition. Spoke with patient about test results and she verbalized understanding

## 2023-08-17 ENCOUNTER — TELEPHONE (OUTPATIENT)
Dept: HEMATOLOGY ONCOLOGY | Facility: CLINIC | Age: 43
End: 2023-08-17

## 2023-08-17 ENCOUNTER — HOSPITAL ENCOUNTER (OUTPATIENT)
Dept: INFUSION CENTER | Facility: HOSPITAL | Age: 43
End: 2023-08-17
Attending: INTERNAL MEDICINE
Payer: OTHER GOVERNMENT

## 2023-08-17 ENCOUNTER — CLINICAL SUPPORT (OUTPATIENT)
Dept: GENETICS | Facility: CLINIC | Age: 43
End: 2023-08-17
Payer: OTHER GOVERNMENT

## 2023-08-17 ENCOUNTER — DOCUMENTATION (OUTPATIENT)
Dept: GENETICS | Facility: CLINIC | Age: 43
End: 2023-08-17

## 2023-08-17 VITALS
TEMPERATURE: 96.3 F | WEIGHT: 193.12 LBS | HEIGHT: 66 IN | BODY MASS INDEX: 31.04 KG/M2 | DIASTOLIC BLOOD PRESSURE: 87 MMHG | OXYGEN SATURATION: 98 % | HEART RATE: 79 BPM | RESPIRATION RATE: 18 BRPM | SYSTOLIC BLOOD PRESSURE: 149 MMHG

## 2023-08-17 DIAGNOSIS — C80.1 SMALL CELL CARCINOMA (HCC): Primary | Chronic | ICD-10-CM

## 2023-08-17 DIAGNOSIS — C18.7 MALIGNANT NEOPLASM OF SIGMOID COLON (HCC): Primary | ICD-10-CM

## 2023-08-17 PROCEDURE — 96367 TX/PROPH/DG ADDL SEQ IV INF: CPT

## 2023-08-17 PROCEDURE — 96413 CHEMO IV INFUSION 1 HR: CPT

## 2023-08-17 PROCEDURE — 36415 COLL VENOUS BLD VENIPUNCTURE: CPT

## 2023-08-17 RX ORDER — SODIUM CHLORIDE 9 MG/ML
20 INJECTION, SOLUTION INTRAVENOUS ONCE
Status: COMPLETED | OUTPATIENT
Start: 2023-08-17 | End: 2023-08-17

## 2023-08-17 RX ADMIN — ETOPOSIDE 160.8 MG: 20 INJECTION INTRAVENOUS at 09:33

## 2023-08-17 RX ADMIN — SODIUM CHLORIDE 20 ML/HR: 0.9 INJECTION, SOLUTION INTRAVENOUS at 08:54

## 2023-08-17 RX ADMIN — DEXAMETHASONE SODIUM PHOSPHATE: 10 INJECTION, SOLUTION INTRAMUSCULAR; INTRAVENOUS at 08:57

## 2023-08-17 NOTE — PROGRESS NOTES
Pt offers no complaints. Tolerated chemotherapy infusion well without incident. Discharged in stable condition and pt aware to return tomorrow at [facility] for next infusion appointment. AVS declined.

## 2023-08-17 NOTE — PROGRESS NOTES
Pre-Test Genetic Counseling Consult Note    Patient Name: Ketan WATERMAN/Age: 1980/42 y.o. Referring Provider: Kelly Nava PA-C    Date of Service: 2023  Genetic Counselor: Macho Pagan MS, [facility]  Interpretation Services: None  [facility] provided verbal consent for Shahla Person, genetic counseling intern, to participate in the session under my supervision. Cassandra Gomez was referred to the [address] [place] [address],[address] and Genetic Assessment Program due to his personal history of colon cancer. he presents today to discuss the possibility of a hereditary cancer syndrome, options for genetic testing, and implications for him and his family. His wife, Laure Quintero, accompanied him to the appointment.         Cancer History and Treatment:   Personal History:   Small cell carcinoma ([facility])   2023 Initial Diagnosis     Small cell carcinoma (HCC)      2023 -  Chemotherapy     alteplase (CATHFLO), 2 mg, Intracatheter, Every 1 Minute as needed, 1 of 10 cycles  CARBOplatin (PARAPLATIN) IVPB (GOG AUC DOSING), 750 mg, Intravenous, Once, 1 of 4 cycles  Administration: 750 mg (2023)  durvalumab (IMFINZI) IVPB, 1,500 mg, Intravenous, Once, 1 of 10 cycles  Administration: 1,500 mg (2023)  aprepitant (CINVANTI) in  mL IVPB, 130 mg, Intravenous, Once, 1 of 4 cycles  Administration: 130 mg (2023)     Screening Hx:   Skin:  Did not assess    Prostate:  Prostate screening: none     Other screening: n/a    Medical and Surgical History  Pertinent surgical history:   Past Surgical History:   Procedure Laterality Date   • COLON SIGMOID RESECTION LAPAROSCOPIC N/A 2023    Procedure: RESECTION COLON SIGMOID LAPAROSCOPIC HAND-ASSISTED;  Surgeon: Elena Cormier MD;  Location: [facility];  Service: General   • IR DRAINAGE TUBE CHECK/CHANGE/REPOSITION/REINSERTION/UPSIZE  2023   • IR DRAINAGE TUBE PLACEMENT  2023   • IR PORT PLACEMENT  7/28/2023      Pertinent medical history:No past medical history on file. Other History:  Height:   Ht Readings from Last 1 Encounters:   08/17/23 5' 5.98" (1.676 m)     Weight:   Wt Readings from Last 1 Encounters:   08/17/23 87.6 kg (193 lb 2 oz)       Relevant Family History   Patient reports non-Ashkenazi Pentecostalism ancestry. Maternal Family History:  Non-contributory     Paternal Family History:  Aunt: pre-cancer of uterus (currently 62 y/o)   Aunt: pre-cancer of uterus (currently 76 y/o)   Grandfather: lung cancer diagnosed at 80, smoker (d.90)   First-cousin: testicular cancer diagnosed at 36 (currently 47 y/o)  First-cousin once removed: testicular cancer diagnosed at 21 (currently 33 y/o)     Please refer to the scanned pedigree in the Media Tab for a complete family history     *All history is reported as provided by the patient; records are not available for review, except where indicated. Assessment:  We discussed sporadic, familial and hereditary cancer. We reviewed that only 5-10% of cancers are considered hereditary. Cancers such as lung cancer, cervical cancer, testicular cancer, and liver cancer very rarely have a hereditary etiology, and we cannot test for a genetic predisposition for these cancers at this time. We also discussed the many factors that influence our risk for cancer such as age, environmental exposures, lifestyle choices and family history. We reviewed the indications suggestive of a hereditary predisposition to cancer. We discussed that the suspicion for a hereditary cancer syndrome based on the family history is low but his diagnosis warrants germline testing. We reviewed the difference between germline and somatic testing.      Genetic testing is indicated for Rebel Clayton based on the following criteria: Meets NCCN J1.7497 Testing Criteria for the Evaluation of Potter syndrome: personal history of colon cancer diagnosed at <49 y/o    The risks, benefits, and limitations of genetic testing were reviewed with the patient, as well as genetic discrimination laws, and possible test results (positive, negative, variants of uncertain significance) and their clinical implications. If positive for a mutation, options for managing cancer risk including increased surveillance, chemoprevention, and in some cases prophylactic surgery were discussed. Tegan Ag was informed that if a hereditary cancer syndrome was identified in him, first degree relatives (parents, siblings, and children) have a chance of also inheriting the condition. Genetic testing would allow for predictive genetic testing in other relatives, who may also be at risk depending on their degree of relation. We discussed that genetic testing for hereditary cancer is not typically     Billing:  Most individuals pay <$100 for hereditary cancer genetic testing. If insurance covers the cost of the testing, individuals may still pay out of pocket secondary to co-pays, co-insurance, or deductibles. If the cost of the testing exceeds $100, the lab will reach out to the patient via phone or e-mail. The patient will then have the option to proceed with the testing, cancel the testing, or elect the self-pay option of $250. Tegan Ag verbalized understanding. Plan: Patient decided to proceed with testing and provided consent.     Summary:     Sample Collection:  The patient's blood sample was drawn in the office on 8/17/23 by medical assistant Rebeka Hernandez    Genetic Testing Preformed: [facility] Cancer Panel + RNA (47 genes): APC, MARCELA, AXIN2, BARD1, BMPR1A, BRCA1, BRCA2, BRIP1, CDH1, CDK4, CDKN2A, CHEK2, CTNNA1, DICER1, EPCAM, GREM1, HOXB13, KIT, MEN1, MLH1, MSH2, MSH3, MSH6, MUTYH, NBN, NF1, NTHL1, PALB2, PDGFRA, PMS2, POLD1, POLE, PTEN, RAD50, RAD51C, RAD51D, SDHA, SDHB, SDHC, SDHD, SMAD4, SMARCA4, STK11, TP53, TSC1, TSC2, VHL    Result Call Information:  In the event that we need to reach Tegan Ag via telephone:  I confirmed the patient's home number on file as the best number to call with results  I confirmed with the patient that we can leave a voicemail on his home number    Results take approximately 2-3 weeks to complete once test is started. Rebel Clayton will be notified via [employer] once results are available. Additional recommendations for surveillance/medical management will be made pending genetic test results.

## 2023-08-17 NOTE — LETTER
2023     [facility] [place]  [address]    Patient: Fazal Kraft  YOB: 1980  Date of Visit: 2023      Dear Dr. Berkley Collins:    Thank you for referring Sarah Monroe to me for evaluation. Below are my notes for this consultation. If you have questions, please do not hesitate to call me. I look forward to following your patient along with you. Sincerely,        Selena Mauro        CC: No Recipients        Pre-Test Genetic Counseling Consult Note    Patient Name: Fazal Kraft   /Age: 1980/42 y.o. Referring Provider: Wm Jean PA-C    Date of Service: 2023  Genetic Counselor: Selena Mauro MS, [facility]  Interpretation Services: None  [facility]    Rebel Clayton was referred to the [address] [place] [address],[address] and Genetic Assessment Program due to his personal history of colon cancer. he presents today to discuss the possibility of a hereditary cancer syndrome, options for genetic testing, and implications for him and his family. His wife, Ericka Todd, accompanied him to the appointment.         Cancer History and Treatment:   Personal History:   Small cell carcinoma ([address])   2023 Initial Diagnosis     Small cell carcinoma (HCC)      2023 -  Chemotherapy     alteplase (CATHFLO), 2 mg, Intracatheter, Every 1 Minute as needed, 1 of 10 cycles  CARBOplatin (PARAPLATIN) IVPB (GOG AUC DOSING), 750 mg, Intravenous, Once, 1 of 4 cycles  Administration: 750 mg (2023)  durvalumab (IMFINZI) IVPB, 1,500 mg, Intravenous, Once, 1 of 10 cycles  Administration: 1,500 mg (2023)  aprepitant (CINVANTI) in  mL IVPB, 130 mg, Intravenous, Once, 1 of 4 cycles  Administration: 130 mg (2023)     Screening Hx:   Skin:  Did not assess    Prostate:  Prostate screening: none     Other screening: n/a    Medical and Surgical History  Pertinent surgical history: Past Surgical History:   Procedure Laterality Date   • COLON SIGMOID RESECTION LAPAROSCOPIC N/A 6/8/2023    Procedure: RESECTION COLON SIGMOID LAPAROSCOPIC HAND-ASSISTED;  Surgeon: Abdiel Hamm MD;  Location: [facility];  Service: General   • IR DRAINAGE TUBE CHECK/CHANGE/REPOSITION/REINSERTION/UPSIZE  6/5/2023   • IR DRAINAGE TUBE PLACEMENT  5/30/2023   • IR PORT PLACEMENT  7/28/2023      Pertinent medical history:No past medical history on file. Other History:  Height:   Ht Readings from Last 1 Encounters:   08/17/23 5' 5.98" (1.676 m)     Weight:   Wt Readings from Last 1 Encounters:   08/17/23 87.6 kg (193 lb 2 oz)       Relevant Family History   Patient reports non-Ashkenazi Yazidism ancestry. Maternal Family History:  Non-contributory     Paternal Family History:  Aunt: pre-cancer of uterus (currently 62 y/o)   Aunt: pre-cancer of uterus (currently 76 y/o)   Grandfather: lung cancer diagnosed at 80, smoker (d.90)   First-cousin: testicular cancer diagnosed at 36 (currently 49 y/o)  First-cousin once removed: testicular cancer diagnosed at 21 (currently 33 y/o)     Please refer to the scanned pedigree in the Media Tab for a complete family history     *All history is reported as provided by the patient; records are not available for review, except where indicated. Assessment:  We discussed sporadic, familial and hereditary cancer. We reviewed that only 5-10% of cancers are considered hereditary. Cancers such as lung cancer, cervical cancer, testicular cancer, and liver cancer very rarely have a hereditary etiology, and we cannot test for a genetic predisposition for these cancers at this time. We also discussed the many factors that influence our risk for cancer such as age, environmental exposures, lifestyle choices and family history. We reviewed the indications suggestive of a hereditary predisposition to cancer.   We discussed that the suspicion for a hereditary cancer syndrome based on the family history is low but his diagnosis warrants germline testing. We reviewed the difference between germline and somatic testing. Genetic testing is indicated for Jac Curling based on the following criteria: Meets NCCN P1.5117 Testing Criteria for the Evaluation of Potter syndrome: personal history of colon cancer diagnosed at <51 y/o    The risks, benefits, and limitations of genetic testing were reviewed with the patient, as well as genetic discrimination laws, and possible test results (positive, negative, variants of uncertain significance) and their clinical implications. If positive for a mutation, options for managing cancer risk including increased surveillance, chemoprevention, and in some cases prophylactic surgery were discussed. Jac Curling was informed that if a hereditary cancer syndrome was identified in him, first degree relatives (parents, siblings, and children) have a chance of also inheriting the condition. Genetic testing would allow for predictive genetic testing in other relatives, who may also be at risk depending on their degree of relation. We discussed that genetic testing for hereditary cancer is not typically     Billing:  Most individuals pay <$100 for hereditary cancer genetic testing. If insurance covers the cost of the testing, individuals may still pay out of pocket secondary to co-pays, co-insurance, or deductibles. If the cost of the testing exceeds $100, the lab will reach out to the patient via phone or e-mail. The patient will then have the option to proceed with the testing, cancel the testing, or elect the self-pay option of $250. Jac Curling verbalized understanding. Plan: Patient decided to proceed with testing and provided consent.     Summary:     Sample Collection:  The patient's blood sample was drawn in the office on 8/17/23 by medical assistant Shila Condon    Genetic Testing Preformed: [facility] Independent Spacet Cancer Panel + RNA (47 genes): APC, MARCELA, AXIN2, BARD1, BMPR1A, BRCA1, BRCA2, BRIP1, CDH1, CDK4, CDKN2A, CHEK2, CTNNA1, DICER1, EPCAM, GREM1, HOXB13, KIT, MEN1, MLH1, MSH2, MSH3, MSH6, MUTYH, NBN, NF1, NTHL1, PALB2, PDGFRA, PMS2, POLD1, POLE, PTEN, RAD50, RAD51C, RAD51D, SDHA, SDHB, SDHC, SDHD, SMAD4, SMARCA4, STK11, TP53, TSC1, TSC2, VHL    Result Call Information:  In the event that we need to reach Simin fitzpatrick via telephone:  I confirmed the patient's home number on file as the best number to call with results  I confirmed with the patient that we can leave a voicemail on his home number    Results take approximately 2-3 weeks to complete once test is started. Simin fitzpatrick will be notified via [facility] MDt once results are available. Additional recommendations for surveillance/medical management will be made pending genetic test results.

## 2023-08-18 ENCOUNTER — HOSPITAL ENCOUNTER (OUTPATIENT)
Dept: INFUSION CENTER | Facility: HOSPITAL | Age: 43
End: 2023-08-18
Attending: INTERNAL MEDICINE
Payer: OTHER GOVERNMENT

## 2023-08-18 VITALS
HEART RATE: 79 BPM | TEMPERATURE: 96.5 F | HEIGHT: 66 IN | SYSTOLIC BLOOD PRESSURE: 150 MMHG | BODY MASS INDEX: 31.04 KG/M2 | RESPIRATION RATE: 18 BRPM | OXYGEN SATURATION: 99 % | DIASTOLIC BLOOD PRESSURE: 88 MMHG | WEIGHT: 193.12 LBS

## 2023-08-18 DIAGNOSIS — C80.1 SMALL CELL CARCINOMA (HCC): Primary | Chronic | ICD-10-CM

## 2023-08-18 PROCEDURE — 96413 CHEMO IV INFUSION 1 HR: CPT

## 2023-08-18 PROCEDURE — 96367 TX/PROPH/DG ADDL SEQ IV INF: CPT

## 2023-08-18 RX ORDER — SODIUM CHLORIDE 9 MG/ML
20 INJECTION, SOLUTION INTRAVENOUS ONCE
Status: COMPLETED | OUTPATIENT
Start: 2023-08-18 | End: 2023-08-18

## 2023-08-18 RX ADMIN — DEXAMETHASONE SODIUM PHOSPHATE: 10 INJECTION, SOLUTION INTRAMUSCULAR; INTRAVENOUS at 09:25

## 2023-08-18 RX ADMIN — ETOPOSIDE 160.8 MG: 20 INJECTION INTRAVENOUS at 09:50

## 2023-08-18 RX ADMIN — SODIUM CHLORIDE 20 ML/HR: 0.9 INJECTION, SOLUTION INTRAVENOUS at 09:23

## 2023-08-21 ENCOUNTER — TELEPHONE (OUTPATIENT)
Dept: GASTROENTEROLOGY | Facility: CLINIC | Age: 43
End: 2023-08-21

## 2023-08-21 NOTE — TELEPHONE ENCOUNTER
Patient is calling regarding cancelling an appointment.     Date/Time: 8/21/23 @ 8am    Patient was rescheduled: YES [] NO [x]    Patient requesting call back to reschedule: YES [] NO [x]

## 2023-08-22 ENCOUNTER — PATIENT OUTREACH (OUTPATIENT)
Dept: HEMATOLOGY ONCOLOGY | Facility: CLINIC | Age: 43
End: 2023-08-22

## 2023-08-22 NOTE — PROGRESS NOTES
Called pt to check in since hes started treatment. He stated so far the only side affect hes having is hair loss. He has an appetite and is not having any nausea or pain. He is aware of when to go for his labs and all upcoming appointments. He does not need transportation assistance at this time.   He has both myself and the [facility] contact information if he needs anything

## 2023-08-30 ENCOUNTER — TELEPHONE (OUTPATIENT)
Dept: GENETICS | Facility: CLINIC | Age: 43
End: 2023-08-30

## 2023-08-30 NOTE — TELEPHONE ENCOUNTER
I called and spoke with Linnea Claude regarding Kenneth Speck request of a referral letter from his PCP. I explained to Linnea Claude that I was calling to obtain the name and phone number of his PCP so our office can request this document. Linnea Claude explained that he does not have a PCP, his only care has been through Dr. Mary Maier, I reviewed with our genetic counselors, we will try to obtain letter from his office. I explained the purpose of this letter was due to his insurance .

## 2023-08-30 NOTE — TELEPHONE ENCOUNTER
Spoke with Alia drake, regarding Susan Zapien' medical record request form. The lab requested proof of referral from a PCP for his [employer]. Susan Zapien reportedly does not have a PCP. Kamron Diaz will reach out to the team working on this case and provide an update in a few hours. They will let us know if there is anything else we can provide instead.

## 2023-08-31 ENCOUNTER — TELEPHONE (OUTPATIENT)
Dept: GENETICS | Facility: CLINIC | Age: 43
End: 2023-08-31

## 2023-08-31 NOTE — TELEPHONE ENCOUNTER
Myriam Snyder from [facility] called providing a billing update for the patient. She reports that Sofi velásquez has Dr. Frank Brink listed as his PCP. They mentioned that insurance will deny the claim unless they receive a referral from Sofi Balderas' PCP. Dr. Felix Smith number is [phone].

## 2023-09-03 NOTE — PROGRESS NOTES
Hematology/Oncology Outpatient Office Note    Date of Service: 2023    [facility] HEMATOLOGY ONCOLOGY SPECIALISTS JERICA Smiley [facility]  [phone]    Reason for Consultation:   No chief complaint on file. Cancer Stage at diagnosis: IV    Referral Physician: No ref. provider found    Primary Care Physician:  No primary care provider on file. Nickname: Christal Lyons    Spouse: Naz Dinh ECO    Today's ECO    Goals and Barriers:  Current Goal: Minimize effects of disease burden, extend life. Barriers to accomplishing this: None    Patient's Capacity to Self Care:  Patient is able to self care    Code Status: not addressed today    Advanced directives: not addressed today    ASSESSMENT & PLAN      Diagnosis ICD-10-CM Associated Orders   1. 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 Tobacco abuse  Z72.0             This is a 38 y.o. c PMHx notable for obesity, being seen in consultation for large cell with neuroendocrine features found in the colon    Discussion of decision making  • Oncology history updated, accordingly, during this visit  • Goals of care/patient communication  o I discussed with the patient the clinical course leading up to their cancer diagnosis. I reviewed relevant office notes, imaging reports and pathology result as well.  o I told the patient that this is a case of curable versus incurable disease and what this means. We discussed that the goal of anti-cancer therapy is to provide best quality of life, extend overall survival, and progression free survival as shown in clinical trials.  We also discussed that there might be a point when the cancer will no longer respond to this anti-neoplastic therapy.   o I explained the risks/benefits of the proposed cancer therapy: Carboplatin-Etoposide +/- Durvalumab and after discussion including understanding risks of possible life-threatening complications and therapy-related malignancy development, informed consent for blood products and treatment has been signed and obtained. • TNM/Staging At Diagnosis  o TNM: tbd  Cancer Staging   tbd  • Disease Features/Tumor Markers/Genetics  o Tumor Marker:   - 7/17/2023: CEA 2.2  - CGA: 49.4  o Notable Path Features:   - 6/8/2023: Poorly differentiated malignancy, pending external expert consultation positive for synaptophysin, chromogranin, CD56, TTF-1, BCL2, and cyclin D1 with a high Ki-67 proliferative rate (approximately 90%) compatible with a poorly differentiated malignancy with features suggesting neuroendocrine differentiation. 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 The intramural abscess abuts the superior aspect of the urinary bladder without evidence of a colovesical fistula. 6/27/2023 MRI Abd w/wo c: Benign flash-filling 11 mm hepatic and angioma in the caudate lobe of the liver accounting for the enhancing nodule in that location on recent CT examinations  7/19/2023 CT Chest w/o c: There are multiple pulmonary nodules, which will be described on series 4:  Image 68, left lower lobe, solid, smooth bordered, 3 mm . Image 63, left upper lobe, solid, smooth bordered, 4 mm . Image 43, right upper lobe, solid, smooth bordered, 4 mm, may represent a fissural lymph node.   Image 55, right upper lobe, solid, smooth bordered, 4 mm, may represent a fissural lymph node  7/19/2023 MRI Brain w/wo c: No evidence of intracranial metastasis, A few foci of T2/FLAIR hyperintensity primarily involving left frontoparietal subcortical and deep white matter (maybe migraine related)  8/8/2023 PET/CT: Three new hypermetabolic solid nodules/masses in the abdomen as described, compatible with progression of disease. Mild uptake along the left anterior ninth rib corresponding to a mild fracture deformity. Findings are indeterminate and may represent the sequela of trauma or metastatic disease. Stable sub-4 mm pulmonary nodules     Discussion of decision making    I personally reviewed the following lab results, the image studies, pathology, other specialty/physicians consult notes and recommendations, and outside medical records. I had a lengthy discussion with the patient and shared the work-up findings. We discussed the diagnosis and management plan as below. I spent 41 minutes reviewing the records (labs, clinician notes, outside records, medical history, ordering medicine/tests/procedures, interpreting the imaging/labs previously done) and coordination of care as well as direct time with the patient today, of which greater than 50% of the time was spent in counseling and coordination of care with the patient/family. · Plan/Labs  · Restaging CT CAP w/c scheduled 10/2/2023  · I discussed the risks of ongoing cigarette smoking and reviewed the benefits of quitting and risk of new lung primary, heart disease, stroke, among other risks and cancers. Reviewed options including support, quit date, medications, and family support. Patient voiced understanding and willingness to try to quit. [facility]. Greater than 3 minutes were needed for discussion of tobacco dependence and quitting counselling.   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 The patient knows the contact information for our office and knows to reach out for any relevant concerns related to this encounter. They are to call for any temperature 100.4 or higher, new symptoms including but not restricted to shaking chills, decreased appetite, nausea, vomiting, diarrhea, increased fatigue, shortness of breath or chest pain, confusion, and not feeling the strength to come to the clinic. For all other listed problems and medical diagnosis in their chart - they are managed by PCP and/or other specialists, which the patient acknowledges. Thank you very much for your consultation and making us a part of this patient's care. We are continuing to follow closely with you. Please do not hesitate to reach out to me with any additional questions or concerns. Josue Montesinos MD  Hematology & Medical Oncology Staff Physician             Disclaimer: This document was prepared using [employer] Direct technology. If a word or phrase is confusing, or does not make sense, this is likely due to recognition error which was not discovered during this clinician's review. If you believe an error has occurred, please contact me through [address] line for lee? cation.       ONCOLOGY HISTORY OF PRESENT ILLNESS        Oncology History   Malignant neoplasm of sigmoid colon ([address])   6/22/2023 Initial Diagnosis    Malignant neoplasm of sigmoid colon ([address])     9/3/2023 -  Cancer Staged    Staging form: Colon and Rectum, AJCC 8th Edition  - Clinical: Stage Unknown (cTX, cN0, pM1) - Signed by Bruno Thorpe MD on 9/3/2023  Total positive nodes: 0       Small cell carcinoma ([address])   7/18/2023 Initial Diagnosis    Small cell carcinoma ([address])     7/26/2023 -  Chemotherapy alteplase (CATHFLO), 2 mg, Intracatheter, Every 1 Minute as needed, 3 of 10 cycles  etoposide (TOPOSAR), 80 mg/m2 = 160.8 mg, Intravenous, Once, 3 of 4 cycles  Administration: 160.8 mg (7/26/2023), 160.8 mg (7/27/2023), 160.8 mg (7/28/2023), 160.8 mg (8/16/2023), 160.8 mg (8/17/2023), 160.8 mg (8/18/2023), 160.8 mg (9/6/2023), 160.8 mg (9/7/2023), 160.8 mg (9/8/2023)  CARBOplatin (PARAPLATIN) IVPB ([facility] AUC DOSING), 750 mg, Intravenous, Once, 3 of 4 cycles  Administration: 750 mg (7/26/2023), 700.5 mg (8/16/2023), 694 mg (9/6/2023)  durvalumab (IMFINZI) IVPB, 1,500 mg, Intravenous, Once, 3 of 10 cycles  Administration: 1,500 mg (7/26/2023), 1,500 mg (8/16/2023), 1,500 mg (9/6/2023)  aprepitant (CINVANTI) in  mL IVPB, 130 mg, Intravenous, Once, 3 of 4 cycles  Administration: 130 mg (7/26/2023), 130 mg (8/16/2023), 130 mg (9/6/2023)       5/21/2023: BRBPR and pain in the lower abdomen for a week; leading to a ER visit, dx of diverticulitis     SUBJECTIVE  (INTERVAL HISTORY)      Clotting History None    Bleeding History None   Cancer History Colon   Family Cancer History pGrandfather (lung, smoker)   H/O Blood/Plt Transfusion None   Tobacco/etoh/drug abuse 1/2 PPD x 25 years (working on quitting), no etoh abuse or rec drug use       Cancer Screening history n/a   Occupation IT       Interval events:     No acute issues at this time. Moderate fatigue during treatment. Hair loss. I have reviewed the relevant past medical, surgical, social and family history. I have also reviewed allergies and medications for this patient. Review of Systems    Baseline weight: 200 lbs    Denies F/C, N/V, SOB, CP, LH, HA, rash, itching, gen weakness, melena, hematuria, hematochezia, falls, diarrhea, or constipation       A 10-point review of system was performed, pertinent positive and negative were detailed as above. Otherwise, the 10-point review of system was negative. No past medical history on file.     Past Surgical History:   Procedure Laterality Date   • COLON SIGMOID RESECTION LAPAROSCOPIC N/A 6/8/2023    Procedure: RESECTION COLON SIGMOID LAPAROSCOPIC HAND-ASSISTED;  Surgeon: Nedra Liang MD;  Location: [facility];  Service: General   • IR DRAINAGE TUBE CHECK/CHANGE/REPOSITION/REINSERTION/UPSIZE  6/5/2023   • IR DRAINAGE TUBE PLACEMENT  5/30/2023   • IR PORT PLACEMENT  7/28/2023       No family history on file. Social History     Socioeconomic History   • Marital status: /Civil Union     Spouse name: Not on file   • Number of children: Not on file   • Years of education: Not on file   • Highest education level: Not on file   Occupational History   • Not on file   Tobacco Use   • Smoking status: Every Day     Packs/day: 1.00     Years: 25.00     Total pack years: 25.00     Types: Cigarettes     Passive exposure: Current (wife smokes)   • Smokeless tobacco: Not on file   • Tobacco comments:     Patient states Chantix was not effective. Vaping Use   • Vaping Use: Never used   Substance and Sexual Activity   • Alcohol use: Not Currently   • Drug use: Never   • Sexual activity: Not on file   Other Topics Concern   • Not on file   Social History Narrative   • Not on file     Social Determinants of Health     Financial Resource Strain: Not on file   Food Insecurity: No Food Insecurity (5/31/2023)    Hunger Vital Sign    • Worried About Running Out of Food in the Last Year: Never true    • Ran Out of Food in the Last Year: Never true   Transportation Needs: No Transportation Needs (5/31/2023)    PRAPARE - Transportation    • Lack of Transportation (Medical): No    • Lack of Transportation (Non-Medical):  No   Physical Activity: Not on file   Stress: Not on file   Social Connections: Not on file   Intimate Partner Violence: Not on file   Housing Stability: Low Risk  (5/31/2023)    Housing Stability Vital Sign    • Unable to Pay for Housing in the Last Year: No    • Number of Places Lived in the Last Year: 1    • Unstable Housing in the Last Year: No       No Known Allergies    Current Outpatient Medications   Medication Sig Dispense Refill   • lidocaine-prilocaine (EMLA) cream Apply topically as needed for mild pain 30 g 0   • ondansetron (ZOFRAN-ODT) 8 mg disintegrating tablet Take 1 tablet (8 mg total) by mouth every 8 (eight) hours as needed for nausea or vomiting 20 tablet 0     No current facility-administered medications for this visit. (Not in a hospital admission)      Objective:     24 Hour Vitals Assessment:     Vitals:    09/13/23 0816   BP: 128/76   Pulse: 91   Resp: 16   Temp: 97.7 °F (36.5 °C)   SpO2: 100%       PHYSICIAN EXAM:    General: Appearance: alert, cooperative, no distress. HEENT: Normocephalic, atraumatic. No scleral icterus. conjunctivae clear. EOMI. Chest: No tenderness to palpation. No open wound noted. Lungs: Diminished BS b/l, otherwise clear to auscultation bilaterally, Respirations unlabored. Cardiac: Regular rate, +S1and S2  Abdomen: Soft, non-tender, non-distended. Bowel sounds are normal.   Extremities:  No edema, cyanosis, clubbing. Skin: Skin color, turgor are normal. No rashes. Lymphatics: no palpable supra-cervical, axillary, or inguinal adenopathy  Neurologic: Awake, Alert, and oriented, no gross focal deficits noted b/l. DATA REVIEW:    Pathology Result:    Final Diagnosis   Date Value Ref Range Status   06/08/2023   Final    A. 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 Comment: This is an appended report. These results have been appended to a previously preliminary verified report.         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 TECHNIQUE:   10.6 mCi F-18-FDG administered IV. Multiplanar attenuation corrected and non attenuation corrected PET images are available for interpretation, and contiguous, low dose, axial CT sections were obtained from the skull base through the femurs. Intravenous contrast material was not utilized. This examination, like all CT scans performed in the [facility], was performed utilizing techniques to minimize radiation dose exposure, including the use of iterative reconstruction and   automated exposure control. Fasting serum glucose: 96 mg/dl    FINDINGS:    VISUALIZED BRAIN:  No acute abnormalities are seen. HEAD/NECK:  There is a physiologic distribution of FDG. No FDG avid cervical adenopathy is seen. CT images: Unremarkable. CHEST:  No FDG avid soft tissue lesions are seen. CT images: Right chest port catheter in satisfactory position. There are scattered sub-4 mm pulmonary nodules including in the left upper lobe (series 4, images 65, 72, 81), right upper lobe (series 4, images 71, 77), and left lower lobe (series 4, image   85), which are stable from July 19, 2023. ABDOMEN/PELVIS:   Three new hypermetabolic nodules/masses in the abdomen as follows:    -3.1 x 3.1 cm hypermetabolic mass in the left anterior abdomen abutting a loop of small bowel (series 4, image 147; SUV max 12).     -1.3 x 1.2 cm hypermetabolic soft tissue nodule in the left mid abdomen posterior to a loop of small bowel (series 4, image 154; SUV max 17)    -1.8 x 2.1 cm hypermetabolic soft tissue nodule anterior to the left psoas muscle (series 4, image 160; SUV max 9.7)    CT images: Postsurgical changes with mild uptake in the left abdominal wall from laparoscopic sigmoid resection. Suture material in the left lower abdomen. Vasectomy related changes. OSSEOUS STRUCTURES:  Increased uptake along the anterior aspect of the left ninth rib (SUV max 4.7),, which corresponds to a mild fracture deformity. CT images: No other significant findings. Impression: 1. Three new hypermetabolic solid nodules/masses in the abdomen as described, compatible with progression of disease. 2. Mild uptake along the left anterior ninth rib corresponding to a mild fracture deformity. Findings are indeterminate and may represent the sequela of trauma or metastatic disease. Correlate for history of trauma in this region and recommend attention   to on subsequent follow-up. 3. Stable sub-4 mm pulmonary nodules compared to the CT chest of July 19, 2023. Recommend a follow-up CT chest in approximately 3 months for reassessment. The study was marked in EPIC for significant notification. Workstation performed: [id number]      LABS:  Lab data are reviewed and documented in HemOnc history. Lab Results   Component Value Date    HGB 15.0 09/05/2023    HCT 46.0 09/05/2023    MCV 95 09/05/2023     09/05/2023    WBC 5.29 09/05/2023    NRBC 0 09/05/2023     Lab Results   Component Value Date    K 3.9 09/05/2023     09/05/2023    CO2 27 09/05/2023    BUN 10 09/05/2023    CREATININE 0.92 09/05/2023    GLUF 88 07/24/2023    CALCIUM 9.5 09/05/2023    CORRECTEDCA 9.1 06/09/2023    AST 21 09/05/2023    ALT 25 09/05/2023    ALKPHOS 80 09/05/2023    EGFR 103 09/05/2023       No results found for: "IRON", "TIBC", "FERRITIN"    No results found for: "Terrilyn Scales"    No results for input(s): "WBC", "CREAT", "PLT" in the last 72 hours.     By:  Bhavna Flores MD, 9/13/2023, 8:30 AM

## 2023-09-05 ENCOUNTER — HOSPITAL ENCOUNTER (OUTPATIENT)
Dept: INFUSION CENTER | Facility: HOSPITAL | Age: 43
Discharge: HOME | End: 2023-09-05
Payer: OTHER GOVERNMENT

## 2023-09-05 VITALS — TEMPERATURE: 97.3 F

## 2023-09-05 DIAGNOSIS — Z45.2 ENCOUNTER FOR CARE RELATED TO PORT-A-CATH: Primary | ICD-10-CM

## 2023-09-05 DIAGNOSIS — C80.1 SMALL CELL CARCINOMA (HCC): Chronic | ICD-10-CM

## 2023-09-05 LAB
ALBUMIN SERPL BCP-MCNC: 4.2 G/DL (ref 3.5–5)
ALP SERPL-CCNC: 80 U/L (ref 34–104)
ALT SERPL W P-5'-P-CCNC: 25 U/L (ref 7–52)
ANION GAP SERPL CALCULATED.3IONS-SCNC: 9 MMOL/L
AST SERPL W P-5'-P-CCNC: 21 U/L (ref 13–39)
BASOPHILS # BLD AUTO: 0.02 THOUSANDS/ÂΜL (ref 0–0.1)
BASOPHILS NFR BLD AUTO: 0 % (ref 0–1)
BILIRUB SERPL-MCNC: 0.4 MG/DL (ref 0.2–1)
BUN SERPL-MCNC: 10 MG/DL (ref 5–25)
CALCIUM SERPL-MCNC: 9.5 MG/DL (ref 8.4–10.2)
CHLORIDE SERPL-SCNC: 101 MMOL/L (ref 96–108)
CO2 SERPL-SCNC: 27 MMOL/L (ref 21–32)
CREAT SERPL-MCNC: 0.92 MG/DL (ref 0.6–1.3)
EOSINOPHIL # BLD AUTO: 0.04 THOUSAND/ÂΜL (ref 0–0.61)
EOSINOPHIL NFR BLD AUTO: 1 % (ref 0–6)
ERYTHROCYTE [DISTWIDTH] IN BLOOD BY AUTOMATED COUNT: 15.4 % (ref 11.6–15.1)
GFR SERPL CREATININE-BSD FRML MDRD: 103 ML/MIN/1.73SQ M
GLUCOSE SERPL-MCNC: 98 MG/DL (ref 65–140)
HCT VFR BLD AUTO: 46 % (ref 36.5–49.3)
HGB BLD-MCNC: 15 G/DL (ref 12–17)
IMM GRANULOCYTES # BLD AUTO: 0.04 THOUSAND/UL (ref 0–0.2)
IMM GRANULOCYTES NFR BLD AUTO: 1 % (ref 0–2)
LYMPHOCYTES # BLD AUTO: 1.49 THOUSANDS/ÂΜL (ref 0.6–4.47)
LYMPHOCYTES NFR BLD AUTO: 28 % (ref 14–44)
MCH RBC QN AUTO: 30.9 PG (ref 26.8–34.3)
MCHC RBC AUTO-ENTMCNC: 32.6 G/DL (ref 31.4–37.4)
MCV RBC AUTO: 95 FL (ref 82–98)
MONOCYTES # BLD AUTO: 0.81 THOUSAND/ÂΜL (ref 0.17–1.22)
MONOCYTES NFR BLD AUTO: 15 % (ref 4–12)
NEUTROPHILS # BLD AUTO: 2.89 THOUSANDS/ÂΜL (ref 1.85–7.62)
NEUTS SEG NFR BLD AUTO: 55 % (ref 43–75)
NRBC BLD AUTO-RTO: 0 /100 WBCS
PLATELET # BLD AUTO: 238 THOUSANDS/UL (ref 149–390)
PMV BLD AUTO: 9.7 FL (ref 8.9–12.7)
POTASSIUM SERPL-SCNC: 3.9 MMOL/L (ref 3.5–5.3)
PROT SERPL-MCNC: 7.2 G/DL (ref 6.4–8.4)
RBC # BLD AUTO: 4.86 MILLION/UL (ref 3.88–5.62)
SODIUM SERPL-SCNC: 137 MMOL/L (ref 135–147)
TSH SERPL DL<=0.05 MIU/L-ACNC: 1.22 UIU/ML (ref 0.45–4.5)
WBC # BLD AUTO: 5.29 THOUSAND/UL (ref 4.31–10.16)

## 2023-09-05 PROCEDURE — 84443 ASSAY THYROID STIM HORMONE: CPT | Performed by: INTERNAL MEDICINE

## 2023-09-05 PROCEDURE — 85025 COMPLETE CBC W/AUTO DIFF WBC: CPT | Performed by: INTERNAL MEDICINE

## 2023-09-05 PROCEDURE — 80053 COMPREHEN METABOLIC PANEL: CPT | Performed by: INTERNAL MEDICINE

## 2023-09-05 NOTE — PROGRESS NOTES
Central labs drawn via port. Catheter maintenance performed per protocol without incident. Discharged in stable condition and pt aware of next infusion appointment. AVS declined.

## 2023-09-06 ENCOUNTER — HOSPITAL ENCOUNTER (OUTPATIENT)
Dept: INFUSION CENTER | Facility: HOSPITAL | Age: 43
Discharge: HOME | End: 2023-09-06
Attending: INTERNAL MEDICINE
Payer: OTHER GOVERNMENT

## 2023-09-06 VITALS
SYSTOLIC BLOOD PRESSURE: 151 MMHG | DIASTOLIC BLOOD PRESSURE: 88 MMHG | BODY MASS INDEX: 31.32 KG/M2 | RESPIRATION RATE: 18 BRPM | WEIGHT: 194.89 LBS | OXYGEN SATURATION: 97 % | HEART RATE: 82 BPM | HEIGHT: 66 IN | TEMPERATURE: 96 F

## 2023-09-06 DIAGNOSIS — C80.1 SMALL CELL CARCINOMA (HCC): Primary | Chronic | ICD-10-CM

## 2023-09-06 PROCEDURE — 96367 TX/PROPH/DG ADDL SEQ IV INF: CPT

## 2023-09-06 PROCEDURE — 96413 CHEMO IV INFUSION 1 HR: CPT

## 2023-09-06 PROCEDURE — 96417 CHEMO IV INFUS EACH ADDL SEQ: CPT

## 2023-09-06 RX ORDER — SODIUM CHLORIDE 9 MG/ML
20 INJECTION, SOLUTION INTRAVENOUS ONCE
Status: COMPLETED | OUTPATIENT
Start: 2023-09-06 | End: 2023-09-06

## 2023-09-06 RX ORDER — SODIUM CHLORIDE 9 MG/ML
20 INJECTION, SOLUTION INTRAVENOUS ONCE
Status: CANCELLED | OUTPATIENT
Start: 2023-09-07

## 2023-09-06 RX ORDER — SODIUM CHLORIDE 9 MG/ML
20 INJECTION, SOLUTION INTRAVENOUS ONCE
Status: CANCELLED | OUTPATIENT
Start: 2023-09-06

## 2023-09-06 RX ORDER — SODIUM CHLORIDE 9 MG/ML
20 INJECTION, SOLUTION INTRAVENOUS ONCE
Status: CANCELLED | OUTPATIENT
Start: 2023-09-08

## 2023-09-06 RX ADMIN — APREPITANT 130 MG: 130 INJECTION, EMULSION INTRAVENOUS at 10:29

## 2023-09-06 RX ADMIN — ETOPOSIDE 160.8 MG: 20 INJECTION INTRAVENOUS at 13:44

## 2023-09-06 RX ADMIN — SODIUM CHLORIDE 20 ML/HR: 9 INJECTION, SOLUTION INTRAVENOUS at 10:01

## 2023-09-06 RX ADMIN — DEXAMETHASONE SODIUM PHOSPHATE: 10 INJECTION, SOLUTION INTRAMUSCULAR; INTRAVENOUS at 10:01

## 2023-09-06 RX ADMIN — DURVALUMAB 1500 MG: 500 INJECTION, SOLUTION INTRAVENOUS at 11:16

## 2023-09-06 RX ADMIN — CARBOPLATIN 694 MG: 10 INJECTION, SOLUTION INTRAVENOUS at 12:34

## 2023-09-06 NOTE — PROGRESS NOTES
Recent labs reviewed. Patient tolerated Imfinzi, Carboplatin, and Etoposide chemotherapy treatment without reaction or issues. AVS declined. Patient has Mychart. Patient ambulated off unit without incident. All personal belongings taken with patient.

## 2023-09-07 ENCOUNTER — PATIENT OUTREACH (OUTPATIENT)
Dept: CASE MANAGEMENT | Facility: HOSPITAL | Age: 43
End: 2023-09-07

## 2023-09-07 ENCOUNTER — HOSPITAL ENCOUNTER (OUTPATIENT)
Dept: INFUSION CENTER | Facility: HOSPITAL | Age: 43
End: 2023-09-07
Attending: INTERNAL MEDICINE
Payer: OTHER GOVERNMENT

## 2023-09-07 VITALS
RESPIRATION RATE: 18 BRPM | OXYGEN SATURATION: 96 % | SYSTOLIC BLOOD PRESSURE: 142 MMHG | WEIGHT: 194.89 LBS | DIASTOLIC BLOOD PRESSURE: 84 MMHG | HEIGHT: 66 IN | HEART RATE: 92 BPM | TEMPERATURE: 97.1 F | BODY MASS INDEX: 31.32 KG/M2

## 2023-09-07 DIAGNOSIS — C80.1 SMALL CELL CARCINOMA (HCC): Primary | Chronic | ICD-10-CM

## 2023-09-07 PROCEDURE — 96367 TX/PROPH/DG ADDL SEQ IV INF: CPT

## 2023-09-07 PROCEDURE — 96413 CHEMO IV INFUSION 1 HR: CPT

## 2023-09-07 RX ORDER — SODIUM CHLORIDE 9 MG/ML
20 INJECTION, SOLUTION INTRAVENOUS ONCE
Status: COMPLETED | OUTPATIENT
Start: 2023-09-07 | End: 2023-09-07

## 2023-09-07 RX ADMIN — ETOPOSIDE 160.8 MG: 20 INJECTION INTRAVENOUS at 11:21

## 2023-09-07 RX ADMIN — DEXAMETHASONE SODIUM PHOSPHATE: 10 INJECTION, SOLUTION INTRAMUSCULAR; INTRAVENOUS at 10:31

## 2023-09-07 RX ADMIN — SODIUM CHLORIDE 20 ML/HR: 0.9 INJECTION, SOLUTION INTRAVENOUS at 10:30

## 2023-09-07 NOTE — PROGRESS NOTES
Day 2 of chemotherapy tx tolerated without incident. Port flushed per protocol. Discharged in stable condition.

## 2023-09-07 NOTE — PROGRESS NOTES
RIDGEW met with patient at the infusion center this morning for a supportive visit. Patient has been doing well with his chemo treatments. He has not experienced ill effects. He was losing his hair, so he shaved. Patient doesn't mind it. He was in the [employer] so it's not the first time his hair is gone. Patient only loses sleep on Wednesday nights when he has his extended infusion, then the rest of the week he sleeps fine. He has been able to continue work and works from home. He remains independent. Patient has  no further OSW needs at this time. Emotional support provided. RIDGEW will follow up with patient at his next infusion.

## 2023-09-08 ENCOUNTER — TELEPHONE (OUTPATIENT)
Dept: GENETICS | Facility: CLINIC | Age: 43
End: 2023-09-08

## 2023-09-08 ENCOUNTER — HOSPITAL ENCOUNTER (OUTPATIENT)
Dept: INFUSION CENTER | Facility: HOSPITAL | Age: 43
End: 2023-09-08
Attending: INTERNAL MEDICINE
Payer: OTHER GOVERNMENT

## 2023-09-08 VITALS
SYSTOLIC BLOOD PRESSURE: 151 MMHG | WEIGHT: 194.89 LBS | HEIGHT: 66 IN | HEART RATE: 71 BPM | TEMPERATURE: 96.9 F | DIASTOLIC BLOOD PRESSURE: 87 MMHG | BODY MASS INDEX: 31.32 KG/M2 | OXYGEN SATURATION: 100 % | RESPIRATION RATE: 18 BRPM

## 2023-09-08 DIAGNOSIS — C80.1 SMALL CELL CARCINOMA (HCC): Primary | Chronic | ICD-10-CM

## 2023-09-08 PROCEDURE — 96413 CHEMO IV INFUSION 1 HR: CPT

## 2023-09-08 PROCEDURE — 96367 TX/PROPH/DG ADDL SEQ IV INF: CPT

## 2023-09-08 RX ORDER — SODIUM CHLORIDE 9 MG/ML
20 INJECTION, SOLUTION INTRAVENOUS ONCE
Status: COMPLETED | OUTPATIENT
Start: 2023-09-08 | End: 2023-09-08

## 2023-09-08 RX ADMIN — DEXAMETHASONE SODIUM PHOSPHATE: 10 INJECTION, SOLUTION INTRAMUSCULAR; INTRAVENOUS at 11:38

## 2023-09-08 RX ADMIN — SODIUM CHLORIDE 20 ML/HR: 0.9 INJECTION, SOLUTION INTRAVENOUS at 11:37

## 2023-09-08 RX ADMIN — ETOPOSIDE 160.8 MG: 20 INJECTION INTRAVENOUS at 12:20

## 2023-09-08 NOTE — PROGRESS NOTES
Recent labs reviewed. Patient tolerated Etoposide chemotherapy treatment without reaction or issues. AVS declined. Patient has Mychart. Patient ambulated off unit without incident. All personal belongings taken with patient.

## 2023-09-08 NOTE — PROGRESS NOTES
Recent labs reviewed. Patient tolerated day # 3 Etoposide chemotherapy treatment without reaction or issues. AVS declined. Patient ambulated off unit without incident. All personal belongings taken with patient.

## 2023-09-08 NOTE — TELEPHONE ENCOUNTER
Spoke with Vicky Barnard regarding his [facility] coverage requirements for genetic testing. I explained per [facility] policy that since they have a PCP on file, they would a require a formal referral from the PCP office to genetics in order to cover the testing. Dr. Christine Maddox cannot provide the referral because he is not the PCP on file. Vicky Barnard insisted that he does not have a PCP provider. [facility] assigned a PCP to him during a visit to the emergency department. We recommended that he call [facility] and attempt to remove Dr. Ilya Joy as his PCP. Vicky Barnard verbalized understanding and stated worst case scenario he is comfortable paying the self-pay option of $250.

## 2023-09-13 ENCOUNTER — OFFICE VISIT (OUTPATIENT)
Dept: HEMATOLOGY ONCOLOGY | Facility: CLINIC | Age: 43
End: 2023-09-13
Payer: OTHER GOVERNMENT

## 2023-09-13 VITALS
BODY MASS INDEX: 31.66 KG/M2 | DIASTOLIC BLOOD PRESSURE: 76 MMHG | OXYGEN SATURATION: 100 % | HEART RATE: 91 BPM | TEMPERATURE: 97.7 F | WEIGHT: 197 LBS | RESPIRATION RATE: 16 BRPM | SYSTOLIC BLOOD PRESSURE: 128 MMHG | HEIGHT: 66 IN

## 2023-09-13 DIAGNOSIS — C80.1 SMALL CELL CARCINOMA (HCC): Primary | Chronic | ICD-10-CM

## 2023-09-13 DIAGNOSIS — Z72.0 TOBACCO ABUSE: ICD-10-CM

## 2023-09-13 PROCEDURE — 99406 BEHAV CHNG SMOKING 3-10 MIN: CPT | Performed by: INTERNAL MEDICINE

## 2023-09-13 PROCEDURE — 99215 OFFICE O/P EST HI 40 MIN: CPT | Performed by: INTERNAL MEDICINE

## 2023-09-20 RX ORDER — SODIUM CHLORIDE 9 MG/ML
20 INJECTION, SOLUTION INTRAVENOUS ONCE
Status: CANCELLED | OUTPATIENT
Start: 2023-09-28

## 2023-09-20 RX ORDER — SODIUM CHLORIDE 9 MG/ML
20 INJECTION, SOLUTION INTRAVENOUS ONCE
Status: CANCELLED | OUTPATIENT
Start: 2023-09-29

## 2023-09-20 RX ORDER — SODIUM CHLORIDE 9 MG/ML
20 INJECTION, SOLUTION INTRAVENOUS ONCE
Status: CANCELLED | OUTPATIENT
Start: 2023-09-27

## 2023-09-20 NOTE — PROGRESS NOTES
Called pt to check in prior to starting treatment. He stated the referral needed to see [facility] is still incomplete via [facility].   Message sent to the [facility] for an updated status on the pending referral Rifampin Pregnancy And Lactation Text: This medication is Pregnancy Category C and it isn't know if it is safe during pregnancy. It is also excreted in breast milk and should not be used if you are breast feeding.

## 2023-09-22 ENCOUNTER — PATIENT OUTREACH (OUTPATIENT)
Dept: HEMATOLOGY ONCOLOGY | Facility: CLINIC | Age: 43
End: 2023-09-22

## 2023-09-22 NOTE — PROGRESS NOTES
Called pt to check in and see how hes doing. He stated he is feeling great, and not having any side affects. Overall he feels good. He is aware of all upcoming appointments, including the scheduled CT. He has no issues or concerns at this time.   He was thankful for the call, and has my contact information if he needs anything

## 2023-09-24 NOTE — PROGRESS NOTES
Hematology/Oncology Outpatient Office Note    Date of Service: 10/4/2023    [facility]  [phone]    Reason for Consultation:   Chief Complaint   Patient presents with   • Follow-up       Cancer Stage at diagnosis: IV    Referral Physician: No ref. provider found    Primary Care Physician:  No primary care provider on file. Nickname: Ti Grimm    Spouse: Naz Dinh ECO    Today's ECO    Goals and Barriers:  Current Goal: Minimize effects of disease burden, extend life. Barriers to accomplishing this: None    Patient's Capacity to Self Care:  Patient is able to self care    Code Status: not addressed today    Advanced directives: not addressed today    ASSESSMENT & PLAN      Diagnosis ICD-10-CM Associated Orders   1. Malignant neoplasm of sigmoid colon (HCC)  C18.7             This is a 38 y.o. c PMHx notable for obesity, being seen in consultation for large cell with neuroendocrine features found in the colon    Discussion of decision making  • Oncology history updated, accordingly, during this visit  • Goals of care/patient communication  o I discussed with the patient the clinical course leading up to their cancer diagnosis. I reviewed relevant office notes, imaging reports and pathology result as well.  o I told the patient that this is a case of curable versus incurable disease and what this means. We discussed that the goal of anti-cancer therapy is to provide best quality of life, extend overall survival, and progression free survival as shown in clinical trials.  We also discussed that there might be a point when the cancer will no longer respond to this anti-neoplastic therapy.   o I explained the risks/benefits of the proposed cancer therapy: Carboplatin-Etoposide +/- Durvalumab and after discussion including understanding risks of possible life-threatening complications and therapy-related malignancy development, informed consent for blood products and treatment has been signed and obtained. • TNM/Staging At Diagnosis  o TNM: tbd  Cancer Staging   tbd  • Disease Features/Tumor Markers/Genetics  o Tumor Marker:   - 7/17/2023: CEA 2.2  - CGA: 49.4  o Notable Path Features:   - 6/8/2023: Poorly differentiated malignancy, pending external expert consultation positive for synaptophysin, chromogranin, CD56, TTF-1, BCL2, and cyclin D1 with a high Ki-67 proliferative rate (approximately 90%) compatible with a poorly differentiated malignancy with features suggesting neuroendocrine differentiation. 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 The intramural abscess abuts the superior aspect of the urinary bladder without evidence of a colovesical fistula. 6/27/2023 MRI Abd w/wo c: Benign flash-filling 11 mm hepatic and angioma in the caudate lobe of the liver accounting for the enhancing nodule in that location on recent CT examinations  7/19/2023 CT Chest w/o c: There are multiple pulmonary nodules, which will be described on series 4:  Image 68, left lower lobe, solid, smooth bordered, 3 mm . Image 63, left upper lobe, solid, smooth bordered, 4 mm . Image 43, right upper lobe, solid, smooth bordered, 4 mm, may represent a fissural lymph node.   Image 55, right upper lobe, solid, smooth bordered, 4 mm, may represent a fissural lymph node  7/19/2023 MRI Brain w/wo c: No evidence of intracranial metastasis, A few foci of T2/FLAIR hyperintensity primarily involving left frontoparietal subcortical and deep white matter (maybe migraine related)  8/8/2023 PET/CT: Three new hypermetabolic solid nodules/masses in the abdomen as described, compatible with progression of disease. Mild uptake along the left anterior ninth rib corresponding to a mild fracture deformity. Findings are indeterminate and may represent the sequela of trauma or metastatic disease. Stable sub-4 mm pulmonary nodules   -3.1 x 3.1 cm hypermetabolic mass in the left anterior abdomen abutting a loop of small bowel (series 4, image 147; SUV max 12).   -1.3 x 1.2 cm hypermetabolic soft tissue nodule in the left mid abdomen posterior to a loop of small bowel (series 4, image 154; SUV max 17)   -1.8 x 2.1 cm hypermetabolic soft tissue nodule anterior to the left psoas muscle (series 4, image 160; SUV max 9.7)  10/2/2023: CT CAP w/c: excellent VT. Multiple pulmonary nodules without significant change when compared to the previous CT chest. Some which may represent fissural lymph nodes. No evidence of metastatic disease in the chest abdomen and pelvis. Improvement of the lower abdominal/pelvic lymphadenopathy when compared to the previous PET/CT. Discussion of decision making    I personally reviewed the following lab results, the image studies, pathology, other specialty/physicians consult notes and recommendations, and outside medical records. I had a lengthy discussion with the patient and shared the work-up findings. We discussed the diagnosis and management plan as below.  I spent 41 minutes reviewing the records (labs, clinician notes, outside records, medical history, ordering medicine/tests/procedures, interpreting the imaging/labs previously done) and coordination of care as well as direct time with the patient today, of which greater than 50% of the time was spent in counseling and coordination of care with the patient/family. · Plan/Labs  · Restaging CT CAP w/c ordered in 3 months  · I discussed the risks of ongoing cigarette smoking and reviewed the benefits of quitting and risk of new lung primary, heart disease, stroke, among other risks and cancers. Reviewed options including support, quit date, medications, and family support. Patient voiced understanding and willingness to try to quit. Patient was told to notify ftheir family practitioner for additional management. Greater than 3 minutes were needed for discussion of tobacco dependence and quitting counselling. 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 The patient knows the contact information for our office and knows to reach out for any relevant concerns related to this encounter. They are to call for any temperature 100.4 or higher, new symptoms including but not restricted to shaking chills, decreased appetite, nausea, vomiting, diarrhea, increased fatigue, shortness of breath or chest pain, confusion, and not feeling the strength to come to the clinic. For all other listed problems and medical diagnosis in their chart - they are managed by PCP and/or other specialists, which the patient acknowledges. Thank you very much for your consultation and making us a part of this patient's care. We are continuing to follow closely with you. Please do not hesitate to reach out to me with any additional questions or concerns. Josue Klein MD  Hematology & Medical Oncology Staff Physician             Disclaimer:  This document was prepared using [employer] Fluency Direct technology. If a word or phrase is confusing, or does not make sense, this is likely due to recognition error which was not discovered during this clinician's review. If you believe an error has occurred, please contact me through [address] [facility] line for lee? cation.       ONCOLOGY HISTORY OF PRESENT ILLNESS        Oncology History   Malignant neoplasm of sigmoid colon ([address])   6/22/2023 Initial Diagnosis    Malignant neoplasm of sigmoid colon ([address])     9/3/2023 -  Cancer Staged    Staging form: Colon and Rectum, AJCC 8th Edition  - Clinical: Stage Unknown (cTX, cN0, pM1) - Signed by Ramos Del Valle MD on 9/3/2023  Total positive nodes: 0       Small cell carcinoma ([address])   7/18/2023 Initial Diagnosis    Small cell carcinoma (HCC)     7/26/2023 -  Chemotherapy    alteplase (CATHFLO), 2 mg, Intracatheter, Every 1 Minute as needed, 4 of 10 cycles  etoposide (TOPOSAR), 80 mg/m2 = 160.8 mg, Intravenous, Once, 4 of 4 cycles  Administration: 160.8 mg (7/26/2023), 160.8 mg (7/27/2023), 160.8 mg (7/28/2023), 160.8 mg (8/16/2023), 160.8 mg (8/17/2023), 160.8 mg (8/18/2023), 160.8 mg (9/6/2023), 160.8 mg (9/7/2023), 160.8 mg (9/8/2023), 160.8 mg (9/27/2023), 160.8 mg (9/28/2023), 160.8 mg (9/29/2023)  CARBOplatin (PARAPLATIN) IVPB (GOG AUC DOSING), 750 mg, Intravenous, Once, 4 of 4 cycles  Administration: 750 mg (7/26/2023), 700.5 mg (8/16/2023), 694 mg (9/6/2023), 694 mg (9/27/2023)  durvalumab (IMFINZI) IVPB, 1,500 mg, Intravenous, Once, 4 of 10 cycles  Administration: 1,500 mg (7/26/2023), 1,500 mg (8/16/2023), 1,500 mg (9/6/2023), 1,500 mg (9/27/2023)  aprepitant (CINVANTI) in  mL IVPB, 130 mg, Intravenous, Once, 4 of 4 cycles  Administration: 130 mg (7/26/2023), 130 mg (8/16/2023), 130 mg (9/6/2023), 130 mg (9/27/2023)       5/21/2023: BRBPR and pain in the lower abdomen for a week; leading to a ER visit, dx of diverticulitis     SUBJECTIVE  (INTERVAL HISTORY)      Clotting History None    Bleeding History None   Cancer History Colon   Family Cancer History pGrandfather (lung, smoker)   H/O Blood/Plt Transfusion None   Tobacco/etoh/drug abuse 1/2 PPD x 25 years (working on quitting), no etoh abuse or rec drug use       Cancer Screening history n/a   Occupation IT       Interval events:    No acute issues at this time. Moderate fatigue during treatment. Hair loss peaked. I have reviewed the relevant past medical, surgical, social and family history. I have also reviewed allergies and medications for this patient. Review of Systems    Baseline weight: 200 lbs    Denies F/C, N/V, SOB, CP, LH, HA, rash, itching, gen weakness, melena, hematuria, hematochezia, falls, diarrhea, or constipation       A 10-point review of system was performed, pertinent positive and negative were detailed as above. Otherwise, the 10-point review of system was negative. No past medical history on file. Past Surgical History:   Procedure Laterality Date   • COLON SIGMOID RESECTION LAPAROSCOPIC N/A 6/8/2023    Procedure: RESECTION COLON SIGMOID LAPAROSCOPIC HAND-ASSISTED;  Surgeon: Mike Gleason MD;  Location: [facility];  Service: General   • IR DRAINAGE TUBE CHECK/CHANGE/REPOSITION/REINSERTION/UPSIZE  6/5/2023   • IR DRAINAGE TUBE PLACEMENT  5/30/2023   • IR PORT PLACEMENT  7/28/2023       No family history on file. Social History     Socioeconomic History   • Marital status: /Civil Union     Spouse name: Not on file   • Number of children: Not on file   • Years of education: Not on file   • Highest education level: Not on file   Occupational History   • Not on file   Tobacco Use   • Smoking status: Every Day     Packs/day: 0.75     Years: 25.00     Total pack years: 18.75     Types: Cigarettes     Passive exposure: Current (wife smokes)   • Smokeless tobacco: Not on file   • Tobacco comments:     Patient states Chantix was not effective.    Vaping Use   • Vaping Use: Never used   Substance and Sexual Activity   • Alcohol use: Not Currently   • Drug use: Never   • Sexual activity: Not on file   Other Topics Concern   • Not on file   Social History Narrative   • Not on file     Social Determinants of Health     Financial Resource Strain: Not on file   Food Insecurity: No Food Insecurity (5/31/2023)    Hunger Vital Sign    • Worried About Running Out of Food in the Last Year: Never true    • Ran Out of Food in the Last Year: Never true   Transportation Needs: No Transportation Needs (5/31/2023)    PRAPARE - Transportation    • Lack of Transportation (Medical): No    • Lack of Transportation (Non-Medical): No   Physical Activity: Not on file   Stress: Not on file   Social Connections: Not on file   Intimate Partner Violence: Not on file   Housing Stability: Low Risk  (5/31/2023)    Housing Stability Vital Sign    • Unable to Pay for Housing in the Last Year: No    • Number of Places Lived in the Last Year: 1    • Unstable Housing in the Last Year: No       No Known Allergies    Current Outpatient Medications   Medication Sig Dispense Refill   • lidocaine-prilocaine (EMLA) cream Apply topically as needed for mild pain 30 g 0   • ondansetron (ZOFRAN-ODT) 8 mg disintegrating tablet Take 1 tablet (8 mg total) by mouth every 8 (eight) hours as needed for nausea or vomiting 20 tablet 0     No current facility-administered medications for this visit. (Not in a hospital admission)      Objective:     24 Hour Vitals Assessment:     Vitals:    10/04/23 0816   BP: 128/70   Pulse: 79   Resp: 18   Temp: 98.1 °F (36.7 °C)   SpO2: 99%       PHYSICIAN EXAM:    General: Appearance: alert, cooperative, no distress. HEENT: Normocephalic, atraumatic. No scleral icterus. conjunctivae clear. EOMI. Chest: No tenderness to palpation. No open wound noted. Lungs: Diminished BS b/l, otherwise clear to auscultation bilaterally, Respirations unlabored. Cardiac: Regular rate, +S1and S2  Abdomen: Soft, non-tender, non-distended.  Bowel sounds are normal.   Extremities:  No edema, cyanosis, clubbing. Skin: Skin color, turgor are normal. No rashes. Lymphatics: no palpable supra-cervical, axillary, or inguinal adenopathy  Neurologic: Awake, Alert, and oriented, no gross focal deficits noted b/l. DATA REVIEW:    Pathology Result:    Final Diagnosis   Date Value Ref Range Status   06/08/2023   Final    A. 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 Comment: This is an appended report. These results have been appended to a previously preliminary verified report. Image Results:   Image result are reviewed and documented in Hematology/Oncology history    CT chest abdomen pelvis w contrast  Narrative: CT CHEST, ABDOMEN AND PELVIS WITH IV CONTRAST    INDICATION:   C80.1: Malignant (primary) neoplasm, unspecified. COMPARISON: CT chest dated 6/19/2023, CT abdomen and pelvis dated 6/4/2023    TECHNIQUE: CT examination of the chest, abdomen and pelvis was performed. Multiplanar 2D reformatted images were created from the source data. This examination, like all CT scans performed in the [facility], was performed utilizing techniques to minimize radiation dose exposure, including the use of iterative reconstruction and automated exposure control. Radiation dose length   product (DLP) for this visit:  672.5 mGy-cm    IV Contrast:  100 mL of iohexol (OMNIPAQUE)  Enteric Contrast: Enteric contrast was administered. FINDINGS:    CHEST    LUNGS: No tracheal or endobronchial lesion. There are multiple pulmonary nodules, which will be described on series :  Image 45, right upper lobe, solid, smooth bordered, 4 mm, unchanged. May represent a fissural lymph node. Image 57, right lower lobe, solid, smooth bordered, 4 mm, unchanged. May represent a fissural lymph node. Image 75, right middle lobe, solid, smooth bordered, 3 mm, unchanged.   Image 79, left lower lobe, solid, smooth bordered, 2 mm, unchanged. Image 86, left lower lobe, solid, smooth bordered, 4 mm, unchanged. Image 63, left upper lobe, solid, smooth bordered, 3 mm, unchanged. PLEURA:  Unremarkable. HEART/GREAT VESSELS: Heart is unremarkable for patient's age. No thoracic aortic aneurysm. MEDIASTINUM AND FABRICIO:  Unremarkable. CHEST WALL AND LOWER NECK: Vascular port in the anterior chest wall with tip at the level of the superior atrial caval junction. ABDOMEN    LIVER/BILIARY TREE:  Unremarkable. GALLBLADDER:  No calcified gallstones. No pericholecystic inflammatory change. SPLEEN:  Unremarkable. PANCREAS:  Unremarkable. ADRENAL GLANDS:  Unremarkable. KIDNEYS/URETERS:  Unremarkable. No hydronephrosis. STOMACH AND BOWEL: Under distention versus wall thickening in the sigmoid colon. No bowel obstruction. APPENDIX:  No findings to suggest appendicitis. ABDOMINOPELVIC CAVITY:  No ascites. No pneumoperitoneum. No lymphadenopathy. Loculated low-attenuation lesion in the anterior abdominal cavity at the level of the umbilicus measuring 5 x 3.6 x 3.8 cm with an attenuation value of 15 Hounsfield in its. VESSELS:  Unremarkable for patient's age. PELVIS    REPRODUCTIVE ORGANS:  Unremarkable for patient's age. URINARY BLADDER:  Unremarkable. ABDOMINAL WALL/INGUINAL REGIONS: Stranding in the left lateral abdominal wall musculature may correspond to previous percutaneous drainage catheter. OSSEOUS STRUCTURES:  No acute fracture or destructive osseous lesion. Old anterior left ninth rib fracture. Impression: 1. Multiple pulmonary nodules without significant change when compared to the previous CT chest. Some which may represent fissural lymph nodes. 2. No evidence of metastatic disease in the chest abdomen and pelvis. 3. Under distention versus wall thickening of the lobe of the sigmoid colon.   4. Loculated fluid collection in the mid abdomen measuring 5 x 3.6 x 3.8 cm with simple fluid attenuation. May represent a postoperative seroma cannot exclude superimposed infection. Collection was present on the PET/CT from 8/8/2023 measuring 3.1 x 3.1   cm.  5. Improvement of the lower abdominal/pelvic lymphadenopathy when compared to the previous PET/CT. Workstation performed: [id number]      LABS:  Lab data are reviewed and documented in HemOnc history. Lab Results   Component Value Date    HGB 14.4 09/26/2023    HCT 43.7 09/26/2023    MCV 95 09/26/2023     09/26/2023    WBC 5.17 09/26/2023    NRBC 0 09/26/2023     Lab Results   Component Value Date    K 4.1 09/26/2023     09/26/2023    CO2 26 09/26/2023    BUN 11 09/26/2023    CREATININE 0.94 09/26/2023    GLUF 88 07/24/2023    CALCIUM 9.2 09/26/2023    CORRECTEDCA 9.1 06/09/2023    AST 18 09/26/2023    ALT 22 09/26/2023    ALKPHOS 80 09/26/2023    EGFR 101 09/26/2023       No results found for: "IRON", "TIBC", "FERRITIN"    No results found for: "Hannah Jeremiah"    No results for input(s): "WBC", "CREAT", "PLT" in the last 72 hours.     By:  Carissa Jay MD, 10/4/2023, 8:27 AM

## 2023-09-26 ENCOUNTER — HOSPITAL ENCOUNTER (OUTPATIENT)
Dept: INFUSION CENTER | Facility: HOSPITAL | Age: 43
Discharge: HOME | End: 2023-09-26
Attending: INTERNAL MEDICINE
Payer: OTHER GOVERNMENT

## 2023-09-26 DIAGNOSIS — C80.1 SMALL CELL CARCINOMA (HCC): Primary | Chronic | ICD-10-CM

## 2023-09-26 DIAGNOSIS — Z45.2 ENCOUNTER FOR CARE RELATED TO PORT-A-CATH: ICD-10-CM

## 2023-09-26 LAB
ALBUMIN SERPL BCP-MCNC: 4.3 G/DL (ref 3.5–5)
ALP SERPL-CCNC: 80 U/L (ref 34–104)
ALT SERPL W P-5'-P-CCNC: 22 U/L (ref 7–52)
ANION GAP SERPL CALCULATED.3IONS-SCNC: 8 MMOL/L
AST SERPL W P-5'-P-CCNC: 18 U/L (ref 13–39)
BASOPHILS # BLD AUTO: 0.01 THOUSANDS/ÂΜL (ref 0–0.1)
BASOPHILS NFR BLD AUTO: 0 % (ref 0–1)
BILIRUB SERPL-MCNC: 0.32 MG/DL (ref 0.2–1)
BUN SERPL-MCNC: 11 MG/DL (ref 5–25)
CALCIUM SERPL-MCNC: 9.2 MG/DL (ref 8.4–10.2)
CHLORIDE SERPL-SCNC: 102 MMOL/L (ref 96–108)
CO2 SERPL-SCNC: 26 MMOL/L (ref 21–32)
CREAT SERPL-MCNC: 0.94 MG/DL (ref 0.6–1.3)
EOSINOPHIL # BLD AUTO: 0.03 THOUSAND/ÂΜL (ref 0–0.61)
EOSINOPHIL NFR BLD AUTO: 1 % (ref 0–6)
ERYTHROCYTE [DISTWIDTH] IN BLOOD BY AUTOMATED COUNT: 14.9 % (ref 11.6–15.1)
GFR SERPL CREATININE-BSD FRML MDRD: 101 ML/MIN/1.73SQ M
GLUCOSE SERPL-MCNC: 110 MG/DL (ref 65–140)
HCT VFR BLD AUTO: 43.7 % (ref 36.5–49.3)
HGB BLD-MCNC: 14.4 G/DL (ref 12–17)
IMM GRANULOCYTES # BLD AUTO: 0.03 THOUSAND/UL (ref 0–0.2)
IMM GRANULOCYTES NFR BLD AUTO: 1 % (ref 0–2)
LYMPHOCYTES # BLD AUTO: 1.38 THOUSANDS/ÂΜL (ref 0.6–4.47)
LYMPHOCYTES NFR BLD AUTO: 27 % (ref 14–44)
MCH RBC QN AUTO: 31.4 PG (ref 26.8–34.3)
MCHC RBC AUTO-ENTMCNC: 33 G/DL (ref 31.4–37.4)
MCV RBC AUTO: 95 FL (ref 82–98)
MONOCYTES # BLD AUTO: 0.77 THOUSAND/ÂΜL (ref 0.17–1.22)
MONOCYTES NFR BLD AUTO: 15 % (ref 4–12)
NEUTROPHILS # BLD AUTO: 2.95 THOUSANDS/ÂΜL (ref 1.85–7.62)
NEUTS SEG NFR BLD AUTO: 56 % (ref 43–75)
NRBC BLD AUTO-RTO: 0 /100 WBCS
PLATELET # BLD AUTO: 258 THOUSANDS/UL (ref 149–390)
PMV BLD AUTO: 9.5 FL (ref 8.9–12.7)
POTASSIUM SERPL-SCNC: 4.1 MMOL/L (ref 3.5–5.3)
PROT SERPL-MCNC: 6.9 G/DL (ref 6.4–8.4)
RBC # BLD AUTO: 4.58 MILLION/UL (ref 3.88–5.62)
SODIUM SERPL-SCNC: 136 MMOL/L (ref 135–147)
TSH SERPL DL<=0.05 MIU/L-ACNC: 1.22 UIU/ML (ref 0.45–4.5)
WBC # BLD AUTO: 5.17 THOUSAND/UL (ref 4.31–10.16)

## 2023-09-26 PROCEDURE — 85025 COMPLETE CBC W/AUTO DIFF WBC: CPT | Performed by: INTERNAL MEDICINE

## 2023-09-26 PROCEDURE — 84443 ASSAY THYROID STIM HORMONE: CPT | Performed by: INTERNAL MEDICINE

## 2023-09-26 PROCEDURE — 80053 COMPREHEN METABOLIC PANEL: CPT | Performed by: INTERNAL MEDICINE

## 2023-09-26 NOTE — PROGRESS NOTES
Labs obtained as ordered via port without difficulty. Port flushed per protocol. Pt discharged in stable condition.

## 2023-09-27 ENCOUNTER — HOSPITAL ENCOUNTER (OUTPATIENT)
Dept: INFUSION CENTER | Facility: HOSPITAL | Age: 43
Discharge: HOME | End: 2023-09-27
Attending: INTERNAL MEDICINE
Payer: OTHER GOVERNMENT

## 2023-09-27 VITALS
HEART RATE: 85 BPM | SYSTOLIC BLOOD PRESSURE: 131 MMHG | DIASTOLIC BLOOD PRESSURE: 79 MMHG | BODY MASS INDEX: 32.03 KG/M2 | WEIGHT: 199.3 LBS | RESPIRATION RATE: 16 BRPM | TEMPERATURE: 97.2 F | OXYGEN SATURATION: 97 % | HEIGHT: 66 IN

## 2023-09-27 DIAGNOSIS — C80.1 SMALL CELL CARCINOMA (HCC): Primary | Chronic | ICD-10-CM

## 2023-09-27 PROCEDURE — 96413 CHEMO IV INFUSION 1 HR: CPT

## 2023-09-27 PROCEDURE — 96367 TX/PROPH/DG ADDL SEQ IV INF: CPT

## 2023-09-27 PROCEDURE — 96417 CHEMO IV INFUS EACH ADDL SEQ: CPT

## 2023-09-27 RX ORDER — SODIUM CHLORIDE 9 MG/ML
20 INJECTION, SOLUTION INTRAVENOUS ONCE
Status: COMPLETED | OUTPATIENT
Start: 2023-09-27 | End: 2023-09-27

## 2023-09-27 RX ADMIN — DURVALUMAB 1500 MG: 500 INJECTION, SOLUTION INTRAVENOUS at 10:03

## 2023-09-27 RX ADMIN — ETOPOSIDE 160.8 MG: 20 INJECTION INTRAVENOUS at 12:07

## 2023-09-27 RX ADMIN — DEXAMETHASONE SODIUM PHOSPHATE: 10 INJECTION, SOLUTION INTRAMUSCULAR; INTRAVENOUS at 08:57

## 2023-09-27 RX ADMIN — APREPITANT 130 MG: 130 INJECTION, EMULSION INTRAVENOUS at 09:20

## 2023-09-27 RX ADMIN — SODIUM CHLORIDE 20 ML/HR: 0.9 INJECTION, SOLUTION INTRAVENOUS at 08:57

## 2023-09-27 RX ADMIN — CARBOPLATIN 694 MG: 10 INJECTION, SOLUTION INTRAVENOUS at 11:02

## 2023-09-27 NOTE — PROGRESS NOTES
Pt offers no complaints. Tolerated chemotherapy infusion well without incident. Discharged in stable condition and pt aware of next infusion appointment tomorrow. AVS provided.

## 2023-09-28 ENCOUNTER — HOSPITAL ENCOUNTER (OUTPATIENT)
Dept: INFUSION CENTER | Facility: HOSPITAL | Age: 43
End: 2023-09-28
Attending: INTERNAL MEDICINE
Payer: OTHER GOVERNMENT

## 2023-09-28 ENCOUNTER — PATIENT OUTREACH (OUTPATIENT)
Dept: CASE MANAGEMENT | Facility: HOSPITAL | Age: 43
End: 2023-09-28

## 2023-09-28 VITALS
HEIGHT: 66 IN | SYSTOLIC BLOOD PRESSURE: 143 MMHG | RESPIRATION RATE: 18 BRPM | TEMPERATURE: 97.3 F | DIASTOLIC BLOOD PRESSURE: 87 MMHG | HEART RATE: 88 BPM | BODY MASS INDEX: 32.03 KG/M2 | WEIGHT: 199.3 LBS

## 2023-09-28 DIAGNOSIS — C80.1 SMALL CELL CARCINOMA (HCC): Primary | Chronic | ICD-10-CM

## 2023-09-28 PROCEDURE — 96413 CHEMO IV INFUSION 1 HR: CPT

## 2023-09-28 PROCEDURE — 96367 TX/PROPH/DG ADDL SEQ IV INF: CPT

## 2023-09-28 RX ORDER — SODIUM CHLORIDE 9 MG/ML
20 INJECTION, SOLUTION INTRAVENOUS ONCE
Status: COMPLETED | OUTPATIENT
Start: 2023-09-28 | End: 2023-09-28

## 2023-09-28 RX ADMIN — DEXAMETHASONE SODIUM PHOSPHATE: 10 INJECTION, SOLUTION INTRAMUSCULAR; INTRAVENOUS at 10:22

## 2023-09-28 RX ADMIN — SODIUM CHLORIDE 20 ML/HR: 0.9 INJECTION, SOLUTION INTRAVENOUS at 10:21

## 2023-09-28 RX ADMIN — ETOPOSIDE 160.8 MG: 20 INJECTION INTRAVENOUS at 10:54

## 2023-09-28 NOTE — PROGRESS NOTES
LSW met with patient at the infusion center this morning for a supportive visit. Patient reports everything is going good. He has not felt any ill effects. Everything at home is good. He has still been working from home. Patient has a CT scan next Monday, then a follow up with Dr Ashtyn Melendez on Wednesday. Pt is hoping for improvement. Emotional support provided. LSW will follow up as needed.

## 2023-09-28 NOTE — PLAN OF CARE
Problem: Potential for Falls  Goal: Patient will remain free of falls  Description: INTERVENTIONS:  - Educate patient/family on patient safety including physical limitations  - Instruct patient to call for assistance with activity   - Consult OT/PT to assist with strengthening/mobility   - Keep Call bell within reach  - Keep bed low and locked with side rails adjusted as appropriate  - Keep care items and personal belongings within reach  - Initiate and maintain comfort rounds  - Make Fall Risk Sign visible to staff  -- Apply yellow socks and bracelet for high fall risk patients  - Consider moving patient to room near nurses station  9/28/2023 1011 by Jonah Tong RN  Outcome: Progressing  9/28/2023 1009 by Jonah Tong RN  Outcome: Progressing

## 2023-09-29 ENCOUNTER — HOSPITAL ENCOUNTER (OUTPATIENT)
Dept: INFUSION CENTER | Facility: HOSPITAL | Age: 43
End: 2023-09-29
Attending: INTERNAL MEDICINE
Payer: OTHER GOVERNMENT

## 2023-09-29 VITALS
HEIGHT: 66 IN | WEIGHT: 199.3 LBS | DIASTOLIC BLOOD PRESSURE: 71 MMHG | RESPIRATION RATE: 18 BRPM | HEART RATE: 86 BPM | OXYGEN SATURATION: 100 % | BODY MASS INDEX: 32.03 KG/M2 | TEMPERATURE: 98.7 F | SYSTOLIC BLOOD PRESSURE: 148 MMHG

## 2023-09-29 DIAGNOSIS — C80.1 SMALL CELL CARCINOMA (HCC): Primary | Chronic | ICD-10-CM

## 2023-09-29 PROCEDURE — 96367 TX/PROPH/DG ADDL SEQ IV INF: CPT

## 2023-09-29 PROCEDURE — 96413 CHEMO IV INFUSION 1 HR: CPT

## 2023-09-29 RX ORDER — SODIUM CHLORIDE 9 MG/ML
20 INJECTION, SOLUTION INTRAVENOUS ONCE
Status: COMPLETED | OUTPATIENT
Start: 2023-09-29 | End: 2023-09-29

## 2023-09-29 RX ADMIN — ETOPOSIDE 160.8 MG: 20 INJECTION INTRAVENOUS at 12:14

## 2023-09-29 RX ADMIN — SODIUM CHLORIDE 20 ML/HR: 0.9 INJECTION, SOLUTION INTRAVENOUS at 11:35

## 2023-09-29 RX ADMIN — DEXAMETHASONE SODIUM PHOSPHATE: 10 INJECTION, SOLUTION INTRAMUSCULAR; INTRAVENOUS at 11:35

## 2023-09-29 NOTE — PROGRESS NOTES
Recent labs reviewed. Patient tolerated Etoposide chemotherapy treatment without reaction or issues. Port flushed and deaccessed per routine. AVS declined. Patient has Mychart. Patient ambulated off unit without incident. All personal belongings taken with patient.

## 2023-10-02 ENCOUNTER — HOSPITAL ENCOUNTER (OUTPATIENT)
Dept: CT IMAGING | Facility: HOSPITAL | Age: 43
Discharge: HOME | End: 2023-10-02
Attending: INTERNAL MEDICINE
Payer: OTHER GOVERNMENT

## 2023-10-02 DIAGNOSIS — C80.1 SMALL CELL CARCINOMA (HCC): Chronic | ICD-10-CM

## 2023-10-02 PROCEDURE — G1004 CDSM NDSC: HCPCS

## 2023-10-02 PROCEDURE — 74177 CT ABD & PELVIS W/CONTRAST: CPT

## 2023-10-02 PROCEDURE — 71260 CT THORAX DX C+: CPT

## 2023-10-02 RX ADMIN — IOHEXOL 100 ML: 350 INJECTION, SOLUTION INTRAVENOUS at 12:32

## 2023-10-04 ENCOUNTER — TELEPHONE (OUTPATIENT)
Dept: HEMATOLOGY ONCOLOGY | Facility: CLINIC | Age: 43
End: 2023-10-04

## 2023-10-04 ENCOUNTER — OFFICE VISIT (OUTPATIENT)
Dept: HEMATOLOGY ONCOLOGY | Facility: CLINIC | Age: 43
End: 2023-10-04
Payer: OTHER GOVERNMENT

## 2023-10-04 VITALS
DIASTOLIC BLOOD PRESSURE: 70 MMHG | HEART RATE: 79 BPM | BODY MASS INDEX: 31.66 KG/M2 | HEIGHT: 66 IN | SYSTOLIC BLOOD PRESSURE: 128 MMHG | TEMPERATURE: 98.1 F | WEIGHT: 197 LBS | OXYGEN SATURATION: 99 % | RESPIRATION RATE: 18 BRPM

## 2023-10-04 DIAGNOSIS — C18.7 MALIGNANT NEOPLASM OF SIGMOID COLON (HCC): Primary | ICD-10-CM

## 2023-10-04 DIAGNOSIS — C80.1 SMALL CELL CARCINOMA (HCC): ICD-10-CM

## 2023-10-04 PROCEDURE — 99215 OFFICE O/P EST HI 40 MIN: CPT | Performed by: INTERNAL MEDICINE

## 2023-10-04 RX ORDER — SODIUM CHLORIDE 9 MG/ML
20 INJECTION, SOLUTION INTRAVENOUS ONCE
OUTPATIENT
Start: 2023-10-18

## 2023-10-04 NOTE — TELEPHONE ENCOUNTER
What would be a preferred day of the week that would work best for your infusion appointment? Thursday or Friday  Do you prefer mornings or afternoons for your appointments? AM  Are there any days or dates that do not work for your schedule, including any upcoming vacations? n/a  We are going to try our best to schedule you at the infusion center closest to your home. In the event that we are unable to what would be your next preferred infusion site or sites? [address]  Do you have transportation to take you to all of your appointments?  yes  Would you like the infusion center to draw labs from your port? (disregard if patient doesn't have a port or need labs for infusion appointment) yes

## 2023-10-16 ENCOUNTER — OFFICE VISIT (OUTPATIENT)
Dept: FAMILY MEDICINE CLINIC | Facility: CLINIC | Age: 43
End: 2023-10-16
Payer: OTHER GOVERNMENT

## 2023-10-16 ENCOUNTER — TELEPHONE (OUTPATIENT)
Dept: HEMATOLOGY ONCOLOGY | Facility: CLINIC | Age: 43
End: 2023-10-16

## 2023-10-16 VITALS
HEART RATE: 108 BPM | BODY MASS INDEX: 31.27 KG/M2 | SYSTOLIC BLOOD PRESSURE: 120 MMHG | WEIGHT: 199.2 LBS | HEIGHT: 67 IN | OXYGEN SATURATION: 98 % | TEMPERATURE: 97.2 F | DIASTOLIC BLOOD PRESSURE: 82 MMHG

## 2023-10-16 DIAGNOSIS — Z00.00 HEALTH MAINTENANCE EXAMINATION: Primary | ICD-10-CM

## 2023-10-16 DIAGNOSIS — Z11.59 NEED FOR HEPATITIS C SCREENING TEST: ICD-10-CM

## 2023-10-16 DIAGNOSIS — Z23 NEED FOR PNEUMOCOCCAL VACCINATION: ICD-10-CM

## 2023-10-16 DIAGNOSIS — Z72.0 TOBACCO ABUSE: ICD-10-CM

## 2023-10-16 DIAGNOSIS — Z13.220 NEED FOR LIPID SCREENING: ICD-10-CM

## 2023-10-16 DIAGNOSIS — Z23 ENCOUNTER FOR IMMUNIZATION: ICD-10-CM

## 2023-10-16 DIAGNOSIS — Z11.4 ENCOUNTER FOR SCREENING FOR HIV: ICD-10-CM

## 2023-10-16 DIAGNOSIS — E66.9 OBESITY, CLASS I, BMI 30.0-34.9 (SEE ACTUAL BMI): ICD-10-CM

## 2023-10-16 DIAGNOSIS — C18.7 MALIGNANT NEOPLASM OF SIGMOID COLON (HCC): ICD-10-CM

## 2023-10-16 PROBLEM — K57.21 DIVERTICULITIS OF LARGE INTESTINE WITH ABSCESS WITH BLEEDING: Status: RESOLVED | Noted: 2023-05-30 | Resolved: 2023-10-16

## 2023-10-16 PROCEDURE — 90677 PCV20 VACCINE IM: CPT

## 2023-10-16 PROCEDURE — 90686 IIV4 VACC NO PRSV 0.5 ML IM: CPT

## 2023-10-16 PROCEDURE — 90471 IMMUNIZATION ADMIN: CPT

## 2023-10-16 PROCEDURE — 99386 PREV VISIT NEW AGE 40-64: CPT | Performed by: FAMILY MEDICINE

## 2023-10-16 PROCEDURE — 90472 IMMUNIZATION ADMIN EACH ADD: CPT

## 2023-10-16 PROCEDURE — 99203 OFFICE O/P NEW LOW 30 MIN: CPT | Performed by: FAMILY MEDICINE

## 2023-10-16 RX ORDER — NICOTINE 21 MG/24HR
1 PATCH, TRANSDERMAL 24 HOURS TRANSDERMAL EVERY 24 HOURS
Qty: 28 PATCH | Refills: 0 | Status: SHIPPED | OUTPATIENT
Start: 2023-10-16

## 2023-10-16 NOTE — PATIENT INSTRUCTIONS
Review of his history over the last 6 months. Referral for colonoscopy. Flu shot and Prevnar 20, the latter being a vaccine to prevent pneumococcal infections recommended in smokers. One-time shot. Discussion of his use of nicotine. Handout given about smoking cessation. Trial of the NicoDerm patch 14 mg daily along with the Nicotrol inhaler. Screen lipid profile, hepatitis C, and HIV. Recheck in 6 weeks for follow-up of tobacco.    Wellness Visit for Adults   AMBULATORY CARE:   A wellness visit  is when you see your healthcare provider to get screened for health problems. Your healthcare provider will also give you advice on how to stay healthy. Write down your questions so you remember to ask them. Ask your healthcare provider how often you should have a wellness visit. What happens at a wellness visit:  Your healthcare provider will ask about your health, and your family history of health problems. This includes high blood pressure, heart disease, and cancer. He or she will ask if you have symptoms that concern you, if you smoke, and about your mood. You may also be asked about your intake of medicines, supplements, food, and alcohol. Any of the following may be done: Your weight  will be checked. Your height may also be checked so your body mass index (BMI) can be calculated. Your BMI shows if you are at a healthy weight. Your blood pressure  and heart rate will be checked. Your temperature may also be checked. Blood and urine tests  may be done. Blood tests may be done to check your cholesterol levels. Abnormal cholesterol levels increase your risk for heart disease and stroke. You may also need a blood or urine test to check for diabetes if you are at increased risk. Urine tests may be done to look for signs of an infection or kidney disease. A physical exam  includes checking your heartbeat and lungs with a stethoscope.  Your healthcare provider may also check your skin to look for sun damage. Screening tests  may be recommended. A screening test is done to check for diseases that may not cause symptoms. The screening tests you may need depend on your age, gender, family history, and lifestyle habits. For example, colorectal screening may be recommended if you are 48years old or older. Screening tests you need if you are a woman:   A Pap smear  is used to screen for cervical cancer. Pap smears are usually done every 3 to 5 years depending on your age. You may need them more often if you have had abnormal Pap smear test results in the past. Ask your healthcare provider how often you should have a Pap smear. A mammogram  is an x-ray of your breasts to screen for breast cancer. Experts recommend mammograms every 2 years starting at age 48 years. You may need a mammogram at age 52 years or younger if you have an increased risk for breast cancer. Talk to your healthcare provider about when you should start having mammograms and how often you need them. Vaccines you may need:   Get an influenza vaccine  every year. The influenza vaccine protects you from the flu. Several types of viruses cause the flu. The viruses change over time, so new vaccines are made each year. Get a tetanus-diphtheria (Td) booster vaccine  every 10 years. This vaccine protects you against tetanus and diphtheria. Tetanus is a severe infection that may cause painful muscle spasms and lockjaw. Diphtheria is a severe bacterial infection that causes a thick covering in the back of your mouth and throat. Get a human papillomavirus (HPV) vaccine  if you are female and aged 23 to 32 or male 23 to 24 and never received it. This vaccine protects you from HPV infection. HPV is the most common infection spread by sexual contact. HPV may also cause vaginal, penile, and anal cancers. Get a pneumococcal vaccine  if you are aged 72 years or older.  The pneumococcal vaccine is an injection given to protect you from pneumococcal disease. Pneumococcal disease is an infection caused by pneumococcal bacteria. The infection may cause pneumonia, meningitis, or an ear infection. Get a shingles vaccine  if you are 60 or older, even if you have had shingles before. The shingles vaccine is an injection to protect you from the varicella-zoster virus. This is the same virus that causes chickenpox. Shingles is a painful rash that develops in people who had chickenpox or have been exposed to the virus. How to eat healthy:  My Plate is a model for planning healthy meals. It shows the types and amounts of foods that should go on your plate. Fruits and vegetables make up about half of your plate, and grains and protein make up the other half. A serving of dairy is included on the side of your plate. The amount of calories and serving sizes you need depends on your age, gender, weight, and height. Examples of healthy foods are listed below:  Eat a variety of vegetables  such as dark green, red, and orange vegetables. You can also include canned vegetables low in sodium (salt) and frozen vegetables without added butter or sauces. Eat a variety of fresh fruits , canned fruit in 100% juice, frozen fruit, and dried fruit. Include whole grains. At least half of the grains you eat should be whole grains. Examples include whole-wheat bread, wheat pasta, brown rice, and whole-grain cereals such as oatmeal.    Eat a variety of protein foods such as seafood (fish and shellfish), lean meat, and poultry without skin (turkey and chicken). Examples of lean meats include pork leg, shoulder, or tenderloin, and beef round, sirloin, tenderloin, and extra lean ground beef. Other protein foods include eggs and egg substitutes, beans, peas, soy products, nuts, and seeds. Choose low-fat dairy products such as skim or 1% milk or low-fat yogurt, cheese, and cottage cheese. Limit unhealthy fats  such as butter, hard margarine, and shortening. Exercise:  Exercise at least 30 minutes per day on most days of the week. Some examples of exercise include walking, biking, dancing, and swimming. You can also fit in more physical activity by taking the stairs instead of the elevator or parking farther away from stores. Include muscle strengthening activities 2 days each week. Regular exercise provides many health benefits. It helps you manage your weight, and decreases your risk for type 2 diabetes, heart disease, stroke, and high blood pressure. Exercise can also help improve your mood. Ask your healthcare provider about the best exercise plan for you. General health and safety guidelines:   Do not smoke. Nicotine and other chemicals in cigarettes and cigars can cause lung damage. Ask your healthcare provider for information if you currently smoke and need help to quit. E-cigarettes or smokeless tobacco still contain nicotine. Talk to your healthcare provider before you use these products. Limit alcohol. A drink of alcohol is 12 ounces of beer, 5 ounces of wine, or 1½ ounces of liquor. Lose weight, if needed. Being overweight increases your risk of certain health conditions. These include heart disease, high blood pressure, type 2 diabetes, and certain types of cancer. Protect your skin. Do not sunbathe or use tanning beds. Use sunscreen with a SPF 15 or higher. Apply sunscreen at least 15 minutes before you go outside. Reapply sunscreen every 2 hours. Wear protective clothing, hats, and sunglasses when you are outside. Drive safely. Always wear your seatbelt. Make sure everyone in your car wears a seatbelt. A seatbelt can save your life if you are in an accident. Do not use your cell phone when you are driving. This could distract you and cause an accident. Pull over if you need to make a call or send a text message. Practice safe sex. Use latex condoms if are sexually active and have more than one partner.  Your healthcare provider may recommend screening tests for sexually transmitted infections (STIs). Wear helmets, lifejackets, and protective gear. Always wear a helmet when you ride a bike or motorcycle, go skiing, or play sports that could cause a head injury. Wear protective equipment when you play sports. Wear a lifejacket when you are on a boat or doing water sports. © Copyright [facility] 2023 Information is for End User's use only and may not be sold, redistributed or otherwise used for commercial purposes. The above information is an  only. It is not intended as medical advice for individual conditions or treatments. Talk to your doctor, nurse or pharmacist before following any medical regimen to see if it is safe and effective for you.

## 2023-10-16 NOTE — TELEPHONE ENCOUNTER
Medical Records Request   Who are you speaking with? Patient   If it is not the patient, are they listed on an active communication consent form? Yes   What is the purpose of this call? Requesting medical records   What records are being requested? Imaging Disks   Details/ Additional Info All imaging on CD and appmt notes of last visit by FAX   Which provider does the patient see?  Dr. Jonah Dutta   Fax Number   Person's name (Attention:)   [phone]   Facility call back number [phone]   Patient call back number [phone]

## 2023-10-16 NOTE — PROGRESS NOTES
Chief Complaint   Patient presents with    Establish Care        HPI   79-year-old male presents as a new patient. History is significant for the diagnosis of colon cancer found in his sigmoid in July of this year. 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 There was a concern it could be small cell from the lung because of his history of smoking. He did have a sigmoid resection. Finished a course of chemotherapy and now on immunotherapy. His oncologist is Dr. Efrain Galvin. Prior to [facility] when he started with left lower quadrant pain, had been in good health. No other chronic medical problems. Smokes 1/2 pack a day. Occasional alcohol. No allergies to medications. Most recent CT revealed pulmonary nodules to be stable. There was improvement in lower abdominal/pelvic lymphadenopathy compared to previous PET/CT.  is his primary insurance and he needs referral from his primary care physician, now me, for colonoscopy which he has not had. 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, chemotherapy, immunotherapy        Past Surgical History:   Procedure Laterality Date    COLON SIGMOID RESECTION LAPAROSCOPIC N/A 6/8/2023    Procedure: RESECTION COLON SIGMOID LAPAROSCOPIC HAND-ASSISTED;  Surgeon: Vern Wakler MD;  Location: [facility];  Service: General    IR DRAINAGE TUBE CHECK/CHANGE/REPOSITION/REINSERTION/UPSIZE  6/5/2023    IR DRAINAGE TUBE PLACEMENT  5/30/2023    IR PORT PLACEMENT  7/28/2023       Social History     Tobacco Use    Smoking status: Every Day     Packs/day: 0.75     Years: 25.00     Total pack years: 18.75     Types: Cigarettes     Passive exposure: Current (wife smokes)    Smokeless tobacco: Not on file    Tobacco comments:     Patient states Chantix was not effective.    Substance Use Topics    Alcohol use: Not Currently       Social History     Social History Narrative     since 2011.    4 children aged 25, 16, 12, 10 as of 10/23. Second child is a girl, the rest boys. 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 Enjoys hiking and hunting. And video games. The following portions of the patient's history were reviewed and updated as appropriate: allergies, current medications, past family history, past medical history, past social history, past surgical history, and problem list.      Review of Systems       /82 (BP Location: Left arm, Patient Position: Sitting, Cuff Size: Large)   Pulse (!) 108   Temp (!) 97.2 °F (36.2 °C) (Temporal)   Ht 5' 6.54" (1.69 m)   Wt 90.4 kg (199 lb 3.2 oz)   SpO2 98%   BMI 31.64 kg/m²      Physical Exam   Pleasant male in no acute distress. No neck nodes. Lungs are clear. Heart regular without murmur. Abdomen soft and nontender. Well-healed scar in left lower quadrant. Mood is okay. Affect appropriate. Depression Screening and Follow-up Plan: Patient was screened for depression during today's encounter. They screened negative with a PHQ-2 score of 0. Current Outpatient Medications:     nicotine (NICODERM CQ) 14 mg/24hr TD 24 hr patch, Place 1 patch on the skin over 24 hours every 24 hours, Disp: 28 patch, Rfl: 0    nicotine (NICOTROL) 10 MG inhaler, Inhale 1 puff as needed for smoking cessation, Disp: 168 each, Rfl: 5    lidocaine-prilocaine (EMLA) cream, Apply topically as needed for mild pain, Disp: 30 g, Rfl: 0    ondansetron (ZOFRAN-ODT) 8 mg disintegrating tablet, Take 1 tablet (8 mg total) by mouth every 8 (eight) hours as needed for nausea or vomiting, Disp: 20 tablet, Rfl: 0     No problem-specific Assessment & Plan notes found for this encounter.        Diagnoses and all orders for this visit:    Health maintenance examination    Encounter for immunization  -     influenza vaccine, quadrivalent, 0.5 mL, preservative-free, for adult and pediatric patients 6 mos+ (AFLURIA, FLUARIX, FLULAVAL, FLUZONE)  -     Pneumococcal Conjugate Vaccine 20-valent (Pcv20)    Malignant neoplasm of sigmoid colon [facility])  -     Ambulatory Referral to Gastroenterology; Future    Tobacco abuse  -     nicotine (NICOTROL) 10 MG inhaler; Inhale 1 puff as needed for smoking cessation  -     nicotine (NICODERM CQ) 14 mg/24hr TD 24 hr patch; Place 1 patch on the skin over 24 hours every 24 hours    Obesity, Class I, BMI 30.0-34.9 (see actual BMI)    Need for pneumococcal vaccination    Encounter for screening for HIV  -     HIV 1/2 AG/AB w Reflex [facility] for 2 yr old and above; Future    Need for hepatitis C screening test  -     Hepatitis C antibody; Future    Need for lipid screening  -     Lipid panel; Future        Patient Instructions   Review of his history over the last 6 months. Referral for colonoscopy. Flu shot and Prevnar 20, the latter being a vaccine to prevent pneumococcal infections recommended in smokers. One-time shot. Discussion of his use of nicotine. Handout given about smoking cessation. Trial of the NicoDerm patch 14 mg daily along with the Nicotrol inhaler. Screen lipid profile, hepatitis C, and HIV. Recheck in 6 weeks for follow-up of tobacco.    Wellness Visit for Adults   AMBULATORY CARE:   A wellness visit  is when you see your healthcare provider to get screened for health problems. Your healthcare provider will also give you advice on how to stay healthy. Write down your questions so you remember to ask them. Ask your healthcare provider how often you should have a wellness visit. What happens at a wellness visit:  Your healthcare provider will ask about your health, and your family history of health problems. This includes high blood pressure, heart disease, and cancer. He or she will ask if you have symptoms that concern you, if you smoke, and about your mood.  You may also be asked about your intake of medicines, supplements, food, and alcohol. Any of the following may be done: Your weight  will be checked. Your height may also be checked so your body mass index (BMI) can be calculated. Your BMI shows if you are at a healthy weight. Your blood pressure  and heart rate will be checked. Your temperature may also be checked. Blood and urine tests  may be done. Blood tests may be done to check your cholesterol levels. Abnormal cholesterol levels increase your risk for heart disease and stroke. You may also need a blood or urine test to check for diabetes if you are at increased risk. Urine tests may be done to look for signs of an infection or kidney disease. A physical exam  includes checking your heartbeat and lungs with a stethoscope. Your healthcare provider may also check your skin to look for sun damage. Screening tests  may be recommended. A screening test is done to check for diseases that may not cause symptoms. The screening tests you may need depend on your age, gender, family history, and lifestyle habits. For example, colorectal screening may be recommended if you are 48years old or older. Screening tests you need if you are a woman:   A Pap smear  is used to screen for cervical cancer. Pap smears are usually done every 3 to 5 years depending on your age. You may need them more often if you have had abnormal Pap smear test results in the past. Ask your healthcare provider how often you should have a Pap smear. A mammogram  is an x-ray of your breasts to screen for breast cancer. Experts recommend mammograms every 2 years starting at age 48 years. You may need a mammogram at age 52 years or younger if you have an increased risk for breast cancer. Talk to your healthcare provider about when you should start having mammograms and how often you need them. Vaccines you may need:   Get an influenza vaccine  every year. The influenza vaccine protects you from the flu.  Several types of viruses cause the flu. The viruses change over time, so new vaccines are made each year. Get a tetanus-diphtheria (Td) booster vaccine  every 10 years. This vaccine protects you against tetanus and diphtheria. Tetanus is a severe infection that may cause painful muscle spasms and lockjaw. Diphtheria is a severe bacterial infection that causes a thick covering in the back of your mouth and throat. Get a human papillomavirus (HPV) vaccine  if you are female and aged 23 to 32 or male 23 to 24 and never received it. This vaccine protects you from HPV infection. HPV is the most common infection spread by sexual contact. HPV may also cause vaginal, penile, and anal cancers. Get a pneumococcal vaccine  if you are aged 72 years or older. The pneumococcal vaccine is an injection given to protect you from pneumococcal disease. Pneumococcal disease is an infection caused by pneumococcal bacteria. The infection may cause pneumonia, meningitis, or an ear infection. Get a shingles vaccine  if you are 60 or older, even if you have had shingles before. The shingles vaccine is an injection to protect you from the varicella-zoster virus. This is the same virus that causes chickenpox. Shingles is a painful rash that develops in people who had chickenpox or have been exposed to the virus. How to eat healthy:  My Plate is a model for planning healthy meals. It shows the types and amounts of foods that should go on your plate. Fruits and vegetables make up about half of your plate, and grains and protein make up the other half. A serving of dairy is included on the side of your plate. The amount of calories and serving sizes you need depends on your age, gender, weight, and height. Examples of healthy foods are listed below:  Eat a variety of vegetables  such as dark green, red, and orange vegetables. You can also include canned vegetables low in sodium (salt) and frozen vegetables without added butter or sauces.     Eat a variety of fresh fruits , canned fruit in 100% juice, frozen fruit, and dried fruit. Include whole grains. At least half of the grains you eat should be whole grains. Examples include whole-wheat bread, wheat pasta, brown rice, and whole-grain cereals such as oatmeal.    Eat a variety of protein foods such as seafood (fish and shellfish), lean meat, and poultry without skin (turkey and chicken). Examples of lean meats include pork leg, shoulder, or tenderloin, and beef round, sirloin, tenderloin, and extra lean ground beef. Other protein foods include eggs and egg substitutes, beans, peas, soy products, nuts, and seeds. Choose low-fat dairy products such as skim or 1% milk or low-fat yogurt, cheese, and cottage cheese. Limit unhealthy fats  such as butter, hard margarine, and shortening. Exercise:  Exercise at least 30 minutes per day on most days of the week. Some examples of exercise include walking, biking, dancing, and swimming. You can also fit in more physical activity by taking the stairs instead of the elevator or parking farther away from stores. Include muscle strengthening activities 2 days each week. Regular exercise provides many health benefits. It helps you manage your weight, and decreases your risk for type 2 diabetes, heart disease, stroke, and high blood pressure. Exercise can also help improve your mood. Ask your healthcare provider about the best exercise plan for you. General health and safety guidelines:   Do not smoke. Nicotine and other chemicals in cigarettes and cigars can cause lung damage. Ask your healthcare provider for information if you currently smoke and need help to quit. E-cigarettes or smokeless tobacco still contain nicotine. Talk to your healthcare provider before you use these products. Limit alcohol. A drink of alcohol is 12 ounces of beer, 5 ounces of wine, or 1½ ounces of liquor. Lose weight, if needed.   Being overweight increases your risk of certain health conditions. These include heart disease, high blood pressure, type 2 diabetes, and certain types of cancer. Protect your skin. Do not sunbathe or use tanning beds. Use sunscreen with a SPF 15 or higher. Apply sunscreen at least 15 minutes before you go outside. Reapply sunscreen every 2 hours. Wear protective clothing, hats, and sunglasses when you are outside. Drive safely. Always wear your seatbelt. Make sure everyone in your car wears a seatbelt. A seatbelt can save your life if you are in an accident. Do not use your cell phone when you are driving. This could distract you and cause an accident. Pull over if you need to make a call or send a text message. Practice safe sex. Use latex condoms if are sexually active and have more than one partner. Your healthcare provider may recommend screening tests for sexually transmitted infections (STIs). Wear helmets, lifejackets, and protective gear. Always wear a helmet when you ride a bike or motorcycle, go skiing, or play sports that could cause a head injury. Wear protective equipment when you play sports. Wear a lifejacket when you are on a boat or doing water sports. © Copyright [facility] 2023 Information is for End User's use only and may not be sold, redistributed or otherwise used for commercial purposes. The above information is an  only. It is not intended as medical advice for individual conditions or treatments. Talk to your doctor, nurse or pharmacist before following any medical regimen to see if it is safe and effective for you.

## 2023-10-19 ENCOUNTER — HOSPITAL ENCOUNTER (OUTPATIENT)
Dept: INFUSION CENTER | Facility: HOSPITAL | Age: 43
End: 2023-10-19
Payer: OTHER GOVERNMENT

## 2023-10-19 ENCOUNTER — TELEPHONE (OUTPATIENT)
Dept: HEMATOLOGY ONCOLOGY | Facility: CLINIC | Age: 43
End: 2023-10-19

## 2023-10-19 DIAGNOSIS — Z11.4 ENCOUNTER FOR SCREENING FOR HIV: ICD-10-CM

## 2023-10-19 DIAGNOSIS — E78.2 MIXED HYPERLIPIDEMIA: Primary | ICD-10-CM

## 2023-10-19 DIAGNOSIS — Z13.220 NEED FOR LIPID SCREENING: ICD-10-CM

## 2023-10-19 DIAGNOSIS — C80.1 SMALL CELL CARCINOMA (HCC): ICD-10-CM

## 2023-10-19 DIAGNOSIS — Z45.2 ENCOUNTER FOR CARE RELATED TO PORT-A-CATH: Primary | ICD-10-CM

## 2023-10-19 DIAGNOSIS — Z11.59 NEED FOR HEPATITIS C SCREENING TEST: ICD-10-CM

## 2023-10-19 LAB
ALBUMIN SERPL BCP-MCNC: 4.3 G/DL (ref 3.5–5)
ALP SERPL-CCNC: 82 U/L (ref 34–104)
ALT SERPL W P-5'-P-CCNC: 23 U/L (ref 7–52)
ANION GAP SERPL CALCULATED.3IONS-SCNC: 8 MMOL/L
AST SERPL W P-5'-P-CCNC: 21 U/L (ref 13–39)
BASOPHILS # BLD AUTO: 0.02 THOUSANDS/ÂΜL (ref 0–0.1)
BASOPHILS NFR BLD AUTO: 0 % (ref 0–1)
BILIRUB SERPL-MCNC: 0.33 MG/DL (ref 0.2–1)
BUN SERPL-MCNC: 12 MG/DL (ref 5–25)
CALCIUM SERPL-MCNC: 9.6 MG/DL (ref 8.4–10.2)
CHLORIDE SERPL-SCNC: 102 MMOL/L (ref 96–108)
CHOLEST SERPL-MCNC: 226 MG/DL
CO2 SERPL-SCNC: 28 MMOL/L (ref 21–32)
CREAT SERPL-MCNC: 0.86 MG/DL (ref 0.6–1.3)
EOSINOPHIL # BLD AUTO: 0.05 THOUSAND/ÂΜL (ref 0–0.61)
EOSINOPHIL NFR BLD AUTO: 1 % (ref 0–6)
ERYTHROCYTE [DISTWIDTH] IN BLOOD BY AUTOMATED COUNT: 15.1 % (ref 11.6–15.1)
GFR SERPL CREATININE-BSD FRML MDRD: 108 ML/MIN/1.73SQ M
GLUCOSE SERPL-MCNC: 101 MG/DL (ref 65–140)
HCT VFR BLD AUTO: 41.4 % (ref 36.5–49.3)
HCV AB SER QL: NORMAL
HDLC SERPL-MCNC: 29 MG/DL
HGB BLD-MCNC: 13.7 G/DL (ref 12–17)
HIV 1+2 AB+HIV1 P24 AG SERPL QL IA: NORMAL
HIV 2 AB SERPL QL IA: NORMAL
HIV1 AB SERPL QL IA: NORMAL
HIV1 P24 AG SERPL QL IA: NORMAL
IMM GRANULOCYTES # BLD AUTO: 0.07 THOUSAND/UL (ref 0–0.2)
IMM GRANULOCYTES NFR BLD AUTO: 1 % (ref 0–2)
LDLC SERPL CALC-MCNC: 146 MG/DL (ref 0–100)
LYMPHOCYTES # BLD AUTO: 1.13 THOUSANDS/ÂΜL (ref 0.6–4.47)
LYMPHOCYTES NFR BLD AUTO: 18 % (ref 14–44)
MCH RBC QN AUTO: 31.8 PG (ref 26.8–34.3)
MCHC RBC AUTO-ENTMCNC: 33.1 G/DL (ref 31.4–37.4)
MCV RBC AUTO: 96 FL (ref 82–98)
MONOCYTES # BLD AUTO: 1.08 THOUSAND/ÂΜL (ref 0.17–1.22)
MONOCYTES NFR BLD AUTO: 18 % (ref 4–12)
NEUTROPHILS # BLD AUTO: 3.78 THOUSANDS/ÂΜL (ref 1.85–7.62)
NEUTS SEG NFR BLD AUTO: 62 % (ref 43–75)
NONHDLC SERPL-MCNC: 197 MG/DL
NRBC BLD AUTO-RTO: 0 /100 WBCS
PLATELET # BLD AUTO: 254 THOUSANDS/UL (ref 149–390)
PMV BLD AUTO: 9.4 FL (ref 8.9–12.7)
POTASSIUM SERPL-SCNC: 4 MMOL/L (ref 3.5–5.3)
PROT SERPL-MCNC: 7.3 G/DL (ref 6.4–8.4)
RBC # BLD AUTO: 4.31 MILLION/UL (ref 3.88–5.62)
SODIUM SERPL-SCNC: 138 MMOL/L (ref 135–147)
TRIGL SERPL-MCNC: 254 MG/DL
TSH SERPL DL<=0.05 MIU/L-ACNC: 0.89 UIU/ML (ref 0.45–4.5)
WBC # BLD AUTO: 6.13 THOUSAND/UL (ref 4.31–10.16)

## 2023-10-19 PROCEDURE — 86803 HEPATITIS C AB TEST: CPT

## 2023-10-19 PROCEDURE — 80053 COMPREHEN METABOLIC PANEL: CPT | Performed by: INTERNAL MEDICINE

## 2023-10-19 PROCEDURE — 87389 HIV-1 AG W/HIV-1&-2 AB AG IA: CPT

## 2023-10-19 PROCEDURE — 80061 LIPID PANEL: CPT

## 2023-10-19 PROCEDURE — 84443 ASSAY THYROID STIM HORMONE: CPT | Performed by: INTERNAL MEDICINE

## 2023-10-19 PROCEDURE — 85025 COMPLETE CBC W/AUTO DIFF WBC: CPT | Performed by: INTERNAL MEDICINE

## 2023-10-19 RX ORDER — ATORVASTATIN CALCIUM 10 MG/1
10 TABLET, FILM COATED ORAL DAILY
Qty: 90 TABLET | Refills: 3 | Status: SHIPPED | OUTPATIENT
Start: 2023-10-19

## 2023-10-19 NOTE — PROGRESS NOTES
Port accessed, blood return noted. Labs obtained per order. Patient ambulated off unit in stable condition.

## 2023-10-19 NOTE — RESULT ENCOUNTER NOTE
Cholesterol is high. Very low amount of the good type of cholesterol. Increased risk of heart disease. Suggest taking atorvastatin 10 mg daily. Prescription called to pharmacy. Blood count and chemistry and thyroid are normal.  Recheck lipids in about 3 months.

## 2023-10-19 NOTE — PLAN OF CARE
Prescription approved per [facility] Refill Protocol.  Chey Brownlee RN     Problem: Knowledge Deficit  Goal: Patient/family/caregiver demonstrates understanding of disease process, treatment plan, medications, and discharge instructions  Description: Complete learning assessment and assess knowledge base.   Interventions:  - Provide teaching at level of understanding  - Provide teaching via preferred learning methods  Outcome: Progressing

## 2023-10-19 NOTE — TELEPHONE ENCOUNTER
Patient Call    Who are you speaking with? Keiko Bird from [employer]      If it is not the patient, are they listed on an active communication consent form? N/A   What is the reason for this call? Keiko Bird is calling because they need the patient's most recent CT scan completed on 10/02/2023 sent to them. Keiko Bird states if we can share them through a program they will accept that as well. Does this require a call back? N/A  Please contact eliana with any further questions. If a call back is required, please list best call back number [phone]   If a call back is required, advise that a message will be forwarded to their care team and someone will return their call as soon as possible. Did you relay this information to the patient?  N/A

## 2023-10-20 ENCOUNTER — HOSPITAL ENCOUNTER (OUTPATIENT)
Dept: INFUSION CENTER | Facility: HOSPITAL | Age: 43
End: 2023-10-20
Attending: INTERNAL MEDICINE
Payer: OTHER GOVERNMENT

## 2023-10-20 VITALS
SYSTOLIC BLOOD PRESSURE: 159 MMHG | RESPIRATION RATE: 18 BRPM | HEIGHT: 67 IN | WEIGHT: 200.4 LBS | HEART RATE: 79 BPM | OXYGEN SATURATION: 99 % | TEMPERATURE: 96.6 F | BODY MASS INDEX: 31.45 KG/M2 | DIASTOLIC BLOOD PRESSURE: 81 MMHG

## 2023-10-20 DIAGNOSIS — C80.1 SMALL CELL CARCINOMA (HCC): Primary | ICD-10-CM

## 2023-10-20 PROCEDURE — 96413 CHEMO IV INFUSION 1 HR: CPT

## 2023-10-20 RX ORDER — SODIUM CHLORIDE 9 MG/ML
20 INJECTION, SOLUTION INTRAVENOUS ONCE
Status: COMPLETED | OUTPATIENT
Start: 2023-10-20 | End: 2023-10-20

## 2023-10-20 RX ADMIN — DURVALUMAB 1500 MG: 500 INJECTION, SOLUTION INTRAVENOUS at 09:37

## 2023-10-20 RX ADMIN — SODIUM CHLORIDE 20 ML/HR: 0.9 INJECTION, SOLUTION INTRAVENOUS at 09:08

## 2023-10-20 NOTE — PROGRESS NOTES
Recent labs reviewed. Patient tolerated Imfinzi treatment without reaction or issues. AVS  declined. Patient has Mychart. Patient ambulated off unit without incident. All personal belongings taken with patient.

## 2023-10-20 NOTE — PLAN OF CARE
Problem: Potential for Falls  Goal: Patient will remain free of falls  Description: INTERVENTIONS:  - Educate patient/family on patient safety including physical limitations  - Instruct patient to call for assistance with activity   - Consult OT/PT to assist with strengthening/mobility   - Keep Call bell within reach  - Keep bed low and locked with side rails adjusted as appropriate  - Keep care items and personal belongings within reach  - Initiate and maintain comfort rounds  - Make Fall Risk Sign visible to staff  -  - Apply yellow socks and bracelet for high fall risk patients  - Consider moving patient to room near nurses station  Outcome: Progressing SEVERITY:- BORDERLINE ECG -

SINUS RHYTHM

PROBABLE LEFT ATRIAL ABNORMALITY

:

Confirmed by: Melva Martins MD 29-Jun-2018 00:24:32

## 2023-10-20 NOTE — TELEPHONE ENCOUNTER
[place] [facility] [place] Allen, report sent to Dr. Albertina Francis office on 10/20/23. Fax number obtained [phone]. Confirmation obtained.

## 2023-10-22 NOTE — PROGRESS NOTES
Hematology/Oncology Outpatient Office Note    Date of Service: 2023    [facility] HEMATOLOGY ONCOLOGY SPECIALISTS JERICA Smiley [facility]  [phone]    Reason for Consultation:   Chief Complaint   Patient presents with    Follow-up       Cancer Stage at diagnosis: IV    Referral Physician: No ref. provider found    Primary Care Physician:  Donna Bennett MD     Nickname: Carla Martinez    Spouse: Jackelin Mueller    Original ECO    Today's ECO    Goals and Barriers:  Current Goal: Minimize effects of disease burden, extend life. Barriers to accomplishing this: None    Patient's Capacity to Self Care:  Patient is able to self care    Code Status: not addressed today    Advanced directives: not addressed today    ASSESSMENT & PLAN      Diagnosis ICD-10-CM Associated Orders   1. Malignant neoplasm of sigmoid colon ([address])  C18.7       2. 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 c PMHx notable for obesity, being seen in consultation for large cell with neuroendocrine features found in the colon    Discussion of decision making  Oncology history updated, accordingly, during this visit  Goals of care/patient communication  I discussed with the patient the clinical course leading up to their cancer diagnosis. I reviewed relevant office notes, imaging reports and pathology result as well. I told the patient that this is a case of curable versus incurable disease and what this means. We discussed that the goal of anti-cancer therapy is to provide best quality of life, extend overall survival, and progression free survival as shown in clinical trials. We also discussed that there might be a point when the cancer will no longer respond to this anti-neoplastic therapy.    I explained the risks/benefits of the proposed cancer therapy: Carboplatin-Etoposide +/- Durvalumab and after discussion including understanding risks of possible life-threatening complications and therapy-related malignancy development, informed consent for blood products and treatment has been signed and obtained. TNM/Staging At Diagnosis  [id number]  Cancer Staging   IV  Disease Features/Tumor Markers/Genetics  Tumor Marker:   7/17/2023: CEA 2.2  CGA: 49.4  Notable Path Features:   6/8/2023: Poorly differentiated malignancy, pending external expert consultation positive for synaptophysin, chromogranin, CD56, TTF-1, BCL2, and cyclin D1 with a high Ki-67 proliferative rate (approximately 90%) compatible with a poorly differentiated malignancy with features suggesting neuroendocrine differentiation. 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 The intramural abscess abuts the superior aspect of the urinary bladder without evidence of a colovesical fistula. 6/27/2023 MRI Abd w/wo c: Benign flash-filling 11 mm hepatic and angioma in the caudate lobe of the liver accounting for the enhancing nodule in that location on recent CT examinations  7/19/2023 CT Chest w/o c: There are multiple pulmonary nodules, which will be described on series 4:  Image 68, left lower lobe, solid, smooth bordered, 3 mm . Image 63, left upper lobe, solid, smooth bordered, 4 mm . Image 43, right upper lobe, solid, smooth bordered, 4 mm, may represent a fissural lymph node.   Image 55, right upper lobe, solid, smooth bordered, 4 mm, may represent a fissural lymph node  7/19/2023 MRI Brain w/wo c: No evidence of intracranial metastasis, A few foci of T2/FLAIR hyperintensity primarily involving left frontoparietal subcortical and deep white matter (maybe migraine related)  8/8/2023 PET/CT: Three new hypermetabolic solid nodules/masses in the abdomen as described, compatible with progression of disease. Mild uptake along the left anterior ninth rib corresponding to a mild fracture deformity. Findings are indeterminate and may represent the sequela of trauma or metastatic disease. Stable sub-4 mm pulmonary nodules   -3.1 x 3.1 cm hypermetabolic mass in the left anterior abdomen abutting a loop of small bowel (series 4, image 147; SUV max 12). -1.3 x 1.2 cm hypermetabolic soft tissue nodule in the left mid abdomen posterior to a loop of small bowel (series 4, image 154; SUV max 17)   -1.8 x 2.1 cm hypermetabolic soft tissue nodule anterior to the left psoas muscle (series 4, image 160; SUV max 9.7)  10/2/2023: CT CAP w/c: excellent RI. Multiple pulmonary nodules without significant change when compared to the previous CT chest. Some which may represent fissural lymph nodes. No evidence of metastatic disease in the chest abdomen and pelvis. Improvement of the lower abdominal/pelvic lymphadenopathy when compared to the previous PET/CT. Discussion of decision making    I personally reviewed the following lab results, the image studies, pathology, other specialty/physicians consult notes and recommendations, and outside medical records. I had a lengthy discussion with the patient and shared the work-up findings. We discussed the diagnosis and management plan as below. I spent 42 minutes reviewing the records (labs, clinician notes, outside records, medical history, ordering medicine/tests/procedures, interpreting the imaging/labs previously done) and coordination of care as well as direct time with the patient today, of which greater than 50% of the time was spent in counseling and coordination of care with the patient/family.       Plan/Labs  Restaging CT CAP w/c scheduled 1/4/2024  I discussed the risks of ongoing cigarette smoking and reviewed the benefits of quitting and risk of new lung primary, heart disease, stroke, among other risks and cancers. Reviewed options including support, quit date, medications, and family support. Patient voiced understanding and willingness to try to quit. [facility]. Greater than 3 minutes were needed for discussion of tobacco dependence and quitting counselling. 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 The patient knows the contact information for our office and knows to reach out for any relevant concerns related to this encounter. They are to call for any temperature 100.4 or higher, new symptoms including but not restricted to shaking chills, decreased appetite, nausea, vomiting, diarrhea, increased fatigue, shortness of breath or chest pain, confusion, and not feeling the strength to come to the clinic. For all other listed problems and medical diagnosis in their chart - they are managed by PCP and/or other specialists, which the patient acknowledges. Thank you very much for your consultation and making us a part of this patient's care. We are continuing to follow closely with you. Please do not hesitate to reach out to me with any additional questions or concerns. Josue Pak MD  Hematology & Medical Oncology Staff Physician             Disclaimer: This document was prepared using [employer] Fluency Direct technology.  If a word or phrase is confusing, or does not make sense, this is likely due to recognition error which was not discovered during this clinician's review. If you believe an error has occurred, please contact me through [id number] line for lee? cation.       ONCOLOGY HISTORY OF PRESENT ILLNESS        Oncology History   Malignant neoplasm of sigmoid colon ([address])   6/22/2023 Initial Diagnosis    Malignant neoplasm of sigmoid colon ([address])     9/3/2023 -  Cancer Staged    Staging form: Colon and Rectum, AJCC 8th Edition  - Clinical: Stage Unknown (cTX, cN0, pM1) - Signed by Yimi Fonseca MD on 9/3/2023  Total positive nodes: 0       Small cell carcinoma ([address])   7/18/2023 Initial Diagnosis    Small cell carcinoma (HCC)     7/26/2023 -  Chemotherapy    alteplase (CATHFLO), 2 mg, Intracatheter, Every 1 Minute as needed, 5 of 10 cycles  etoposide (TOPOSAR), 80 mg/m2 = 160.8 mg, Intravenous, Once, 4 of 4 cycles  Administration: 160.8 mg (7/26/2023), 160.8 mg (7/27/2023), 160.8 mg (7/28/2023), 160.8 mg (8/16/2023), 160.8 mg (8/17/2023), 160.8 mg (8/18/2023), 160.8 mg (9/6/2023), 160.8 mg (9/7/2023), 160.8 mg (9/8/2023), 160.8 mg (9/27/2023), 160.8 mg (9/28/2023), 160.8 mg (9/29/2023)  CARBOplatin (PARAPLATIN) IVPB (GOG AUC DOSING), 750 mg, Intravenous, Once, 4 of 4 cycles  Administration: 750 mg (7/26/2023), 700.5 mg (8/16/2023), 694 mg (9/6/2023), 694 mg (9/27/2023)  durvalumab (IMFINZI) IVPB, 1,500 mg, Intravenous, Once, 5 of 10 cycles  Administration: 1,500 mg (7/26/2023), 1,500 mg (10/20/2023), 1,500 mg (8/16/2023), 1,500 mg (9/6/2023), 1,500 mg (9/27/2023)  aprepitant (CINVANTI) in  mL IVPB, 130 mg, Intravenous, Once, 4 of 4 cycles  Administration: 130 mg (7/26/2023), 130 mg (8/16/2023), 130 mg (9/6/2023), 130 mg (9/27/2023)       5/21/2023: BRBPR and pain in the lower abdomen for a week; leading to a ER visit, dx of diverticulitis     SUBJECTIVE  (INTERVAL HISTORY)      Clotting History None    Bleeding History None   Cancer History Colon   Family Cancer History pGrandfather (lung, smoker)   H/O Blood/Plt Transfusion None   Tobacco/etoh/drug abuse 1/2 PPD x 25 years (working on quitting and thinking about it; has nicotine patches), no etoh abuse or rec drug use       Cancer Screening history n/a   Occupation IT       Interval events:    No acute issues at this time. Moderate fatigue during treatment. Hair loss peaked. Feels better off of chemo. I have reviewed the relevant past medical, surgical, social and family history. I have also reviewed allergies and medications for this patient. Review of Systems    Baseline weight: 200 lbs    Denies F/C, N/V, SOB, CP, LH, HA, rash, itching, gen weakness, melena, hematuria, hematochezia, falls, diarrhea, or constipation       A 10-point review of system was performed, pertinent positive and negative were detailed as above. Otherwise, the 10-point review of system was negative.       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, chemotherapy, immunotherapy    Mixed hyperlipidemia 10/19/2023    10/19/2023-cholesterol 226, HDL 29       Past Surgical History:   Procedure Laterality Date    COLON SIGMOID RESECTION LAPAROSCOPIC N/A 6/8/2023    Procedure: RESECTION COLON SIGMOID LAPAROSCOPIC HAND-ASSISTED;  Surgeon: Korin Teran MD;  Location: [facility];  Service: General    IR DRAINAGE TUBE CHECK/CHANGE/REPOSITION/REINSERTION/UPSIZE  6/5/2023    IR DRAINAGE TUBE PLACEMENT  5/30/2023    IR PORT PLACEMENT  7/28/2023       Family History   Problem Relation Age of Onset    No Known Problems Mother     Hypertension Father     Diabetes Father     Heart attack Father     Stroke Father        Social History     Socioeconomic History    Marital status: /Civil Union     Spouse name: Not on file    Number of children: Not on file    Years of education: Not on file    Highest education level: Not on file   Occupational History    Not on file   Tobacco Use Smoking status: Every Day     Packs/day: 0.75     Years: 25.00     Total pack years: 18.75     Types: Cigarettes     Passive exposure: Current (wife smokes)    Smokeless tobacco: Not on file    Tobacco comments:     Patient states Chantix was not effective. Vaping Use    Vaping Use: Never used   Substance and Sexual Activity    Alcohol use: Not Currently    Drug use: Never    Sexual activity: Not on file   Other Topics Concern    Not on file   Social History Narrative     since 2011.    4 children aged 25, 16, 12, 10 as of 10/23. Second child is a girl, the rest boys. 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 Wife is in the [employer]. Enjoys hiking and hunting. And video games. Social Determinants of Health     Financial Resource Strain: Not on file   Food Insecurity: No Food Insecurity (5/31/2023)    Hunger Vital Sign     Worried About Running Out of Food in the Last Year: Never true     Ran Out of Food in the Last Year: Never true   Transportation Needs: No Transportation Needs (5/31/2023)    PRAPARE - Transportation     Lack of Transportation (Medical): No     Lack of Transportation (Non-Medical):  No   Physical Activity: Not on file   Stress: Not on file   Social Connections: Not on file   Intimate Partner Violence: Not on file   Housing Stability: Low Risk  (5/31/2023)    Housing Stability Vital Sign     Unable to Pay for Housing in the Last Year: No     Number of Places Lived in the Last Year: 1     Unstable Housing in the Last Year: No       No Known Allergies    Current Outpatient Medications   Medication Sig Dispense Refill    atorvastatin (LIPITOR) 10 mg tablet Take 1 tablet (10 mg total) by mouth daily 90 tablet 3    Lidocaine 5 % CREA Apply topically      nicotine (NICODERM CQ) 14 mg/24hr TD 24 hr patch Place 1 patch on the skin over 24 hours every 24 hours 28 patch 0    ondansetron (ZOFRAN-ODT) 8 mg disintegrating tablet Take 1 tablet (8 mg total) by mouth every 8 (eight) hours as needed for nausea or vomiting 20 tablet 0     No current facility-administered medications for this visit. (Not in a hospital admission)      Objective:     24 Hour Vitals Assessment:     Vitals:    11/01/23 0944   BP: 132/78   Pulse: 102   Resp: 18   Temp: 97.6 °F (36.4 °C)   SpO2: 98%         PHYSICIAN EXAM:    General: Appearance: alert, cooperative, no distress. HEENT: Normocephalic, atraumatic. No scleral icterus. conjunctivae clear. EOMI. Chest: No tenderness to palpation. No open wound noted. Lungs: Diminished BS b/l, otherwise clear to auscultation bilaterally, Respirations unlabored. Cardiac: Tachycardia, +S1and S2  Abdomen: Soft, non-tender, non-distended. Bowel sounds are normal.   Extremities:  No edema, cyanosis, clubbing. Skin: Skin color, turgor are normal. No rashes. Lymphatics: no palpable supra-cervical, axillary, or inguinal adenopathy  Neurologic: Awake, Alert, and oriented, no gross focal deficits noted b/l. DATA REVIEW:    Pathology Result:    Final Diagnosis   Date Value Ref Range Status   06/08/2023   Final    A. 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 Comment: This is an appended report. These results have been appended to a previously preliminary verified report. Image Results:   Image result are reviewed and documented in Hematology/Oncology history    CT chest abdomen pelvis w contrast  Narrative: CT CHEST, ABDOMEN AND PELVIS WITH IV CONTRAST    INDICATION:   C80.1: Malignant (primary) neoplasm, unspecified. COMPARISON: CT chest dated 6/19/2023, CT abdomen and pelvis dated 6/4/2023    TECHNIQUE: CT examination of the chest, abdomen and pelvis was performed. Multiplanar 2D reformatted images were created from the source data.     This examination, like all CT scans performed in the [facility], was performed utilizing techniques to minimize radiation dose exposure, including the use of iterative reconstruction and automated exposure control. Radiation dose length   product (DLP) for this visit:  672.5 mGy-cm    IV Contrast:  100 mL of iohexol (OMNIPAQUE)  Enteric Contrast: Enteric contrast was administered. FINDINGS:    CHEST    LUNGS: No tracheal or endobronchial lesion. There are multiple pulmonary nodules, which will be described on series :  Image 45, right upper lobe, solid, smooth bordered, 4 mm, unchanged. May represent a fissural lymph node. Image 57, right lower lobe, solid, smooth bordered, 4 mm, unchanged. May represent a fissural lymph node. Image 75, right middle lobe, solid, smooth bordered, 3 mm, unchanged. Image 79, left lower lobe, solid, smooth bordered, 2 mm, unchanged. Image 86, left lower lobe, solid, smooth bordered, 4 mm, unchanged. Image 63, left upper lobe, solid, smooth bordered, 3 mm, unchanged. PLEURA:  Unremarkable. HEART/GREAT VESSELS: Heart is unremarkable for patient's age. No thoracic aortic aneurysm. MEDIASTINUM AND FABRICIO:  Unremarkable. CHEST WALL AND LOWER NECK: Vascular port in the anterior chest wall with tip at the level of the superior atrial caval junction. ABDOMEN    LIVER/BILIARY TREE:  Unremarkable. GALLBLADDER:  No calcified gallstones. No pericholecystic inflammatory change. SPLEEN:  Unremarkable. PANCREAS:  Unremarkable. ADRENAL GLANDS:  Unremarkable. KIDNEYS/URETERS:  Unremarkable. No hydronephrosis. STOMACH AND BOWEL: Under distention versus wall thickening in the sigmoid colon. No bowel obstruction. APPENDIX:  No findings to suggest appendicitis. ABDOMINOPELVIC CAVITY:  No ascites. No pneumoperitoneum. No lymphadenopathy. Loculated low-attenuation lesion in the anterior abdominal cavity at the level of the umbilicus measuring 5 x 3.6 x 3.8 cm with an attenuation value of 15 Hounsfield in its. VESSELS:  Unremarkable for patient's age.     PELVIS    REPRODUCTIVE ORGANS: Unremarkable for patient's age. URINARY BLADDER:  Unremarkable. ABDOMINAL WALL/INGUINAL REGIONS: Stranding in the left lateral abdominal wall musculature may correspond to previous percutaneous drainage catheter. OSSEOUS STRUCTURES:  No acute fracture or destructive osseous lesion. Old anterior left ninth rib fracture. Impression: 1. Multiple pulmonary nodules without significant change when compared to the previous CT chest. Some which may represent fissural lymph nodes. 2. No evidence of metastatic disease in the chest abdomen and pelvis. 3. Under distention versus wall thickening of the lobe of the sigmoid colon. 4. Loculated fluid collection in the mid abdomen measuring 5 x 3.6 x 3.8 cm with simple fluid attenuation. May represent a postoperative seroma cannot exclude superimposed infection. Collection was present on the PET/CT from 8/8/2023 measuring 3.1 x 3.1   cm.  5. Improvement of the lower abdominal/pelvic lymphadenopathy when compared to the previous PET/CT. Workstation performed: [id number]      LABS:  Lab data are reviewed and documented in HemOnc history. Lab Results   Component Value Date    HGB 13.7 10/19/2023    HCT 41.4 10/19/2023    MCV 96 10/19/2023     10/19/2023    WBC 6.13 10/19/2023    NRBC 0 10/19/2023     Lab Results   Component Value Date    K 4.0 10/19/2023     10/19/2023    CO2 28 10/19/2023    BUN 12 10/19/2023    CREATININE 0.86 10/19/2023    GLUF 88 07/24/2023    CALCIUM 9.6 10/19/2023    CORRECTEDCA 9.1 06/09/2023    AST 21 10/19/2023    ALT 23 10/19/2023    ALKPHOS 82 10/19/2023    EGFR 108 10/19/2023       No results found for: "IRON", "TIBC", "FERRITIN"    No results found for: "Janneth Claude"    No results for input(s): "WBC", "CREAT", "PLT" in the last 72 hours.     By:  Matt Snyder MD, 11/1/2023, 9:49 AM

## 2023-10-25 ENCOUNTER — TELEPHONE (OUTPATIENT)
Age: 43
End: 2023-10-25

## 2023-10-26 ENCOUNTER — TELEPHONE (OUTPATIENT)
Dept: HEMATOLOGY ONCOLOGY | Facility: CLINIC | Age: 43
End: 2023-10-26

## 2023-10-26 NOTE — TELEPHONE ENCOUNTER
Patient Call    Who are you speaking with? Physician Office    If it is not the patient, are they listed on an active communication consent form? Yes   What is the reason for this call? Dr Jessica Brumfield at [facility] would like to speak w Dr J Luis Garza   Does this require a call back? Yes   If a call back is required, please list best call back number [phone] cell   If a call back is required, advise that a message will be forwarded to their care team and someone will return their call as soon as possible. Did you relay this information to the patient?  Yes

## 2023-10-26 NOTE — TELEPHONE ENCOUNTER
Phoned Dr. Brandon Nagel. Reviewed Dr. Blade Hummel out of office until Monday. Dr. Brandon Nagel requesting call to discuss patient on Monday/early next week.

## 2023-11-01 ENCOUNTER — OFFICE VISIT (OUTPATIENT)
Dept: HEMATOLOGY ONCOLOGY | Facility: CLINIC | Age: 43
End: 2023-11-01
Payer: OTHER GOVERNMENT

## 2023-11-01 VITALS
SYSTOLIC BLOOD PRESSURE: 132 MMHG | TEMPERATURE: 97.6 F | HEIGHT: 67 IN | WEIGHT: 203 LBS | BODY MASS INDEX: 31.86 KG/M2 | OXYGEN SATURATION: 98 % | RESPIRATION RATE: 18 BRPM | HEART RATE: 102 BPM | DIASTOLIC BLOOD PRESSURE: 78 MMHG

## 2023-11-01 DIAGNOSIS — C80.1 SMALL CELL CARCINOMA (HCC): Chronic | ICD-10-CM

## 2023-11-01 DIAGNOSIS — C18.7 MALIGNANT NEOPLASM OF SIGMOID COLON (HCC): Primary | ICD-10-CM

## 2023-11-01 PROCEDURE — 99406 BEHAV CHNG SMOKING 3-10 MIN: CPT | Performed by: INTERNAL MEDICINE

## 2023-11-01 PROCEDURE — 99215 OFFICE O/P EST HI 40 MIN: CPT | Performed by: INTERNAL MEDICINE

## 2023-11-01 RX ORDER — SODIUM CHLORIDE 9 MG/ML
20 INJECTION, SOLUTION INTRAVENOUS ONCE
OUTPATIENT
Start: 2023-12-15

## 2023-11-01 RX ORDER — SODIUM CHLORIDE 9 MG/ML
20 INJECTION, SOLUTION INTRAVENOUS ONCE
OUTPATIENT
Start: 2024-01-12

## 2023-11-01 RX ORDER — LIDOCAINE 5% 5 G/100G
CREAM TOPICAL
COMMUNITY
Start: 2023-10-26

## 2023-11-01 RX ORDER — SODIUM CHLORIDE 9 MG/ML
20 INJECTION, SOLUTION INTRAVENOUS ONCE
OUTPATIENT
Start: 2023-11-17

## 2023-11-03 ENCOUNTER — TELEPHONE (OUTPATIENT)
Dept: HEMATOLOGY ONCOLOGY | Facility: CLINIC | Age: 43
End: 2023-11-03

## 2023-11-16 ENCOUNTER — HOSPITAL ENCOUNTER (OUTPATIENT)
Dept: INFUSION CENTER | Facility: HOSPITAL | Age: 43
End: 2023-11-16
Payer: OTHER GOVERNMENT

## 2023-11-16 DIAGNOSIS — C80.1 SMALL CELL CARCINOMA (HCC): ICD-10-CM

## 2023-11-16 DIAGNOSIS — Z45.2 ENCOUNTER FOR CARE RELATED TO PORT-A-CATH: Primary | ICD-10-CM

## 2023-11-16 LAB
ALBUMIN SERPL BCP-MCNC: 4.3 G/DL (ref 3.5–5)
ALP SERPL-CCNC: 74 U/L (ref 34–104)
ALT SERPL W P-5'-P-CCNC: 47 U/L (ref 7–52)
ANION GAP SERPL CALCULATED.3IONS-SCNC: 7 MMOL/L
AST SERPL W P-5'-P-CCNC: 26 U/L (ref 13–39)
BASOPHILS # BLD AUTO: 0 THOUSANDS/ÂΜL (ref 0–0.1)
BASOPHILS NFR BLD AUTO: 0 % (ref 0–1)
BILIRUB SERPL-MCNC: 0.75 MG/DL (ref 0.2–1)
BUN SERPL-MCNC: 10 MG/DL (ref 5–25)
CALCIUM SERPL-MCNC: 9.3 MG/DL (ref 8.4–10.2)
CHLORIDE SERPL-SCNC: 102 MMOL/L (ref 96–108)
CO2 SERPL-SCNC: 27 MMOL/L (ref 21–32)
CREAT SERPL-MCNC: 0.87 MG/DL (ref 0.6–1.3)
EOSINOPHIL # BLD AUTO: 0.2 THOUSAND/ÂΜL (ref 0–0.61)
EOSINOPHIL NFR BLD AUTO: 3 % (ref 0–6)
ERYTHROCYTE [DISTWIDTH] IN BLOOD BY AUTOMATED COUNT: 14.7 % (ref 11.6–15.1)
GFR SERPL CREATININE-BSD FRML MDRD: 107 ML/MIN/1.73SQ M
GLUCOSE SERPL-MCNC: 99 MG/DL (ref 65–140)
HCT VFR BLD AUTO: 45.5 % (ref 36.5–49.3)
HGB BLD-MCNC: 14.7 G/DL (ref 12–17)
IMM GRANULOCYTES # BLD AUTO: 0.05 THOUSAND/UL (ref 0–0.2)
IMM GRANULOCYTES NFR BLD AUTO: 1 % (ref 0–2)
LYMPHOCYTES # BLD AUTO: 2.11 THOUSANDS/ÂΜL (ref 0.6–4.47)
LYMPHOCYTES NFR BLD AUTO: 33 % (ref 14–44)
MCH RBC QN AUTO: 32.2 PG (ref 26.8–34.3)
MCHC RBC AUTO-ENTMCNC: 32.3 G/DL (ref 31.4–37.4)
MCV RBC AUTO: 100 FL (ref 82–98)
MONOCYTES # BLD AUTO: 0.77 THOUSAND/ÂΜL (ref 0.17–1.22)
MONOCYTES NFR BLD AUTO: 12 % (ref 4–12)
NEUTROPHILS # BLD AUTO: 3.32 THOUSANDS/ÂΜL (ref 1.85–7.62)
NEUTS SEG NFR BLD AUTO: 51 % (ref 43–75)
NRBC BLD AUTO-RTO: 0 /100 WBCS
PLATELET # BLD AUTO: 238 THOUSANDS/UL (ref 149–390)
PMV BLD AUTO: 10.9 FL (ref 8.9–12.7)
POTASSIUM SERPL-SCNC: 4.2 MMOL/L (ref 3.5–5.3)
PROT SERPL-MCNC: 7.3 G/DL (ref 6.4–8.4)
RBC # BLD AUTO: 4.56 MILLION/UL (ref 3.88–5.62)
SODIUM SERPL-SCNC: 136 MMOL/L (ref 135–147)
TSH SERPL DL<=0.05 MIU/L-ACNC: 1.05 UIU/ML (ref 0.45–4.5)
WBC # BLD AUTO: 6.45 THOUSAND/UL (ref 4.31–10.16)

## 2023-11-16 PROCEDURE — 80053 COMPREHEN METABOLIC PANEL: CPT | Performed by: INTERNAL MEDICINE

## 2023-11-16 PROCEDURE — 85025 COMPLETE CBC W/AUTO DIFF WBC: CPT | Performed by: INTERNAL MEDICINE

## 2023-11-16 PROCEDURE — 84443 ASSAY THYROID STIM HORMONE: CPT | Performed by: INTERNAL MEDICINE

## 2023-11-17 ENCOUNTER — HOSPITAL ENCOUNTER (OUTPATIENT)
Dept: INFUSION CENTER | Facility: HOSPITAL | Age: 43
End: 2023-11-17
Attending: INTERNAL MEDICINE
Payer: OTHER GOVERNMENT

## 2023-11-17 VITALS
SYSTOLIC BLOOD PRESSURE: 130 MMHG | DIASTOLIC BLOOD PRESSURE: 86 MMHG | HEART RATE: 88 BPM | RESPIRATION RATE: 16 BRPM | TEMPERATURE: 97 F

## 2023-11-17 DIAGNOSIS — C80.1 SMALL CELL CARCINOMA (HCC): Primary | ICD-10-CM

## 2023-11-17 PROCEDURE — 96413 CHEMO IV INFUSION 1 HR: CPT

## 2023-11-17 RX ORDER — SODIUM CHLORIDE 9 MG/ML
20 INJECTION, SOLUTION INTRAVENOUS ONCE
Status: COMPLETED | OUTPATIENT
Start: 2023-11-17 | End: 2023-11-17

## 2023-11-17 RX ADMIN — SODIUM CHLORIDE 20 ML/HR: 0.9 INJECTION, SOLUTION INTRAVENOUS at 09:10

## 2023-11-17 RX ADMIN — DURVALUMAB 1500 MG: 500 INJECTION, SOLUTION INTRAVENOUS at 09:13

## 2023-11-17 NOTE — PROGRESS NOTES
Recent labs and md notes reviewed. Patient tolerated imfinzi treatment without reaction or issues. Port flushed anddeaccessed per routine. AVS deferred as pt uses mychart. Patient ambulated off unit without incident. All personal belongings taken with patient.

## 2023-11-19 NOTE — PROGRESS NOTES
Hematology/Oncology Outpatient Office Note    Date of Service: 2023    [facility]ATOLOG[facility]  [phone]    Reason for Consultation:   Chief Complaint   Patient presents with    Follow-up       Cancer Stage at diagnosis: IV    Referral Physician: No ref. provider found    Primary Care Physician:  Pavithra Castillo MD     Nickname: Hector Rachael    Spouse: Moncho Clements    Original ECO    Today's ECO    Goals and Barriers:  Current Goal: Minimize effects of disease burden, extend life. Barriers to accomplishing this: None    Patient's Capacity to Self Care:  Patient is able to self care    Code Status: not addressed today    Advanced directives: not addressed today    ASSESSMENT & PLAN      Diagnosis ICD-10-CM Associated Orders   1. Malignant neoplasm of sigmoid colon (HCC)  C18.7             This is a 38 y.o. c PMHx notable for obesity, being seen in consultation for large cell with neuroendocrine features found in the colon    Discussion of decision making  Oncology history updated, accordingly, during this visit  Goals of care/patient communication  I discussed with the patient the clinical course leading up to their cancer diagnosis. I reviewed relevant office notes, imaging reports and pathology result as well. I told the patient that this is a case of curable versus incurable disease and what this means. We discussed that the goal of anti-cancer therapy is to provide best quality of life, extend overall survival, and progression free survival as shown in clinical trials. We also discussed that there might be a point when the cancer will no longer respond to this anti-neoplastic therapy.    I explained the risks/benefits of the proposed cancer therapy: Carboplatin-Etoposide +/- Durvalumab and after discussion including understanding risks of possible life-threatening complications and therapy-related malignancy development, informed consent for blood products and treatment has been signed and obtained. TNM/Staging At Diagnosis  [id number]  Cancer Staging   IV  Disease Features/Tumor Markers/Genetics  Tumor Marker:   7/17/2023: CEA 2.2  CGA: 49.4  Notable Path Features:   6/8/2023: Poorly differentiated malignancy, pending external expert consultation positive for synaptophysin, chromogranin, CD56, TTF-1, BCL2, and cyclin D1 with a high Ki-67 proliferative rate (approximately 90%) compatible with a poorly differentiated malignancy with features suggesting neuroendocrine differentiation. 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 The intramural abscess abuts the superior aspect of the urinary bladder without evidence of a colovesical fistula. 6/27/2023 MRI Abd w/wo c: Benign flash-filling 11 mm hepatic and angioma in the caudate lobe of the liver accounting for the enhancing nodule in that location on recent CT examinations  7/19/2023 CT Chest w/o c: There are multiple pulmonary nodules, which will be described on series 4:  Image 68, left lower lobe, solid, smooth bordered, 3 mm . Image 63, left upper lobe, solid, smooth bordered, 4 mm . Image 43, right upper lobe, solid, smooth bordered, 4 mm, may represent a fissural lymph node.   Image 55, right upper lobe, solid, smooth bordered, 4 mm, may represent a fissural lymph node  7/19/2023 MRI Brain w/wo c: No evidence of intracranial metastasis, A few foci of T2/FLAIR hyperintensity primarily involving left frontoparietal subcortical and deep white matter (maybe migraine related)  8/8/2023 PET/CT: Three new hypermetabolic solid nodules/masses in the abdomen as described, compatible with progression of disease. Mild uptake along the left anterior ninth rib corresponding to a mild fracture deformity. Findings are indeterminate and may represent the sequela of trauma or metastatic disease. Stable sub-4 mm pulmonary nodules   -3.1 x 3.1 cm hypermetabolic mass in the left anterior abdomen abutting a loop of small bowel (series 4, image 147; SUV max 12). -1.3 x 1.2 cm hypermetabolic soft tissue nodule in the left mid abdomen posterior to a loop of small bowel (series 4, image 154; SUV max 17)   -1.8 x 2.1 cm hypermetabolic soft tissue nodule anterior to the left psoas muscle (series 4, image 160; SUV max 9.7)  10/2/2023: CT CAP w/c: excellent TN. Multiple pulmonary nodules without significant change when compared to the previous CT chest. Some which may represent fissural lymph nodes. No evidence of metastatic disease in the chest abdomen and pelvis. Improvement of the lower abdominal/pelvic lymphadenopathy when compared to the previous PET/CT. Discussion of decision making    I personally reviewed the following lab results, the image studies, pathology, other specialty/physicians consult notes and recommendations, and outside medical records. I had a lengthy discussion with the patient and shared the work-up findings. We discussed the diagnosis and management plan as below. I spent 42 minutes reviewing the records (labs, clinician notes, outside records, medical history, ordering medicine/tests/procedures, interpreting the imaging/labs previously done) and coordination of care as well as direct time with the patient today, of which greater than 50% of the time was spent in counseling and coordination of care with the patient/family.     I discussed the risks of ongoing cigarette smoking and reviewed the benefits of quitting and risk of new lung primary, heart disease, stroke, among other risks and cancers. Reviewed options including support, quit date, medications, and family support. Patient voiced understanding and willingness to try to quit. Patient was told to notify [facility] family practitioner for additional management. Greater than 3 minutes were needed for discussion of tobacco dependence and quitting counselling. Plan/Labs  Restaging CT CAP w/c scheduled 1/4/2024  I discussed the risks of ongoing cigarette smoking and reviewed the benefits of quitting and risk of new lung primary, heart disease, stroke, among other risks and cancers. Reviewed options including support, quit date, medications, and family support. Patient voiced understanding and willingness to try to quit. Patient was told to notify [facility] family practitioner for additional management. Greater than 3 minutes were needed for discussion of tobacco dependence and quitting counselling. 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 The patient knows the contact information for our office and knows to reach out for any relevant concerns related to this encounter. They are to call for any temperature 100.4 or higher, new symptoms including but not restricted to shaking chills, decreased appetite, nausea, vomiting, diarrhea, increased fatigue, shortness of breath or chest pain, confusion, and not feeling the strength to come to the clinic.  For all other listed problems and medical diagnosis in their chart - they are managed by PCP and/or other specialists, which the patient acknowledges. Thank you very much for your consultation and making us a part of this patient's care. We are continuing to follow closely with you. Please do not hesitate to reach out to me with any additional questions or concerns. Josue Saleh MD  Hematology & Medical Oncology Staff Physician             Disclaimer: This document was prepared using [employer] technology. If a word or phrase is confusing, or does not make sense, this is likely due to recognition error which was not discovered during this clinician's review. If you believe an error has occurred, please contact me through [phone] line for lee? cation.       ONCOLOGY HISTORY OF PRESENT ILLNESS        Oncology History   Malignant neoplasm of sigmoid colon ([address])   6/22/2023 Initial Diagnosis    Malignant neoplasm of sigmoid colon ([address])     9/3/2023 -  Cancer Staged    Staging form: Colon and Rectum, AJCC 8th Edition  - Clinical: Stage Unknown (cTX, cN0, pM1) - Signed by Mark Anthony Pike MD on 9/3/2023  Total positive nodes: 0       Small cell carcinoma ([address])   7/18/2023 Initial Diagnosis    Small cell carcinoma ([address])     7/26/2023 -  Chemotherapy    alteplase (CATHFLO), 2 mg, Intracatheter, Every 1 Minute as needed, 6 of 10 cycles  etoposide (TOPOSAR), 80 mg/m2 = 160.8 mg, Intravenous, Once, 4 of 4 cycles  Administration: 160.8 mg (7/26/2023), 160.8 mg (7/27/2023), 160.8 mg (7/28/2023), 160.8 mg (8/16/2023), 160.8 mg (8/17/2023), 160.8 mg (8/18/2023), 160.8 mg (9/6/2023), 160.8 mg (9/7/2023), 160.8 mg (9/8/2023), 160.8 mg (9/27/2023), 160.8 mg (9/28/2023), 160.8 mg (9/29/2023)  CARBOplatin (PARAPLATIN) IVPB (GOG AUC DOSING), 750 mg, Intravenous, Once, 4 of 4 cycles  Administration: 750 mg (7/26/2023), 700.5 mg (8/16/2023), 694 mg (9/6/2023), 694 mg (9/27/2023)  durvalumab (IMFINZI) IVPB, 1,500 mg, Intravenous, Once, 6 of 10 cycles  Administration: 1,500 mg (7/26/2023), 1,500 mg (10/20/2023), 1,500 mg (8/16/2023), 1,500 mg (9/6/2023), 1,500 mg (9/27/2023), 1,500 mg (11/17/2023)  aprepitant (CINVANTI) in  mL IVPB, 130 mg, Intravenous, Once, 4 of 4 cycles  Administration: 130 mg (7/26/2023), 130 mg (8/16/2023), 130 mg (9/6/2023), 130 mg (9/27/2023)       5/21/2023: BRBPR and pain in the lower abdomen for a week; leading to a ER visit, dx of diverticulitis     SUBJECTIVE  (INTERVAL HISTORY)      Clotting History None    Bleeding History None   Cancer History Colon   Family Cancer History pGrandfather (lung, smoker)   H/O Blood/Plt Transfusion None   Tobacco/etoh/drug abuse 1/2 PPD x 25 years (working on quitting and thinking about it; has nicotine patches), no etoh abuse or rec drug use       Cancer Screening history n/a   Occupation IT       Interval events:    No acute issues at this time. Moderate fatigue during treatment. Hair loss peaked. Feels better off of chemo. I have reviewed the relevant past medical, surgical, social and family history. I have also reviewed allergies and medications for this patient. Review of Systems    Baseline weight: 200 lbs    Denies F/C, N/V, SOB, CP, LH, HA, rash, itching, gen weakness, melena, hematuria, hematochezia, falls, diarrhea, or constipation       A 10-point review of system was performed, pertinent positive and negative were detailed as above. Otherwise, the 10-point review of system was negative.       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, chemotherapy, immunotherapy    Mixed hyperlipidemia 10/19/2023    10/19/2023-cholesterol 226, HDL 29       Past Surgical History:   Procedure Laterality Date    COLON SIGMOID RESECTION LAPAROSCOPIC N/A 6/8/2023    Procedure: RESECTION COLON SIGMOID LAPAROSCOPIC HAND-ASSISTED;  Surgeon: Jeny Lema MD;  Location: [facility];  Service: General    IR DRAINAGE TUBE CHECK/CHANGE/REPOSITION/REINSERTION/UPSIZE  6/5/2023    IR DRAINAGE TUBE PLACEMENT  5/30/2023    IR PORT PLACEMENT  7/28/2023       Family History   Problem Relation Age of Onset    No Known Problems Mother     Hypertension Father     Diabetes Father     Heart attack Father 43    Stroke Father        Social History     Socioeconomic History    Marital status: /Civil Union     Spouse name: Not on file    Number of children: Not on file    Years of education: Not on file    Highest education level: Not on file   Occupational History    Not on file   Tobacco Use    Smoking status: Every Day     Packs/day: 0.75     Years: 25.00     Total pack years: 18.75     Types: Cigarettes     Passive exposure: Current (wife smokes)    Smokeless tobacco: Not on file    Tobacco comments:     Patient states Chantix was not effective. Vaping Use    Vaping Use: Never used   Substance and Sexual Activity    Alcohol use: Not Currently    Drug use: Never    Sexual activity: Not on file   Other Topics Concern    Not on file   Social History Narrative     since 2011.    4 children aged 25, 16, 12, 10 as of 10/23. Second child is a girl, the rest boys. 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 Enjoys hiking and hunting. And video games. Social Determinants of Health     Financial Resource Strain: Not on file   Food Insecurity: No Food Insecurity (5/31/2023)    Hunger Vital Sign     Worried About Running Out of Food in the Last Year: Never true     Ran Out of Food in the Last Year: Never true   Transportation Needs: No Transportation Needs (5/31/2023)    PRAPARE - Transportation     Lack of Transportation (Medical): No     Lack of Transportation (Non-Medical):  No   Physical Activity: Not on file   Stress: Not on file   Social Connections: Not on file   Intimate Partner Violence: Not on file   Housing Stability: Low Risk  (5/31/2023)    Housing Stability Vital Sign     Unable to Pay for Housing in the Last Year: No     Number of Places Lived in the Last Year: 1     Unstable Housing in the Last Year: No       No Known Allergies    Current Outpatient Medications   Medication Sig Dispense Refill    atorvastatin (LIPITOR) 10 mg tablet Take 1 tablet (10 mg total) by mouth daily 90 tablet 3    nicotine (NICODERM CQ) 14 mg/24hr TD 24 hr patch Place 1 patch on the skin over 24 hours every 24 hours 28 patch 0     No current facility-administered medications for this visit. (Not in a hospital admission)      Objective:     24 Hour Vitals Assessment:     Vitals:    11/29/23 0944   BP: 112/64   Pulse: 85   Resp: 18   Temp: (!) 96.9 °F (36.1 °C)   SpO2: 95%           PHYSICIAN EXAM:    General: Appearance: alert, cooperative, no distress. HEENT: Normocephalic, atraumatic. No scleral icterus. conjunctivae clear. EOMI. Chest: No tenderness to palpation. No open wound noted. Lungs: Diminished BS b/l, otherwise clear to auscultation bilaterally, Respirations unlabored. Cardiac: Regular rate, +S1and S2  Abdomen: Soft, non-tender, non-distended. Bowel sounds are normal.   Extremities:  No edema, cyanosis, clubbing. Skin: Skin color, turgor are normal. No rashes. Lymphatics: no palpable supra-cervical, axillary, or inguinal adenopathy  Neurologic: Awake, Alert, and oriented, no gross focal deficits noted b/l. DATA REVIEW:    Pathology Result:    Final Diagnosis   Date Value Ref Range Status   06/08/2023   Final    A. 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 Comment: This is an appended report. These results have been appended to a previously preliminary verified report.         Image Results:   Image result are reviewed and documented in Hematology/Oncology history    CT chest abdomen pelvis w contrast  Narrative: CT CHEST, ABDOMEN AND PELVIS WITH IV CONTRAST    INDICATION: C80.1: Malignant (primary) neoplasm, unspecified. COMPARISON: CT chest dated 6/19/2023, CT abdomen and pelvis dated 6/4/2023    TECHNIQUE: CT examination of the chest, abdomen and pelvis was performed. Multiplanar 2D reformatted images were created from the source data. This examination, like all CT scans performed in the [facility], was performed utilizing techniques to minimize radiation dose exposure, including the use of iterative reconstruction and automated exposure control. Radiation dose length   product (DLP) for this visit:  672.5 mGy-cm    IV Contrast:  100 mL of iohexol (OMNIPAQUE)  Enteric Contrast: Enteric contrast was administered. FINDINGS:    CHEST    LUNGS: No tracheal or endobronchial lesion. There are multiple pulmonary nodules, which will be described on series :  Image 45, right upper lobe, solid, smooth bordered, 4 mm, unchanged. May represent a fissural lymph node. Image 57, right lower lobe, solid, smooth bordered, 4 mm, unchanged. May represent a fissural lymph node. Image 75, right middle lobe, solid, smooth bordered, 3 mm, unchanged. Image 79, left lower lobe, solid, smooth bordered, 2 mm, unchanged. Image 86, left lower lobe, solid, smooth bordered, 4 mm, unchanged. Image 63, left upper lobe, solid, smooth bordered, 3 mm, unchanged. PLEURA:  Unremarkable. HEART/GREAT VESSELS: Heart is unremarkable for patient's age. No thoracic aortic aneurysm. MEDIASTINUM AND FABRICIO:  Unremarkable. CHEST WALL AND LOWER NECK: Vascular port in the anterior chest wall with tip at the level of the superior atrial caval junction. ABDOMEN    LIVER/BILIARY TREE:  Unremarkable. GALLBLADDER:  No calcified gallstones. No pericholecystic inflammatory change. SPLEEN:  Unremarkable. PANCREAS:  Unremarkable. ADRENAL GLANDS:  Unremarkable. KIDNEYS/URETERS:  Unremarkable. No hydronephrosis.     STOMACH AND BOWEL: Under distention versus wall thickening in the sigmoid colon. No bowel obstruction. APPENDIX:  No findings to suggest appendicitis. ABDOMINOPELVIC CAVITY:  No ascites. No pneumoperitoneum. No lymphadenopathy. Loculated low-attenuation lesion in the anterior abdominal cavity at the level of the umbilicus measuring 5 x 3.6 x 3.8 cm with an attenuation value of 15 Hounsfield in its. VESSELS:  Unremarkable for patient's age. PELVIS    REPRODUCTIVE ORGANS:  Unremarkable for patient's age. URINARY BLADDER:  Unremarkable. ABDOMINAL WALL/INGUINAL REGIONS: Stranding in the left lateral abdominal wall musculature may correspond to previous percutaneous drainage catheter. OSSEOUS STRUCTURES:  No acute fracture or destructive osseous lesion. Old anterior left ninth rib fracture. Impression: 1. Multiple pulmonary nodules without significant change when compared to the previous CT chest. Some which may represent fissural lymph nodes. 2. No evidence of metastatic disease in the chest abdomen and pelvis. 3. Under distention versus wall thickening of the lobe of the sigmoid colon. 4. Loculated fluid collection in the mid abdomen measuring 5 x 3.6 x 3.8 cm with simple fluid attenuation. May represent a postoperative seroma cannot exclude superimposed infection. Collection was present on the PET/CT from 8/8/2023 measuring 3.1 x 3.1   cm.  5. Improvement of the lower abdominal/pelvic lymphadenopathy when compared to the previous PET/CT. Workstation performed: [id number]      LABS:  Lab data are reviewed and documented in HemOnc history.        Lab Results   Component Value Date    HGB 14.7 11/16/2023    HCT 45.5 11/16/2023     (H) 11/16/2023     11/16/2023    WBC 6.45 11/16/2023    NRBC 0 11/16/2023     Lab Results   Component Value Date    K 4.2 11/16/2023     11/16/2023    CO2 27 11/16/2023    BUN 10 11/16/2023    CREATININE 0.87 11/16/2023    GLUF 88 07/24/2023    CALCIUM 9.3 11/16/2023    CORRECTEDCA 9.1 06/09/2023    AST 26 11/16/2023    ALT 47 11/16/2023    ALKPHOS 74 11/16/2023    EGFR 107 11/16/2023       No results found for: "IRON", "TIBC", "FERRITIN"    No results found for: "Janel Ebbing"    No results for input(s): "WBC", "CREAT", "PLT" in the last 72 hours.     By:  Angelica Hernandez MD, 11/29/2023, 9:48 AM

## 2023-11-27 ENCOUNTER — OFFICE VISIT (OUTPATIENT)
Dept: FAMILY MEDICINE CLINIC | Facility: CLINIC | Age: 43
End: 2023-11-27
Payer: OTHER GOVERNMENT

## 2023-11-27 VITALS
HEIGHT: 67 IN | SYSTOLIC BLOOD PRESSURE: 132 MMHG | DIASTOLIC BLOOD PRESSURE: 78 MMHG | TEMPERATURE: 97.6 F | BODY MASS INDEX: 32.05 KG/M2 | HEART RATE: 94 BPM | WEIGHT: 204.2 LBS | OXYGEN SATURATION: 97 %

## 2023-11-27 DIAGNOSIS — Z72.0 TOBACCO ABUSE: Primary | ICD-10-CM

## 2023-11-27 DIAGNOSIS — C18.7 MALIGNANT NEOPLASM OF SIGMOID COLON (HCC): ICD-10-CM

## 2023-11-27 PROBLEM — C7A.8 NEUROENDOCRINE CARCINOMA (HCC): Status: ACTIVE | Noted: 2023-08-16

## 2023-11-27 PROCEDURE — 99214 OFFICE O/P EST MOD 30 MIN: CPT | Performed by: FAMILY MEDICINE

## 2023-11-27 NOTE — PROGRESS NOTES
Chief Complaint   Patient presents with    Follow-up     6 week         HPI   Here for follow-up of nicotine use disorder. At last visit, NicoDerm and Nicotrol were prescribed but not covered by his insurance. He decided this past weekend to pay for the patches. Started yesterday. Cholesterol was checked at last visit. Total cholesterol 29 with an HDL of 29. Triglycerides 254. . Started on atorvastatin. 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, chemotherapy, immunotherapy    Mixed hyperlipidemia 10/19/2023    10/19/2023-cholesterol 226, HDL 29        Past Surgical History:   Procedure Laterality Date    COLON SIGMOID RESECTION LAPAROSCOPIC N/A 6/8/2023    Procedure: RESECTION COLON SIGMOID LAPAROSCOPIC HAND-ASSISTED;  Surgeon: Joe Velazquez MD;  Location: [facility];  Service: General    IR DRAINAGE TUBE CHECK/CHANGE/REPOSITION/REINSERTION/UPSIZE  6/5/2023    IR DRAINAGE TUBE PLACEMENT  5/30/2023    IR PORT PLACEMENT  7/28/2023       Social History     Tobacco Use    Smoking status: Every Day     Packs/day: 0.75     Years: 25.00     Total pack years: 18.75     Types: Cigarettes     Passive exposure: Current (wife smokes)    Smokeless tobacco: Not on file    Tobacco comments:     Patient states Chantix was not effective. Substance Use Topics    Alcohol use: Not Currently       Social History     Social History Narrative     since 2011.    4 children aged 25, 16, 12, 10 as of 10/23. Second child is a girl, the rest boys. 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 Enjoys hiking and hunting. And video games.         The following portions of the patient's history were reviewed and updated as appropriate: allergies, current medications, past family history, past medical history, past social history, past surgical history, and problem list.      Review of Systems /78 (BP Location: Left arm, Patient Position: Sitting, Cuff Size: Large)   Pulse 94   Temp 97.6 °F (36.4 °C) (Temporal)   Ht 5' 6.54" (1.69 m)   Wt 92.6 kg (204 lb 3.2 oz)   SpO2 97%   BMI 32.43 kg/m²      Physical Exam   Appears well. Lungs are clear. Lipids reviewed. Current Outpatient Medications:     atorvastatin (LIPITOR) 10 mg tablet, Take 1 tablet (10 mg total) by mouth daily, Disp: 90 tablet, Rfl: 3    nicotine (NICODERM CQ) 14 mg/24hr TD 24 hr patch, Place 1 patch on the skin over 24 hours every 24 hours, Disp: 28 patch, Rfl: 0     No problem-specific Assessment & Plan notes found for this encounter. Diagnoses and all orders for this visit:    Tobacco abuse    Malignant neoplasm of sigmoid colon [facility])        Patient Instructions   Review of lipid profile which reveals higher risk for heart disease with a very low amount of the good type of cholesterol, the HDL. He is on atorvastatin. Recheck lipid profile in about 2 months. Discussed the importance of decreasing his risk factors with smoking cessation. Suggest he encourage gently his wife to join him in smoking cessation. Recheck in 2 months.

## 2023-11-27 NOTE — PATIENT INSTRUCTIONS
Review of lipid profile which reveals higher risk for heart disease with a very low amount of the good type of cholesterol, the HDL. He is on atorvastatin. Recheck lipid profile in about 2 months. Discussed the importance of decreasing his risk factors with smoking cessation. Suggest he encourage gently his wife to join him in smoking cessation. Recheck in 2 months.

## 2023-11-29 ENCOUNTER — OFFICE VISIT (OUTPATIENT)
Dept: HEMATOLOGY ONCOLOGY | Facility: CLINIC | Age: 43
End: 2023-11-29
Payer: OTHER GOVERNMENT

## 2023-11-29 VITALS
RESPIRATION RATE: 18 BRPM | WEIGHT: 206 LBS | HEIGHT: 67 IN | DIASTOLIC BLOOD PRESSURE: 64 MMHG | BODY MASS INDEX: 32.33 KG/M2 | HEART RATE: 85 BPM | OXYGEN SATURATION: 95 % | TEMPERATURE: 96.9 F | SYSTOLIC BLOOD PRESSURE: 112 MMHG

## 2023-11-29 DIAGNOSIS — C80.1 SMALL CELL CARCINOMA (HCC): ICD-10-CM

## 2023-11-29 DIAGNOSIS — C18.7 MALIGNANT NEOPLASM OF SIGMOID COLON (HCC): Primary | ICD-10-CM

## 2023-11-29 LAB
CARIS GENOMIC LOH - EXOME: NORMAL
CARIS MISMATCH REPAIR STATUS: NORMAL
CARIS MLH1: NORMAL
CARIS MSH2: NORMAL
CARIS MSH6: NORMAL
CARIS MSI - EXOME: NORMAL
CARIS PD-L1 (SP142): NEGATIVE
CARIS PMS2: NORMAL
CARIS TMB - EXOME: NORMAL

## 2023-11-29 PROCEDURE — 99215 OFFICE O/P EST HI 40 MIN: CPT | Performed by: INTERNAL MEDICINE

## 2023-11-29 PROCEDURE — 99406 BEHAV CHNG SMOKING 3-10 MIN: CPT | Performed by: INTERNAL MEDICINE

## 2023-12-14 ENCOUNTER — HOSPITAL ENCOUNTER (OUTPATIENT)
Dept: INFUSION CENTER | Facility: HOSPITAL | Age: 43
End: 2023-12-14
Payer: OTHER GOVERNMENT

## 2023-12-14 DIAGNOSIS — C80.1 SMALL CELL CARCINOMA (HCC): ICD-10-CM

## 2023-12-14 LAB
ALBUMIN SERPL BCP-MCNC: 4.5 G/DL (ref 3.5–5)
ALP SERPL-CCNC: 68 U/L (ref 34–104)
ALT SERPL W P-5'-P-CCNC: 25 U/L (ref 7–52)
ANION GAP SERPL CALCULATED.3IONS-SCNC: 10 MMOL/L
AST SERPL W P-5'-P-CCNC: 22 U/L (ref 13–39)
BASOPHILS # BLD AUTO: 0.01 THOUSANDS/ÂΜL (ref 0–0.1)
BASOPHILS NFR BLD AUTO: 0 % (ref 0–1)
BILIRUB SERPL-MCNC: 0.54 MG/DL (ref 0.2–1)
BUN SERPL-MCNC: 15 MG/DL (ref 5–25)
CALCIUM SERPL-MCNC: 9.3 MG/DL (ref 8.4–10.2)
CHLORIDE SERPL-SCNC: 102 MMOL/L (ref 96–108)
CO2 SERPL-SCNC: 25 MMOL/L (ref 21–32)
CREAT SERPL-MCNC: 0.88 MG/DL (ref 0.6–1.3)
EOSINOPHIL # BLD AUTO: 0.11 THOUSAND/ÂΜL (ref 0–0.61)
EOSINOPHIL NFR BLD AUTO: 2 % (ref 0–6)
ERYTHROCYTE [DISTWIDTH] IN BLOOD BY AUTOMATED COUNT: 12.5 % (ref 11.6–15.1)
GFR SERPL CREATININE-BSD FRML MDRD: 107 ML/MIN/1.73SQ M
GLUCOSE SERPL-MCNC: 105 MG/DL (ref 65–140)
HCT VFR BLD AUTO: 47.1 % (ref 36.5–49.3)
HGB BLD-MCNC: 15.5 G/DL (ref 12–17)
IMM GRANULOCYTES # BLD AUTO: 0.02 THOUSAND/UL (ref 0–0.2)
IMM GRANULOCYTES NFR BLD AUTO: 0 % (ref 0–2)
LYMPHOCYTES # BLD AUTO: 1.57 THOUSANDS/ÂΜL (ref 0.6–4.47)
LYMPHOCYTES NFR BLD AUTO: 31 % (ref 14–44)
MCH RBC QN AUTO: 32.9 PG (ref 26.8–34.3)
MCHC RBC AUTO-ENTMCNC: 32.9 G/DL (ref 31.4–37.4)
MCV RBC AUTO: 100 FL (ref 82–98)
MONOCYTES # BLD AUTO: 0.6 THOUSAND/ÂΜL (ref 0.17–1.22)
MONOCYTES NFR BLD AUTO: 12 % (ref 4–12)
NEUTROPHILS # BLD AUTO: 2.82 THOUSANDS/ÂΜL (ref 1.85–7.62)
NEUTS SEG NFR BLD AUTO: 55 % (ref 43–75)
NRBC BLD AUTO-RTO: 0 /100 WBCS
PLATELET # BLD AUTO: 203 THOUSANDS/UL (ref 149–390)
PMV BLD AUTO: 10.6 FL (ref 8.9–12.7)
POTASSIUM SERPL-SCNC: 3.9 MMOL/L (ref 3.5–5.3)
PROT SERPL-MCNC: 7.3 G/DL (ref 6.4–8.4)
RBC # BLD AUTO: 4.71 MILLION/UL (ref 3.88–5.62)
SODIUM SERPL-SCNC: 137 MMOL/L (ref 135–147)
TSH SERPL DL<=0.05 MIU/L-ACNC: 0.94 UIU/ML (ref 0.45–4.5)
WBC # BLD AUTO: 5.13 THOUSAND/UL (ref 4.31–10.16)

## 2023-12-14 PROCEDURE — 85025 COMPLETE CBC W/AUTO DIFF WBC: CPT | Performed by: INTERNAL MEDICINE

## 2023-12-14 PROCEDURE — 84443 ASSAY THYROID STIM HORMONE: CPT | Performed by: INTERNAL MEDICINE

## 2023-12-14 PROCEDURE — 80053 COMPREHEN METABOLIC PANEL: CPT | Performed by: INTERNAL MEDICINE

## 2023-12-14 NOTE — PROGRESS NOTES
Patient labwork completed via [facility]. Port accessed and de-accessed without complication. Patient declined AVS at this time and confirmed next appointment. Patient discharged in stable condition.

## 2023-12-15 ENCOUNTER — HOSPITAL ENCOUNTER (OUTPATIENT)
Dept: INFUSION CENTER | Facility: HOSPITAL | Age: 43
End: 2023-12-15
Attending: INTERNAL MEDICINE
Payer: OTHER GOVERNMENT

## 2023-12-15 VITALS
TEMPERATURE: 97.2 F | DIASTOLIC BLOOD PRESSURE: 95 MMHG | SYSTOLIC BLOOD PRESSURE: 141 MMHG | HEART RATE: 87 BPM | RESPIRATION RATE: 19 BRPM

## 2023-12-15 DIAGNOSIS — C80.1 SMALL CELL CARCINOMA (HCC): Primary | ICD-10-CM

## 2023-12-15 PROCEDURE — 96413 CHEMO IV INFUSION 1 HR: CPT

## 2023-12-15 RX ORDER — SODIUM CHLORIDE 9 MG/ML
20 INJECTION, SOLUTION INTRAVENOUS ONCE
Status: COMPLETED | OUTPATIENT
Start: 2023-12-15 | End: 2023-12-15

## 2023-12-15 RX ADMIN — SODIUM CHLORIDE 20 ML/HR: 0.9 INJECTION, SOLUTION INTRAVENOUS at 09:15

## 2023-12-15 RX ADMIN — DURVALUMAB 1500 MG: 500 INJECTION, SOLUTION INTRAVENOUS at 09:18

## 2023-12-15 NOTE — PROGRESS NOTES
Labs reviewed. Patient tolerated Imfinzi treatment without incident. Next appointment scheduled for 1/11/24 at 0830, patient aware, declined AVS. Patient ambulated of unit in stable condition.

## 2023-12-22 ENCOUNTER — HOSPITAL ENCOUNTER (OUTPATIENT)
Dept: CT IMAGING | Facility: HOSPITAL | Age: 43
End: 2023-12-22
Attending: INTERNAL MEDICINE
Payer: OTHER GOVERNMENT

## 2023-12-22 DIAGNOSIS — C18.7 MALIGNANT NEOPLASM OF SIGMOID COLON (HCC): ICD-10-CM

## 2023-12-22 PROCEDURE — G1004 CDSM NDSC: HCPCS

## 2023-12-22 PROCEDURE — 74177 CT ABD & PELVIS W/CONTRAST: CPT

## 2023-12-22 PROCEDURE — 71260 CT THORAX DX C+: CPT

## 2023-12-22 RX ADMIN — IOHEXOL 100 ML: 350 INJECTION, SOLUTION INTRAVENOUS at 09:01

## 2023-12-24 NOTE — PROGRESS NOTES
Hematology/Oncology Outpatient Office Note    Date of Service: 1/3/2024    [facility] [address] [place] [address]  [phone]    Reason for Consultation:   Chief Complaint   Patient presents with    Follow-up       Cancer Stage at diagnosis: IV    Referral Physician: No ref. provider found    Primary Care Physician:  Theron Stein MD     Nickname: Kwaku    Spouse: Naz Dinh ECO    Today's ECO    Goals and Barriers:  Current Goal: Minimize effects of disease burden, extend life.   Barriers to accomplishing this: None    Patient's Capacity to Self Care:  Patient is able to self care    Code Status: not addressed today    Advanced directives: not addressed today    ASSESSMENT & PLAN      Diagnosis ICD-10-CM Associated Orders   1. Malignant neoplasm of sigmoid colon (HCC)  C18.7             This is a 43 y.o. c PMHx notable for obesity, being seen in consultation for large cell with neuroendocrine features found in the colon    Discussion of decision making  Oncology history updated, accordingly, during this visit  Goals of care/patient communication  I discussed with the patient the clinical course leading up to their cancer diagnosis. I reviewed relevant office notes, imaging reports and pathology result as well.  I told the patient that this is a case of curable versus incurable disease and what this means. We discussed that the goal of anti-cancer therapy is to provide best quality of life, extend overall survival, and progression free survival as shown in clinical trials. We also discussed that there might be a point when the cancer will no longer respond to this anti-neoplastic therapy.   I explained the risks/benefits of the proposed cancer therapy: Carboplatin-Etoposide +/- Durvalumab and after discussion including understanding risks of possible life-threatening complications and therapy-related malignancy development,  informed consent for blood products and treatment has been signed and obtained.  TNM/Staging At Diagnosis  [id number]  Cancer Staging   IV  Disease Features/Tumor Markers/Genetics  Tumor Marker:   7/17/2023: CEA 2.2  CGA: 49.4  Notable Path Features:   6/8/2023: Poorly differentiated malignancy, pending external expert consultation positive for synaptophysin, chromogranin, CD56, TTF-1, BCL2, and cyclin D1 with a high Ki-67 proliferative rate (approximately 90%) compatible with a poorly differentiated malignancy with features suggesting neuroendocrine differentiation. 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 The intramural abscess abuts the superior aspect of the urinary bladder without evidence of a colovesical fistula.  6/27/2023 MRI Abd w/wo c: Benign flash-filling 11 mm hepatic and angioma in the caudate lobe of the liver accounting for the enhancing nodule in that location on recent CT examinations  7/19/2023 CT Chest w/o c: There are multiple pulmonary nodules, which will be described on series 4:  Image 68, left lower lobe, solid, smooth bordered, 3 mm .  Image 63, left upper lobe, solid, smooth bordered, 4 mm .  Image 43, right upper lobe, solid, smooth bordered, 4 mm, may represent a fissural lymph node.  Image 55, right upper lobe, solid, smooth bordered, 4 mm, may represent a fissural lymph node  7/19/2023 MRI Brain w/wo c: No evidence of intracranial metastasis, A few foci of T2/FLAIR hyperintensity primarily involving left  frontoparietal subcortical and deep white matter (maybe migraine related)  8/8/2023 PET/CT: Three new hypermetabolic solid nodules/masses in the abdomen as described, compatible with progression of disease. Mild uptake along the left anterior ninth rib corresponding to a mild fracture deformity. Findings are indeterminate and may represent the sequela of trauma or metastatic disease. Stable sub-4 mm pulmonary nodules   -3.1 x 3.1 cm hypermetabolic mass in the left anterior abdomen abutting a loop of small bowel (series 4, image 147; SUV max 12).   -1.3 x 1.2 cm hypermetabolic soft tissue nodule in the left mid abdomen posterior to a loop of small bowel (series 4, image 154; SUV max 17)   -1.8 x 2.1 cm hypermetabolic soft tissue nodule anterior to the left psoas muscle (series 4, image 160; SUV max 9.7)  10/2/2023: CT CAP w/c: excellent OH. Multiple pulmonary nodules without significant change when compared to the previous CT chest. Some which may represent fissural lymph nodes. No evidence of metastatic disease in the chest abdomen and pelvis.  Improvement of the lower abdominal/pelvic lymphadenopathy when compared to the previous PET/CT.  CARIS NGS: 11/9/2023 CARIS NGS: TMB 94 muts/Mb, MSI-proficient, BRCA 1 exon 18 mutation  BRAF exon 15 mutation, MSH2/3, NF1 exon 3, PIK3CA, POLE exon 9, PTEN exon 1, RB1, SMAD2, TP53 exon 6, XPO1 exon 15  12/22/2023 CT CAP w/c stable: There is no new measurable metastatic disease. Small less than 6 mm nodules in the both lungs, remains stable. The previously noted rounded lesion in the anterior abdomen which demonstrated marginal uptake on the PET scan is decreasing in size, now measuring 1.1 x 1.6 cm (image 184 series 2). Proximal sigmoid colonic thickening as seen on the previous. Enhancing cavernous hemangioma caudate lobe  Genetic testing was negative    Discussion of decision making    I personally reviewed the following lab results, the image studies, pathology, other  specialty/physicians consult notes and recommendations, and outside medical records. I had a lengthy discussion with the patient and shared the work-up findings. We discussed the diagnosis and management plan as below. I spent  minutes reviewing the records (labs, clinician notes, outside records, medical history, ordering medicine/tests/procedures, interpreting the imaging/labs previously done) and coordination of care as well as direct time with the patient today, of which greater than 50% of the time was spent in counseling and coordination of care with the patient/family.    I discussed the risks of ongoing cigarette smoking and reviewed the benefits of quitting and risk of new lung primary, heart disease, stroke, among other risks and cancers. Reviewed options including support, quit date, medications, and family support. Patient voiced understanding and willingness to try to quit. Patient was told to notify [facility] family practitioner for additional management. Greater than 3 minutes were needed for discussion of tobacco dependence and quitting counselling.      Plan/Labs  Restaging CT CAP w/c scheduled 1/4/2024  I discussed the risks of ongoing cigarette smoking and reviewed the benefits of quitting and risk of new lung primary, heart disease, stroke, among other risks and cancers. Reviewed options including support, quit date, medications, and family support. Patient voiced understanding and willingness to try to quit. Patient was told to notify [facility] family practitioner for additional management. Greater than 3 minutes were needed for discussion of tobacco dependence and quitting counselling.  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 [place] [facility]    Follow Up: Every 4 weeks while on systemic therapy scheduled thru 4/5/2024, add more     Infusion chairs are at the [facility]    All questions were answered to the patient's satisfaction during this encounter. The patient knows the contact information for our office and knows to reach out for any relevant concerns related to this encounter. They are to call for any temperature 100.4 or higher, new symptoms including but not restricted to shaking chills, decreased appetite, nausea, vomiting, diarrhea, increased fatigue, shortness of breath or chest pain, confusion, and not feeling the strength to come to the clinic. For all other listed problems and medical diagnosis in their chart - they are managed by PCP and/or other specialists, which the patient acknowledges. Thank you very much for your consultation and making us a part of this patient's care. We are continuing to follow closely with you. Please do not hesitate to reach out to me with any additional questions or concerns.    Josue Bledsoe MD  Hematology & Medical Oncology Staff Physician             Disclaimer: This document was prepared using [employer] technology. If a word or phrase is confusing, or does not make sense, this is likely due to recognition error which was not discovered during this clinician's review. If you believe an error has occurred, please contact me through [facility] service line for lee?cation.      ONCOLOGY HISTORY OF PRESENT ILLNESS        Oncology History   Malignant neoplasm of sigmoid colon (HCC)   6/22/2023 Initial Diagnosis    Malignant neoplasm of sigmoid colon (HCC)     9/3/2023 -  Cancer Staged    Staging form: Colon and Rectum, AJCC 8th Edition  - Clinical: Stage Unknown (cTX, cN0, pM1) - Signed by Josue Bledsoe MD on 9/3/2023  Total positive nodes: 0       Small cell carcinoma (HCC)   7/18/2023 Initial Diagnosis    Small cell carcinoma (HCC)      7/26/2023 -  Chemotherapy    alteplase (CATHFLO), 2 mg, Intracatheter, Every 1 Minute as needed, 7 of 11 cycles  etoposide (TOPOSAR), 80 mg/m2 = 160.8 mg, Intravenous, Once, 4 of 4 cycles  Administration: 160.8 mg (7/26/2023), 160.8 mg (7/27/2023), 160.8 mg (7/28/2023), 160.8 mg (8/16/2023), 160.8 mg (8/17/2023), 160.8 mg (8/18/2023), 160.8 mg (9/6/2023), 160.8 mg (9/7/2023), 160.8 mg (9/8/2023), 160.8 mg (9/27/2023), 160.8 mg (9/28/2023), 160.8 mg (9/29/2023)  CARBOplatin (PARAPLATIN) IVPB (GOG AUC DOSING), 750 mg, Intravenous, Once, 4 of 4 cycles  Administration: 750 mg (7/26/2023), 700.5 mg (8/16/2023), 694 mg (9/6/2023), 694 mg (9/27/2023)  durvalumab (IMFINZI) IVPB, 1,500 mg, Intravenous, Once, 7 of 11 cycles  Administration: 1,500 mg (7/26/2023), 1,500 mg (10/20/2023), 1,500 mg (8/16/2023), 1,500 mg (9/6/2023), 1,500 mg (9/27/2023), 1,500 mg (11/17/2023), 1,500 mg (12/15/2023)  aprepitant (CINVANTI) in  mL IVPB, 130 mg, Intravenous, Once, 4 of 4 cycles  Administration: 130 mg (7/26/2023), 130 mg (8/16/2023), 130 mg (9/6/2023), 130 mg (9/27/2023)       5/21/2023: BRBPR and pain in the lower abdomen for a week; leading to a ER visit, dx of diverticulitis     SUBJECTIVE  (INTERVAL HISTORY)      Clotting History None    Bleeding History None   Cancer History Colon   Family Cancer History pGrandfather (lung, smoker)   H/O Blood/Plt Transfusion None   Tobacco/etoh/drug abuse 1/2 PPD x 25 years (working on quitting and thinking about it; has nicotine patches), no etoh abuse or rec drug use       Cancer Screening history n/a   Occupation IT       Interval events:    No acute issues at this time. Fatigue resolved. Hair growing slightly. Feels better off of chemo.    I have reviewed the relevant past medical, surgical, social and family history. I have also reviewed allergies and medications for this patient.    Review of Systems    Baseline weight: 200 lbs    Denies F/C, N/V, SOB, CP, LH, HA, rash, itching, gen  weakness, melena, hematuria, hematochezia, falls, diarrhea, or constipation       A 10-point review of system was performed, pertinent positive and negative were detailed as above. Otherwise, the 10-point review of system was negative.      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, chemotherapy, immunotherapy    Mixed hyperlipidemia 10/19/2023    10/19/2023-cholesterol 226, HDL 29       Past Surgical History:   Procedure Laterality Date    COLON SIGMOID RESECTION LAPAROSCOPIC N/A 6/8/2023    Procedure: RESECTION COLON SIGMOID LAPAROSCOPIC HAND-ASSISTED;  Surgeon: Roland Connelly MD;  Location: [facility];  Service: General    IR DRAINAGE TUBE CHECK/CHANGE/REPOSITION/REINSERTION/UPSIZE  6/5/2023    IR DRAINAGE TUBE PLACEMENT  5/30/2023    IR PORT PLACEMENT  7/28/2023       Family History   Problem Relation Age of Onset    No Known Problems Mother     Hypertension Father     Diabetes Father     Heart attack Father 42    Stroke Father        Social History     Socioeconomic History    Marital status: /Civil Union     Spouse name: Not on file    Number of children: Not on file    Years of education: Not on file    Highest education level: Not on file   Occupational History    Not on file   Tobacco Use    Smoking status: Every Day     Current packs/day: 0.75     Average packs/day: 0.8 packs/day for 25.0 years (18.8 ttl pk-yrs)     Types: Cigarettes     Passive exposure: Current (wife smokes)    Smokeless tobacco: Not on file    Tobacco comments:     Patient states Chantix was not effective.   Vaping Use    Vaping status: Never Used   Substance and Sexual Activity    Alcohol use: Not Currently    Drug use: Never    Sexual activity: Not on file   Other Topics Concern    Not on file   Social History Narrative     since 2011.    4 children aged 18, 17, 16, 6 as of 10/23.  Second child is a girl, the rest boys.    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    Wife is in the [employer].    Enjoys hiking and hunting.    And video games.     Social Determinants of Health     Financial Resource Strain: Not on file   Food Insecurity: No Food Insecurity (5/31/2023)    Hunger Vital Sign     Worried About Running Out of Food in the Last Year: Never true     Ran Out of Food in the Last Year: Never true   Transportation Needs: No Transportation Needs (5/31/2023)    PRAPARE - Transportation     Lack of Transportation (Medical): No     Lack of Transportation (Non-Medical): No   Physical Activity: Not on file   Stress: Not on file   Social Connections: Not on file   Intimate Partner Violence: Not on file   Housing Stability: Low Risk  (5/31/2023)    Housing Stability Vital Sign     Unable to Pay for Housing in the Last Year: No     Number of Places Lived in the Last Year: 1     Unstable Housing in the Last Year: No       No Known Allergies    Current Outpatient Medications   Medication Sig Dispense Refill    atorvastatin (LIPITOR) 10 mg tablet Take 1 tablet (10 mg total) by mouth daily 90 tablet 3    nicotine (NICODERM CQ) 14 mg/24hr TD 24 hr patch Place 1 patch on the skin over 24 hours every 24 hours 28 patch 0     No current facility-administered medications for this visit.       (Not in a hospital admission)      Objective:     24 Hour Vitals Assessment:     Vitals:    01/03/24 0949   BP: 138/82   Pulse: 93   Resp: 18   Temp: 97.5 °F (36.4 °C)   SpO2: 97%             PHYSICIAN EXAM:    General: Appearance: alert, cooperative, no distress.  HEENT: Normocephalic, atraumatic. No scleral icterus. conjunctivae clear. EOMI.  Chest: No tenderness to palpation. No open wound noted.  Lungs: Diminished BS b/l, otherwise clear to auscultation bilaterally, Respirations unlabored.  Cardiac: Regular rate, +S1and S2  Abdomen: Soft, non-tender, non-distended. Bowel sounds are normal.   Extremities:  No edema, cyanosis, clubbing.  Skin: Skin color, turgor are  normal. No rashes.  Lymphatics: no palpable supra-cervical, axillary, or inguinal adenopathy  Neurologic: Awake, Alert, and oriented, no gross focal deficits noted b/l.       DATA REVIEW:    Pathology Result:    Final Diagnosis   Date Value Ref Range Status   06/08/2023   Final    A.  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       Comment:     This is an appended report. These results have been appended to a previously preliminary verified report.        Image Results:   Image result are reviewed and documented in Hematology/Oncology history    CT chest abdomen pelvis w contrast  Narrative: CT CHEST, ABDOMEN AND PELVIS WITH IV CONTRAST    INDICATION:   C18.7: Malignant neoplasm of sigmoid colon.    COMPARISON: October 2, 2023  TECHNIQUE: CT examination of the chest, abdomen and pelvis was performed. Multiplanar 2D reformatted images were created from the source data.    This examination, like all CT scans performed in the [facility], was performed utilizing techniques to minimize radiation dose exposure, including the use of iterative reconstruction and automated exposure control. Radiation dose length   product (DLP) for this visit:  717.84 mGy-cm    IV Contrast:  100 mL of iohexol (OMNIPAQUE)  Enteric Contrast: Enteric contrast was administered.    FINDINGS:    CHEST    LUNGS: There are left upper lobe lung nodules measuring less than 6 mm, image 52 series 601, stable  Additional nodule in the left upper lobe image 62 series 601, stable  Additional 2 mm nodule left upper lobe image 61 series 601, stable  A left lower lung nodule measuring about 3 mm image 71 series 601, image 134 series 3, stable.  Left lower lobe lung nodule image 169 series 3, stable  Perifissural nodule/intrapulmonary lymph nodes in the right midlung along the fissure in image 54 series 601, stable  Right middle lobe 3 mm nodule image 142 series 3,  stable  PLEURA: No pleural effusion seen HEART/GREAT VESSELS: Heart is unremarkable for patient's age.  No thoracic aortic aneurysm.    MEDIASTINUM AND FABRICIO: Lower right paratracheal, left paratracheal lymph node,  Reduction of the pathologic enlargement on the basis of size    CHEST WALL AND LOWER NECK:  Unremarkable.    ABDOMEN    LIVER/BILIARY TREE: Hepatic steatosis seen  Enhancing nodules quadrate lobe measuring 1.3 cm compatible with hemangioma  GALLBLADDER:  No calcified gallstones. No pericholecystic inflammatory change.    SPLEEN:  Unremarkable.    PANCREAS:  Unremarkable.    ADRENAL GLANDS:  Unremarkable.    KIDNEYS/URETERS:  Unremarkable. No hydronephrosis.    STOMACH AND BOWEL: Sigmoid colonic thickening seen, image 203 series 2    APPENDIX:  No findings to suggest appendicitis.    ABDOMINOPELVIC CAVITY:  No ascites.  No pneumoperitoneum.  No lymphadenopathy.  The previously noted rounded lesion in the anterior aspect abdominal with central low-attenuation, small left, previously measuring 4.9 cm x 3.5 cm currently measures about 1.1 x 1.6 cm  There is no correlate of the nodular densities described in the lower abdominal and on the previous PET scan of August 8, 2023  VESSELS:  Unremarkable for patient's age.    PELVIS    REPRODUCTIVE ORGANS:  Unremarkable for patient's age.    URINARY BLADDER:  Unremarkable.    ABDOMINAL WALL/INGUINAL REGIONS:  Unremarkable.    OSSEOUS STRUCTURES:  No acute fracture or destructive osseous lesion.  Impression: There is no new measurable metastatic disease    Small less than 6 mm nodules in the both lungs, remains stable    The previously noted rounded lesion in the anterior abdomen which demonstrated marginal uptake on the PET scan is decreasing in size, now measuring 1.1 x 1.6 cm (image 184 series 2)    Proximal sigmoid colonic thickening as seen on the previous  Enhancing cavernous hemangioma caudate lobe    Workstation performed: [id number]      LABS:  Lab data are  "reviewed and documented in HemOnc history.       Lab Results   Component Value Date    HGB 15.5 12/14/2023    HCT 47.1 12/14/2023     (H) 12/14/2023     12/14/2023    WBC 5.13 12/14/2023    NRBC 0 12/14/2023     Lab Results   Component Value Date    K 3.9 12/14/2023     12/14/2023    CO2 25 12/14/2023    BUN 15 12/14/2023    CREATININE 0.88 12/14/2023    GLUF 88 07/24/2023    CALCIUM 9.3 12/14/2023    CORRECTEDCA 9.1 06/09/2023    AST 22 12/14/2023    ALT 25 12/14/2023    ALKPHOS 68 12/14/2023    EGFR 107 12/14/2023       No results found for: \"IRON\", \"TIBC\", \"FERRITIN\"    No results found for: \"[id number]\"    No results for input(s): \"WBC\", \"CREAT\", \"PLT\" in the last 72 hours.    By:  Josue Bledsoe MD, 1/3/2024, 10:13 AM                                  "

## 2023-12-28 ENCOUNTER — TELEPHONE (OUTPATIENT)
Dept: HEMATOLOGY ONCOLOGY | Facility: CLINIC | Age: 43
End: 2023-12-28

## 2023-12-28 NOTE — TELEPHONE ENCOUNTER
Called and spoke with the radiology reading room staff to have patient's CT read prior to his coming up appointment with  on 1/3/24.    CT-CAP DONE 12/22- ACCESSION# [id number]

## 2023-12-28 NOTE — TELEPHONE ENCOUNTER
"Rec'd significant finding CT chest abd pelvis from 12/22/23:    \"IMPRESSION:     There is no new measurable metastatic disease     Small less than 6 mm nodules in the both lungs, remains stable     The previously noted rounded lesion in the anterior abdomen which demonstrated marginal uptake on the PET scan is decreasing in size, now measuring 1.1 x 1.6 cm (image 184 series 2)     Proximal sigmoid colonic thickening as seen on the previous  Enhancing cavernous hemangioma caudate lobe\"  "

## 2024-01-03 ENCOUNTER — OFFICE VISIT (OUTPATIENT)
Dept: HEMATOLOGY ONCOLOGY | Facility: CLINIC | Age: 44
End: 2024-01-03
Payer: OTHER GOVERNMENT

## 2024-01-03 VITALS
TEMPERATURE: 97.5 F | RESPIRATION RATE: 18 BRPM | OXYGEN SATURATION: 97 % | HEIGHT: 67 IN | DIASTOLIC BLOOD PRESSURE: 82 MMHG | BODY MASS INDEX: 33.12 KG/M2 | WEIGHT: 211 LBS | SYSTOLIC BLOOD PRESSURE: 138 MMHG | HEART RATE: 93 BPM

## 2024-01-03 DIAGNOSIS — C18.7 MALIGNANT NEOPLASM OF SIGMOID COLON (HCC): Primary | ICD-10-CM

## 2024-01-03 PROCEDURE — 99215 OFFICE O/P EST HI 40 MIN: CPT | Performed by: INTERNAL MEDICINE

## 2024-01-11 ENCOUNTER — HOSPITAL ENCOUNTER (OUTPATIENT)
Dept: INFUSION CENTER | Facility: HOSPITAL | Age: 44
End: 2024-01-11
Payer: OTHER GOVERNMENT

## 2024-01-11 DIAGNOSIS — C80.1 SMALL CELL CARCINOMA (HCC): Chronic | ICD-10-CM

## 2024-01-11 DIAGNOSIS — C18.7 MALIGNANT NEOPLASM OF SIGMOID COLON (HCC): ICD-10-CM

## 2024-01-11 DIAGNOSIS — Z45.2 ENCOUNTER FOR CARE RELATED TO PORT-A-CATH: Primary | ICD-10-CM

## 2024-01-11 LAB
ALBUMIN SERPL BCP-MCNC: 4.5 G/DL (ref 3.5–5)
ALP SERPL-CCNC: 70 U/L (ref 34–104)
ALT SERPL W P-5'-P-CCNC: 36 U/L (ref 7–52)
ANION GAP SERPL CALCULATED.3IONS-SCNC: 8 MMOL/L
AST SERPL W P-5'-P-CCNC: 27 U/L (ref 13–39)
BASOPHILS # BLD AUTO: 0.01 THOUSANDS/ÂΜL (ref 0–0.1)
BASOPHILS NFR BLD AUTO: 0 % (ref 0–1)
BILIRUB SERPL-MCNC: 0.52 MG/DL (ref 0.2–1)
BUN SERPL-MCNC: 13 MG/DL (ref 5–25)
CALCIUM SERPL-MCNC: 9.2 MG/DL (ref 8.4–10.2)
CHLORIDE SERPL-SCNC: 100 MMOL/L (ref 96–108)
CO2 SERPL-SCNC: 27 MMOL/L (ref 21–32)
CREAT SERPL-MCNC: 0.96 MG/DL (ref 0.6–1.3)
EOSINOPHIL # BLD AUTO: 0.23 THOUSAND/ÂΜL (ref 0–0.61)
EOSINOPHIL NFR BLD AUTO: 4 % (ref 0–6)
ERYTHROCYTE [DISTWIDTH] IN BLOOD BY AUTOMATED COUNT: 12.4 % (ref 11.6–15.1)
GFR SERPL CREATININE-BSD FRML MDRD: 97 ML/MIN/1.73SQ M
GLUCOSE SERPL-MCNC: 100 MG/DL (ref 65–140)
HCT VFR BLD AUTO: 48.3 % (ref 36.5–49.3)
HGB BLD-MCNC: 15.8 G/DL (ref 12–17)
IMM GRANULOCYTES # BLD AUTO: 0.02 THOUSAND/UL (ref 0–0.2)
IMM GRANULOCYTES NFR BLD AUTO: 0 % (ref 0–2)
LYMPHOCYTES # BLD AUTO: 1.18 THOUSANDS/ÂΜL (ref 0.6–4.47)
LYMPHOCYTES NFR BLD AUTO: 22 % (ref 14–44)
MCH RBC QN AUTO: 32 PG (ref 26.8–34.3)
MCHC RBC AUTO-ENTMCNC: 32.7 G/DL (ref 31.4–37.4)
MCV RBC AUTO: 98 FL (ref 82–98)
MONOCYTES # BLD AUTO: 1.01 THOUSAND/ÂΜL (ref 0.17–1.22)
MONOCYTES NFR BLD AUTO: 18 % (ref 4–12)
NEUTROPHILS # BLD AUTO: 3.04 THOUSANDS/ÂΜL (ref 1.85–7.62)
NEUTS SEG NFR BLD AUTO: 56 % (ref 43–75)
NRBC BLD AUTO-RTO: 0 /100 WBCS
PLATELET # BLD AUTO: 182 THOUSANDS/UL (ref 149–390)
PMV BLD AUTO: 10.8 FL (ref 8.9–12.7)
POTASSIUM SERPL-SCNC: 3.9 MMOL/L (ref 3.5–5.3)
PROT SERPL-MCNC: 7.1 G/DL (ref 6.4–8.4)
RBC # BLD AUTO: 4.94 MILLION/UL (ref 3.88–5.62)
SODIUM SERPL-SCNC: 135 MMOL/L (ref 135–147)
TSH SERPL DL<=0.05 MIU/L-ACNC: 1.17 UIU/ML (ref 0.45–4.5)
WBC # BLD AUTO: 5.49 THOUSAND/UL (ref 4.31–10.16)

## 2024-01-11 PROCEDURE — 84443 ASSAY THYROID STIM HORMONE: CPT | Performed by: INTERNAL MEDICINE

## 2024-01-11 PROCEDURE — 80053 COMPREHEN METABOLIC PANEL: CPT

## 2024-01-11 PROCEDURE — 85025 COMPLETE CBC W/AUTO DIFF WBC: CPT

## 2024-01-11 NOTE — PROGRESS NOTES
Central labs drawn via port. Catheter maintenance performed per protocol without incident. Pt discharged in stable condition and is aware of next infusion appointment tomorrow at 8:30AM. AVS declined.

## 2024-01-12 ENCOUNTER — HOSPITAL ENCOUNTER (OUTPATIENT)
Dept: INFUSION CENTER | Facility: HOSPITAL | Age: 44
End: 2024-01-12
Attending: INTERNAL MEDICINE
Payer: OTHER GOVERNMENT

## 2024-01-12 VITALS
OXYGEN SATURATION: 99 % | WEIGHT: 211.42 LBS | SYSTOLIC BLOOD PRESSURE: 143 MMHG | RESPIRATION RATE: 18 BRPM | BODY MASS INDEX: 33.18 KG/M2 | DIASTOLIC BLOOD PRESSURE: 88 MMHG | HEIGHT: 67 IN | HEART RATE: 91 BPM | TEMPERATURE: 96.9 F

## 2024-01-12 DIAGNOSIS — C80.1 SMALL CELL CARCINOMA (HCC): Primary | ICD-10-CM

## 2024-01-12 PROCEDURE — 96413 CHEMO IV INFUSION 1 HR: CPT

## 2024-01-12 RX ORDER — SODIUM CHLORIDE 9 MG/ML
20 INJECTION, SOLUTION INTRAVENOUS ONCE
Status: COMPLETED | OUTPATIENT
Start: 2024-01-12 | End: 2024-01-12

## 2024-01-12 RX ADMIN — DURVALUMAB 1500 MG: 500 INJECTION, SOLUTION INTRAVENOUS at 09:36

## 2024-01-12 RX ADMIN — SODIUM CHLORIDE 20 ML/HR: 0.9 INJECTION, SOLUTION INTRAVENOUS at 08:55

## 2024-01-12 NOTE — PROGRESS NOTES
Recent labs reviewed.  Patient tolerated Imfinzi treatment without reaction or issues.      Kwaku Plascencia is aware of future appt on 1/19/24 at 0800 for labs.    AVS - No (Declined by Kwaku Plascencia)     Patient ambulated off unit without incident.  All personal belongings taken with patient.

## 2024-01-19 ENCOUNTER — HOSPITAL ENCOUNTER (OUTPATIENT)
Dept: INFUSION CENTER | Facility: HOSPITAL | Age: 44
Discharge: HOME | End: 2024-01-19
Attending: INTERNAL MEDICINE

## 2024-01-28 NOTE — PROGRESS NOTES
Telemedicine consent    Patient: Kwaku Plascencia  Provider: Josue Bledsoe MD  Provider located at [address] [place] [address] [facility] [address] [address]    The patient was identified by name and date of birth. Kwaku Plascencia was informed that this is a telemedicine visit and that the visit is being conducted through Telephone.  My office door was closed. No one else was in the room.  He acknowledged consent and understanding of privacy and security of the video platform. The patient has agreed to participate and understands they can discontinue the visit at any time.    Patient is aware this is a billable service.                              Hematology/Oncology Outpatient Office Note    Date of Service: 2024    [facility] [address] [address] [phone]    Reason for Consultation:   No chief complaint on file.      Cancer Stage at diagnosis: IV    Referral Physician: No ref. provider found    Primary Care Physician:  Theron Stein MD     Nickname: Kwaku    Spouse: Naz Dinh ECO    Today's ECO    Goals and Barriers:  Current Goal: Minimize effects of disease burden, extend life.   Barriers to accomplishing this: None    Patient's Capacity to Self Care:  Patient is able to self care    Code Status: not addressed today    Advanced directives: not addressed today    ASSESSMENT & PLAN      Diagnosis ICD-10-CM Associated Orders   1. Malignant neoplasm of sigmoid colon (HCC)  C18.7             This is a 43 y.o. c PMHx notable for obesity, being seen in consultation for large cell with neuroendocrine features found in the colon    Discussion of decision making  Oncology history updated, accordingly, during this visit  Goals of care/patient communication  I discussed with the patient the clinical course leading up to their cancer diagnosis. I reviewed relevant office notes,  imaging reports and pathology result as well.  I told the patient that this is a case of curable versus incurable disease and what this means. We discussed that the goal of anti-cancer therapy is to provide best quality of life, extend overall survival, and progression free survival as shown in clinical trials. We also discussed that there might be a point when the cancer will no longer respond to this anti-neoplastic therapy.   I explained the risks/benefits of the proposed cancer therapy: Carboplatin-Etoposide +/- Durvalumab and after discussion including understanding risks of possible life-threatening complications and therapy-related malignancy development, informed consent for blood products and treatment has been signed and obtained.  TNM/Staging At Diagnosis  [id number]  Cancer Staging   IV  Disease Features/Tumor Markers/Genetics  Tumor Marker:   7/17/2023: CEA 2.2  CGA: 49.4  Notable Path Features:   6/8/2023: Poorly differentiated malignancy, pending external expert consultation positive for synaptophysin, chromogranin, CD56, TTF-1, BCL2, and cyclin D1 with a high Ki-67 proliferative rate (approximately 90%) compatible with a poorly differentiated malignancy with features suggesting neuroendocrine differentiation. 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 The intramural abscess abuts the superior aspect of the urinary bladder without evidence of a colovesical fistula.  6/27/2023 MRI Abd w/wo c: Benign flash-filling 11 mm  hepatic and angioma in the caudate lobe of the liver accounting for the enhancing nodule in that location on recent CT examinations  7/19/2023 CT Chest w/o c: There are multiple pulmonary nodules, which will be described on series 4:  Image 68, left lower lobe, solid, smooth bordered, 3 mm .  Image 63, left upper lobe, solid, smooth bordered, 4 mm .  Image 43, right upper lobe, solid, smooth bordered, 4 mm, may represent a fissural lymph node.  Image 55, right upper lobe, solid, smooth bordered, 4 mm, may represent a fissural lymph node  7/19/2023 MRI Brain w/wo c: No evidence of intracranial metastasis, A few foci of T2/FLAIR hyperintensity primarily involving left frontoparietal subcortical and deep white matter (maybe migraine related)  8/8/2023 PET/CT: Three new hypermetabolic solid nodules/masses in the abdomen as described, compatible with progression of disease. Mild uptake along the left anterior ninth rib corresponding to a mild fracture deformity. Findings are indeterminate and may represent the sequela of trauma or metastatic disease. Stable sub-4 mm pulmonary nodules   -3.1 x 3.1 cm hypermetabolic mass in the left anterior abdomen abutting a loop of small bowel (series 4, image 147; SUV max 12).   -1.3 x 1.2 cm hypermetabolic soft tissue nodule in the left mid abdomen posterior to a loop of small bowel (series 4, image 154; SUV max 17)   -1.8 x 2.1 cm hypermetabolic soft tissue nodule anterior to the left psoas muscle (series 4, image 160; SUV max 9.7)  10/2/2023: CT CAP w/c: excellent NM. Multiple pulmonary nodules without significant change when compared to the previous CT chest. Some which may represent fissural lymph nodes. No evidence of metastatic disease in the chest abdomen and pelvis.  Improvement of the lower abdominal/pelvic lymphadenopathy when compared to the previous PET/CT.  CARIS NGS: 11/9/2023 CARIS NGS: TMB 94 muts/Mb, MSI-proficient, BRCA 1 exon 18 mutation  BRAF exon 15 mutation,  MSH2/3, NF1 exon 3, PIK3CA, POLE exon 9, PTEN exon 1, RB1, SMAD2, TP53 exon 6, XPO1 exon 15  12/22/2023 CT CAP w/c stable: There is no new measurable metastatic disease. Small less than 6 mm nodules in the both lungs, remains stable. The previously noted rounded lesion in the anterior abdomen which demonstrated marginal uptake on the PET scan is decreasing in size, now measuring 1.1 x 1.6 cm (image 184 series 2). Proximal sigmoid colonic thickening as seen on the previous. Enhancing cavernous hemangioma caudate lobe  Genetic testing was negative    Discussion of decision making    I personally reviewed the following lab results, the image studies, pathology, other specialty/physicians consult notes and recommendations, and outside medical records. I had a lengthy discussion with the patient and shared the work-up findings. We discussed the diagnosis and management plan as below. I spent  42 minutes reviewing the records (labs, clinician notes, outside records, medical history, ordering medicine/tests/procedures, interpreting the imaging/labs previously done) and coordination of care as well as direct time with the patient today, of which greater than 50% of the time was spent in counseling and coordination of care with the patient/family.    I discussed the risks of ongoing cigarette smoking and reviewed the benefits of quitting and risk of new lung primary, heart disease, stroke, among other risks and cancers. Reviewed options including support, quit date, medications, and family support. Patient voiced understanding and willingness to try to quit. Patient was told to notify [facility] family practitioner for additional management. Greater than 3 minutes were needed for discussion of tobacco dependence and quitting counselling.      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 Discuss with [facility]*    Follow Up: Every 4 weeks while on systemic therapy scheduled thru 6/5/2024, added more*    Infusion chairs are at the [facility]    All questions were answered to the patient's satisfaction during this encounter. The patient knows the contact information for our office and knows to reach out for any relevant concerns related to this encounter. They are to call for any temperature 100.4 or higher, new symptoms including but not restricted to shaking chills, decreased appetite, nausea, vomiting, diarrhea, increased fatigue, shortness of breath or chest pain, confusion, and not feeling the strength to come to the clinic. For all other listed problems and medical diagnosis in their chart - they are managed by PCP and/or other specialists, which the patient acknowledges. Thank you very much for your consultation and making us a part of this patient's care. We are continuing to follow closely with you. Please do not hesitate to reach out to me with any additional questions or concerns.    Josue Bledsoe MD  Hematology & Medical Oncology Staff Physician             Disclaimer: This document was prepared using [employer] technology. If a word or phrase is confusing, or does not make sense, this is likely due to recognition error which was not discovered during this clinician's review. If you believe an error has occurred, please contact me through [facility] service line for lee?cation.      ONCOLOGY HISTORY OF PRESENT ILLNESS        Oncology History   Malignant neoplasm of sigmoid colon (HCC)   6/22/2023 Initial Diagnosis    Malignant neoplasm of sigmoid colon (HCC)     9/3/2023 -  Cancer Staged    Staging form: Colon and Rectum, AJCC 8th Edition  - Clinical: Stage Unknown  (cTX, cN0, pM1) - Signed by Josue Bledsoe MD on 9/3/2023  Total positive nodes: 0       Small cell carcinoma (HCC)   7/18/2023 Initial Diagnosis    Small cell carcinoma (HCC)     7/26/2023 -  Chemotherapy    alteplase (CATHFLO), 2 mg, Intracatheter, Every 1 Minute as needed, 8 of 12 cycles  etoposide (TOPOSAR), 80 mg/m2 = 160.8 mg, Intravenous, Once, 4 of 4 cycles  Administration: 160.8 mg (7/26/2023), 160.8 mg (7/27/2023), 160.8 mg (7/28/2023), 160.8 mg (8/16/2023), 160.8 mg (8/17/2023), 160.8 mg (8/18/2023), 160.8 mg (9/6/2023), 160.8 mg (9/7/2023), 160.8 mg (9/8/2023), 160.8 mg (9/27/2023), 160.8 mg (9/28/2023), 160.8 mg (9/29/2023)  CARBOplatin (PARAPLATIN) IVPB (GOG AUC DOSING), 750 mg, Intravenous, Once, 4 of 4 cycles  Administration: 750 mg (7/26/2023), 700.5 mg (8/16/2023), 694 mg (9/6/2023), 694 mg (9/27/2023)  durvalumab (IMFINZI) IVPB, 1,500 mg, Intravenous, Once, 8 of 12 cycles  Administration: 1,500 mg (7/26/2023), 1,500 mg (10/20/2023), 1,500 mg (8/16/2023), 1,500 mg (9/6/2023), 1,500 mg (9/27/2023), 1,500 mg (11/17/2023), 1,500 mg (12/15/2023), 1,500 mg (1/12/2024)  aprepitant (CINVANTI) in  mL IVPB, 130 mg, Intravenous, Once, 4 of 4 cycles  Administration: 130 mg (7/26/2023), 130 mg (8/16/2023), 130 mg (9/6/2023), 130 mg (9/27/2023)       5/21/2023: BRBPR and pain in the lower abdomen for a week; leading to a ER visit, dx of diverticulitis     SUBJECTIVE  (INTERVAL HISTORY)      Clotting History None    Bleeding History None   Cancer History Colon   Family Cancer History pGrandfather (lung, smoker)   H/O Blood/Plt Transfusion None   Tobacco/etoh/drug abuse 1/2 PPD x 25 years (working on quitting and thinking about it; has nicotine patches), no etoh abuse or rec drug use       Cancer Screening history n/a   Occupation IT       Interval events:    Still up to 1/4 PPD, trying to quit.    No acute issues at this time. Fatigue resolved. Hair growing slightly.     I have reviewed the relevant  past medical, surgical, social and family history. I have also reviewed allergies and medications for this patient.    Review of Systems    Baseline weight: 200 lbs    Denies F/C, N/V, SOB, CP, LH, HA, rash, itching, gen weakness, melena, hematuria, hematochezia, falls, diarrhea, or constipation       A 10-point review of system was performed, pertinent positive and negative were detailed as above. Otherwise, the 10-point review of system was negative.      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, chemotherapy, immunotherapy    Mixed hyperlipidemia 10/19/2023    10/19/2023-cholesterol 226, HDL 29       Past Surgical History:   Procedure Laterality Date    COLON SIGMOID RESECTION LAPAROSCOPIC N/A 6/8/2023    Procedure: RESECTION COLON SIGMOID LAPAROSCOPIC HAND-ASSISTED;  Surgeon: Roland Connelly MD;  Location: [facility];  Service: General    IR DRAINAGE TUBE CHECK/CHANGE/REPOSITION/REINSERTION/UPSIZE  6/5/2023    IR DRAINAGE TUBE PLACEMENT  5/30/2023    IR PORT PLACEMENT  7/28/2023       Family History   Problem Relation Age of Onset    No Known Problems Mother     Hypertension Father     Diabetes Father     Heart attack Father 42    Stroke Father        Social History     Socioeconomic History    Marital status: /Civil Union     Spouse name: Not on file    Number of children: Not on file    Years of education: Not on file    Highest education level: Not on file   Occupational History    Not on file   Tobacco Use    Smoking status: Every Day     Current packs/day: 0.75     Average packs/day: 0.8 packs/day for 25.0 years (18.8 ttl pk-yrs)     Types: Cigarettes     Passive exposure: Current (wife smokes)    Smokeless tobacco: Not on file    Tobacco comments:     Patient states Chantix was not effective.   Vaping Use    Vaping status: Never Used   Substance and Sexual Activity    Alcohol use: Not Currently    Drug  use: Never    Sexual activity: Not on file   Other Topics Concern    Not on file   Social History Narrative     since 2011.    4 children aged 18, 17, 16, 6 as of 10/23.  Second child is a girl, the rest boys.    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    Wife is in the [employer].    Enjoys hiking and hunting.    And video games.     Social Determinants of Health     Financial Resource Strain: Not on file   Food Insecurity: No Food Insecurity (5/31/2023)    Hunger Vital Sign     Worried About Running Out of Food in the Last Year: Never true     Ran Out of Food in the Last Year: Never true   Transportation Needs: No Transportation Needs (5/31/2023)    PRAPARE - Transportation     Lack of Transportation (Medical): No     Lack of Transportation (Non-Medical): No   Physical Activity: Not on file   Stress: Not on file   Social Connections: Not on file   Intimate Partner Violence: Not on file   Housing Stability: Low Risk  (5/31/2023)    Housing Stability Vital Sign     Unable to Pay for Housing in the Last Year: No     Number of Places Lived in the Last Year: 1     Unstable Housing in the Last Year: No       No Known Allergies    Current Outpatient Medications   Medication Sig Dispense Refill    atorvastatin (LIPITOR) 10 mg tablet Take 1 tablet (10 mg total) by mouth daily 90 tablet 3    nicotine (NICODERM CQ) 14 mg/24hr TD 24 hr patch Place 1 patch on the skin over 24 hours every 24 hours 28 patch 0     No current facility-administered medications for this visit.       (Not in a hospital admission)      Objective:     24 Hour Vitals Assessment:     There were no vitals filed for this visit.    Below not updated as this was a televisit      PHYSICIAN EXAM:    General: Appearance: alert, cooperative, no distress.  HEENT: Normocephalic, atraumatic. No scleral icterus. conjunctivae clear. EOMI.  Chest: No tenderness to palpation. No open wound noted.  Lungs: Diminished BS b/l, otherwise clear to auscultation  bilaterally, Respirations unlabored.  Cardiac: Regular rate, +S1and S2  Abdomen: Soft, non-tender, non-distended. Bowel sounds are normal.   Extremities:  No edema, cyanosis, clubbing.  Skin: Skin color, turgor are normal. No rashes.  Lymphatics: no palpable supra-cervical, axillary, or inguinal adenopathy  Neurologic: Awake, Alert, and oriented, no gross focal deficits noted b/l.       DATA REVIEW:    Pathology Result:    Final Diagnosis   Date Value Ref Range Status   06/08/2023   Final    A.  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       Comment:     This is an appended report. These results have been appended to a previously preliminary verified report.        Image Results:   Image result are reviewed and documented in Hematology/Oncology history    CT chest abdomen pelvis w contrast  Narrative: CT CHEST, ABDOMEN AND PELVIS WITH IV CONTRAST    INDICATION:   C18.7: Malignant neoplasm of sigmoid colon.    COMPARISON: October 2, 2023  TECHNIQUE: CT examination of the chest, abdomen and pelvis was performed. Multiplanar 2D reformatted images were created from the source data.    This examination, like all CT scans performed in the [facility], was performed utilizing techniques to minimize radiation dose exposure, including the use of iterative reconstruction and automated exposure control. Radiation dose length   product (DLP) for this visit:  717.84 mGy-cm    IV Contrast:  100 mL of iohexol (OMNIPAQUE)  Enteric Contrast: Enteric contrast was administered.    FINDINGS:    CHEST    LUNGS: There are left upper lobe lung nodules measuring less than 6 mm, image 52 series 601, stable  Additional nodule in the left upper lobe image 62 series 601, stable  Additional 2 mm nodule left upper lobe image 61 series 601, stable  A left lower lung nodule measuring about 3 mm image 71 series 601, image 134 series 3, stable.  Left lower lobe  lung nodule image 169 series 3, stable  Perifissural nodule/intrapulmonary lymph nodes in the right midlung along the fissure in image 54 series 601, stable  Right middle lobe 3 mm nodule image 142 series 3, stable  PLEURA: No pleural effusion seen HEART/GREAT VESSELS: Heart is unremarkable for patient's age.  No thoracic aortic aneurysm.    MEDIASTINUM AND FABRICIO: Lower right paratracheal, left paratracheal lymph node,  Reduction of the pathologic enlargement on the basis of size    CHEST WALL AND LOWER NECK:  Unremarkable.    ABDOMEN    LIVER/BILIARY TREE: Hepatic steatosis seen  Enhancing nodules quadrate lobe measuring 1.3 cm compatible with hemangioma  GALLBLADDER:  No calcified gallstones. No pericholecystic inflammatory change.    SPLEEN:  Unremarkable.    PANCREAS:  Unremarkable.    ADRENAL GLANDS:  Unremarkable.    KIDNEYS/URETERS:  Unremarkable. No hydronephrosis.    STOMACH AND BOWEL: Sigmoid colonic thickening seen, image 203 series 2    APPENDIX:  No findings to suggest appendicitis.    ABDOMINOPELVIC CAVITY:  No ascites.  No pneumoperitoneum.  No lymphadenopathy.  The previously noted rounded lesion in the anterior aspect abdominal with central low-attenuation, small left, previously measuring 4.9 cm x 3.5 cm currently measures about 1.1 x 1.6 cm  There is no correlate of the nodular densities described in the lower abdominal and on the previous PET scan of August 8, 2023  VESSELS:  Unremarkable for patient's age.    PELVIS    REPRODUCTIVE ORGANS:  Unremarkable for patient's age.    URINARY BLADDER:  Unremarkable.    ABDOMINAL WALL/INGUINAL REGIONS:  Unremarkable.    OSSEOUS STRUCTURES:  No acute fracture or destructive osseous lesion.  Impression: There is no new measurable metastatic disease    Small less than 6 mm nodules in the both lungs, remains stable    The previously noted rounded lesion in the anterior abdomen which demonstrated marginal uptake on the PET scan is decreasing in size, now  "measuring 1.1 x 1.6 cm (image 184 series 2)    Proximal sigmoid colonic thickening as seen on the previous  Enhancing cavernous hemangioma caudate lobe    Workstation performed: [id number]      LABS:  Lab data are reviewed and documented in HemOn history.       Lab Results   Component Value Date    HGB 15.8 01/11/2024    HCT 48.3 01/11/2024    MCV 98 01/11/2024     01/11/2024    WBC 5.49 01/11/2024    NRBC 0 01/11/2024     Lab Results   Component Value Date    K 3.9 01/11/2024     01/11/2024    CO2 27 01/11/2024    BUN 13 01/11/2024    CREATININE 0.96 01/11/2024    GLUF 88 07/24/2023    CALCIUM 9.2 01/11/2024    CORRECTEDCA 9.1 06/09/2023    AST 27 01/11/2024    ALT 36 01/11/2024    ALKPHOS 70 01/11/2024    EGFR 97 01/11/2024       No results found for: \"IRON\", \"TIBC\", \"FERRITIN\"    No results found for: \"UKFQTKYS41\"    No results for input(s): \"WBC\", \"CREAT\", \"PLT\" in the last 72 hours.    By:  Josue Bledsoe MD, 2/6/2024, 2:27 PM                                  "

## 2024-02-02 RX ORDER — SODIUM CHLORIDE 9 MG/ML
20 INJECTION, SOLUTION INTRAVENOUS ONCE
Status: CANCELLED | OUTPATIENT
Start: 2024-02-09

## 2024-02-06 ENCOUNTER — TELEMEDICINE (OUTPATIENT)
Dept: HEMATOLOGY ONCOLOGY | Facility: CLINIC | Age: 44
End: 2024-02-06
Payer: OTHER GOVERNMENT

## 2024-02-06 DIAGNOSIS — C18.7 MALIGNANT NEOPLASM OF SIGMOID COLON (HCC): Primary | ICD-10-CM

## 2024-02-06 DIAGNOSIS — C80.1 SMALL CELL CARCINOMA (HCC): ICD-10-CM

## 2024-02-06 DIAGNOSIS — Z71.6 ENCOUNTER FOR TOBACCO USE CESSATION COUNSELING: Chronic | ICD-10-CM

## 2024-02-06 PROCEDURE — 99215 OFFICE O/P EST HI 40 MIN: CPT | Performed by: INTERNAL MEDICINE

## 2024-02-06 PROCEDURE — 99406 BEHAV CHNG SMOKING 3-10 MIN: CPT | Performed by: INTERNAL MEDICINE

## 2024-02-06 RX ORDER — SODIUM CHLORIDE 9 MG/ML
20 INJECTION, SOLUTION INTRAVENOUS ONCE
OUTPATIENT
Start: 2024-06-28

## 2024-02-06 RX ORDER — SODIUM CHLORIDE 9 MG/ML
20 INJECTION, SOLUTION INTRAVENOUS ONCE
OUTPATIENT
Start: 2024-05-03

## 2024-02-06 RX ORDER — SODIUM CHLORIDE 9 MG/ML
20 INJECTION, SOLUTION INTRAVENOUS ONCE
OUTPATIENT
Start: 2024-04-05

## 2024-02-06 RX ORDER — SODIUM CHLORIDE 9 MG/ML
20 INJECTION, SOLUTION INTRAVENOUS ONCE
OUTPATIENT
Start: 2024-05-31

## 2024-02-06 RX ORDER — SODIUM CHLORIDE 9 MG/ML
20 INJECTION, SOLUTION INTRAVENOUS ONCE
OUTPATIENT
Start: 2024-03-08

## 2024-02-08 ENCOUNTER — HOSPITAL ENCOUNTER (OUTPATIENT)
Dept: INFUSION CENTER | Facility: HOSPITAL | Age: 44
End: 2024-02-08
Attending: INTERNAL MEDICINE
Payer: OTHER GOVERNMENT

## 2024-02-08 DIAGNOSIS — Z45.2 ENCOUNTER FOR CARE RELATED TO PORT-A-CATH: Primary | ICD-10-CM

## 2024-02-08 DIAGNOSIS — E78.2 MIXED HYPERLIPIDEMIA: ICD-10-CM

## 2024-02-08 DIAGNOSIS — C80.1 SMALL CELL CARCINOMA (HCC): ICD-10-CM

## 2024-02-08 LAB
ALBUMIN SERPL BCP-MCNC: 4.3 G/DL (ref 3.5–5)
ALP SERPL-CCNC: 61 U/L (ref 34–104)
ALT SERPL W P-5'-P-CCNC: 32 U/L (ref 7–52)
ANION GAP SERPL CALCULATED.3IONS-SCNC: 6 MMOL/L
AST SERPL W P-5'-P-CCNC: 22 U/L (ref 13–39)
BASOPHILS # BLD AUTO: 0.01 THOUSANDS/ÂΜL (ref 0–0.1)
BASOPHILS NFR BLD AUTO: 0 % (ref 0–1)
BILIRUB SERPL-MCNC: 0.34 MG/DL (ref 0.2–1)
BUN SERPL-MCNC: 13 MG/DL (ref 5–25)
CALCIUM SERPL-MCNC: 9.1 MG/DL (ref 8.4–10.2)
CHLORIDE SERPL-SCNC: 104 MMOL/L (ref 96–108)
CHOLEST SERPL-MCNC: 171 MG/DL
CO2 SERPL-SCNC: 28 MMOL/L (ref 21–32)
CREAT SERPL-MCNC: 0.88 MG/DL (ref 0.6–1.3)
EOSINOPHIL # BLD AUTO: 0.15 THOUSAND/ÂΜL (ref 0–0.61)
EOSINOPHIL NFR BLD AUTO: 2 % (ref 0–6)
ERYTHROCYTE [DISTWIDTH] IN BLOOD BY AUTOMATED COUNT: 12.4 % (ref 11.6–15.1)
GFR SERPL CREATININE-BSD FRML MDRD: 106 ML/MIN/1.73SQ M
GLUCOSE P FAST SERPL-MCNC: 102 MG/DL (ref 65–99)
GLUCOSE SERPL-MCNC: 102 MG/DL (ref 65–140)
HCT VFR BLD AUTO: 47.3 % (ref 36.5–49.3)
HDLC SERPL-MCNC: 31 MG/DL
HGB BLD-MCNC: 15.4 G/DL (ref 12–17)
IMM GRANULOCYTES # BLD AUTO: 0.01 THOUSAND/UL (ref 0–0.2)
IMM GRANULOCYTES NFR BLD AUTO: 0 % (ref 0–2)
LDLC SERPL CALC-MCNC: 112 MG/DL (ref 0–100)
LYMPHOCYTES # BLD AUTO: 1.59 THOUSANDS/ÂΜL (ref 0.6–4.47)
LYMPHOCYTES NFR BLD AUTO: 22 % (ref 14–44)
MCH RBC QN AUTO: 31.4 PG (ref 26.8–34.3)
MCHC RBC AUTO-ENTMCNC: 32.6 G/DL (ref 31.4–37.4)
MCV RBC AUTO: 97 FL (ref 82–98)
MONOCYTES # BLD AUTO: 0.87 THOUSAND/ÂΜL (ref 0.17–1.22)
MONOCYTES NFR BLD AUTO: 12 % (ref 4–12)
NEUTROPHILS # BLD AUTO: 4.54 THOUSANDS/ÂΜL (ref 1.85–7.62)
NEUTS SEG NFR BLD AUTO: 64 % (ref 43–75)
NONHDLC SERPL-MCNC: 140 MG/DL
NRBC BLD AUTO-RTO: 0 /100 WBCS
PLATELET # BLD AUTO: 187 THOUSANDS/UL (ref 149–390)
PMV BLD AUTO: 11.1 FL (ref 8.9–12.7)
POTASSIUM SERPL-SCNC: 4 MMOL/L (ref 3.5–5.3)
PROT SERPL-MCNC: 6.9 G/DL (ref 6.4–8.4)
RBC # BLD AUTO: 4.9 MILLION/UL (ref 3.88–5.62)
SODIUM SERPL-SCNC: 138 MMOL/L (ref 135–147)
TRIGL SERPL-MCNC: 138 MG/DL
TSH SERPL DL<=0.05 MIU/L-ACNC: 1.15 UIU/ML (ref 0.45–4.5)
WBC # BLD AUTO: 7.17 THOUSAND/UL (ref 4.31–10.16)

## 2024-02-08 PROCEDURE — 85025 COMPLETE CBC W/AUTO DIFF WBC: CPT | Performed by: INTERNAL MEDICINE

## 2024-02-08 PROCEDURE — 80053 COMPREHEN METABOLIC PANEL: CPT | Performed by: INTERNAL MEDICINE

## 2024-02-08 PROCEDURE — 80061 LIPID PANEL: CPT

## 2024-02-08 PROCEDURE — 84443 ASSAY THYROID STIM HORMONE: CPT | Performed by: INTERNAL MEDICINE

## 2024-02-08 NOTE — RESULT ENCOUNTER NOTE
Cholesterol is improved.  Down to 171 from 226.  Continue 10 mg atorvastatin.  Chemistry and thyroid are normal.

## 2024-02-08 NOTE — PROGRESS NOTES
Kwaku Plascencia  tolerated lab draw and port flush well with no complications.      Kwaku Plascencia is aware of future appt on 2/9 at 8:30AM.     AVS printed and given to Kwaku Plascencia: No (Declined by Kwaku Plascencia).

## 2024-02-09 ENCOUNTER — HOSPITAL ENCOUNTER (OUTPATIENT)
Dept: INFUSION CENTER | Facility: HOSPITAL | Age: 44
End: 2024-02-09
Attending: INTERNAL MEDICINE
Payer: OTHER GOVERNMENT

## 2024-02-09 VITALS
OXYGEN SATURATION: 96 % | TEMPERATURE: 97.4 F | HEART RATE: 89 BPM | DIASTOLIC BLOOD PRESSURE: 94 MMHG | SYSTOLIC BLOOD PRESSURE: 144 MMHG | RESPIRATION RATE: 18 BRPM

## 2024-02-09 DIAGNOSIS — C80.1 SMALL CELL CARCINOMA (HCC): Primary | ICD-10-CM

## 2024-02-09 PROCEDURE — 96413 CHEMO IV INFUSION 1 HR: CPT

## 2024-02-09 RX ORDER — SODIUM CHLORIDE 9 MG/ML
20 INJECTION, SOLUTION INTRAVENOUS ONCE
Status: COMPLETED | OUTPATIENT
Start: 2024-02-09 | End: 2024-02-09

## 2024-02-09 RX ADMIN — DURVALUMAB 1500 MG: 500 INJECTION, SOLUTION INTRAVENOUS at 09:26

## 2024-02-09 RX ADMIN — SODIUM CHLORIDE 20 ML/HR: 9 INJECTION, SOLUTION INTRAVENOUS at 09:22

## 2024-02-09 NOTE — PROGRESS NOTES
Kwaku Plascencia  tolerated Imfinzi well with no complications.      Kwaku Plascencia is aware of future appt on 3/7 at 8:30am.     AVS printed and given to Kwaku Plascencia:  No (Declined by Kwaku Plascencia)     Left unit in stable condition.

## 2024-02-12 ENCOUNTER — OFFICE VISIT (OUTPATIENT)
Dept: FAMILY MEDICINE CLINIC | Facility: CLINIC | Age: 44
End: 2024-02-12
Payer: OTHER GOVERNMENT

## 2024-02-12 VITALS
DIASTOLIC BLOOD PRESSURE: 70 MMHG | HEIGHT: 67 IN | SYSTOLIC BLOOD PRESSURE: 120 MMHG | HEART RATE: 89 BPM | BODY MASS INDEX: 33.34 KG/M2 | TEMPERATURE: 97.4 F | WEIGHT: 212.4 LBS | OXYGEN SATURATION: 99 %

## 2024-02-12 DIAGNOSIS — Z72.0 TOBACCO ABUSE: ICD-10-CM

## 2024-02-12 DIAGNOSIS — Z71.6 ENCOUNTER FOR TOBACCO USE CESSATION COUNSELING: Primary | Chronic | ICD-10-CM

## 2024-02-12 DIAGNOSIS — Z01.00 EYE EXAM, ROUTINE: ICD-10-CM

## 2024-02-12 PROCEDURE — 99213 OFFICE O/P EST LOW 20 MIN: CPT | Performed by: FAMILY MEDICINE

## 2024-02-12 NOTE — PATIENT INSTRUCTIONS
Down to 5 cigarettes a day.  Suggest eliminating 1 cigarette every 1 or 2 weeks and setting appropriate quit date.  Excellent improvement noted in lipid profile.  Continue atorvastatin.  Recheck 1 year or as needed.

## 2024-02-12 NOTE — PROGRESS NOTES
Chief Complaint   Patient presents with    Follow-up        HPI   Here for follow-up on smoking cessation.  And hyperlipidemia.  Stopped using the NicoDerm patch which was not helping.  However since doing that he is decreased smoking intake from 15 cigarettes to 5 cigarettes a day.  He is found that he is able to postpone a cigarette an hour when he gets the urge.  Wife continues to smoke but neither smoke within the house.  5 cigarettes or smoked upon awakening, midmorning, lunch, dinner, and bedtime.  If he was going to eliminate 1, it would be the midmorning cigarette.  Nice improvement in lipid profile despite forgetting occasional doses.    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, chemotherapy, immunotherapy    Mixed hyperlipidemia 10/19/2023    10/19/2023-cholesterol 226, HDL 29        Past Surgical History:   Procedure Laterality Date    COLON SIGMOID RESECTION LAPAROSCOPIC N/A 6/8/2023    Procedure: RESECTION COLON SIGMOID LAPAROSCOPIC HAND-ASSISTED;  Surgeon: Roland Connelly MD;  Location: [facility];  Service: General    IR DRAINAGE TUBE CHECK/CHANGE/REPOSITION/REINSERTION/UPSIZE  6/5/2023    IR DRAINAGE TUBE PLACEMENT  5/30/2023    IR PORT PLACEMENT  7/28/2023       Social History     Tobacco Use    Smoking status: Every Day     Current packs/day: 0.75     Average packs/day: 0.8 packs/day for 25.0 years (18.8 ttl pk-yrs)     Types: Cigarettes     Passive exposure: Current (wife smokes)    Smokeless tobacco: Not on file    Tobacco comments:     Patient states Chantix was not effective.   Substance Use Topics    Alcohol use: Not Currently       Social History     Social History Narrative     since 2011.    4 children aged 18, 17, 16, 6 as of 10/23.  Second child is a girl, the rest boys.    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    Wife is in the [employer].    Enjoys hiking and hunting.    And video  "games.        The following portions of the patient's history were reviewed and updated as appropriate: allergies, current medications, past family history, past medical history, past social history, past surgical history, and problem list.      Review of Systems       /70 (BP Location: Left arm, Patient Position: Sitting, Cuff Size: Large)   Pulse 89   Temp (!) 97.4 °F (36.3 °C) (Temporal)   Ht 5' 6.54\" (1.69 m)   Wt 96.3 kg (212 lb 6.4 oz)   SpO2 99%   BMI 33.73 kg/m²      Physical Exam   Appears well.   Weight up 6 pounds in last couple of months.   Lipid profile improved.  Cholesterol dropped from 226 down to 171.  Triglycerides 254 down to 138.   down to 112.            Current Outpatient Medications:     atorvastatin (LIPITOR) 10 mg tablet, Take 1 tablet (10 mg total) by mouth daily, Disp: 90 tablet, Rfl: 3  No current facility-administered medications for this visit.    Facility-Administered Medications Ordered in Other Visits:     alteplase (CATHFLO) injection 2 mg, 2 mg, Intracatheter, Q1MIN PRN, Josue Bledsoe MD     No problem-specific Assessment & Plan notes found for this encounter.       Diagnoses and all orders for this visit:    Encounter for tobacco use cessation counseling    Tobacco abuse    Eye exam, routine  -     Ambulatory Referral to Optometry; Future        Patient Instructions   Down to 5 cigarettes a day.  Suggest eliminating 1 cigarette every 1 or 2 weeks and setting appropriate quit date.  Excellent improvement noted in lipid profile.  Continue atorvastatin.  Recheck 1 year or as needed.     "

## 2024-03-01 NOTE — PROGRESS NOTES
Telemedicine consent    Patient: Kwaku Plascencia  Provider: Josue Bledsoe MD  Provider located at [address] [place] [address] [facility] [address] [id number]    The patient was identified by name and date of birth. Kwaku Plascencia was informed that this is a telemedicine visit and that the visit is being conducted through the [employer] platform. He agrees to proceed..  My office door was closed. No one else was in the room.  He acknowledged consent and understanding of privacy and security of the video platform. The patient has agreed to participate and understands they can discontinue the visit at any time.    Patient is aware this is a billable service.                                  Hematology/Oncology Outpatient Office Note    Date of Service: 3/1/2024    [facility] [address] [address] [phone]    Reason for Consultation:   No chief complaint on file.      Cancer Stage at diagnosis: IV    Referral Physician: No ref. provider found    Primary Care Physician:  Theron Stein MD     Nickname: Kwaku    Spouse: Naz Dinh ECO    Today's ECO    Goals and Barriers:  Current Goal: Minimize effects of disease burden, extend life.   Barriers to accomplishing this: None    Patient's Capacity to Self Care:  Patient is able to self care    Code Status: not addressed today    Advanced directives: not addressed today    ASSESSMENT & PLAN      Diagnosis ICD-10-CM Associated Orders   1. Malignant neoplasm of sigmoid colon (HCC)  C18.7       2. Neuroendocrine carcinoma (HCC)  C7A.8       3. Small cell carcinoma (HCC)  C80.1             This is a 43 y.o. c PMHx notable for obesity, being seen in consultation for large cell with neuroendocrine features found in the colon    Discussion of decision making  Oncology history updated, accordingly, during this visit  Goals of care/patient  communication  I discussed with the patient the clinical course leading up to their cancer diagnosis. I reviewed relevant office notes, imaging reports and pathology result as well.  I told the patient that this is a case of curable versus incurable disease and what this means. We discussed that the goal of anti-cancer therapy is to provide best quality of life, extend overall survival, and progression free survival as shown in clinical trials. We also discussed that there might be a point when the cancer will no longer respond to this anti-neoplastic therapy.   I explained the risks/benefits of the proposed cancer therapy: Carboplatin-Etoposide +/- Durvalumab and after discussion including understanding risks of possible life-threatening complications and therapy-related malignancy development, informed consent for blood products and treatment has been signed and obtained.  TNM/Staging At Diagnosis  [id number]  Cancer Staging   IV  Disease Features/Tumor Markers/Genetics  Tumor Marker:   7/17/2023: CEA 2.2  CGA: 49.4  Notable Path Features:   6/8/2023: Poorly differentiated malignancy, pending external expert consultation positive for synaptophysin, chromogranin, CD56, TTF-1, BCL2, and cyclin D1 with a high Ki-67 proliferative rate (approximately 90%) compatible with a poorly differentiated malignancy with features suggesting neuroendocrine differentiation. Malignant small round blue cell neoplasm with neuroendocrine expression  Treatment: Carboplatin-Etoposide +/- Durvalumab  Other Supportive care: EMLA, Zofran  Treatment Team Members  Surgeon  Rad Onc  Palliative  Saw [facility] who says do PET/CT,  plat/etop, not sure of value of IO  Labs  Diagnostics  6/4/2023 CT Abd pelv w/c: Worsening acute sigmoid diverticulitis with adjacent contained perforation and interval placement of a drainage catheter within the intramural abscess which is slightly increased in size since prior study. The intramural abscess abuts the  superior aspect of the urinary bladder without evidence of a colovesical fistula.  6/27/2023 MRI Abd w/wo c: Benign flash-filling 11 mm hepatic and angioma in the caudate lobe of the liver accounting for the enhancing nodule in that location on recent CT examinations  7/19/2023 CT Chest w/o c: There are multiple pulmonary nodules, which will be described on series 4:  Image 68, left lower lobe, solid, smooth bordered, 3 mm .  Image 63, left upper lobe, solid, smooth bordered, 4 mm .  Image 43, right upper lobe, solid, smooth bordered, 4 mm, may represent a fissural lymph node.  Image 55, right upper lobe, solid, smooth bordered, 4 mm, may represent a fissural lymph node  7/19/2023 MRI Brain w/wo c: No evidence of intracranial metastasis, A few foci of T2/FLAIR hyperintensity primarily involving left frontoparietal subcortical and deep white matter (maybe migraine related)  8/8/2023 PET/CT: Three new hypermetabolic solid nodules/masses in the abdomen as described, compatible with progression of disease. Mild uptake along the left anterior ninth rib corresponding to a mild fracture deformity. Findings are indeterminate and may represent the sequela of trauma or metastatic disease. Stable sub-4 mm pulmonary nodules   -3.1 x 3.1 cm hypermetabolic mass in the left anterior abdomen abutting a loop of small bowel (series 4, image 147; SUV max 12).   -1.3 x 1.2 cm hypermetabolic soft tissue nodule in the left mid abdomen posterior to a loop of small bowel (series 4, image 154; SUV max 17)   -1.8 x 2.1 cm hypermetabolic soft tissue nodule anterior to the left psoas muscle (series 4, image 160; SUV max 9.7)  10/2/2023: CT CAP w/c: excellent DE. Multiple pulmonary nodules without significant change when compared to the previous CT chest. Some which may represent fissural lymph nodes. No evidence of metastatic disease in the chest abdomen and pelvis.  Improvement of the lower abdominal/pelvic lymphadenopathy when compared to  the previous PET/CT.  CARIS NGS: 11/9/2023 CARIS NGS: TMB 94 muts/Mb, MSI-proficient, BRCA 1 exon 18 mutation  BRAF exon 15 mutation, MSH2/3, NF1 exon 3, PIK3CA, POLE exon 9, PTEN exon 1, RB1, SMAD2, TP53 exon 6, XPO1 exon 15  12/22/2023 CT CAP w/c stable: There is no new measurable metastatic disease. Small less than 6 mm nodules in the both lungs, remains stable. The previously noted rounded lesion in the anterior abdomen which demonstrated marginal uptake on the PET scan is decreasing in size, now measuring 1.1 x 1.6 cm (image 184 series 2). Proximal sigmoid colonic thickening as seen on the previous. Enhancing cavernous hemangioma caudate lobe  Genetic testing was negative    Discussion of decision making    I personally reviewed the following lab results, the image studies, pathology, other specialty/physicians consult notes and recommendations, and outside medical records. I had a lengthy discussion with the patient and shared the work-up findings. We discussed the diagnosis and management plan as below. I spent 43 minutes reviewing the records (labs, clinician notes, outside records, medical history, ordering medicine/tests/procedures, interpreting the imaging/labs previously done) and coordination of care as well as direct time with the patient today, of which greater than 50% of the time was spent in counseling and coordination of care with the patient/family.    I discussed the risks of ongoing cigarette smoking and reviewed the benefits of quitting and risk of new lung primary, heart disease, stroke, among other risks and cancers. Reviewed options including support, quit date, medications, and family support. Patient voiced understanding and willingness to try to quit. Patient was told to notify [facility] family practitioner for additional management. Greater than 3 minutes were needed for discussion of tobacco dependence and quitting counselling.      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 Discuss with [facility]*    Follow Up: Every 4 weeks while on systemic therapy scheduled thru 7/9/2024    Infusion chairs are at the [facility]    All questions were answered to the patient's satisfaction during this encounter. The patient knows the contact information for our office and knows to reach out for any relevant concerns related to this encounter. They are to call for any temperature 100.4 or higher, new symptoms including but not restricted to shaking chills, decreased appetite, nausea, vomiting, diarrhea, increased fatigue, shortness of breath or chest pain, confusion, and not feeling the strength to come to the clinic. For all other listed problems and medical diagnosis in their chart - they are managed by PCP and/or other specialists, which the patient acknowledges. Thank you very much for your consultation and making us a part of this patient's care. We are continuing to follow closely with you. Please do not hesitate to reach out to me with any additional questions or concerns.    Josue Bledsoe MD  Hematology & Medical Oncology Staff Physician             Disclaimer: This document was prepared using [employer] technology. If a word or phrase is confusing, or does not make sense, this is likely due to recognition error which was not discovered during this clinician's review. If you believe an error has occurred, please contact me through [employer] service line for lee?cation.      ONCOLOGY HISTORY OF PRESENT ILLNESS        Oncology History   Malignant neoplasm of sigmoid colon (HCC)   6/22/2023 Initial Diagnosis    Malignant neoplasm of sigmoid colon  (HCC)     9/3/2023 -  Cancer Staged    Staging form: Colon and Rectum, AJCC 8th Edition  - Clinical: Stage Unknown (cTX, cN0, pM1) - Signed by Josue Bledsoe MD on 9/3/2023  Total positive nodes: 0       Small cell carcinoma (HCC)   7/18/2023 Initial Diagnosis    Small cell carcinoma (HCC)     7/26/2023 -  Chemotherapy    alteplase (CATHFLO), 2 mg, Intracatheter, Every 1 Minute as needed, 9 of 15 cycles  etoposide (TOPOSAR), 80 mg/m2 = 160.8 mg, Intravenous, Once, 4 of 4 cycles  Administration: 160.8 mg (7/26/2023), 160.8 mg (7/27/2023), 160.8 mg (7/28/2023), 160.8 mg (8/16/2023), 160.8 mg (8/17/2023), 160.8 mg (8/18/2023), 160.8 mg (9/6/2023), 160.8 mg (9/7/2023), 160.8 mg (9/8/2023), 160.8 mg (9/27/2023), 160.8 mg (9/28/2023), 160.8 mg (9/29/2023)  CARBOplatin (PARAPLATIN) IVPB (GOG AUC DOSING), 750 mg, Intravenous, Once, 4 of 4 cycles  Administration: 750 mg (7/26/2023), 700.5 mg (8/16/2023), 694 mg (9/6/2023), 694 mg (9/27/2023)  durvalumab (IMFINZI) IVPB, 1,500 mg, Intravenous, Once, 9 of 15 cycles  Administration: 1,500 mg (7/26/2023), 1,500 mg (10/20/2023), 1,500 mg (8/16/2023), 1,500 mg (9/6/2023), 1,500 mg (9/27/2023), 1,500 mg (11/17/2023), 1,500 mg (12/15/2023), 1,500 mg (1/12/2024), 1,500 mg (2/9/2024)  aprepitant (CINVANTI) in  mL IVPB, 130 mg, Intravenous, Once, 4 of 4 cycles  Administration: 130 mg (7/26/2023), 130 mg (8/16/2023), 130 mg (9/6/2023), 130 mg (9/27/2023)       5/21/2023: BRBPR and pain in the lower abdomen for a week; leading to a ER visit, dx of diverticulitis     SUBJECTIVE  (INTERVAL HISTORY)      Clotting History None    Bleeding History None   Cancer History Colon   Family Cancer History pGrandfather (lung, smoker)   H/O Blood/Plt Transfusion None   Tobacco/etoh/drug abuse 1/2 PPD x 25 years (working on quitting and thinking about it; has nicotine patches), no etoh abuse or rec drug use       Cancer Screening history n/a   Occupation IT       Interval events:  Mild  diarrhea Saturday but not recurrence (was present in the rest of the family).  Still up to 1/4 PPD, trying to quit.    No acute issues at this time. Hair growing thicker.     I have reviewed the relevant past medical, surgical, social and family history. I have also reviewed allergies and medications for this patient.    Review of Systems    Baseline weight: 200 lbs    Denies F/C, N/V, SOB, CP, LH, HA, rash, itching, gen weakness, melena, hematuria, hematochezia, falls, diarrhea, or constipation       A 10-point review of system was performed, pertinent positive and negative were detailed as above. Otherwise, the 10-point review of system was negative.      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, chemotherapy, immunotherapy    Mixed hyperlipidemia 10/19/2023    10/19/2023-cholesterol 226, HDL 29       Past Surgical History:   Procedure Laterality Date    COLON SIGMOID RESECTION LAPAROSCOPIC N/A 6/8/2023    Procedure: RESECTION COLON SIGMOID LAPAROSCOPIC HAND-ASSISTED;  Surgeon: Roland Connelly MD;  Location: [facility];  Service: General    IR DRAINAGE TUBE CHECK/CHANGE/REPOSITION/REINSERTION/UPSIZE  6/5/2023    IR DRAINAGE TUBE PLACEMENT  5/30/2023    IR PORT PLACEMENT  7/28/2023       Family History   Problem Relation Age of Onset    No Known Problems Mother     Hypertension Father     Diabetes Father     Heart attack Father 42    Stroke Father        Social History     Socioeconomic History    Marital status: /Civil Union     Spouse name: Not on file    Number of children: Not on file    Years of education: Not on file    Highest education level: Not on file   Occupational History    Not on file   Tobacco Use    Smoking status: Every Day     Current packs/day: 0.75     Average packs/day: 0.8 packs/day for 25.0 years (18.8 ttl pk-yrs)     Types: Cigarettes     Passive exposure: Current (wife smokes)    Smokeless  tobacco: Not on file    Tobacco comments:     Patient states Chantix was not effective.   Vaping Use    Vaping status: Never Used   Substance and Sexual Activity    Alcohol use: Not Currently    Drug use: Never    Sexual activity: Not on file   Other Topics Concern    Not on file   Social History Narrative     since 2011.    4 children aged 18, 17, 16, 6 as of 10/23.  Second child is a girl, the rest boys.    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    Enjoys hiking and hunting.    And video games.     Social Determinants of Health     Financial Resource Strain: Not on file   Food Insecurity: No Food Insecurity (5/31/2023)    Hunger Vital Sign     Worried About Running Out of Food in the Last Year: Never true     Ran Out of Food in the Last Year: Never true   Transportation Needs: No Transportation Needs (5/31/2023)    PRAPARE - Transportation     Lack of Transportation (Medical): No     Lack of Transportation (Non-Medical): No   Physical Activity: Not on file   Stress: Not on file   Social Connections: Not on file   Intimate Partner Violence: Not on file   Housing Stability: Low Risk  (5/31/2023)    Housing Stability Vital Sign     Unable to Pay for Housing in the Last Year: No     Number of Places Lived in the Last Year: 1     Unstable Housing in the Last Year: No       No Known Allergies    Current Outpatient Medications   Medication Sig Dispense Refill    atorvastatin (LIPITOR) 10 mg tablet Take 1 tablet (10 mg total) by mouth daily 90 tablet 3     No current facility-administered medications for this visit.       (Not in a hospital admission)      Objective:     24 Hour Vitals Assessment:     There were no vitals filed for this visit.    Below not updated as this was a televisit      PHYSICIAN EXAM:    General: Appearance: alert, cooperative, no distress.  HEENT: Normocephalic, atraumatic. No scleral icterus. conjunctivae clear. EOMI.  Chest: No tenderness to palpation. No open  wound noted.  Lungs: Diminished BS b/l, otherwise clear to auscultation bilaterally, Respirations unlabored.  Cardiac: Regular rate, +S1and S2  Abdomen: Soft, non-tender, non-distended. Bowel sounds are normal.   Extremities:  No edema, cyanosis, clubbing.  Skin: Skin color, turgor are normal. No rashes.  Lymphatics: no palpable supra-cervical, axillary, or inguinal adenopathy  Neurologic: Awake, Alert, and oriented, no gross focal deficits noted b/l.       DATA REVIEW:    Pathology Result:    Final Diagnosis   Date Value Ref Range Status   06/08/2023   Final    A.  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       Comment:     This is an appended report. These results have been appended to a previously preliminary verified report.        Image Results:   Image result are reviewed and documented in Hematology/Oncology history    CT chest abdomen pelvis w contrast  Narrative: CT CHEST, ABDOMEN AND PELVIS WITH IV CONTRAST    INDICATION:   C18.7: Malignant neoplasm of sigmoid colon.    COMPARISON: October 2, 2023  TECHNIQUE: CT examination of the chest, abdomen and pelvis was performed. Multiplanar 2D reformatted images were created from the source data.    This examination, like all CT scans performed in the [facility], was performed utilizing techniques to minimize radiation dose exposure, including the use of iterative reconstruction and automated exposure control. Radiation dose length   product (DLP) for this visit:  717.84 mGy-cm    IV Contrast:  100 mL of iohexol (OMNIPAQUE)  Enteric Contrast: Enteric contrast was administered.    FINDINGS:    CHEST    LUNGS: There are left upper lobe lung nodules measuring less than 6 mm, image 52 series 601, stable  Additional nodule in the left upper lobe image 62 series 601, stable  Additional 2 mm nodule left upper lobe image 61 series 601, stable  A left lower lung nodule measuring about  3 mm image 71 series 601, image 134 series 3, stable.  Left lower lobe lung nodule image 169 series 3, stable  Perifissural nodule/intrapulmonary lymph nodes in the right midlung along the fissure in image 54 series 601, stable  Right middle lobe 3 mm nodule image 142 series 3, stable  PLEURA: No pleural effusion seen HEART/GREAT VESSELS: Heart is unremarkable for patient's age.  No thoracic aortic aneurysm.    MEDIASTINUM AND FABRICIO: Lower right paratracheal, left paratracheal lymph node,  Reduction of the pathologic enlargement on the basis of size    CHEST WALL AND LOWER NECK:  Unremarkable.    ABDOMEN    LIVER/BILIARY TREE: Hepatic steatosis seen  Enhancing nodules quadrate lobe measuring 1.3 cm compatible with hemangioma  GALLBLADDER:  No calcified gallstones. No pericholecystic inflammatory change.    SPLEEN:  Unremarkable.    PANCREAS:  Unremarkable.    ADRENAL GLANDS:  Unremarkable.    KIDNEYS/URETERS:  Unremarkable. No hydronephrosis.    STOMACH AND BOWEL: Sigmoid colonic thickening seen, image 203 series 2    APPENDIX:  No findings to suggest appendicitis.    ABDOMINOPELVIC CAVITY:  No ascites.  No pneumoperitoneum.  No lymphadenopathy.  The previously noted rounded lesion in the anterior aspect abdominal with central low-attenuation, small left, previously measuring 4.9 cm x 3.5 cm currently measures about 1.1 x 1.6 cm  There is no correlate of the nodular densities described in the lower abdominal and on the previous PET scan of August 8, 2023  VESSELS:  Unremarkable for patient's age.    PELVIS    REPRODUCTIVE ORGANS:  Unremarkable for patient's age.    URINARY BLADDER:  Unremarkable.    ABDOMINAL WALL/INGUINAL REGIONS:  Unremarkable.    OSSEOUS STRUCTURES:  No acute fracture or destructive osseous lesion.  Impression: There is no new measurable metastatic disease    Small less than 6 mm nodules in the both lungs, remains stable    The previously noted rounded lesion in the anterior abdomen which  "demonstrated marginal uptake on the PET scan is decreasing in size, now measuring 1.1 x 1.6 cm (image 184 series 2)    Proximal sigmoid colonic thickening as seen on the previous  Enhancing cavernous hemangioma caudate lobe    Workstation performed: [id number]      LABS:  Lab data are reviewed and documented in HemOn history.       Lab Results   Component Value Date    HGB 15.4 02/08/2024    HCT 47.3 02/08/2024    MCV 97 02/08/2024     02/08/2024    WBC 7.17 02/08/2024    NRBC 0 02/08/2024     Lab Results   Component Value Date    K 4.0 02/08/2024     02/08/2024    CO2 28 02/08/2024    BUN 13 02/08/2024    CREATININE 0.88 02/08/2024    GLUF 102 (H) 02/08/2024    CALCIUM 9.1 02/08/2024    CORRECTEDCA 9.1 06/09/2023    AST 22 02/08/2024    ALT 32 02/08/2024    ALKPHOS 61 02/08/2024    EGFR 106 02/08/2024       No results found for: \"IRON\", \"TIBC\", \"FERRITIN\"    No results found for: \"[id number]\"    No results for input(s): \"WBC\", \"CREAT\", \"PLT\" in the last 72 hours.    By:  Josue Bledsoe MD, 3/1/2024, 7:03 AM                                  "

## 2024-03-07 ENCOUNTER — HOSPITAL ENCOUNTER (OUTPATIENT)
Dept: INFUSION CENTER | Facility: HOSPITAL | Age: 44
End: 2024-03-07
Payer: OTHER GOVERNMENT

## 2024-03-07 VITALS — TEMPERATURE: 96.8 F

## 2024-03-07 DIAGNOSIS — Z45.2 ENCOUNTER FOR CARE RELATED TO PORT-A-CATH: Primary | ICD-10-CM

## 2024-03-07 DIAGNOSIS — C80.1 SMALL CELL CARCINOMA (HCC): ICD-10-CM

## 2024-03-07 LAB
ALBUMIN SERPL BCP-MCNC: 4.1 G/DL (ref 3.5–5)
ALP SERPL-CCNC: 64 U/L (ref 34–104)
ALT SERPL W P-5'-P-CCNC: 27 U/L (ref 7–52)
ANION GAP SERPL CALCULATED.3IONS-SCNC: 7 MMOL/L
AST SERPL W P-5'-P-CCNC: 21 U/L (ref 13–39)
BASOPHILS # BLD AUTO: 0.02 THOUSANDS/ÂΜL (ref 0–0.1)
BASOPHILS NFR BLD AUTO: 0 % (ref 0–1)
BILIRUB SERPL-MCNC: 0.47 MG/DL (ref 0.2–1)
BUN SERPL-MCNC: 11 MG/DL (ref 5–25)
CALCIUM SERPL-MCNC: 8.8 MG/DL (ref 8.4–10.2)
CHLORIDE SERPL-SCNC: 103 MMOL/L (ref 96–108)
CO2 SERPL-SCNC: 27 MMOL/L (ref 21–32)
CREAT SERPL-MCNC: 0.89 MG/DL (ref 0.6–1.3)
EOSINOPHIL # BLD AUTO: 0.18 THOUSAND/ÂΜL (ref 0–0.61)
EOSINOPHIL NFR BLD AUTO: 3 % (ref 0–6)
ERYTHROCYTE [DISTWIDTH] IN BLOOD BY AUTOMATED COUNT: 12.7 % (ref 11.6–15.1)
GFR SERPL CREATININE-BSD FRML MDRD: 106 ML/MIN/1.73SQ M
GLUCOSE SERPL-MCNC: 102 MG/DL (ref 65–140)
HCT VFR BLD AUTO: 48.4 % (ref 36.5–49.3)
HGB BLD-MCNC: 16 G/DL (ref 12–17)
IMM GRANULOCYTES # BLD AUTO: 0.02 THOUSAND/UL (ref 0–0.2)
IMM GRANULOCYTES NFR BLD AUTO: 0 % (ref 0–2)
LYMPHOCYTES # BLD AUTO: 1.77 THOUSANDS/ÂΜL (ref 0.6–4.47)
LYMPHOCYTES NFR BLD AUTO: 25 % (ref 14–44)
MCH RBC QN AUTO: 31.3 PG (ref 26.8–34.3)
MCHC RBC AUTO-ENTMCNC: 33.1 G/DL (ref 31.4–37.4)
MCV RBC AUTO: 95 FL (ref 82–98)
MONOCYTES # BLD AUTO: 0.64 THOUSAND/ÂΜL (ref 0.17–1.22)
MONOCYTES NFR BLD AUTO: 9 % (ref 4–12)
NEUTROPHILS # BLD AUTO: 4.41 THOUSANDS/ÂΜL (ref 1.85–7.62)
NEUTS SEG NFR BLD AUTO: 63 % (ref 43–75)
NRBC BLD AUTO-RTO: 0 /100 WBCS
PLATELET # BLD AUTO: 206 THOUSANDS/UL (ref 149–390)
PMV BLD AUTO: 11.1 FL (ref 8.9–12.7)
POTASSIUM SERPL-SCNC: 4 MMOL/L (ref 3.5–5.3)
PROT SERPL-MCNC: 6.8 G/DL (ref 6.4–8.4)
RBC # BLD AUTO: 5.12 MILLION/UL (ref 3.88–5.62)
SODIUM SERPL-SCNC: 137 MMOL/L (ref 135–147)
TSH SERPL DL<=0.05 MIU/L-ACNC: 2.41 UIU/ML (ref 0.45–4.5)
WBC # BLD AUTO: 7.04 THOUSAND/UL (ref 4.31–10.16)

## 2024-03-07 PROCEDURE — 84443 ASSAY THYROID STIM HORMONE: CPT | Performed by: INTERNAL MEDICINE

## 2024-03-07 PROCEDURE — 80053 COMPREHEN METABOLIC PANEL: CPT | Performed by: INTERNAL MEDICINE

## 2024-03-07 PROCEDURE — 85025 COMPLETE CBC W/AUTO DIFF WBC: CPT | Performed by: INTERNAL MEDICINE

## 2024-03-07 NOTE — PROGRESS NOTES
Kwaku Plascencia  tolerated routine lab draw well with no complications.  Port flushed and deaccessed per routine.     Kwaku Plascencia is aware of future appt tomorrow. .     AVS printed and given to Kwaku Plascencia:  No (Declined by Kwaku Plascencia)

## 2024-03-08 ENCOUNTER — HOSPITAL ENCOUNTER (OUTPATIENT)
Dept: INFUSION CENTER | Facility: HOSPITAL | Age: 44
End: 2024-03-08
Attending: INTERNAL MEDICINE
Payer: OTHER GOVERNMENT

## 2024-03-08 VITALS
DIASTOLIC BLOOD PRESSURE: 86 MMHG | HEART RATE: 86 BPM | TEMPERATURE: 97.1 F | SYSTOLIC BLOOD PRESSURE: 158 MMHG | RESPIRATION RATE: 18 BRPM

## 2024-03-08 DIAGNOSIS — C80.1 SMALL CELL CARCINOMA (HCC): Primary | ICD-10-CM

## 2024-03-08 PROCEDURE — 96413 CHEMO IV INFUSION 1 HR: CPT

## 2024-03-08 RX ORDER — SODIUM CHLORIDE 9 MG/ML
20 INJECTION, SOLUTION INTRAVENOUS ONCE
Status: COMPLETED | OUTPATIENT
Start: 2024-03-08 | End: 2024-03-08

## 2024-03-08 RX ADMIN — SODIUM CHLORIDE 20 ML/HR: 9 INJECTION, SOLUTION INTRAVENOUS at 09:05

## 2024-03-08 RX ADMIN — DURVALUMAB 1500 MG: 500 INJECTION, SOLUTION INTRAVENOUS at 09:09

## 2024-03-08 NOTE — PROGRESS NOTES
Kwaku Plascencia  tolerated treatment well with no complications.      Kwaku Plascencia is aware of future appt on 4/4 at 0930.     AVS declined by Kwaku Plascencia.    
MD Notified

## 2024-03-11 ENCOUNTER — TELEMEDICINE (OUTPATIENT)
Dept: HEMATOLOGY ONCOLOGY | Facility: CLINIC | Age: 44
End: 2024-03-11
Payer: OTHER GOVERNMENT

## 2024-03-11 ENCOUNTER — TELEPHONE (OUTPATIENT)
Dept: HEMATOLOGY ONCOLOGY | Facility: CLINIC | Age: 44
End: 2024-03-11

## 2024-03-11 DIAGNOSIS — C7A.8 NEUROENDOCRINE CARCINOMA (HCC): ICD-10-CM

## 2024-03-11 DIAGNOSIS — C18.7 MALIGNANT NEOPLASM OF SIGMOID COLON (HCC): Primary | ICD-10-CM

## 2024-03-11 DIAGNOSIS — C80.1 SMALL CELL CARCINOMA (HCC): Chronic | ICD-10-CM

## 2024-03-11 DIAGNOSIS — Z71.6 ENCOUNTER FOR TOBACCO USE CESSATION COUNSELING: Chronic | ICD-10-CM

## 2024-03-11 PROCEDURE — 99406 BEHAV CHNG SMOKING 3-10 MIN: CPT | Performed by: INTERNAL MEDICINE

## 2024-03-11 PROCEDURE — 99215 OFFICE O/P EST HI 40 MIN: CPT | Performed by: INTERNAL MEDICINE

## 2024-03-11 RX ORDER — SODIUM CHLORIDE 9 MG/ML
20 INJECTION, SOLUTION INTRAVENOUS ONCE
OUTPATIENT
Start: 2024-07-26

## 2024-03-23 PROBLEM — C7A.025: Status: ACTIVE | Noted: 2023-06-22

## 2024-03-23 PROBLEM — C7A.8 NEUROENDOCRINE CARCINOMA (HCC): Status: RESOLVED | Noted: 2023-08-16 | Resolved: 2024-03-23

## 2024-03-23 PROBLEM — R91.8 PULMONARY NODULES: Status: ACTIVE | Noted: 2024-03-23

## 2024-03-26 NOTE — PROGRESS NOTES
Hematology/Oncology Outpatient Office Note    Date of Service: 2024    [facility]AT[facility]  [address]  [place] [id number]  [phone]    Reason for Consultation:   Chief Complaint   Patient presents with    Follow-up       Cancer Stage at diagnosis: IV    Referral Physician: No ref. provider found    Primary Care Physician:  Theron Stein MD     Nickname: Kwaku    Spouse: Naz    Original ECO    Today's ECO    Goals and Barriers:  Current Goal: Minimize effects of disease burden, extend life.   Barriers to accomplishing this: None    Patient's Capacity to Self Care:  Patient is able to self care    Code Status: not addressed today    Advanced directives: not addressed today    ASSESSMENT & PLAN      Diagnosis ICD-10-CM Associated Orders   1. 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 Encounter for tobacco use cessation counseling  Z71.6             This is a 43 y.o. c PMHx notable for obesity, being seen in consultation for large cell with neuroendocrine features found in the colon    Discussion of decision making  Oncology history updated, accordingly, during this visit  Goals of care/patient communication  I discussed with the patient the clinical course leading up to their cancer diagnosis. I reviewed relevant office notes, imaging reports and pathology result as well.  I told the patient that this is a case of curable versus incurable disease and what this means. We discussed that the goal of anti-cancer therapy is to provide best quality of life, extend overall survival, and progression free survival as shown in clinical trials. We also discussed that there might be a point when the cancer will no longer respond to this anti-neoplastic therapy.   I explained the risks/benefits of the proposed cancer therapy: Carboplatin-Etoposide +/- Durvalumab and after discussion including understanding risks of possible  life-threatening complications and therapy-related malignancy development, informed consent for blood products and treatment has been signed and obtained.  TNM/Staging At Diagnosis  [id number]  Cancer Staging   IV  Disease Features/Tumor Markers/Genetics  Tumor Marker:   7/17/2023: CEA 2.2  CGA: 49.4  Notable Path Features:   6/8/2023: Poorly differentiated malignancy, pending external expert consultation positive for synaptophysin, chromogranin, CD56, TTF-1, BCL2, and cyclin D1 with a high Ki-67 proliferative rate (approximately 90%) compatible with a poorly differentiated malignancy with features suggesting neuroendocrine differentiation. 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 The intramural abscess abuts the superior aspect of the urinary bladder without evidence of a colovesical fistula.  6/27/2023 MRI Abd w/wo c: Benign flash-filling 11 mm hepatic and angioma in the caudate lobe of the liver accounting for the enhancing nodule in that location on recent CT examinations  7/19/2023 CT Chest w/o c: There are multiple pulmonary nodules, which will be described on series 4:  Image 68, left lower lobe, solid, smooth bordered, 3 mm .  Image 63, left upper lobe, solid, smooth bordered, 4 mm .  Image 43, right upper lobe, solid, smooth bordered, 4 mm, may represent a fissural lymph node.  Image 55, right upper lobe, solid, smooth bordered, 4 mm, may represent a fissural lymph node  7/19/2023 MRI Brain w/wo c: No evidence of intracranial  metastasis, A few foci of T2/FLAIR hyperintensity primarily involving left frontoparietal subcortical and deep white matter (maybe migraine related)  8/8/2023 PET/CT: Three new hypermetabolic solid nodules/masses in the abdomen as described, compatible with progression of disease. Mild uptake along the left anterior ninth rib corresponding to a mild fracture deformity. Findings are indeterminate and may represent the sequela of trauma or metastatic disease. Stable sub-4 mm pulmonary nodules   -3.1 x 3.1 cm hypermetabolic mass in the left anterior abdomen abutting a loop of small bowel (series 4, image 147; SUV max 12).   -1.3 x 1.2 cm hypermetabolic soft tissue nodule in the left mid abdomen posterior to a loop of small bowel (series 4, image 154; SUV max 17)   -1.8 x 2.1 cm hypermetabolic soft tissue nodule anterior to the left psoas muscle (series 4, image 160; SUV max 9.7)  10/2/2023: CT CAP w/c: excellent CT. Multiple pulmonary nodules without significant change when compared to the previous CT chest. Some which may represent fissural lymph nodes. No evidence of metastatic disease in the chest abdomen and pelvis.  Improvement of the lower abdominal/pelvic lymphadenopathy when compared to the previous PET/CT.  CARIS NGS: 11/9/2023 CARIS NGS: TMB 94 muts/Mb, MSI-proficient, BRCA 1 exon 18 mutation  BRAF exon 15 mutation, MSH2/3, NF1 exon 3, PIK3CA, POLE exon 9, PTEN exon 1, RB1, SMAD2, TP53 exon 6, XPO1 exon 15  12/22/2023 CT CAP w/c stable: There is no new measurable metastatic disease. Small less than 6 mm nodules in the both lungs, remains stable. The previously noted rounded lesion in the anterior abdomen which demonstrated marginal uptake on the PET scan is decreasing in size, now measuring 1.1 x 1.6 cm (image 184 series 2). Proximal sigmoid colonic thickening as seen on the previous. Enhancing cavernous hemangioma caudate lobe  Genetic testing was negative  4/3/2024 CT CAP w/c CT. Continued decrease in size  of the rounded lesion in the anterior abdominal peritoneal cavity measuring 1 cm (previously 1.1 x 1.6 cm). Interval improvement of sigmoid colonic thickening. Stable tiny pulmonary nodules.    Discussion of decision making    I personally reviewed the following lab results, the image studies, pathology, other specialty/physicians consult notes and recommendations, and outside medical records. I had a lengthy discussion with the patient and shared the work-up findings. We discussed the diagnosis and management plan as below. I spent 41 minutes reviewing the records (labs, clinician notes, outside records, medical history, ordering medicine/tests/procedures, interpreting the imaging/labs previously done) and coordination of care as well as direct time with the patient today, of which greater than 50% of the time was spent in counseling and coordination of care with the patient/family.    I discussed the risks of ongoing cigarette smoking and reviewed the benefits of quitting and risk of new lung primary, heart disease, stroke, among other risks and cancers. Reviewed options including support, quit date, medications, and family support. Patient voiced understanding and willingness to try to quit. [facility]. Greater than 3 minutes were needed for discussion of tobacco dependence and quitting counselling.      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 Discuss with [facility]    Follow Up: Every 4 weeks while on systemic therapy (all virtual except post CT visits) scheduled thru 8/12/2024, add more    Infusion  chairs are at the [facility]    All questions were answered to the patient's satisfaction during this encounter. The patient knows the contact information for our office and knows to reach out for any relevant concerns related to this encounter. They are to call for any temperature 100.4 or higher, new symptoms including but not restricted to shaking chills, decreased appetite, nausea, vomiting, diarrhea, increased fatigue, shortness of breath or chest pain, confusion, and not feeling the strength to come to the clinic. For all other listed problems and medical diagnosis in their chart - they are managed by PCP and/or other specialists, which the patient acknowledges. Thank you very much for your consultation and making us a part of this patient's care. We are continuing to follow closely with you. Please do not hesitate to reach out to me with any additional questions or concerns.    Josue Bledsoe MD  Hematology & Medical Oncology Staff Physician             Disclaimer: This document was prepared using [employer] Direct technology. If a word or phrase is confusing, or does not make sense, this is likely due to recognition error which was not discovered during this clinician's review. If you believe an error has occurred, please contact me through [facility] service line for lee?cation.      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)   6/22/2023 Initial Diagnosis    Malignant neoplasm of sigmoid colon (HCC)     9/3/2023 -  Cancer Staged    Staging form: Colon and Rectum, AJCC 8th Edition  - Clinical: Stage Unknown (cTX, cN0, pM1) - Signed by Josue Bledsoe MD on 9/3/2023  Total positive nodes: 0       Small cell carcinoma (HCC)   7/18/2023 Initial Diagnosis    Small cell carcinoma (HCC)     7/26/2023 -  Chemotherapy    alteplase (CATHFLO), 2 mg, Intracatheter, Every 1 Minute as needed, 10 of 15 cycles  etoposide (TOPOSAR), 80 mg/m2 =  160.8 mg, Intravenous, Once, 4 of 4 cycles  Administration: 160.8 mg (7/26/2023), 160.8 mg (7/27/2023), 160.8 mg (7/28/2023), 160.8 mg (8/16/2023), 160.8 mg (8/17/2023), 160.8 mg (8/18/2023), 160.8 mg (9/6/2023), 160.8 mg (9/7/2023), 160.8 mg (9/8/2023), 160.8 mg (9/27/2023), 160.8 mg (9/28/2023), 160.8 mg (9/29/2023)  CARBOplatin (PARAPLATIN) IVPB ([facility] AUC DOSING), 750 mg, Intravenous, Once, 4 of 4 cycles  Administration: 750 mg (7/26/2023), 700.5 mg (8/16/2023), 694 mg (9/6/2023), 694 mg (9/27/2023)  durvalumab (IMFINZI) IVPB, 1,500 mg, Intravenous, Once, 10 of 15 cycles  Administration: 1,500 mg (7/26/2023), 1,500 mg (10/20/2023), 1,500 mg (8/16/2023), 1,500 mg (9/6/2023), 1,500 mg (9/27/2023), 1,500 mg (11/17/2023), 1,500 mg (12/15/2023), 1,500 mg (1/12/2024), 1,500 mg (2/9/2024), 1,500 mg (3/8/2024)  aprepitant (CINVANTI) in  mL IVPB, 130 mg, Intravenous, Once, 4 of 4 cycles  Administration: 130 mg (7/26/2023), 130 mg (8/16/2023), 130 mg (9/6/2023), 130 mg (9/27/2023)       5/21/2023: BRBPR and pain in the lower abdomen for a week; leading to a ER visit, dx of diverticulitis     SUBJECTIVE  (INTERVAL HISTORY)      Clotting History None    Bleeding History None   Cancer History Colon   Family Cancer History pGrandfather (lung, smoker)   H/O Blood/Plt Transfusion None   Tobacco/etoh/drug abuse 1/2 PPD x 25 years (working on quitting and thinking about it; has nicotine patches), no etoh abuse or rec drug use       Cancer Screening history n/a   Occupation IT       Interval events:  Mild diarrhea Saturday but not recurrence (was present in the rest of the family).  Still up to 1/4-1/2 PPD, trying to quit by chewing things.    No acute issues at this time. Hair growing thicker.     I have reviewed the relevant past medical, surgical, social and family history. I have also reviewed allergies and medications for this patient.    Review of Systems    Baseline weight: 200 lbs    Denies F/C, N/V, SOB, CP, LH, HA,  rash, itching, gen weakness, melena, hematuria, hematochezia, falls, diarrhea, or constipation       A 10-point review of system was performed, pertinent positive and negative were detailed as above. Otherwise, the 10-point review of system was negative.      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, chemotherapy, immunotherapy    Mixed hyperlipidemia 10/19/2023    10/19/2023-cholesterol 226, HDL 29       Past Surgical History:   Procedure Laterality Date    COLON SIGMOID RESECTION LAPAROSCOPIC N/A 6/8/2023    Procedure: RESECTION COLON SIGMOID LAPAROSCOPIC HAND-ASSISTED;  Surgeon: Roland Connelly MD;  Location: [facility];  Service: General    IR DRAINAGE TUBE CHECK/CHANGE/REPOSITION/REINSERTION/UPSIZE  6/5/2023    IR DRAINAGE TUBE PLACEMENT  5/30/2023    IR PORT PLACEMENT  7/28/2023       Family History   Problem Relation Age of Onset    No Known Problems Mother     Hypertension Father     Diabetes Father     Heart attack Father 42    Stroke Father        Social History     Socioeconomic History    Marital status: /Civil Union     Spouse name: Not on file    Number of children: Not on file    Years of education: Not on file    Highest education level: Not on file   Occupational History    Not on file   Tobacco Use    Smoking status: Every Day     Current packs/day: 0.75     Average packs/day: 0.8 packs/day for 25.0 years (18.8 ttl pk-yrs)     Types: Cigarettes     Passive exposure: Current (wife smokes)    Smokeless tobacco: Not on file    Tobacco comments:     Patient states Chantix was not effective.   Vaping Use    Vaping status: Never Used   Substance and Sexual Activity    Alcohol use: Not Currently    Drug use: Never    Sexual activity: Not on file   Other Topics Concern    Not on file   Social History Narrative     since 2011.    4 children aged 18, 17, 16, 6 as of 10/23.  Second child is a girl, the  rest boys.    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    Wife is in the [employer].    Enjoys hiking and hunting.    And video games.     Social Determinants of Health     Financial Resource Strain: Not on file   Food Insecurity: No Food Insecurity (5/31/2023)    Hunger Vital Sign     Worried About Running Out of Food in the Last Year: Never true     Ran Out of Food in the Last Year: Never true   Transportation Needs: No Transportation Needs (5/31/2023)    PRAPARE - Transportation     Lack of Transportation (Medical): No     Lack of Transportation (Non-Medical): No   Physical Activity: Not on file   Stress: Not on file   Social Connections: Not on file   Intimate Partner Violence: Not on file   Housing Stability: Low Risk  (5/31/2023)    Housing Stability Vital Sign     Unable to Pay for Housing in the Last Year: No     Number of Places Lived in the Last Year: 1     Unstable Housing in the Last Year: No       No Known Allergies    Current Outpatient Medications   Medication Sig Dispense Refill    atorvastatin (LIPITOR) 10 mg tablet Take 1 tablet (10 mg total) by mouth daily 90 tablet 3     No current facility-administered medications for this visit.     Facility-Administered Medications Ordered in Other Visits   Medication Dose Route Frequency Provider Last Rate Last Admin    alteplase (CATHFLO) injection 2 mg  2 mg Intracatheter Q1MIN PRN Josue Bledsoe MD           (Not in a hospital admission)      Objective:     24 Hour Vitals Assessment:     Vitals:    04/05/24 1001   BP: 142/80   Pulse: 81   Resp: 18   Temp: (!) 97.4 °F (36.3 °C)   SpO2: 96%       PHYSICIAN EXAM:    General: Appearance: alert, cooperative, no distress.  HEENT: Normocephalic, atraumatic. No scleral icterus. conjunctivae clear. EOMI.  Chest: No tenderness to palpation. No open wound noted.  Lungs: Diminished BS b/l, otherwise clear to auscultation bilaterally, Respirations unlabored.  Cardiac: Regular rate, +S1and S2  Abdomen: Soft,  non-tender, non-distended. Bowel sounds are normal.   Extremities:  No edema, cyanosis, clubbing.  Skin: Skin color, turgor are normal. No rashes.  Lymphatics: no palpable supra-cervical, axillary, or inguinal adenopathy  Neurologic: Awake, Alert, and oriented, no gross focal deficits noted b/l.       DATA REVIEW:    Pathology Result:    Final Diagnosis   Date Value Ref Range Status   06/08/2023   Final    A.  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       Comment:     This is an appended report. These results have been appended to a previously preliminary verified report.        Image Results:   Image result are reviewed and documented in Hematology/Oncology history    CT chest abdomen pelvis w contrast  Narrative: CT CHEST, ABDOMEN AND PELVIS WITH IV CONTRAST    INDICATION: C18.7: Malignant neoplasm of sigmoid colon.    COMPARISON: 12/22/2023    TECHNIQUE: CT examination of the chest, abdomen and pelvis was performed. Multiplanar 2D reformatted images were created from the source data.    This examination, like all CT scans performed in the [facility] beds at the [facility], was performed utilizing techniques to minimize radiation dose exposure, including the use of iterative reconstruction and automated exposure control. Radiation dose length   product (DLP) for this visit: 790.17 mGy-cm    IV Contrast: 100 mL of iohexol (OMNIPAQUE)  Enteric Contrast: Not administered. Lack of oral contrast limits the sensitivity for pathology within the bowel.    FINDINGS:    CHEST    LUNGS: Multiple bilateral pulmonary nodules measuring less than 6 mm are stable and are labeled on series 4 (images 31, 44, 52, 59, 65, 72, 74 and 91). No new suspicious appearing pulmonary nodule is seen. No tracheal or endobronchial lesion.    PLEURA: Unremarkable.    HEART/GREAT VESSELS: Coronary artery calcification. No thoracic aortic aneurysm.    MEDIASTINUM AND FABRICIO: Stable  subcentimeter right posterior paratracheal lymph node. No developing mediastinal adenopathy.    CHEST WALL AND LOWER NECK: Implanted port right anterior chest wall, the tubing tip near the cavoatrial junction.    ABDOMEN    LIVER/BILIARY TREE: Redemonstration of fatty infiltration of the liver. Stable 1.3 focus of enhancement within the quadrate lobe compatible with a hemangioma. No developing suspicious hepatic mass.    GALLBLADDER: No calcified gallstones. No pericholecystic inflammatory change.    SPLEEN: Unremarkable.    PANCREAS: Unremarkable.    ADRENAL GLANDS: Unremarkable.    KIDNEYS/URETERS: Unremarkable. No hydronephrosis.    STOMACH AND BOWEL: Previously described sigmoid colonic wall thickening is less apparent on today's study. No bowel obstruction.    APPENDIX: No findings to suggest appendicitis.    ABDOMINOPELVIC CAVITY: Previously noted rounded lesion in the anterior aspect of the abdomen just left of midline continues to decrease in size, now measuring 1 cm in maximum dimension (2/183). This previously measured 1.1 x 1.6 cm on 12/22/2023 and 4.9   cm on 10/2/2023. Otherwise, no evidence of ascites. No pneumoperitoneum.    VESSELS: Unremarkable for patient's age.    PELVIS    REPRODUCTIVE ORGANS: Unremarkable for patient's age.    URINARY BLADDER: Improvement of diffuse wall thickening.    ABDOMINAL WALL/INGUINAL REGIONS: Unremarkable.    BONES: No acute fracture or suspicious osseous lesion.  Impression: 1. Continued decrease in size of the rounded lesion in the anterior abdominal peritoneal cavity now measuring 1 cm.  2. Interval improvement of sigmoid colonic thickening.  3. Stable tiny pulmonary nodules.    Workstation performed: [id number]      LABS:  Lab data are reviewed and documented in HemOn history.       Lab Results   Component Value Date    HGB 15.9 04/04/2024    HCT 48.2 04/04/2024    MCV 95 04/04/2024     04/04/2024    WBC 6.79 04/04/2024    NRBC 0 04/04/2024     Lab Results  "  Component Value Date    K 3.9 04/04/2024     04/04/2024    CO2 24 04/04/2024    BUN 10 04/04/2024    CREATININE 0.92 04/04/2024    GLUF 102 (H) 02/08/2024    CALCIUM 9.0 04/04/2024    CORRECTEDCA 9.1 06/09/2023    AST 24 04/04/2024    ALT 32 04/04/2024    ALKPHOS 61 04/04/2024    EGFR 102 04/04/2024       No results found for: \"IRON\", \"TIBC\", \"FERRITIN\"    No results found for: \"ZSVPYMIT80\"    Recent Labs     04/04/24  0939   WBC 6.79          By:  Josue Bledsoe MD, 4/5/2024, 10:08 AM                                  "

## 2024-04-03 ENCOUNTER — HOSPITAL ENCOUNTER (OUTPATIENT)
Dept: CT IMAGING | Facility: HOSPITAL | Age: 44
Discharge: HOME | End: 2024-04-03
Attending: INTERNAL MEDICINE
Payer: OTHER GOVERNMENT

## 2024-04-03 DIAGNOSIS — C18.7 MALIGNANT NEOPLASM OF SIGMOID COLON (HCC): ICD-10-CM

## 2024-04-03 PROCEDURE — 74177 CT ABD & PELVIS W/CONTRAST: CPT

## 2024-04-03 PROCEDURE — 71260 CT THORAX DX C+: CPT

## 2024-04-03 RX ADMIN — IOHEXOL 100 ML: 350 INJECTION, SOLUTION INTRAVENOUS at 10:33

## 2024-04-04 ENCOUNTER — HOSPITAL ENCOUNTER (OUTPATIENT)
Dept: INFUSION CENTER | Facility: HOSPITAL | Age: 44
End: 2024-04-04
Payer: OTHER GOVERNMENT

## 2024-04-04 DIAGNOSIS — Z45.2 ENCOUNTER FOR CARE RELATED TO PORT-A-CATH: Primary | ICD-10-CM

## 2024-04-04 DIAGNOSIS — C80.1 SMALL CELL CARCINOMA (HCC): ICD-10-CM

## 2024-04-04 LAB
ALBUMIN SERPL BCP-MCNC: 4.3 G/DL (ref 3.5–5)
ALP SERPL-CCNC: 61 U/L (ref 34–104)
ALT SERPL W P-5'-P-CCNC: 32 U/L (ref 7–52)
ANION GAP SERPL CALCULATED.3IONS-SCNC: 12 MMOL/L (ref 4–13)
AST SERPL W P-5'-P-CCNC: 24 U/L (ref 13–39)
BASOPHILS # BLD AUTO: 0.02 THOUSANDS/ÂΜL (ref 0–0.1)
BASOPHILS NFR BLD AUTO: 0 % (ref 0–1)
BILIRUB SERPL-MCNC: 0.81 MG/DL (ref 0.2–1)
BUN SERPL-MCNC: 10 MG/DL (ref 5–25)
CALCIUM SERPL-MCNC: 9 MG/DL (ref 8.4–10.2)
CHLORIDE SERPL-SCNC: 101 MMOL/L (ref 96–108)
CO2 SERPL-SCNC: 24 MMOL/L (ref 21–32)
CREAT SERPL-MCNC: 0.92 MG/DL (ref 0.6–1.3)
EOSINOPHIL # BLD AUTO: 0.11 THOUSAND/ÂΜL (ref 0–0.61)
EOSINOPHIL NFR BLD AUTO: 2 % (ref 0–6)
ERYTHROCYTE [DISTWIDTH] IN BLOOD BY AUTOMATED COUNT: 13.2 % (ref 11.6–15.1)
GFR SERPL CREATININE-BSD FRML MDRD: 102 ML/MIN/1.73SQ M
GLUCOSE SERPL-MCNC: 98 MG/DL (ref 65–140)
HCT VFR BLD AUTO: 48.2 % (ref 36.5–49.3)
HGB BLD-MCNC: 15.9 G/DL (ref 12–17)
IMM GRANULOCYTES # BLD AUTO: 0.02 THOUSAND/UL (ref 0–0.2)
IMM GRANULOCYTES NFR BLD AUTO: 0 % (ref 0–2)
LYMPHOCYTES # BLD AUTO: 1.51 THOUSANDS/ÂΜL (ref 0.6–4.47)
LYMPHOCYTES NFR BLD AUTO: 22 % (ref 14–44)
MCH RBC QN AUTO: 31.3 PG (ref 26.8–34.3)
MCHC RBC AUTO-ENTMCNC: 33 G/DL (ref 31.4–37.4)
MCV RBC AUTO: 95 FL (ref 82–98)
MONOCYTES # BLD AUTO: 0.72 THOUSAND/ÂΜL (ref 0.17–1.22)
MONOCYTES NFR BLD AUTO: 11 % (ref 4–12)
NEUTROPHILS # BLD AUTO: 4.41 THOUSANDS/ÂΜL (ref 1.85–7.62)
NEUTS SEG NFR BLD AUTO: 65 % (ref 43–75)
NRBC BLD AUTO-RTO: 0 /100 WBCS
PLATELET # BLD AUTO: 207 THOUSANDS/UL (ref 149–390)
PMV BLD AUTO: 11.3 FL (ref 8.9–12.7)
POTASSIUM SERPL-SCNC: 3.9 MMOL/L (ref 3.5–5.3)
PROT SERPL-MCNC: 7.2 G/DL (ref 6.4–8.4)
RBC # BLD AUTO: 5.08 MILLION/UL (ref 3.88–5.62)
SODIUM SERPL-SCNC: 137 MMOL/L (ref 135–147)
T4 FREE SERPL-MCNC: 0.7 NG/DL (ref 0.61–1.12)
TSH SERPL DL<=0.05 MIU/L-ACNC: 5.1 UIU/ML (ref 0.45–4.5)
WBC # BLD AUTO: 6.79 THOUSAND/UL (ref 4.31–10.16)

## 2024-04-04 PROCEDURE — 84443 ASSAY THYROID STIM HORMONE: CPT | Performed by: INTERNAL MEDICINE

## 2024-04-04 PROCEDURE — 80053 COMPREHEN METABOLIC PANEL: CPT | Performed by: INTERNAL MEDICINE

## 2024-04-04 PROCEDURE — 84439 ASSAY OF FREE THYROXINE: CPT | Performed by: INTERNAL MEDICINE

## 2024-04-04 PROCEDURE — 85025 COMPLETE CBC W/AUTO DIFF WBC: CPT | Performed by: INTERNAL MEDICINE

## 2024-04-04 NOTE — PROGRESS NOTES
Kwaku Plascencia  tolerated lab draw and port flush well with no complications.      Kwaku Plascencia is aware of future appt on 4/5 at 12PM.     AVS printed and given to Kwaku Plascencia: No (Declined by Kwaku Plascencia).

## 2024-04-05 ENCOUNTER — HOSPITAL ENCOUNTER (OUTPATIENT)
Dept: INFUSION CENTER | Facility: HOSPITAL | Age: 44
End: 2024-04-05
Attending: INTERNAL MEDICINE
Payer: OTHER GOVERNMENT

## 2024-04-05 ENCOUNTER — OFFICE VISIT (OUTPATIENT)
Dept: HEMATOLOGY ONCOLOGY | Facility: CLINIC | Age: 44
End: 2024-04-05
Payer: OTHER GOVERNMENT

## 2024-04-05 VITALS
WEIGHT: 212.3 LBS | RESPIRATION RATE: 18 BRPM | BODY MASS INDEX: 33.32 KG/M2 | HEIGHT: 67 IN | DIASTOLIC BLOOD PRESSURE: 68 MMHG | HEART RATE: 68 BPM | SYSTOLIC BLOOD PRESSURE: 135 MMHG | TEMPERATURE: 98.5 F

## 2024-04-05 VITALS
HEIGHT: 67 IN | DIASTOLIC BLOOD PRESSURE: 80 MMHG | BODY MASS INDEX: 33.74 KG/M2 | RESPIRATION RATE: 18 BRPM | HEART RATE: 81 BPM | OXYGEN SATURATION: 96 % | TEMPERATURE: 97.4 F | SYSTOLIC BLOOD PRESSURE: 142 MMHG | WEIGHT: 215 LBS

## 2024-04-05 DIAGNOSIS — Z71.6 ENCOUNTER FOR TOBACCO USE CESSATION COUNSELING: Chronic | ICD-10-CM

## 2024-04-05 DIAGNOSIS — C80.1 SMALL CELL CARCINOMA (HCC): Primary | ICD-10-CM

## 2024-04-05 DIAGNOSIS — C7A.025 MALIGNANT CARCINOID TUMOR OF SIGMOID COLON (HCC): Primary | ICD-10-CM

## 2024-04-05 PROCEDURE — 99406 BEHAV CHNG SMOKING 3-10 MIN: CPT | Performed by: INTERNAL MEDICINE

## 2024-04-05 PROCEDURE — 99215 OFFICE O/P EST HI 40 MIN: CPT | Performed by: INTERNAL MEDICINE

## 2024-04-05 RX ORDER — SODIUM CHLORIDE 9 MG/ML
20 INJECTION, SOLUTION INTRAVENOUS ONCE
Status: COMPLETED | OUTPATIENT
Start: 2024-04-05 | End: 2024-04-05

## 2024-04-05 RX ADMIN — DURVALUMAB 1500 MG: 500 INJECTION, SOLUTION INTRAVENOUS at 12:52

## 2024-04-05 RX ADMIN — SODIUM CHLORIDE 20 ML/HR: 0.9 INJECTION, SOLUTION INTRAVENOUS at 12:33

## 2024-04-05 NOTE — PROGRESS NOTES
Kwaku Plascencia  tolerated imfinzi treatment well with no complications.      Kwaku Plascencia is aware of future appt on  5/2/24 at 0830.     AVS printed and given to Kwaku Plascencia:  No (Declined by Kwaku Plascencia)

## 2024-04-08 ENCOUNTER — PATIENT OUTREACH (OUTPATIENT)
Dept: CASE MANAGEMENT | Facility: HOSPITAL | Age: 44
End: 2024-04-08

## 2024-04-08 NOTE — PROGRESS NOTES
LSW reviewed pt's chart. Pt has denied any OSW needs. LSW will close ppt's case for OSW at this time. Pt does have my contact information if any concerns should arise.

## 2024-04-27 NOTE — PROGRESS NOTES
Telemedicine consent    Patient: Kwaku Plascencia  Provider: Josue Bledsoe MD  Provider located at [address] [place] [address] [facility] [address] [id number]    The patient was identified by name and date of birth. Kwaku Plascencia was informed that this is a telemedicine visit and that the visit is being conducted through the [employer] platform. He agrees to proceed..  My office door was closed. No one else was in the room.  He acknowledged consent and understanding of privacy and security of the video platform. The patient has agreed to participate and understands they can discontinue the visit at any time.    Patient is aware this is a billable service.                           Hematology/Oncology Outpatient Office Note    Date of Service: 2024    [facility] [address] [address] [phone]    Reason for Consultation:   No chief complaint on file.      Cancer Stage at diagnosis: IV    Referral Physician: No ref. provider found    Primary Care Physician:  Theron Stein MD     Nickname: Kwaku    Spouse: Naz Dinh ECO    Today's ECO    Goals and Barriers:  Current Goal: Minimize effects of disease burden, extend life.   Barriers to accomplishing this: None    Patient's Capacity to Self Care:  Patient is able to self care    Code Status: not addressed today    Advanced directives: not addressed today    ASSESSMENT & PLAN      Diagnosis ICD-10-CM Associated Orders   1. 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 Encounter for tobacco use cessation counseling  Z71.6       3. 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 c PMHx notable for obesity, being seen in consultation for large cell with neuroendocrine features found in the colon    Discussion of decision making  Oncology history updated, accordingly, during this visit  Goals  of care/patient communication  I discussed with the patient the clinical course leading up to their cancer diagnosis. I reviewed relevant office notes, imaging reports and pathology result as well.  I told the patient that this is a case of curable versus incurable disease and what this means. We discussed that the goal of anti-cancer therapy is to provide best quality of life, extend overall survival, and progression free survival as shown in clinical trials. We also discussed that there might be a point when the cancer will no longer respond to this anti-neoplastic therapy.   I explained the risks/benefits of the proposed cancer therapy: Carboplatin-Etoposide +/- Durvalumab and after discussion including understanding risks of possible life-threatening complications and therapy-related malignancy development, informed consent for blood products and treatment has been signed and obtained.  TNM/Staging At Diagnosis  [id number]  Cancer Staging   IV  Disease Features/Tumor Markers/Genetics  Tumor Marker:   7/17/2023: CEA 2.2  CGA: 49.4  Notable Path Features:   6/8/2023: Poorly differentiated malignancy, pending external expert consultation positive for synaptophysin, chromogranin, CD56, TTF-1, BCL2, and cyclin D1 with a high Ki-67 proliferative rate (approximately 90%) compatible with a poorly differentiated malignancy with features suggesting neuroendocrine differentiation. 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 The intramural abscess  abuts the superior aspect of the urinary bladder without evidence of a colovesical fistula.  6/27/2023 MRI Abd w/wo c: Benign flash-filling 11 mm hepatic and angioma in the caudate lobe of the liver accounting for the enhancing nodule in that location on recent CT examinations  7/19/2023 CT Chest w/o c: There are multiple pulmonary nodules, which will be described on series 4:  Image 68, left lower lobe, solid, smooth bordered, 3 mm .  Image 63, left upper lobe, solid, smooth bordered, 4 mm .  Image 43, right upper lobe, solid, smooth bordered, 4 mm, may represent a fissural lymph node.  Image 55, right upper lobe, solid, smooth bordered, 4 mm, may represent a fissural lymph node  7/19/2023 MRI Brain w/wo c: No evidence of intracranial metastasis, A few foci of T2/FLAIR hyperintensity primarily involving left frontoparietal subcortical and deep white matter (maybe migraine related)  8/8/2023 PET/CT: Three new hypermetabolic solid nodules/masses in the abdomen as described, compatible with progression of disease. Mild uptake along the left anterior ninth rib corresponding to a mild fracture deformity. Findings are indeterminate and may represent the sequela of trauma or metastatic disease. Stable sub-4 mm pulmonary nodules   -3.1 x 3.1 cm hypermetabolic mass in the left anterior abdomen abutting a loop of small bowel (series 4, image 147; SUV max 12).   -1.3 x 1.2 cm hypermetabolic soft tissue nodule in the left mid abdomen posterior to a loop of small bowel (series 4, image 154; SUV max 17)   -1.8 x 2.1 cm hypermetabolic soft tissue nodule anterior to the left psoas muscle (series 4, image 160; SUV max 9.7)  10/2/2023: CT CAP w/c: excellent NV. Multiple pulmonary nodules without significant change when compared to the previous CT chest. Some which may represent fissural lymph nodes. No evidence of metastatic disease in the chest abdomen and pelvis.  Improvement of the lower abdominal/pelvic lymphadenopathy when  compared to the previous PET/CT.  CARIS NGS: 11/9/2023 CARIS NGS: TMB 94 muts/Mb, MSI-proficient, BRCA 1 exon 18 mutation  BRAF exon 15 mutation, MSH2/3, NF1 exon 3, PIK3CA, POLE exon 9, PTEN exon 1, RB1, SMAD2, TP53 exon 6, XPO1 exon 15  12/22/2023 CT CAP w/c stable: There is no new measurable metastatic disease. Small less than 6 mm nodules in the both lungs, remains stable. The previously noted rounded lesion in the anterior abdomen which demonstrated marginal uptake on the PET scan is decreasing in size, now measuring 1.1 x 1.6 cm (image 184 series 2). Proximal sigmoid colonic thickening as seen on the previous. Enhancing cavernous hemangioma caudate lobe  Genetic testing was negative  4/3/2024 CT CAP w/c GA. Continued decrease in size of the rounded lesion in the anterior abdominal peritoneal cavity measuring 1 cm (previously 1.1 x 1.6 cm). Interval improvement of sigmoid colonic thickening. Stable tiny pulmonary nodules.    Discussion of decision making    I personally reviewed the following lab results, the image studies, pathology, other specialty/physicians consult notes and recommendations, and outside medical records. I had a lengthy discussion with the patient and shared the work-up findings. We discussed the diagnosis and management plan as below. I spent 42 minutes reviewing the records (labs, clinician notes, outside records, medical history, ordering medicine/tests/procedures, interpreting the imaging/labs previously done) and coordination of care as well as direct time with the patient today, of which greater than 50% of the time was spent in counseling and coordination of care with the patient/family.    I discussed the risks of ongoing cigarette smoking and reviewed the benefits of quitting and risk of new lung primary, heart disease, stroke, among other risks and cancers. Reviewed options including support, quit date, medications, and family support. Patient voiced understanding and willingness  to try to quit. Patient was told to notify [facility] family practitioner for additional management. Greater than 3 minutes were needed for discussion of tobacco dependence and quitting counselling.      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 Discuss with [facility]*    Follow Up: Every 4 weeks while on systemic therapy (all virtual except post CT visits) scheduled thru 12/10/2024    Infusion chairs are at the [facility]    All questions were answered to the patient's satisfaction during this encounter. The patient knows the contact information for our office and knows to reach out for any relevant concerns related to this encounter. They are to call for any temperature 100.4 or higher, new symptoms including but not restricted to shaking chills, decreased appetite, nausea, vomiting, diarrhea, increased fatigue, shortness of breath or chest pain, confusion, and not feeling the strength to come to the clinic. For all other listed problems and medical diagnosis in their chart - they are managed by PCP and/or other specialists, which the patient acknowledges. Thank you very much for your consultation and making us a part of this patient's care. We are continuing to follow closely with you. Please do not hesitate to reach out to me with any additional questions or concerns.    Josue Bledsoe MD  Hematology & Medical Oncology Staff Physician             Disclaimer: This document was prepared using [employer] technology. If a word or phrase is confusing, or does not make sense, this is likely due to recognition error which was not discovered during this  clinician's review. If you believe an error has occurred, please contact me through [facility] service line for lee?cation.      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)   6/22/2023 Initial Diagnosis    Malignant neoplasm of sigmoid colon (HCC)     9/3/2023 -  Cancer Staged    Staging form: Colon and Rectum, AJCC 8th Edition  - Clinical: Stage Unknown (cTX, cN0, pM1) - Signed by Josue Bledsoe MD on 9/3/2023  Total positive nodes: 0       Small cell carcinoma (HCC)   7/18/2023 Initial Diagnosis    Small cell carcinoma (HCC)     7/26/2023 -  Chemotherapy    alteplase (CATHFLO), 2 mg, Intracatheter, Every 1 Minute as needed, 12 of 18 cycles  etoposide (TOPOSAR), 80 mg/m2 = 160.8 mg, Intravenous, Once, 4 of 4 cycles  Administration: 160.8 mg (7/26/2023), 160.8 mg (7/27/2023), 160.8 mg (7/28/2023), 160.8 mg (8/16/2023), 160.8 mg (8/17/2023), 160.8 mg (8/18/2023), 160.8 mg (9/6/2023), 160.8 mg (9/7/2023), 160.8 mg (9/8/2023), 160.8 mg (9/27/2023), 160.8 mg (9/28/2023), 160.8 mg (9/29/2023)  CARBOplatin (PARAPLATIN) IVPB (GOG AUC DOSING), 750 mg, Intravenous, Once, 4 of 4 cycles  Administration: 750 mg (7/26/2023), 700.5 mg (8/16/2023), 694 mg (9/6/2023), 694 mg (9/27/2023)  durvalumab (IMFINZI) IVPB, 1,500 mg, Intravenous, Once, 12 of 18 cycles  Administration: 1,500 mg (7/26/2023), 1,500 mg (10/20/2023), 1,500 mg (8/16/2023), 1,500 mg (9/6/2023), 1,500 mg (9/27/2023), 1,500 mg (11/17/2023), 1,500 mg (12/15/2023), 1,500 mg (1/12/2024), 1,500 mg (2/9/2024), 1,500 mg (3/8/2024), 1,500 mg (4/5/2024), 1,500 mg (5/3/2024)  aprepitant (CINVANTI) in  mL IVPB, 130 mg, Intravenous, Once, 4 of 4 cycles  Administration: 130 mg (7/26/2023), 130 mg (8/16/2023), 130 mg (9/6/2023), 130 mg (9/27/2023)       5/21/2023: BRBPR and pain in the lower abdomen for a week; leading to a ER visit, dx of diverticulitis     SUBJECTIVE  (INTERVAL HISTORY)      Clotting  History None    Bleeding History None   Cancer History Colon   Family Cancer History pGrandfather (lung, smoker)   H/O Blood/Plt Transfusion None   Tobacco/etoh/drug abuse 1/2 PPD x 25 years (working on quitting and thinking about it; has nicotine patches), no etoh abuse or rec drug use       Cancer Screening history n/a   Occupation IT       Interval events:    Still up to 1/4-1/2 PPD, trying to quit by chewing things.    No acute issues at this time. Hair growing thicker.     I have reviewed the relevant past medical, surgical, social and family history. I have also reviewed allergies and medications for this patient.    Review of Systems    Baseline weight: 200 lbs    Denies F/C, N/V, SOB, CP, LH, HA, rash, itching, gen weakness, melena, hematuria, hematochezia, falls, diarrhea, or constipation       A 10-point review of system was performed, pertinent positive and negative were detailed as above. Otherwise, the 10-point review of system was negative.      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, chemotherapy, immunotherapy    Mixed hyperlipidemia 10/19/2023    10/19/2023-cholesterol 226, HDL 29       Past Surgical History:   Procedure Laterality Date    COLON SIGMOID RESECTION LAPAROSCOPIC N/A 6/8/2023    Procedure: RESECTION COLON SIGMOID LAPAROSCOPIC HAND-ASSISTED;  Surgeon: Roland Connelly MD;  Location: [facility];  Service: General    IR DRAINAGE TUBE CHECK/CHANGE/REPOSITION/REINSERTION/UPSIZE  6/5/2023    IR DRAINAGE TUBE PLACEMENT  5/30/2023    IR PORT PLACEMENT  7/28/2023       Family History   Problem Relation Age of Onset    No Known Problems Mother     Hypertension Father     Diabetes Father     Heart attack Father 42    Stroke Father        Social History     Socioeconomic History    Marital status: /Civil Union     Spouse name: Not on file    Number of children: Not on file    Years of education:  Not on file    Highest education level: Not on file   Occupational History    Not on file   Tobacco Use    Smoking status: Every Day     Current packs/day: 0.75     Average packs/day: 0.8 packs/day for 25.0 years (18.8 ttl pk-yrs)     Types: Cigarettes     Passive exposure: Current (wife smokes)    Smokeless tobacco: Not on file    Tobacco comments:     Patient states Chantix was not effective.   Vaping Use    Vaping status: Never Used   Substance and Sexual Activity    Alcohol use: Not Currently    Drug use: Never    Sexual activity: Not on file   Other Topics Concern    Not on file   Social History Narrative     since 2011.    4 children aged 18, 17, 16, 6 as of 10/23.  Second child is a girl, the rest boys.    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    Wife is in the [employer].    Enjoys hiking and hunting.    And video games.     Social Determinants of Health     Financial Resource Strain: Not on file   Food Insecurity: No Food Insecurity (5/31/2023)    Hunger Vital Sign     Worried About Running Out of Food in the Last Year: Never true     Ran Out of Food in the Last Year: Never true   Transportation Needs: No Transportation Needs (5/31/2023)    PRAPARE - Transportation     Lack of Transportation (Medical): No     Lack of Transportation (Non-Medical): No   Physical Activity: Not on file   Stress: Not on file   Social Connections: Not on file   Intimate Partner Violence: Not on file   Housing Stability: Low Risk  (5/31/2023)    Housing Stability Vital Sign     Unable to Pay for Housing in the Last Year: No     Number of Places Lived in the Last Year: 1     Unstable Housing in the Last Year: No       No Known Allergies    Current Outpatient Medications   Medication Sig Dispense Refill    atorvastatin (LIPITOR) 10 mg tablet Take 1 tablet (10 mg total) by mouth daily 90 tablet 3     No current facility-administered medications for this visit.     Facility-Administered Medications Ordered in Other Visits    Medication Dose Route Frequency Provider Last Rate Last Admin    alteplase (CATHFLO) injection 2 mg  2 mg Intracatheter Q1MIN PRN Josue Bledsoe MD           (Not in a hospital admission)      Objective:     24 Hour Vitals Assessment:     There were no vitals filed for this visit.    Below not updated as this was a telemed visit    PHYSICIAN EXAM:    General: Appearance: alert, cooperative, no distress.  HEENT: Normocephalic, atraumatic. No scleral icterus. conjunctivae clear. EOMI.  Chest: No tenderness to palpation. No open wound noted.  Lungs: Diminished BS b/l, otherwise clear to auscultation bilaterally, Respirations unlabored.  Cardiac: Regular rate, +S1and S2  Abdomen: Soft, non-tender, non-distended. Bowel sounds are normal.   Extremities:  No edema, cyanosis, clubbing.  Skin: Skin color, turgor are normal. No rashes.  Lymphatics: no palpable supra-cervical, axillary, or inguinal adenopathy  Neurologic: Awake, Alert, and oriented, no gross focal deficits noted b/l.       DATA REVIEW:    Pathology Result:    Final Diagnosis   Date Value Ref Range Status   06/08/2023   Final    A.  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       Comment:     This is an appended report. These results have been appended to a previously preliminary verified report.        Image Results:   Image result are reviewed and documented in Hematology/Oncology history    CT chest abdomen pelvis w contrast  Narrative: CT CHEST, ABDOMEN AND PELVIS WITH IV CONTRAST    INDICATION: C18.7: Malignant neoplasm of sigmoid colon.    COMPARISON: 12/22/2023    TECHNIQUE: CT examination of the chest, abdomen and pelvis was performed. Multiplanar 2D reformatted images were created from the source data.    This examination, like all CT scans performed in the [facility], was performed utilizing techniques to minimize radiation dose exposure, including the use of  iterative reconstruction and automated exposure control. Radiation dose length   product (DLP) for this visit: 790.17 mGy-cm    IV Contrast: 100 mL of iohexol (OMNIPAQUE)  Enteric Contrast: Not administered. Lack of oral contrast limits the sensitivity for pathology within the bowel.    FINDINGS:    CHEST    LUNGS: Multiple bilateral pulmonary nodules measuring less than 6 mm are stable and are labeled on series 4 (images 31, 44, 52, 59, 65, 72, 74 and 91). No new suspicious appearing pulmonary nodule is seen. No tracheal or endobronchial lesion.    PLEURA: Unremarkable.    HEART/GREAT VESSELS: Coronary artery calcification. No thoracic aortic aneurysm.    MEDIASTINUM AND FABRICIO: Stable subcentimeter right posterior paratracheal lymph node. No developing mediastinal adenopathy.    CHEST WALL AND LOWER NECK: Implanted port right anterior chest wall, the tubing tip near the cavoatrial junction.    ABDOMEN    LIVER/BILIARY TREE: Redemonstration of fatty infiltration of the liver. Stable 1.3 focus of enhancement within the quadrate lobe compatible with a hemangioma. No developing suspicious hepatic mass.    GALLBLADDER: No calcified gallstones. No pericholecystic inflammatory change.    SPLEEN: Unremarkable.    PANCREAS: Unremarkable.    ADRENAL GLANDS: Unremarkable.    KIDNEYS/URETERS: Unremarkable. No hydronephrosis.    STOMACH AND BOWEL: Previously described sigmoid colonic wall thickening is less apparent on today's study. No bowel obstruction.    APPENDIX: No findings to suggest appendicitis.    ABDOMINOPELVIC CAVITY: Previously noted rounded lesion in the anterior aspect of the abdomen just left of midline continues to decrease in size, now measuring 1 cm in maximum dimension (2/183). This previously measured 1.1 x 1.6 cm on 12/22/2023 and 4.9   cm on 10/2/2023. Otherwise, no evidence of ascites. No pneumoperitoneum.    VESSELS: Unremarkable for patient's age.    PELVIS    REPRODUCTIVE ORGANS: Unremarkable for  "patient's age.    URINARY BLADDER: Improvement of diffuse wall thickening.    ABDOMINAL WALL/INGUINAL REGIONS: Unremarkable.    BONES: No acute fracture or suspicious osseous lesion.  Impression: 1. Continued decrease in size of the rounded lesion in the anterior abdominal peritoneal cavity now measuring 1 cm.  2. Interval improvement of sigmoid colonic thickening.  3. Stable tiny pulmonary nodules.    Workstation performed: [id number]      LABS:  Lab data are reviewed and documented in HemOnc history.       Lab Results   Component Value Date    HGB 15.6 05/02/2024    HCT 48.1 05/02/2024    MCV 96 05/02/2024     05/02/2024    WBC 7.69 05/02/2024    NRBC 0 05/02/2024     Lab Results   Component Value Date    K 4.0 05/02/2024     05/02/2024    CO2 27 05/02/2024    BUN 14 05/02/2024    CREATININE 0.90 05/02/2024    GLUF 102 (H) 02/08/2024    CALCIUM 9.2 05/02/2024    CORRECTEDCA 9.1 06/09/2023    AST 22 05/02/2024    ALT 28 05/02/2024    ALKPHOS 63 05/02/2024    EGFR 105 05/02/2024       No results found for: \"IRON\", \"TIBC\", \"FERRITIN\"    No results found for: \"[id number]\"    No results for input(s): \"WBC\", \"CREAT\", \"PLT\" in the last 72 hours.      By:  Josue Bledsoe MD, 5/6/2024, 9:56 AM                                  "

## 2024-05-02 ENCOUNTER — HOSPITAL ENCOUNTER (OUTPATIENT)
Dept: INFUSION CENTER | Facility: HOSPITAL | Age: 44
Discharge: HOME | End: 2024-05-02
Payer: OTHER GOVERNMENT

## 2024-05-02 VITALS — TEMPERATURE: 97 F

## 2024-05-02 DIAGNOSIS — Z45.2 ENCOUNTER FOR CARE RELATED TO PORT-A-CATH: Primary | ICD-10-CM

## 2024-05-02 DIAGNOSIS — C80.1 SMALL CELL CARCINOMA (HCC): ICD-10-CM

## 2024-05-02 LAB
ALBUMIN SERPL BCP-MCNC: 4.3 G/DL (ref 3.5–5)
ALP SERPL-CCNC: 63 U/L (ref 34–104)
ALT SERPL W P-5'-P-CCNC: 28 U/L (ref 7–52)
ANION GAP SERPL CALCULATED.3IONS-SCNC: 8 MMOL/L (ref 4–13)
AST SERPL W P-5'-P-CCNC: 22 U/L (ref 13–39)
BASOPHILS # BLD AUTO: 0.02 THOUSANDS/ÂΜL (ref 0–0.1)
BASOPHILS NFR BLD AUTO: 0 % (ref 0–1)
BILIRUB SERPL-MCNC: 0.55 MG/DL (ref 0.2–1)
BUN SERPL-MCNC: 14 MG/DL (ref 5–25)
CALCIUM SERPL-MCNC: 9.2 MG/DL (ref 8.4–10.2)
CHLORIDE SERPL-SCNC: 102 MMOL/L (ref 96–108)
CO2 SERPL-SCNC: 27 MMOL/L (ref 21–32)
CREAT SERPL-MCNC: 0.9 MG/DL (ref 0.6–1.3)
EOSINOPHIL # BLD AUTO: 0.17 THOUSAND/ÂΜL (ref 0–0.61)
EOSINOPHIL NFR BLD AUTO: 2 % (ref 0–6)
ERYTHROCYTE [DISTWIDTH] IN BLOOD BY AUTOMATED COUNT: 13.2 % (ref 11.6–15.1)
GFR SERPL CREATININE-BSD FRML MDRD: 105 ML/MIN/1.73SQ M
GLUCOSE SERPL-MCNC: 103 MG/DL (ref 65–140)
HCT VFR BLD AUTO: 48.1 % (ref 36.5–49.3)
HGB BLD-MCNC: 15.6 G/DL (ref 12–17)
IMM GRANULOCYTES # BLD AUTO: 0.03 THOUSAND/UL (ref 0–0.2)
IMM GRANULOCYTES NFR BLD AUTO: 0 % (ref 0–2)
LYMPHOCYTES # BLD AUTO: 1.45 THOUSANDS/ÂΜL (ref 0.6–4.47)
LYMPHOCYTES NFR BLD AUTO: 19 % (ref 14–44)
MCH RBC QN AUTO: 31.1 PG (ref 26.8–34.3)
MCHC RBC AUTO-ENTMCNC: 32.4 G/DL (ref 31.4–37.4)
MCV RBC AUTO: 96 FL (ref 82–98)
MONOCYTES # BLD AUTO: 0.78 THOUSAND/ÂΜL (ref 0.17–1.22)
MONOCYTES NFR BLD AUTO: 10 % (ref 4–12)
NEUTROPHILS # BLD AUTO: 5.24 THOUSANDS/ÂΜL (ref 1.85–7.62)
NEUTS SEG NFR BLD AUTO: 69 % (ref 43–75)
NRBC BLD AUTO-RTO: 0 /100 WBCS
PLATELET # BLD AUTO: 195 THOUSANDS/UL (ref 149–390)
PMV BLD AUTO: 11.6 FL (ref 8.9–12.7)
POTASSIUM SERPL-SCNC: 4 MMOL/L (ref 3.5–5.3)
PROT SERPL-MCNC: 7 G/DL (ref 6.4–8.4)
RBC # BLD AUTO: 5.02 MILLION/UL (ref 3.88–5.62)
SODIUM SERPL-SCNC: 137 MMOL/L (ref 135–147)
T4 FREE SERPL-MCNC: 0.78 NG/DL (ref 0.61–1.12)
TSH SERPL DL<=0.05 MIU/L-ACNC: 4.68 UIU/ML (ref 0.45–4.5)
WBC # BLD AUTO: 7.69 THOUSAND/UL (ref 4.31–10.16)

## 2024-05-02 PROCEDURE — 80053 COMPREHEN METABOLIC PANEL: CPT | Performed by: INTERNAL MEDICINE

## 2024-05-02 PROCEDURE — 84443 ASSAY THYROID STIM HORMONE: CPT | Performed by: INTERNAL MEDICINE

## 2024-05-02 PROCEDURE — 84439 ASSAY OF FREE THYROXINE: CPT | Performed by: INTERNAL MEDICINE

## 2024-05-02 PROCEDURE — 85025 COMPLETE CBC W/AUTO DIFF WBC: CPT | Performed by: INTERNAL MEDICINE

## 2024-05-02 NOTE — PROGRESS NOTES
Kwaku Plascencia  tolerated central lab draw well with no complications.      Kwaku Plascencia is aware of future appt tomorrow.     AVS printed and given to Kwaku Plascencia:   No (Declined by Kwaku Plascencia)

## 2024-05-03 ENCOUNTER — HOSPITAL ENCOUNTER (OUTPATIENT)
Dept: INFUSION CENTER | Facility: HOSPITAL | Age: 44
Discharge: HOME | End: 2024-05-03
Attending: INTERNAL MEDICINE
Payer: OTHER GOVERNMENT

## 2024-05-03 VITALS
TEMPERATURE: 96.5 F | HEART RATE: 80 BPM | RESPIRATION RATE: 16 BRPM | SYSTOLIC BLOOD PRESSURE: 137 MMHG | DIASTOLIC BLOOD PRESSURE: 89 MMHG

## 2024-05-03 DIAGNOSIS — C80.1 SMALL CELL CARCINOMA (HCC): Primary | ICD-10-CM

## 2024-05-03 PROCEDURE — 96413 CHEMO IV INFUSION 1 HR: CPT

## 2024-05-03 RX ORDER — SODIUM CHLORIDE 9 MG/ML
20 INJECTION, SOLUTION INTRAVENOUS ONCE
Status: COMPLETED | OUTPATIENT
Start: 2024-05-03 | End: 2024-05-03

## 2024-05-03 RX ADMIN — SODIUM CHLORIDE 20 ML/HR: 0.9 INJECTION, SOLUTION INTRAVENOUS at 09:09

## 2024-05-03 RX ADMIN — DURVALUMAB 1500 MG: 500 INJECTION, SOLUTION INTRAVENOUS at 09:10

## 2024-05-03 NOTE — PROGRESS NOTES
Kwaku Plascencia  tolerated Imfinzi treatment well with no complications.      Kwaku Plascencia is aware of future appt on 5/30 at 8:30AM.     AVS printed and given to Kwaku Plascencia: No (Declined by Kwaku Plascencia).

## 2024-05-06 ENCOUNTER — TELEMEDICINE (OUTPATIENT)
Dept: HEMATOLOGY ONCOLOGY | Facility: CLINIC | Age: 44
End: 2024-05-06
Payer: OTHER GOVERNMENT

## 2024-05-06 DIAGNOSIS — Z71.6 ENCOUNTER FOR TOBACCO USE CESSATION COUNSELING: Chronic | ICD-10-CM

## 2024-05-06 DIAGNOSIS — C80.1 SMALL CELL CARCINOMA (HCC): Primary | Chronic | ICD-10-CM

## 2024-05-06 DIAGNOSIS — C7A.025 MALIGNANT CARCINOID TUMOR OF SIGMOID COLON (HCC): ICD-10-CM

## 2024-05-06 PROCEDURE — 99215 OFFICE O/P EST HI 40 MIN: CPT | Performed by: INTERNAL MEDICINE

## 2024-05-06 PROCEDURE — 99406 BEHAV CHNG SMOKING 3-10 MIN: CPT | Performed by: INTERNAL MEDICINE

## 2024-05-07 ENCOUNTER — TELEPHONE (OUTPATIENT)
Dept: HEMATOLOGY ONCOLOGY | Facility: CLINIC | Age: 44
End: 2024-05-07

## 2024-05-07 NOTE — TELEPHONE ENCOUNTER
Appointment Change  Cancel, Reschedule, Change to Virtual      Who are you speaking with? Patient   If it is not the patient, is the caller listed on the communication consent form? N/A   Which provider is the appointment scheduled with? Dr. Bledsoe   When was the original appointment scheduled?    Please list date and time 06/05/2024 @ 9:40AM    At which location is the appointment scheduled to take place? Virtual   Was the appointment rescheduled?     Was the appointment changed from an in person visit to a virtual visit?    If so, please list the details of the change. Yes, 06/04/2024 @11AM    What is the reason for the appointment change? Provider    Does the patient have an upcoming infusion appointment scheduled? If so, when? Yes, 05/30/2024

## 2024-05-10 PROBLEM — R91.8 PULMONARY NODULES: Status: RESOLVED | Noted: 2024-03-23 | Resolved: 2024-05-10

## 2024-05-28 NOTE — PROGRESS NOTES
Telemedicine consent    Patient: Kwaku Plascencia  Provider: Josue Bledsoe MD  Provider located at [address] [place] [address] [facility] [address] [id number]    The patient was identified by name and date of birth. Kwaku Plascencia was informed that this is a telemedicine visit and that the visit is being conducted through the [employer] platform. He agrees to proceed..  My office door was closed. No one else was in the room.  He acknowledged consent and understanding of privacy and security of the video platform. The patient has agreed to participate and understands they can discontinue the visit at any time.    Patient is aware this is a billable service.                           Hematology/Oncology Outpatient Office Note    Date of Service: 2024    [facility] [address] [address] [phone]    Reason for Consultation:   No chief complaint on file.      Cancer Stage at diagnosis: IV    Referral Physician: No ref. provider found    Primary Care Physician:  Theron Stein MD     Nickname: Kwaku    Spouse: Naz Dinh ECO    Today's ECO    Goals and Barriers:  Current Goal: Minimize effects of disease burden, extend life.   Barriers to accomplishing this: None    Patient's Capacity to Self Care:  Patient is able to self care    Code Status: not addressed today    Advanced directives: not addressed today    ASSESSMENT & PLAN      Diagnosis ICD-10-CM Associated Orders   1. 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 Small cell carcinoma (HCC)  C80.1       3. Encounter for tobacco use cessation counseling  Z71.6             This is a 43 y.o. c PMHx notable for obesity, being seen in consultation for large cell with neuroendocrine features found in the colon    Discussion of decision making  Oncology history updated, accordingly, during this visit  Goals  of care/patient communication  I discussed with the patient the clinical course leading up to their cancer diagnosis. I reviewed relevant office notes, imaging reports and pathology result as well.  I told the patient that this is a case of curable versus incurable disease and what this means. We discussed that the goal of anti-cancer therapy is to provide best quality of life, extend overall survival, and progression free survival as shown in clinical trials. We also discussed that there might be a point when the cancer will no longer respond to this anti-neoplastic therapy.   I explained the risks/benefits of the proposed cancer therapy: Carboplatin-Etoposide +/- Durvalumab and after discussion including understanding risks of possible life-threatening complications and therapy-related malignancy development, informed consent for blood products and treatment has been signed and obtained.  TNM/Staging At Diagnosis  [id number]  Cancer Staging   IV  Disease Features/Tumor Markers/Genetics  Tumor Marker:   7/17/2023: CEA 2.2  CGA: 49.4  Notable Path Features:   6/8/2023: Poorly differentiated malignancy, pending external expert consultation positive for synaptophysin, chromogranin, CD56, TTF-1, BCL2, and cyclin D1 with a high Ki-67 proliferative rate (approximately 90%) compatible with a poorly differentiated malignancy with features suggesting neuroendocrine differentiation. 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 The intramural abscess  abuts the superior aspect of the urinary bladder without evidence of a colovesical fistula.  6/27/2023 MRI Abd w/wo c: Benign flash-filling 11 mm hepatic and angioma in the caudate lobe of the liver accounting for the enhancing nodule in that location on recent CT examinations  7/19/2023 CT Chest w/o c: There are multiple pulmonary nodules, which will be described on series 4:  Image 68, left lower lobe, solid, smooth bordered, 3 mm .  Image 63, left upper lobe, solid, smooth bordered, 4 mm .  Image 43, right upper lobe, solid, smooth bordered, 4 mm, may represent a fissural lymph node.  Image 55, right upper lobe, solid, smooth bordered, 4 mm, may represent a fissural lymph node  7/19/2023 MRI Brain w/wo c: No evidence of intracranial metastasis, A few foci of T2/FLAIR hyperintensity primarily involving left frontoparietal subcortical and deep white matter (maybe migraine related)  8/8/2023 PET/CT: Three new hypermetabolic solid nodules/masses in the abdomen as described, compatible with progression of disease. Mild uptake along the left anterior ninth rib corresponding to a mild fracture deformity. Findings are indeterminate and may represent the sequela of trauma or metastatic disease. Stable sub-4 mm pulmonary nodules   -3.1 x 3.1 cm hypermetabolic mass in the left anterior abdomen abutting a loop of small bowel (series 4, image 147; SUV max 12).   -1.3 x 1.2 cm hypermetabolic soft tissue nodule in the left mid abdomen posterior to a loop of small bowel (series 4, image 154; SUV max 17)   -1.8 x 2.1 cm hypermetabolic soft tissue nodule anterior to the left psoas muscle (series 4, image 160; SUV max 9.7)  10/2/2023: CT CAP w/c: excellent IL. Multiple pulmonary nodules without significant change when compared to the previous CT chest. Some which may represent fissural lymph nodes. No evidence of metastatic disease in the chest abdomen and pelvis.  Improvement of the lower abdominal/pelvic lymphadenopathy when  compared to the previous PET/CT.  CARIS NGS: 11/9/2023 CARIS NGS: TMB 94 muts/Mb, MSI-proficient, BRCA 1 exon 18 mutation  BRAF exon 15 mutation, MSH2/3, NF1 exon 3, PIK3CA, POLE exon 9, PTEN exon 1, RB1, SMAD2, TP53 exon 6, XPO1 exon 15  12/22/2023 CT CAP w/c stable: There is no new measurable metastatic disease. Small less than 6 mm nodules in the both lungs, remains stable. The previously noted rounded lesion in the anterior abdomen which demonstrated marginal uptake on the PET scan is decreasing in size, now measuring 1.1 x 1.6 cm (image 184 series 2). Proximal sigmoid colonic thickening as seen on the previous. Enhancing cavernous hemangioma caudate lobe  Genetic testing was negative  4/3/2024 CT CAP w/c SD. Continued decrease in size of the rounded lesion in the anterior abdominal peritoneal cavity measuring 1 cm (previously 1.1 x 1.6 cm). Interval improvement of sigmoid colonic thickening. Stable tiny pulmonary nodules.    Discussion of decision making    I personally reviewed the following lab results, the image studies, pathology, other specialty/physicians consult notes and recommendations, and outside medical records. I had a lengthy discussion with the patient and shared the work-up findings. We discussed the diagnosis and management plan as below. I spent 42 minutes reviewing the records (labs, clinician notes, outside records, medical history, ordering medicine/tests/procedures, interpreting the imaging/labs previously done) and coordination of care as well as direct time with the patient today, of which greater than 50% of the time was spent in counseling and coordination of care with the patient/family.    I discussed the risks of ongoing cigarette smoking and reviewed the benefits of quitting and risk of new lung primary, heart disease, stroke, among other risks and cancers. Reviewed options including support, quit date, medications, and family support. Patient voiced understanding and willingness  to try to quit. Patient was told to notify [facility] family practitioner for additional management. Greater than 3 minutes were needed for discussion of tobacco dependence and quitting counselling.      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 Discuss with [facility]*    Follow Up: Every 4 weeks while on systemic therapy (all virtual except post CT visits) scheduled thru 12/10/2024    Infusion chairs are at the [facility]    All questions were answered to the patient's satisfaction during this encounter. The patient knows the contact information for our office and knows to reach out for any relevant concerns related to this encounter. They are to call for any temperature 100.4 or higher, new symptoms including but not restricted to shaking chills, decreased appetite, nausea, vomiting, diarrhea, increased fatigue, shortness of breath or chest pain, confusion, and not feeling the strength to come to the clinic. For all other listed problems and medical diagnosis in their chart - they are managed by PCP and/or other specialists, which the patient acknowledges. Thank you very much for your consultation and making us a part of this patient's care. We are continuing to follow closely with you. Please do not hesitate to reach out to me with any additional questions or concerns.    Josue Bledsoe MD  Hematology & Medical Oncology Staff Physician             Disclaimer: This document was prepared using Send[employer] Fluency Direct technology. If a word or phrase is confusing, or does not make sense, this is likely due to recognition error which was not discovered during this  clinician's review. If you believe an error has occurred, please contact me through [facility] service line for lee?cation.      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)   6/22/2023 Initial Diagnosis    Malignant neoplasm of sigmoid colon (HCC)     9/3/2023 -  Cancer Staged    Staging form: Colon and Rectum, AJCC 8th Edition  - Clinical: Stage Unknown (cTX, cN0, pM1) - Signed by Josue Bledsoe MD on 9/3/2023  Total positive nodes: 0       Small cell carcinoma (HCC)   7/18/2023 Initial Diagnosis    Small cell carcinoma (HCC)     7/26/2023 -  Chemotherapy    alteplase (CATHFLO), 2 mg, Intracatheter, Every 1 Minute as needed, 13 of 18 cycles  etoposide (TOPOSAR), 80 mg/m2 = 160.8 mg, Intravenous, Once, 4 of 4 cycles  Administration: 160.8 mg (7/26/2023), 160.8 mg (7/27/2023), 160.8 mg (7/28/2023), 160.8 mg (8/16/2023), 160.8 mg (8/17/2023), 160.8 mg (8/18/2023), 160.8 mg (9/6/2023), 160.8 mg (9/7/2023), 160.8 mg (9/8/2023), 160.8 mg (9/27/2023), 160.8 mg (9/28/2023), 160.8 mg (9/29/2023)  CARBOplatin (PARAPLATIN) IVPB (GOG AUC DOSING), 750 mg, Intravenous, Once, 4 of 4 cycles  Administration: 750 mg (7/26/2023), 700.5 mg (8/16/2023), 694 mg (9/6/2023), 694 mg (9/27/2023)  durvalumab (IMFINZI) IVPB, 1,500 mg, Intravenous, Once, 13 of 18 cycles  Administration: 1,500 mg (7/26/2023), 1,500 mg (10/20/2023), 1,500 mg (8/16/2023), 1,500 mg (9/6/2023), 1,500 mg (9/27/2023), 1,500 mg (11/17/2023), 1,500 mg (12/15/2023), 1,500 mg (1/12/2024), 1,500 mg (2/9/2024), 1,500 mg (3/8/2024), 1,500 mg (4/5/2024), 1,500 mg (5/3/2024), 1,500 mg (5/31/2024)  aprepitant (CINVANTI) in  mL IVPB, 130 mg, Intravenous, Once, 4 of 4 cycles  Administration: 130 mg (7/26/2023), 130 mg (8/16/2023), 130 mg (9/6/2023), 130 mg (9/27/2023)       5/21/2023: BRBPR and pain in the lower abdomen for a week; leading to a ER visit, dx of diverticulitis     SUBJECTIVE  (INTERVAL  HISTORY)      Clotting History None    Bleeding History None   Cancer History Colon   Family Cancer History pGrandfather (lung, smoker)   H/O Blood/Plt Transfusion None   Tobacco/etoh/drug abuse 1/2 PPD x 25 years (working on quitting and thinking about it; has nicotine patches), no etoh abuse or rec drug use       Cancer Screening history n/a   Occupation IT       Interval events:    Still between 1/4-1/2 PPD, trying to quit by chewing things in general.    No acute issues at this time. Hair growing thicker. Work is good. Has 1-2 days of fatigue after his infusions.    I have reviewed the relevant past medical, surgical, social and family history. I have also reviewed allergies and medications for this patient.    Review of Systems    Baseline weight: 200 lbs    Denies F/C, N/V, SOB, CP, LH, HA, rash, itching, gen weakness, melena, hematuria, hematochezia, falls, diarrhea, or constipation       A 10-point review of system was performed, pertinent positive and negative were detailed as above. Otherwise, the 10-point review of system was negative.      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, chemotherapy, immunotherapy    Mixed hyperlipidemia 10/19/2023    10/19/2023-cholesterol 226, HDL 29    Pulmonary nodules 03/23/2024    Per coding guidelines from note dated 10/16/2023       Past Surgical History:   Procedure Laterality Date    COLON SIGMOID RESECTION LAPAROSCOPIC N/A 6/8/2023    Procedure: RESECTION COLON SIGMOID LAPAROSCOPIC HAND-ASSISTED;  Surgeon: Roland Connelly MD;  Location: [facility];  Service: General    IR DRAINAGE TUBE CHECK/CHANGE/REPOSITION/REINSERTION/UPSIZE  6/5/2023    IR DRAINAGE TUBE PLACEMENT  5/30/2023    IR PORT PLACEMENT  7/28/2023       Family History   Problem Relation Age of Onset    No Known Problems Mother     Hypertension Father     Diabetes Father     Heart attack Father 42    Stroke  Father        Social History     Socioeconomic History    Marital status: /Civil Union     Spouse name: Not on file    Number of children: Not on file    Years of education: Not on file    Highest education level: Not on file   Occupational History    Not on file   Tobacco Use    Smoking status: Every Day     Current packs/day: 0.75     Average packs/day: 0.8 packs/day for 25.0 years (18.8 ttl pk-yrs)     Types: Cigarettes     Passive exposure: Current (wife smokes)    Smokeless tobacco: Not on file    Tobacco comments:     Patient states Chantix was not effective.   Vaping Use    Vaping status: Never Used   Substance and Sexual Activity    Alcohol use: Not Currently    Drug use: Never    Sexual activity: Not on file   Other Topics Concern    Not on file   Social History Narrative     since 2011.    4 children aged 18, 17, 16, 6 as of 10/23.  Second child is a girl, the rest boys.    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    Wife is in the [employer].    Enjoys hiking and hunting.    And video games.     Social Determinants of Health     Financial Resource Strain: Not on file   Food Insecurity: No Food Insecurity (5/31/2023)    Hunger Vital Sign     Worried About Running Out of Food in the Last Year: Never true     Ran Out of Food in the Last Year: Never true   Transportation Needs: No Transportation Needs (5/31/2023)    PRAPARE - Transportation     Lack of Transportation (Medical): No     Lack of Transportation (Non-Medical): No   Physical Activity: Not on file   Stress: Not on file   Social Connections: Not on file   Intimate Partner Violence: Not on file   Housing Stability: Low Risk  (5/31/2023)    Housing Stability Vital Sign     Unable to Pay for Housing in the Last Year: No     Number of Places Lived in the Last Year: 1     Unstable Housing in the Last Year: No       No Known Allergies    Current Outpatient Medications   Medication Sig Dispense Refill    atorvastatin (LIPITOR) 10 mg tablet Take 1  tablet (10 mg total) by mouth daily 90 tablet 3     No current facility-administered medications for this visit.       (Not in a hospital admission)      Objective:     24 Hour Vitals Assessment:     There were no vitals filed for this visit.    Below not updated as this was a telemed visit    PHYSICIAN EXAM:    General: Appearance: alert, cooperative, no distress.  HEENT: Normocephalic, atraumatic. No scleral icterus. conjunctivae clear. EOMI.  Chest: No tenderness to palpation. No open wound noted.  Lungs: Diminished BS b/l, otherwise clear to auscultation bilaterally, Respirations unlabored.  Cardiac: Regular rate, +S1and S2  Abdomen: Soft, non-tender, non-distended. Bowel sounds are normal.   Extremities:  No edema, cyanosis, clubbing.  Skin: Skin color, turgor are normal. No rashes.  Lymphatics: no palpable supra-cervical, axillary, or inguinal adenopathy  Neurologic: Awake, Alert, and oriented, no gross focal deficits noted b/l.       DATA REVIEW:    Pathology Result:    Final Diagnosis   Date Value Ref Range Status   06/08/2023   Final    A.  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       Comment:     This is an appended report. These results have been appended to a previously preliminary verified report.        Image Results:   Image result are reviewed and documented in Hematology/Oncology history    CT chest abdomen pelvis w contrast  Narrative: CT CHEST, ABDOMEN AND PELVIS WITH IV CONTRAST    INDICATION: C18.7: Malignant neoplasm of sigmoid colon.    COMPARISON: 12/22/2023    TECHNIQUE: CT examination of the chest, abdomen and pelvis was performed. Multiplanar 2D reformatted images were created from the source data.    This examination, like all CT scans performed in the [facility], was performed utilizing techniques to minimize radiation dose exposure, including the use of iterative reconstruction and automated  exposure control. Radiation dose length   product (DLP) for this visit: 790.17 mGy-cm    IV Contrast: 100 mL of iohexol (OMNIPAQUE)  Enteric Contrast: Not administered. Lack of oral contrast limits the sensitivity for pathology within the bowel.    FINDINGS:    CHEST    LUNGS: Multiple bilateral pulmonary nodules measuring less than 6 mm are stable and are labeled on series 4 (images 31, 44, 52, 59, 65, 72, 74 and 91). No new suspicious appearing pulmonary nodule is seen. No tracheal or endobronchial lesion.    PLEURA: Unremarkable.    HEART/GREAT VESSELS: Coronary artery calcification. No thoracic aortic aneurysm.    MEDIASTINUM AND FABRICIO: Stable subcentimeter right posterior paratracheal lymph node. No developing mediastinal adenopathy.    CHEST WALL AND LOWER NECK: Implanted port right anterior chest wall, the tubing tip near the cavoatrial junction.    ABDOMEN    LIVER/BILIARY TREE: Redemonstration of fatty infiltration of the liver. Stable 1.3 focus of enhancement within the quadrate lobe compatible with a hemangioma. No developing suspicious hepatic mass.    GALLBLADDER: No calcified gallstones. No pericholecystic inflammatory change.    SPLEEN: Unremarkable.    PANCREAS: Unremarkable.    ADRENAL GLANDS: Unremarkable.    KIDNEYS/URETERS: Unremarkable. No hydronephrosis.    STOMACH AND BOWEL: Previously described sigmoid colonic wall thickening is less apparent on today's study. No bowel obstruction.    APPENDIX: No findings to suggest appendicitis.    ABDOMINOPELVIC CAVITY: Previously noted rounded lesion in the anterior aspect of the abdomen just left of midline continues to decrease in size, now measuring 1 cm in maximum dimension (2/183). This previously measured 1.1 x 1.6 cm on 12/22/2023 and 4.9   cm on 10/2/2023. Otherwise, no evidence of ascites. No pneumoperitoneum.    VESSELS: Unremarkable for patient's age.    PELVIS    REPRODUCTIVE ORGANS: Unremarkable for patient's age.    URINARY BLADDER:  "Improvement of diffuse wall thickening.    ABDOMINAL WALL/INGUINAL REGIONS: Unremarkable.    BONES: No acute fracture or suspicious osseous lesion.  Impression: 1. Continued decrease in size of the rounded lesion in the anterior abdominal peritoneal cavity now measuring 1 cm.  2. Interval improvement of sigmoid colonic thickening.  3. Stable tiny pulmonary nodules.    Workstation performed: [id number]      LABS:  Lab data are reviewed and documented in HemOnc history.       Lab Results   Component Value Date    HGB 16.8 05/30/2024    HCT 51.8 (H) 05/30/2024    MCV 97 05/30/2024     05/30/2024    WBC 7.02 05/30/2024    NRBC 0 05/30/2024     Lab Results   Component Value Date    K 4.4 05/30/2024     05/30/2024    CO2 28 05/30/2024    BUN 8 05/30/2024    CREATININE 0.90 05/30/2024    GLUF 102 (H) 02/08/2024    CALCIUM 9.2 05/30/2024    CORRECTEDCA 9.1 06/09/2023    AST 24 05/30/2024    ALT 33 05/30/2024    ALKPHOS 60 05/30/2024    EGFR 105 05/30/2024       No results found for: \"IRON\", \"TIBC\", \"FERRITIN\"    No results found for: \"[id number]\"    No results for input(s): \"WBC\", \"CREAT\", \"PLT\" in the last 72 hours.      By:  Josue Bledsoe MD, 6/4/2024, 11:03 AM                                  "

## 2024-05-30 ENCOUNTER — HOSPITAL ENCOUNTER (OUTPATIENT)
Dept: INFUSION CENTER | Facility: HOSPITAL | Age: 44
End: 2024-05-30
Payer: OTHER GOVERNMENT

## 2024-05-30 DIAGNOSIS — Z45.2 ENCOUNTER FOR CARE RELATED TO PORT-A-CATH: Primary | ICD-10-CM

## 2024-05-30 DIAGNOSIS — C80.1 SMALL CELL CARCINOMA (HCC): ICD-10-CM

## 2024-05-30 LAB
ALBUMIN SERPL BCP-MCNC: 4.4 G/DL (ref 3.5–5)
ALP SERPL-CCNC: 60 U/L (ref 34–104)
ALT SERPL W P-5'-P-CCNC: 33 U/L (ref 7–52)
ANION GAP SERPL CALCULATED.3IONS-SCNC: 6 MMOL/L (ref 4–13)
AST SERPL W P-5'-P-CCNC: 24 U/L (ref 13–39)
BASOPHILS # BLD AUTO: 0.02 THOUSANDS/ÂΜL (ref 0–0.1)
BASOPHILS NFR BLD AUTO: 0 % (ref 0–1)
BILIRUB SERPL-MCNC: 0.56 MG/DL (ref 0.2–1)
BUN SERPL-MCNC: 8 MG/DL (ref 5–25)
CALCIUM SERPL-MCNC: 9.2 MG/DL (ref 8.4–10.2)
CHLORIDE SERPL-SCNC: 103 MMOL/L (ref 96–108)
CO2 SERPL-SCNC: 28 MMOL/L (ref 21–32)
CREAT SERPL-MCNC: 0.9 MG/DL (ref 0.6–1.3)
EOSINOPHIL # BLD AUTO: 0.11 THOUSAND/ÂΜL (ref 0–0.61)
EOSINOPHIL NFR BLD AUTO: 2 % (ref 0–6)
ERYTHROCYTE [DISTWIDTH] IN BLOOD BY AUTOMATED COUNT: 13.6 % (ref 11.6–15.1)
GFR SERPL CREATININE-BSD FRML MDRD: 105 ML/MIN/1.73SQ M
GLUCOSE SERPL-MCNC: 108 MG/DL (ref 65–140)
HCT VFR BLD AUTO: 51.8 % (ref 36.5–49.3)
HGB BLD-MCNC: 16.8 G/DL (ref 12–17)
IMM GRANULOCYTES # BLD AUTO: 0.01 THOUSAND/UL (ref 0–0.2)
IMM GRANULOCYTES NFR BLD AUTO: 0 % (ref 0–2)
LYMPHOCYTES # BLD AUTO: 1.61 THOUSANDS/ÂΜL (ref 0.6–4.47)
LYMPHOCYTES NFR BLD AUTO: 23 % (ref 14–44)
MCH RBC QN AUTO: 31.3 PG (ref 26.8–34.3)
MCHC RBC AUTO-ENTMCNC: 32.4 G/DL (ref 31.4–37.4)
MCV RBC AUTO: 97 FL (ref 82–98)
MONOCYTES # BLD AUTO: 0.76 THOUSAND/ÂΜL (ref 0.17–1.22)
MONOCYTES NFR BLD AUTO: 11 % (ref 4–12)
NEUTROPHILS # BLD AUTO: 4.51 THOUSANDS/ÂΜL (ref 1.85–7.62)
NEUTS SEG NFR BLD AUTO: 64 % (ref 43–75)
NRBC BLD AUTO-RTO: 0 /100 WBCS
PLATELET # BLD AUTO: 210 THOUSANDS/UL (ref 149–390)
PMV BLD AUTO: 11.4 FL (ref 8.9–12.7)
POTASSIUM SERPL-SCNC: 4.4 MMOL/L (ref 3.5–5.3)
PROT SERPL-MCNC: 7.3 G/DL (ref 6.4–8.4)
RBC # BLD AUTO: 5.36 MILLION/UL (ref 3.88–5.62)
SODIUM SERPL-SCNC: 137 MMOL/L (ref 135–147)
TSH SERPL DL<=0.05 MIU/L-ACNC: 3.82 UIU/ML (ref 0.45–4.5)
WBC # BLD AUTO: 7.02 THOUSAND/UL (ref 4.31–10.16)

## 2024-05-30 PROCEDURE — 80053 COMPREHEN METABOLIC PANEL: CPT | Performed by: INTERNAL MEDICINE

## 2024-05-30 PROCEDURE — 85025 COMPLETE CBC W/AUTO DIFF WBC: CPT | Performed by: INTERNAL MEDICINE

## 2024-05-30 PROCEDURE — 84443 ASSAY THYROID STIM HORMONE: CPT | Performed by: INTERNAL MEDICINE

## 2024-05-30 NOTE — PROGRESS NOTES
Kwaku Plascencia  had central labs done and port flushed per protocol.     Kwaku Plascencia is aware of future appt on 5-31-24 0800     AVS declined by Kwaku Plascencia

## 2024-05-31 ENCOUNTER — HOSPITAL ENCOUNTER (OUTPATIENT)
Dept: INFUSION CENTER | Facility: HOSPITAL | Age: 44
End: 2024-05-31
Attending: INTERNAL MEDICINE
Payer: OTHER GOVERNMENT

## 2024-05-31 VITALS
DIASTOLIC BLOOD PRESSURE: 69 MMHG | SYSTOLIC BLOOD PRESSURE: 152 MMHG | HEART RATE: 89 BPM | TEMPERATURE: 96.4 F | RESPIRATION RATE: 18 BRPM

## 2024-05-31 DIAGNOSIS — C80.1 SMALL CELL CARCINOMA (HCC): Primary | ICD-10-CM

## 2024-05-31 PROCEDURE — 96413 CHEMO IV INFUSION 1 HR: CPT

## 2024-05-31 RX ORDER — SODIUM CHLORIDE 9 MG/ML
20 INJECTION, SOLUTION INTRAVENOUS ONCE
Status: COMPLETED | OUTPATIENT
Start: 2024-05-31 | End: 2024-05-31

## 2024-05-31 RX ADMIN — DURVALUMAB 1500 MG: 500 INJECTION, SOLUTION INTRAVENOUS at 09:02

## 2024-05-31 RX ADMIN — SODIUM CHLORIDE 20 ML/HR: 0.9 INJECTION, SOLUTION INTRAVENOUS at 09:00

## 2024-05-31 NOTE — PROGRESS NOTES
Kwaku Plascencia  tolerated treatment well with no complications.      Kwaku Plascencia is aware of future appt on 6/27 at 8:30 am.     AVS declined by Kwaku Plascencia.

## 2024-06-04 ENCOUNTER — TELEMEDICINE (OUTPATIENT)
Dept: HEMATOLOGY ONCOLOGY | Facility: CLINIC | Age: 44
End: 2024-06-04
Payer: OTHER GOVERNMENT

## 2024-06-04 DIAGNOSIS — C7A.025 MALIGNANT CARCINOID TUMOR OF SIGMOID COLON (HCC): Primary | ICD-10-CM

## 2024-06-04 DIAGNOSIS — C80.1 SMALL CELL CARCINOMA (HCC): Chronic | ICD-10-CM

## 2024-06-04 DIAGNOSIS — Z71.6 ENCOUNTER FOR TOBACCO USE CESSATION COUNSELING: Chronic | ICD-10-CM

## 2024-06-04 PROCEDURE — 99406 BEHAV CHNG SMOKING 3-10 MIN: CPT | Performed by: INTERNAL MEDICINE

## 2024-06-04 PROCEDURE — 99215 OFFICE O/P EST HI 40 MIN: CPT | Performed by: INTERNAL MEDICINE

## 2024-06-27 ENCOUNTER — HOSPITAL ENCOUNTER (OUTPATIENT)
Dept: INFUSION CENTER | Facility: HOSPITAL | Age: 44
End: 2024-06-27
Payer: OTHER GOVERNMENT

## 2024-06-27 VITALS — TEMPERATURE: 96.9 F

## 2024-06-27 DIAGNOSIS — Z45.2 ENCOUNTER FOR CARE RELATED TO PORT-A-CATH: Primary | ICD-10-CM

## 2024-06-27 DIAGNOSIS — C80.1 SMALL CELL CARCINOMA (HCC): ICD-10-CM

## 2024-06-27 LAB
ALBUMIN SERPL BCG-MCNC: 4.2 G/DL (ref 3.5–5)
ALP SERPL-CCNC: 63 U/L (ref 34–104)
ALT SERPL W P-5'-P-CCNC: 27 U/L (ref 7–52)
ANION GAP SERPL CALCULATED.3IONS-SCNC: 6 MMOL/L (ref 4–13)
AST SERPL W P-5'-P-CCNC: 20 U/L (ref 13–39)
BASOPHILS # BLD AUTO: 0.01 THOUSANDS/ÂΜL (ref 0–0.1)
BASOPHILS NFR BLD AUTO: 0 % (ref 0–1)
BILIRUB SERPL-MCNC: 0.68 MG/DL (ref 0.2–1)
BUN SERPL-MCNC: 12 MG/DL (ref 5–25)
CALCIUM SERPL-MCNC: 9.1 MG/DL (ref 8.4–10.2)
CHLORIDE SERPL-SCNC: 104 MMOL/L (ref 96–108)
CO2 SERPL-SCNC: 28 MMOL/L (ref 21–32)
CREAT SERPL-MCNC: 0.99 MG/DL (ref 0.6–1.3)
EOSINOPHIL # BLD AUTO: 0.12 THOUSAND/ÂΜL (ref 0–0.61)
EOSINOPHIL NFR BLD AUTO: 1 % (ref 0–6)
ERYTHROCYTE [DISTWIDTH] IN BLOOD BY AUTOMATED COUNT: 13.2 % (ref 11.6–15.1)
GFR SERPL CREATININE-BSD FRML MDRD: 94 ML/MIN/1.73SQ M
GLUCOSE SERPL-MCNC: 109 MG/DL (ref 65–140)
HCT VFR BLD AUTO: 51.1 % (ref 36.5–49.3)
HGB BLD-MCNC: 16.7 G/DL (ref 12–17)
IMM GRANULOCYTES # BLD AUTO: 0.04 THOUSAND/UL (ref 0–0.2)
IMM GRANULOCYTES NFR BLD AUTO: 1 % (ref 0–2)
LYMPHOCYTES # BLD AUTO: 1.79 THOUSANDS/ÂΜL (ref 0.6–4.47)
LYMPHOCYTES NFR BLD AUTO: 21 % (ref 14–44)
MCH RBC QN AUTO: 31.5 PG (ref 26.8–34.3)
MCHC RBC AUTO-ENTMCNC: 32.7 G/DL (ref 31.4–37.4)
MCV RBC AUTO: 96 FL (ref 82–98)
MONOCYTES # BLD AUTO: 0.93 THOUSAND/ÂΜL (ref 0.17–1.22)
MONOCYTES NFR BLD AUTO: 11 % (ref 4–12)
NEUTROPHILS # BLD AUTO: 5.52 THOUSANDS/ÂΜL (ref 1.85–7.62)
NEUTS SEG NFR BLD AUTO: 66 % (ref 43–75)
NRBC BLD AUTO-RTO: 0 /100 WBCS
PLATELET # BLD AUTO: 180 THOUSANDS/UL (ref 149–390)
PMV BLD AUTO: 11.6 FL (ref 8.9–12.7)
POTASSIUM SERPL-SCNC: 4 MMOL/L (ref 3.5–5.3)
PROT SERPL-MCNC: 7 G/DL (ref 6.4–8.4)
RBC # BLD AUTO: 5.3 MILLION/UL (ref 3.88–5.62)
SODIUM SERPL-SCNC: 138 MMOL/L (ref 135–147)
TSH SERPL DL<=0.05 MIU/L-ACNC: 2.27 UIU/ML (ref 0.45–4.5)
WBC # BLD AUTO: 8.41 THOUSAND/UL (ref 4.31–10.16)

## 2024-06-27 PROCEDURE — 84443 ASSAY THYROID STIM HORMONE: CPT | Performed by: INTERNAL MEDICINE

## 2024-06-27 PROCEDURE — 80053 COMPREHEN METABOLIC PANEL: CPT | Performed by: INTERNAL MEDICINE

## 2024-06-27 PROCEDURE — 85025 COMPLETE CBC W/AUTO DIFF WBC: CPT | Performed by: INTERNAL MEDICINE

## 2024-06-27 NOTE — PROGRESS NOTES
Kwaku Plascencia  tolerated treatment well with no complications.      Kwaku Plascencia is aware of future appt on 6/28 at 8:30 am.     AVS declined by Kwaku Plascencia.

## 2024-06-28 ENCOUNTER — HOSPITAL ENCOUNTER (OUTPATIENT)
Dept: INFUSION CENTER | Facility: HOSPITAL | Age: 44
End: 2024-06-28
Attending: INTERNAL MEDICINE
Payer: OTHER GOVERNMENT

## 2024-06-28 VITALS
SYSTOLIC BLOOD PRESSURE: 143 MMHG | HEART RATE: 88 BPM | RESPIRATION RATE: 18 BRPM | TEMPERATURE: 97.2 F | DIASTOLIC BLOOD PRESSURE: 89 MMHG

## 2024-06-28 DIAGNOSIS — C80.1 SMALL CELL CARCINOMA (HCC): Primary | ICD-10-CM

## 2024-06-28 PROCEDURE — 96413 CHEMO IV INFUSION 1 HR: CPT

## 2024-06-28 RX ORDER — SODIUM CHLORIDE 9 MG/ML
20 INJECTION, SOLUTION INTRAVENOUS ONCE
Status: COMPLETED | OUTPATIENT
Start: 2024-06-28 | End: 2024-06-28

## 2024-06-28 RX ADMIN — SODIUM CHLORIDE 20 ML/HR: 0.9 INJECTION, SOLUTION INTRAVENOUS at 09:46

## 2024-06-28 RX ADMIN — DURVALUMAB 1500 MG: 500 INJECTION, SOLUTION INTRAVENOUS at 09:49

## 2024-06-29 NOTE — PROGRESS NOTES
Telemedicine consent    Patient: Kwaku Plascencia  Provider: Josue Bledsoe MD  Provider located at [address] [place] [address] [facility] [address] [id number]    The patient was identified by name and date of birth. Kwaku Plascencia was informed that this is a telemedicine visit and that the visit is being conducted through the [employer] platform. He agrees to proceed..  My office door was closed. No one else was in the room.  He acknowledged consent and understanding of privacy and security of the video platform. The patient has agreed to participate and understands they can discontinue the visit at any time.    Patient is aware this is a billable service.                           Hematology/Oncology Outpatient Office Note    Date of Service: 2024    [facility] [address] [address] [phone]    Reason for Consultation:   No chief complaint on file.      Cancer Stage at diagnosis: IV    Referral Physician: No ref. provider found    Primary Care Physician:  Theron Stein MD     Nickname: Kwaku    Spouse: Naz Dinh ECO    Today's ECO    Goals and Barriers:  Current Goal: Minimize effects of disease burden, extend life.   Barriers to accomplishing this: None    Patient's Capacity to Self Care:  Patient is able to self care    Code Status: not addressed today    Advanced directives: not addressed today    ASSESSMENT & PLAN      Diagnosis ICD-10-CM Associated Orders   1. 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 Encounter for tobacco use cessation counseling  Z71.6             This is a 43 y.o. c PMHx notable for obesity, being seen in consultation for large cell with neuroendocrine features found in the colon    Discussion of decision making  Oncology history updated, accordingly, during this visit  Goals of care/patient communication  I discussed  with the patient the clinical course leading up to their cancer diagnosis. I reviewed relevant office notes, imaging reports and pathology result as well.  I told the patient that this is a case of curable versus incurable disease and what this means. We discussed that the goal of anti-cancer therapy is to provide best quality of life, extend overall survival, and progression free survival as shown in clinical trials. We also discussed that there might be a point when the cancer will no longer respond to this anti-neoplastic therapy.   I explained the risks/benefits of the proposed cancer therapy: Carboplatin-Etoposide +/- Durvalumab and after discussion including understanding risks of possible life-threatening complications and therapy-related malignancy development, informed consent for blood products and treatment has been signed and obtained.  TNM/Staging At Diagnosis  [id number]  Cancer Staging   IV  Disease Features/Tumor Markers/Genetics  Tumor Marker:   7/17/2023: CEA 2.2  CGA: 49.4  Notable Path Features:   6/8/2023: Poorly differentiated malignancy, pending external expert consultation positive for synaptophysin, chromogranin, CD56, TTF-1, BCL2, and cyclin D1 with a high Ki-67 proliferative rate (approximately 90%) compatible with a poorly differentiated malignancy with features suggesting neuroendocrine differentiation. 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 The intramural abscess abuts the superior aspect of the urinary  bladder without evidence of a colovesical fistula.  6/27/2023 MRI Abd w/wo c: Benign flash-filling 11 mm hepatic and angioma in the caudate lobe of the liver accounting for the enhancing nodule in that location on recent CT examinations  7/19/2023 CT Chest w/o c: There are multiple pulmonary nodules, which will be described on series 4:  Image 68, left lower lobe, solid, smooth bordered, 3 mm .  Image 63, left upper lobe, solid, smooth bordered, 4 mm .  Image 43, right upper lobe, solid, smooth bordered, 4 mm, may represent a fissural lymph node.  Image 55, right upper lobe, solid, smooth bordered, 4 mm, may represent a fissural lymph node  7/19/2023 MRI Brain w/wo c: No evidence of intracranial metastasis, A few foci of T2/FLAIR hyperintensity primarily involving left frontoparietal subcortical and deep white matter (maybe migraine related)  8/8/2023 PET/CT: Three new hypermetabolic solid nodules/masses in the abdomen as described, compatible with progression of disease. Mild uptake along the left anterior ninth rib corresponding to a mild fracture deformity. Findings are indeterminate and may represent the sequela of trauma or metastatic disease. Stable sub-4 mm pulmonary nodules   -3.1 x 3.1 cm hypermetabolic mass in the left anterior abdomen abutting a loop of small bowel (series 4, image 147; SUV max 12).   -1.3 x 1.2 cm hypermetabolic soft tissue nodule in the left mid abdomen posterior to a loop of small bowel (series 4, image 154; SUV max 17)   -1.8 x 2.1 cm hypermetabolic soft tissue nodule anterior to the left psoas muscle (series 4, image 160; SUV max 9.7)  10/2/2023: CT CAP w/c: excellent MT. Multiple pulmonary nodules without significant change when compared to the previous CT chest. Some which may represent fissural lymph nodes. No evidence of metastatic disease in the chest abdomen and pelvis.  Improvement of the lower abdominal/pelvic lymphadenopathy when compared to the previous PET/CT.  CARIS NGS:  11/9/2023 CARIS NGS: TMB 94 muts/Mb, MSI-proficient, BRCA 1 exon 18 mutation  BRAF exon 15 mutation, MSH2/3, NF1 exon 3, PIK3CA, POLE exon 9, PTEN exon 1, RB1, SMAD2, TP53 exon 6, XPO1 exon 15  12/22/2023 CT CAP w/c stable: There is no new measurable metastatic disease. Small less than 6 mm nodules in the both lungs, remains stable. The previously noted rounded lesion in the anterior abdomen which demonstrated marginal uptake on the PET scan is decreasing in size, now measuring 1.1 x 1.6 cm (image 184 series 2). Proximal sigmoid colonic thickening as seen on the previous. Enhancing cavernous hemangioma caudate lobe  Genetic testing was negative  4/3/2024 CT CAP w/c HI. Continued decrease in size of the rounded lesion in the anterior abdominal peritoneal cavity measuring 1 cm (previously 1.1 x 1.6 cm). Interval improvement of sigmoid colonic thickening. Stable tiny pulmonary nodules.    Discussion of decision making    I personally reviewed the following lab results, the image studies, pathology, other specialty/physicians consult notes and recommendations, and outside medical records. I had a lengthy discussion with the patient and shared the work-up findings. We discussed the diagnosis and management plan as below. I spent 42 minutes reviewing the records (labs, clinician notes, outside records, medical history, ordering medicine/tests/procedures, interpreting the imaging/labs previously done) and coordination of care as well as direct time with the patient today, of which greater than 50% of the time was spent in counseling and coordination of care with the patient/family.    I discussed the risks of ongoing cigarette smoking and reviewed the benefits of quitting and risk of new lung primary, heart disease, stroke, among other risks and cancers. Reviewed options including support, quit date, medications, and family support. Patient voiced understanding and willingness to try to quit. Patient was told to notify  ftheir family practitioner for additional management. Greater than 3 minutes were needed for discussion of tobacco dependence and quitting counselling.      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 Discuss with [facility]*    Follow Up: Every 4 weeks while on systemic therapy (all virtual except post CT visits) scheduled thru 12/10/2024    [place] [facility]    All questions were answered to the patient's satisfaction during this encounter. The patient knows the contact information for our office and knows to reach out for any relevant concerns related to this encounter. They are to call for any temperature 100.4 or higher, new symptoms including but not restricted to shaking chills, decreased appetite, nausea, vomiting, diarrhea, increased fatigue, shortness of breath or chest pain, confusion, and not feeling the strength to come to the clinic. For all other listed problems and medical diagnosis in their chart - they are managed by PCP and/or other specialists, which the patient acknowledges. Thank you very much for your consultation and making us a part of this patient's care. We are continuing to follow closely with you. Please do not hesitate to reach out to me with any additional questions or concerns.    Josue Bledsoe MD  Hematology & Medical Oncology Staff Physician             Disclaimer: This document was prepared using [employer] Direct technology. If a word or phrase is confusing, or does not make sense, this is likely due to recognition error which was not discovered during this clinician's review. If you believe an error has  occurred, please contact me through [facility] service line for lee?cation.      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)   6/22/2023 Initial Diagnosis    Malignant neoplasm of sigmoid colon (HCC)     9/3/2023 -  Cancer Staged    Staging form: Colon and Rectum, AJCC 8th Edition  - Clinical: Stage Unknown (cTX, cN0, pM1) - Signed by Josue Bledsoe MD on 9/3/2023  Total positive nodes: 0       Small cell carcinoma (HCC)   7/18/2023 Initial Diagnosis    Small cell carcinoma (HCC)     7/26/2023 -  Chemotherapy    alteplase (CATHFLO), 2 mg, Intracatheter, Every 1 Minute as needed, 14 of 18 cycles  etoposide (TOPOSAR), 80 mg/m2 = 160.8 mg, Intravenous, Once, 4 of 4 cycles  Administration: 160.8 mg (7/26/2023), 160.8 mg (7/27/2023), 160.8 mg (7/28/2023), 160.8 mg (8/16/2023), 160.8 mg (8/17/2023), 160.8 mg (8/18/2023), 160.8 mg (9/6/2023), 160.8 mg (9/7/2023), 160.8 mg (9/8/2023), 160.8 mg (9/27/2023), 160.8 mg (9/28/2023), 160.8 mg (9/29/2023)  CARBOplatin (PARAPLATIN) IVPB (GOG AUC DOSING), 750 mg, Intravenous, Once, 4 of 4 cycles  Administration: 750 mg (7/26/2023), 700.5 mg (8/16/2023), 694 mg (9/6/2023), 694 mg (9/27/2023)  durvalumab (IMFINZI) IVPB, 1,500 mg, Intravenous, Once, 14 of 18 cycles  Administration: 1,500 mg (7/26/2023), 1,500 mg (10/20/2023), 1,500 mg (8/16/2023), 1,500 mg (9/6/2023), 1,500 mg (9/27/2023), 1,500 mg (11/17/2023), 1,500 mg (12/15/2023), 1,500 mg (1/12/2024), 1,500 mg (2/9/2024), 1,500 mg (3/8/2024), 1,500 mg (4/5/2024), 1,500 mg (5/3/2024), 1,500 mg (5/31/2024), 1,500 mg (6/28/2024)  aprepitant (CINVANTI) in  mL IVPB, 130 mg, Intravenous, Once, 4 of 4 cycles  Administration: 130 mg (7/26/2023), 130 mg (8/16/2023), 130 mg (9/6/2023), 130 mg (9/27/2023)       5/21/2023: BRBPR and pain in the lower abdomen for a week; leading to a ER visit, dx of diverticulitis     SUBJECTIVE  (INTERVAL HISTORY)      Clotting History  None    Bleeding History None   Cancer History Colon   Family Cancer History pGrandfather (lung, smoker)   H/O Blood/Plt Transfusion None   Tobacco/etoh/drug abuse 1/2 PPD x 25 years (working on quitting and thinking about it; has nicotine patches), no etoh abuse or rec drug use       Cancer Screening history n/a   Occupation IT       Interval events:  Had a good vacation at the beach.    Still between 1/4-1/2 PPD, trying to quit by chewing things in general.    No acute issues at this time. Hair growing thicker. Work is good. Has 1-2 days of fatigue after his infusions.    I have reviewed the relevant past medical, surgical, social and family history. I have also reviewed allergies and medications for this patient.    Review of Systems    Baseline weight: 200 lbs    Denies F/C, N/V, SOB, CP, LH, HA, rash, itching, gen weakness, melena, hematuria, hematochezia, falls, diarrhea, or constipation       A 10-point review of system was performed, pertinent positive and negative were detailed as above. Otherwise, the 10-point review of system was negative.      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, chemotherapy, immunotherapy    Mixed hyperlipidemia 10/19/2023    10/19/2023-cholesterol 226, HDL 29    Pulmonary nodules 03/23/2024    Per coding guidelines from note dated 10/16/2023       Past Surgical History:   Procedure Laterality Date    COLON SIGMOID RESECTION LAPAROSCOPIC N/A 6/8/2023    Procedure: RESECTION COLON SIGMOID LAPAROSCOPIC HAND-ASSISTED;  Surgeon: Roland Connelly MD;  Location: [facility];  Service: General    IR DRAINAGE TUBE CHECK/CHANGE/REPOSITION/REINSERTION/UPSIZE  6/5/2023    IR DRAINAGE TUBE PLACEMENT  5/30/2023    IR PORT PLACEMENT  7/28/2023       Family History   Problem Relation Age of Onset    No Known Problems Mother     Hypertension Father     Diabetes Father     Heart attack Father 42    Stroke  Father        Social History     Socioeconomic History    Marital status: /Civil Union     Spouse name: Not on file    Number of children: Not on file    Years of education: Not on file    Highest education level: Not on file   Occupational History    Not on file   Tobacco Use    Smoking status: Every Day     Current packs/day: 0.75     Average packs/day: 0.8 packs/day for 25.0 years (18.8 ttl pk-yrs)     Types: Cigarettes     Passive exposure: Current (wife smokes)    Smokeless tobacco: Not on file    Tobacco comments:     Patient states Chantix was not effective.   Vaping Use    Vaping status: Never Used   Substance and Sexual Activity    Alcohol use: Not Currently    Drug use: Never    Sexual activity: Not on file   Other Topics Concern    Not on file   Social History Narrative     since 2011.    4 children aged 18, 17, 16, 6 as of 10/23.  Second child is a girl, the rest boys.    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    Wife is in the [employer].    Enjoys hiking and hunting.    And video games.     Social Determinants of Health     Financial Resource Strain: Not on file   Food Insecurity: No Food Insecurity (5/31/2023)    Hunger Vital Sign     Worried About Running Out of Food in the Last Year: Never true     Ran Out of Food in the Last Year: Never true   Transportation Needs: No Transportation Needs (5/31/2023)    PRAPARE - Transportation     Lack of Transportation (Medical): No     Lack of Transportation (Non-Medical): No   Physical Activity: Not on file   Stress: Not on file   Social Connections: Unknown (6/18/2024)    Received from [facility]    Social Connections     How often do you feel lonely or isolated from those around you? (Adult - for ages 18 years and over): Not on file   Intimate Partner Violence: Not on file   Housing Stability: Low Risk  (5/31/2023)    Housing Stability Vital Sign     Unable to Pay for Housing in the Last Year: No     Number of Places Lived in the Last Year: 1      Unstable Housing in the Last Year: No       No Known Allergies    Current Outpatient Medications   Medication Sig Dispense Refill    atorvastatin (LIPITOR) 10 mg tablet Take 1 tablet (10 mg total) by mouth daily 90 tablet 3     No current facility-administered medications for this visit.       (Not in a hospital admission)      Objective:     24 Hour Vitals Assessment:     There were no vitals filed for this visit.    Below not updated as this was a telemed visit    PHYSICIAN EXAM:    General: Appearance: alert, cooperative, no distress.  HEENT: Normocephalic, atraumatic. No scleral icterus. conjunctivae clear. EOMI.  Chest: No tenderness to palpation. No open wound noted.  Lungs: Diminished BS b/l, otherwise clear to auscultation bilaterally, Respirations unlabored.  Cardiac: Regular rate, +S1and S2  Abdomen: Soft, non-tender, non-distended. Bowel sounds are normal.   Extremities:  No edema, cyanosis, clubbing.  Skin: Skin color, turgor are normal. No rashes.  Lymphatics: no palpable supra-cervical, axillary, or inguinal adenopathy  Neurologic: Awake, Alert, and oriented, no gross focal deficits noted b/l.       DATA REVIEW:    Pathology Result:    Final Diagnosis   Date Value Ref Range Status   06/08/2023   Final    A.  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       Comment:     This is an appended report. These results have been appended to a previously preliminary verified report.        Image Results:   Image result are reviewed and documented in Hematology/Oncology history    CT chest abdomen pelvis w contrast  Narrative: CT CHEST, ABDOMEN AND PELVIS WITH IV CONTRAST    INDICATION: C18.7: Malignant neoplasm of sigmoid colon.    COMPARISON: 12/22/2023    TECHNIQUE: CT examination of the chest, abdomen and pelvis was performed. Multiplanar 2D reformatted images were created from the source data.    This examination, like all CT scans  performed in the [facility], was performed utilizing techniques to minimize radiation dose exposure, including the use of iterative reconstruction and automated exposure control. Radiation dose length   product (DLP) for this visit: 790.17 mGy-cm    IV Contrast: 100 mL of iohexol (OMNIPAQUE)  Enteric Contrast: Not administered. Lack of oral contrast limits the sensitivity for pathology within the bowel.    FINDINGS:    CHEST    LUNGS: Multiple bilateral pulmonary nodules measuring less than 6 mm are stable and are labeled on series 4 (images 31, 44, 52, 59, 65, 72, 74 and 91). No new suspicious appearing pulmonary nodule is seen. No tracheal or endobronchial lesion.    PLEURA: Unremarkable.    HEART/GREAT VESSELS: Coronary artery calcification. No thoracic aortic aneurysm.    MEDIASTINUM AND FABRICIO: Stable subcentimeter right posterior paratracheal lymph node. No developing mediastinal adenopathy.    CHEST WALL AND LOWER NECK: Implanted port right anterior chest wall, the tubing tip near the cavoatrial junction.    ABDOMEN    LIVER/BILIARY TREE: Redemonstration of fatty infiltration of the liver. Stable 1.3 focus of enhancement within the quadrate lobe compatible with a hemangioma. No developing suspicious hepatic mass.    GALLBLADDER: No calcified gallstones. No pericholecystic inflammatory change.    SPLEEN: Unremarkable.    PANCREAS: Unremarkable.    ADRENAL GLANDS: Unremarkable.    KIDNEYS/URETERS: Unremarkable. No hydronephrosis.    STOMACH AND BOWEL: Previously described sigmoid colonic wall thickening is less apparent on today's study. No bowel obstruction.    APPENDIX: No findings to suggest appendicitis.    ABDOMINOPELVIC CAVITY: Previously noted rounded lesion in the anterior aspect of the abdomen just left of midline continues to decrease in size, now measuring 1 cm in maximum dimension (2/183). This previously measured 1.1 x 1.6 cm on 12/22/2023 and 4.9   cm on 10/2/2023. Otherwise, no  "evidence of ascites. No pneumoperitoneum.    VESSELS: Unremarkable for patient's age.    PELVIS    REPRODUCTIVE ORGANS: Unremarkable for patient's age.    URINARY BLADDER: Improvement of diffuse wall thickening.    ABDOMINAL WALL/INGUINAL REGIONS: Unremarkable.    BONES: No acute fracture or suspicious osseous lesion.  Impression: 1. Continued decrease in size of the rounded lesion in the anterior abdominal peritoneal cavity now measuring 1 cm.  2. Interval improvement of sigmoid colonic thickening.  3. Stable tiny pulmonary nodules.    Workstation performed: [id number]      LABS:  Lab data are reviewed and documented in HemOnc history.       Lab Results   Component Value Date    HGB 16.7 06/27/2024    HCT 51.1 (H) 06/27/2024    MCV 96 06/27/2024     06/27/2024    WBC 8.41 06/27/2024    NRBC 0 06/27/2024     Lab Results   Component Value Date    K 4.0 06/27/2024     06/27/2024    CO2 28 06/27/2024    BUN 12 06/27/2024    CREATININE 0.99 06/27/2024    GLUF 102 (H) 02/08/2024    CALCIUM 9.1 06/27/2024    CORRECTEDCA 9.1 06/09/2023    AST 20 06/27/2024    ALT 27 06/27/2024    ALKPHOS 63 06/27/2024    EGFR 94 06/27/2024       No results found for: \"IRON\", \"TIBC\", \"FERRITIN\"    No results found for: \"KTJMUCGH18\"    No results for input(s): \"WBC\", \"CREAT\", \"PLT\" in the last 72 hours.        By:  Josue Bledsoe MD, 7/9/2024, 8:32 AM                                  "

## 2024-07-09 ENCOUNTER — TELEMEDICINE (OUTPATIENT)
Dept: HEMATOLOGY ONCOLOGY | Facility: CLINIC | Age: 44
End: 2024-07-09
Payer: OTHER GOVERNMENT

## 2024-07-09 DIAGNOSIS — C7A.025 MALIGNANT CARCINOID TUMOR OF SIGMOID COLON (HCC): Primary | ICD-10-CM

## 2024-07-09 DIAGNOSIS — Z71.6 ENCOUNTER FOR TOBACCO USE CESSATION COUNSELING: Chronic | ICD-10-CM

## 2024-07-09 PROCEDURE — 99406 BEHAV CHNG SMOKING 3-10 MIN: CPT | Performed by: INTERNAL MEDICINE

## 2024-07-09 PROCEDURE — 99215 OFFICE O/P EST HI 40 MIN: CPT | Performed by: INTERNAL MEDICINE

## 2024-07-25 ENCOUNTER — HOSPITAL ENCOUNTER (OUTPATIENT)
Dept: INFUSION CENTER | Facility: HOSPITAL | Age: 44
End: 2024-07-25
Payer: OTHER GOVERNMENT

## 2024-07-25 VITALS — TEMPERATURE: 97 F

## 2024-07-25 DIAGNOSIS — C80.1 SMALL CELL CARCINOMA (HCC): Chronic | ICD-10-CM

## 2024-07-25 DIAGNOSIS — Z45.2 ENCOUNTER FOR CARE RELATED TO PORT-A-CATH: Primary | ICD-10-CM

## 2024-07-25 LAB
ALBUMIN SERPL BCG-MCNC: 4.2 G/DL (ref 3.5–5)
ALP SERPL-CCNC: 65 U/L (ref 34–104)
ALT SERPL W P-5'-P-CCNC: 22 U/L (ref 7–52)
ANION GAP SERPL CALCULATED.3IONS-SCNC: 7 MMOL/L (ref 4–13)
AST SERPL W P-5'-P-CCNC: 16 U/L (ref 13–39)
BASOPHILS # BLD AUTO: 0.01 THOUSANDS/ÂΜL (ref 0–0.1)
BASOPHILS NFR BLD AUTO: 0 % (ref 0–1)
BILIRUB SERPL-MCNC: 0.48 MG/DL (ref 0.2–1)
BUN SERPL-MCNC: 11 MG/DL (ref 5–25)
CALCIUM SERPL-MCNC: 9.3 MG/DL (ref 8.4–10.2)
CHLORIDE SERPL-SCNC: 103 MMOL/L (ref 96–108)
CO2 SERPL-SCNC: 26 MMOL/L (ref 21–32)
CREAT SERPL-MCNC: 0.89 MG/DL (ref 0.6–1.3)
EOSINOPHIL # BLD AUTO: 0.13 THOUSAND/ÂΜL (ref 0–0.61)
EOSINOPHIL NFR BLD AUTO: 2 % (ref 0–6)
ERYTHROCYTE [DISTWIDTH] IN BLOOD BY AUTOMATED COUNT: 13.1 % (ref 11.6–15.1)
GFR SERPL CREATININE-BSD FRML MDRD: 106 ML/MIN/1.73SQ M
GLUCOSE SERPL-MCNC: 97 MG/DL (ref 65–140)
HCT VFR BLD AUTO: 51.1 % (ref 36.5–49.3)
HGB BLD-MCNC: 16.7 G/DL (ref 12–17)
IMM GRANULOCYTES # BLD AUTO: 0.02 THOUSAND/UL (ref 0–0.2)
IMM GRANULOCYTES NFR BLD AUTO: 0 % (ref 0–2)
LYMPHOCYTES # BLD AUTO: 1.58 THOUSANDS/ÂΜL (ref 0.6–4.47)
LYMPHOCYTES NFR BLD AUTO: 22 % (ref 14–44)
MCH RBC QN AUTO: 31.3 PG (ref 26.8–34.3)
MCHC RBC AUTO-ENTMCNC: 32.7 G/DL (ref 31.4–37.4)
MCV RBC AUTO: 96 FL (ref 82–98)
MONOCYTES # BLD AUTO: 0.72 THOUSAND/ÂΜL (ref 0.17–1.22)
MONOCYTES NFR BLD AUTO: 10 % (ref 4–12)
NEUTROPHILS # BLD AUTO: 4.71 THOUSANDS/ÂΜL (ref 1.85–7.62)
NEUTS SEG NFR BLD AUTO: 66 % (ref 43–75)
NRBC BLD AUTO-RTO: 0 /100 WBCS
PLATELET # BLD AUTO: 200 THOUSANDS/UL (ref 149–390)
PMV BLD AUTO: 11.8 FL (ref 8.9–12.7)
POTASSIUM SERPL-SCNC: 4.1 MMOL/L (ref 3.5–5.3)
PROT SERPL-MCNC: 6.8 G/DL (ref 6.4–8.4)
RBC # BLD AUTO: 5.34 MILLION/UL (ref 3.88–5.62)
SODIUM SERPL-SCNC: 136 MMOL/L (ref 135–147)
TSH SERPL DL<=0.05 MIU/L-ACNC: 2.44 UIU/ML (ref 0.45–4.5)
WBC # BLD AUTO: 7.17 THOUSAND/UL (ref 4.31–10.16)

## 2024-07-25 PROCEDURE — 84443 ASSAY THYROID STIM HORMONE: CPT | Performed by: INTERNAL MEDICINE

## 2024-07-25 PROCEDURE — 85025 COMPLETE CBC W/AUTO DIFF WBC: CPT | Performed by: INTERNAL MEDICINE

## 2024-07-25 PROCEDURE — 80053 COMPREHEN METABOLIC PANEL: CPT | Performed by: INTERNAL MEDICINE

## 2024-07-25 NOTE — PROGRESS NOTES
Kwaku Plascencia  tolerated lab draw and port flush  well with no complications.      Kwaku Plascencia is aware of future appt on 7/26 at 8:30AM.     AVS printed and given to Kwaku Plascencia: No (Declined by Kwaku Plascencia).

## 2024-07-26 ENCOUNTER — HOSPITAL ENCOUNTER (OUTPATIENT)
Dept: INFUSION CENTER | Facility: HOSPITAL | Age: 44
End: 2024-07-26
Attending: INTERNAL MEDICINE
Payer: OTHER GOVERNMENT

## 2024-07-26 VITALS
OXYGEN SATURATION: 98 % | SYSTOLIC BLOOD PRESSURE: 139 MMHG | HEART RATE: 80 BPM | RESPIRATION RATE: 18 BRPM | TEMPERATURE: 96.9 F | DIASTOLIC BLOOD PRESSURE: 82 MMHG

## 2024-07-26 DIAGNOSIS — C80.1 SMALL CELL CARCINOMA (HCC): Primary | Chronic | ICD-10-CM

## 2024-07-26 PROCEDURE — 96413 CHEMO IV INFUSION 1 HR: CPT

## 2024-07-26 RX ORDER — SODIUM CHLORIDE 9 MG/ML
20 INJECTION, SOLUTION INTRAVENOUS ONCE
Status: COMPLETED | OUTPATIENT
Start: 2024-07-26 | End: 2024-07-26

## 2024-07-26 RX ADMIN — DURVALUMAB 1500 MG: 500 INJECTION, SOLUTION INTRAVENOUS at 09:22

## 2024-07-26 RX ADMIN — SODIUM CHLORIDE 20 ML/HR: 0.9 INJECTION, SOLUTION INTRAVENOUS at 08:22

## 2024-07-26 NOTE — PROGRESS NOTES
Kwaku Plascencia  tolerated imfinzi treatment well with no complications.      Kwaku Plascencia is aware that he has a ct scan and follow up appt with dr murillo. He has no future appts with infusion at this point.     AVS printed and given to Kwaku Plascencia:   No (Declined by Kwaku Plascencia)

## 2024-08-03 NOTE — PROGRESS NOTES
Telemedicine consent    Patient: Kwaku Plascencia  Provider: Josue Bledsoe MD  Provider located at [address] [place] [address] [facility] [address] [address]    The patient was identified by name and date of birth. Kwaku Plascencia was informed that this is a telemedicine visit and that the visit is being conducted through the [employer] platform. He agrees to proceed..  My office door was closed. No one else was in the room.  He acknowledged consent and understanding of privacy and security of the video platform. The patient has agreed to participate and understands they can discontinue the visit at any time.    Patient is aware this is a billable service.                           Hematology/Oncology Outpatient Office Note    Date of Service: 2024    [facility] [address] [address] [phone]    Reason for Consultation:   No chief complaint on file.      Cancer Stage at diagnosis: IV    Referral Physician: No ref. provider found    Primary Care Physician:  Theron Stein MD     Nickname: Kwaku    Spouse: Naz Dinh ECO    Today's ECO    Goals and Barriers:  Current Goal: Minimize effects of disease burden, extend life.   Barriers to accomplishing this: None    Patient's Capacity to Self Care:  Patient is able to self care    Code Status: not addressed today    Advanced directives: not addressed today    ASSESSMENT & PLAN      Diagnosis ICD-10-CM Associated Orders   1. 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 Small cell carcinoma (HCC)  C80.1       3. Encounter for tobacco use cessation counseling  Z71.6             This is a 43 y.o. c PMHx notable for obesity, being seen in consultation for large cell with neuroendocrine features found in the colon    Discussion of decision making  Oncology history updated,  accordingly, during this visit  Goals of care/patient communication  I discussed with the patient the clinical course leading up to their cancer diagnosis. I reviewed relevant office notes, imaging reports and pathology result as well.  I told the patient that this is a case of curable versus incurable disease and what this means. We discussed that the goal of anti-cancer therapy is to provide best quality of life, extend overall survival, and progression free survival as shown in clinical trials. We also discussed that there might be a point when the cancer will no longer respond to this anti-neoplastic therapy.   I explained the risks/benefits of the proposed cancer therapy: Carboplatin-Etoposide +/- Durvalumab and after discussion including understanding risks of possible life-threatening complications and therapy-related malignancy development, informed consent for blood products and treatment has been signed and obtained.  TNM/Staging At Diagnosis  [id number]  Cancer Staging   IV  Disease Features/Tumor Markers/Genetics  Tumor Marker:   7/17/2023: CEA 2.2  CGA: 49.4  Notable Path Features:   6/8/2023: Poorly differentiated malignancy, pending external expert consultation positive for synaptophysin, chromogranin, CD56, TTF-1, BCL2, and cyclin D1 with a high Ki-67 proliferative rate (approximately 90%) compatible with a poorly differentiated malignancy with features suggesting neuroendocrine differentiation. 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 The intramural abscess abuts the superior aspect of the urinary bladder without evidence of a colovesical fistula.  6/27/2023 MRI Abd w/wo c: Benign flash-filling 11 mm hepatic and angioma in the caudate lobe of the liver accounting for the enhancing nodule in that location on recent CT examinations  7/19/2023 CT Chest w/o c: There are multiple pulmonary nodules, which will be described on series 4:  Image 68, left lower lobe, solid, smooth bordered, 3 mm .  Image 63, left upper lobe, solid, smooth bordered, 4 mm .  Image 43, right upper lobe, solid, smooth bordered, 4 mm, may represent a fissural lymph node.  Image 55, right upper lobe, solid, smooth bordered, 4 mm, may represent a fissural lymph node  7/19/2023 MRI Brain w/wo c: No evidence of intracranial metastasis, A few foci of T2/FLAIR hyperintensity primarily involving left frontoparietal subcortical and deep white matter (maybe migraine related)  8/8/2023 PET/CT: Three new hypermetabolic solid nodules/masses in the abdomen as described, compatible with progression of disease. Mild uptake along the left anterior ninth rib corresponding to a mild fracture deformity. Findings are indeterminate and may represent the sequela of trauma or metastatic disease. Stable sub-4 mm pulmonary nodules   -3.1 x 3.1 cm hypermetabolic mass in the left anterior abdomen abutting a loop of small bowel (series 4, image 147; SUV max 12).   -1.3 x 1.2 cm hypermetabolic soft tissue nodule in the left mid abdomen posterior to a loop of small bowel (series 4, image 154; SUV max 17)   -1.8 x 2.1 cm hypermetabolic soft tissue nodule anterior to the left psoas muscle (series 4, image 160; SUV max 9.7)  10/2/2023: CT CAP w/c: excellent DE. Multiple pulmonary nodules without significant change when compared to the previous CT chest. Some which may represent fissural lymph nodes. No evidence of metastatic disease in the chest abdomen and pelvis.  Improvement of the lower  abdominal/pelvic lymphadenopathy when compared to the previous PET/CT.  CARIS NGS: 11/9/2023 CARIS NGS: TMB 94 muts/Mb, MSI-proficient, BRCA 1 exon 18 mutation  BRAF exon 15 mutation, MSH2/3, NF1 exon 3, PIK3CA, POLE exon 9, PTEN exon 1, RB1, SMAD2, TP53 exon 6, XPO1 exon 15  12/22/2023 CT CAP w/c stable: There is no new measurable metastatic disease. Small less than 6 mm nodules in the both lungs, remains stable. The previously noted rounded lesion in the anterior abdomen which demonstrated marginal uptake on the PET scan is decreasing in size, now measuring 1.1 x 1.6 cm (image 184 series 2). Proximal sigmoid colonic thickening as seen on the previous. Enhancing cavernous hemangioma caudate lobe  Genetic testing was negative  4/3/2024 CT CAP w/c TX. Continued decrease in size of the rounded lesion in the anterior abdominal peritoneal cavity measuring 1 cm (previously 1.1 x 1.6 cm). Interval improvement of sigmoid colonic thickening. Stable tiny pulmonary nodules.  8/6/2024 CT CAP w/c : ongoing TX. Continued interval decrease in size of a peritoneal lesion in the anterior abdomen abutting the small bowel (measuring 8 mm compared to 12 mm previously). No abdominal or pelvic lymphadenopathy. No thoracic lymphadenopathy. No new pulmonary lesions. Stable scattered pulmonary nodules. Known sigmoid mass is stable without evidence of obstruction.    Discussion of decision making    I personally reviewed the following lab results, the image studies, pathology, other specialty/physicians consult notes and recommendations, and outside medical records. I had a lengthy discussion with the patient and shared the work-up findings. We discussed the diagnosis and management plan as below. I spent 42 minutes reviewing the records (labs, clinician notes, outside records, medical history, ordering medicine/tests/procedures, interpreting the imaging/labs previously done) and coordination of care as well as direct time with the patient  today, of which greater than 50% of the time was spent in counseling and coordination of care with the patient/family.    I discussed the risks of ongoing cigarette smoking and reviewed the benefits of quitting and risk of new lung primary, heart disease, stroke, among other risks and cancers. Reviewed options including support, quit date, medications, and family support. Patient voiced understanding and willingness to try to quit. Patient was told to notify [facility] family practitioner for additional management. Greater than 3 minutes were needed for discussion of tobacco dependence and quitting counselling.      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 [place] [facility]    Follow Up: Every 4 weeks while on systemic therapy (all virtual except post CT visits) scheduled thru 12/10/2024    Infusion chairs are at the [facility]    All questions were answered to the patient's satisfaction during this encounter. The patient knows the contact information for our office and knows to reach out for any relevant concerns related to this encounter. They are to call for any temperature 100.4 or higher, new symptoms including but not restricted to shaking chills, decreased appetite, nausea, vomiting, diarrhea, increased fatigue, shortness of breath or chest pain, confusion, and not feeling the strength to come to the clinic. For all other listed problems and medical diagnosis in their chart - they are managed by PCP and/or other specialists, which the patient acknowledges. Thank you very much for your consultation and making us a part of this patient's care. We are continuing  to follow closely with you. Please do not hesitate to reach out to me with any additional questions or concerns.    Josue Bledsoe MD  Hematology & Medical Oncology Staff Physician             Disclaimer: This document was prepared using [employer] technology. If a word or phrase is confusing, or does not make sense, this is likely due to recognition error which was not discovered during this clinician's review. If you believe an error has occurred, please contact me through [facility] service line for lee?cation.      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)   6/22/2023 Initial Diagnosis    Malignant neoplasm of sigmoid colon (HCC)     9/3/2023 -  Cancer Staged    Staging form: Colon and Rectum, AJCC 8th Edition  - Clinical: Stage Unknown (cTX, cN0, pM1) - Signed by Josue Bledsoe MD on 9/3/2023  Total positive nodes: 0       Small cell carcinoma (HCC)   7/18/2023 Initial Diagnosis    Small cell carcinoma (HCC)     7/26/2023 -  Chemotherapy    alteplase (CATHFLO), 2 mg, Intracatheter, Every 1 Minute as needed, 15 of 18 cycles  etoposide (TOPOSAR), 80 mg/m2 = 160.8 mg, Intravenous, Once, 4 of 4 cycles  Administration: 160.8 mg (7/26/2023), 160.8 mg (7/27/2023), 160.8 mg (7/28/2023), 160.8 mg (8/16/2023), 160.8 mg (8/17/2023), 160.8 mg (8/18/2023), 160.8 mg (9/6/2023), 160.8 mg (9/7/2023), 160.8 mg (9/8/2023), 160.8 mg (9/27/2023), 160.8 mg (9/28/2023), 160.8 mg (9/29/2023)  CARBOplatin (PARAPLATIN) IVPB (GOG AUC DOSING), 750 mg, Intravenous, Once, 4 of 4 cycles  Administration: 750 mg (7/26/2023), 700.5 mg (8/16/2023), 694 mg (9/6/2023), 694 mg (9/27/2023)  durvalumab (IMFINZI) IVPB, 1,500 mg, Intravenous, Once, 15 of 18 cycles  Administration: 1,500 mg (7/26/2023), 1,500 mg (10/20/2023), 1,500 mg (8/16/2023), 1,500 mg (9/6/2023), 1,500 mg (9/27/2023), 1,500 mg (11/17/2023), 1,500 mg (12/15/2023), 1,500 mg (1/12/2024), 1,500 mg  (2/9/2024), 1,500 mg (3/8/2024), 1,500 mg (4/5/2024), 1,500 mg (5/3/2024), 1,500 mg (5/31/2024), 1,500 mg (6/28/2024), 1,500 mg (7/26/2024)  aprepitant (CINVANTI) in  mL IVPB, 130 mg, Intravenous, Once, 4 of 4 cycles  Administration: 130 mg (7/26/2023), 130 mg (8/16/2023), 130 mg (9/6/2023), 130 mg (9/27/2023)       5/21/2023: BRBPR and pain in the lower abdomen for a week; leading to a ER visit, dx of diverticulitis     SUBJECTIVE  (INTERVAL HISTORY)      Clotting History None    Bleeding History None   Cancer History Colon   Family Cancer History pGrandfather (lung, smoker)   H/O Blood/Plt Transfusion None   Tobacco/etoh/drug abuse 1/2 PPD x 25 years (working on quitting and thinking about it; has nicotine patches), no etoh abuse or rec drug use       Cancer Screening history n/a   Occupation IT       Interval events:    Still between 1/4-1/2 PPD, trying to quit by chewing things in general: no changes    No acute issues at this time. Work is good. Has 1-2 days of fatigue after his infusions.    I have reviewed the relevant past medical, surgical, social and family history. I have also reviewed allergies and medications for this patient.    Review of Systems    Baseline weight: 200 lbs    Denies F/C, N/V, SOB, CP, LH, HA, rash, itching, gen weakness, melena, hematuria, hematochezia, falls, diarrhea, or constipation       A 10-point review of system was performed, pertinent positive and negative were detailed as above. Otherwise, the 10-point review of system was negative.      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, chemotherapy, immunotherapy    Mixed hyperlipidemia 10/19/2023    10/19/2023-cholesterol 226, HDL 29    Pulmonary nodules 03/23/2024    Per coding guidelines from note dated 10/16/2023       Past Surgical History:   Procedure Laterality Date    COLON SIGMOID RESECTION LAPAROSCOPIC N/A 6/8/2023     Procedure: RESECTION COLON SIGMOID LAPAROSCOPIC HAND-ASSISTED;  Surgeon: Roland Connelly MD;  Location: [facility];  Service: General    IR DRAINAGE TUBE CHECK/CHANGE/REPOSITION/REINSERTION/UPSIZE  6/5/2023    IR DRAINAGE TUBE PLACEMENT  5/30/2023    IR PORT PLACEMENT  7/28/2023       Family History   Problem Relation Age of Onset    No Known Problems Mother     Hypertension Father     Diabetes Father     Heart attack Father 42    Stroke Father        Social History     Socioeconomic History    Marital status: /Civil Union     Spouse name: Not on file    Number of children: Not on file    Years of education: Not on file    Highest education level: Not on file   Occupational History    Not on file   Tobacco Use    Smoking status: Every Day     Current packs/day: 0.75     Average packs/day: 0.8 packs/day for 25.0 years (18.8 ttl pk-yrs)     Types: Cigarettes     Passive exposure: Current (wife smokes)    Smokeless tobacco: Not on file    Tobacco comments:     Patient states Chantix was not effective.   Vaping Use    Vaping status: Never Used   Substance and Sexual Activity    Alcohol use: Not Currently    Drug use: Never    Sexual activity: Not on file   Other Topics Concern    Not on file   Social History Narrative     since 2011.    4 children aged 18, 17, 16, 6 as of 10/23.  Second child is a girl, the rest boys.    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    Wife is in the [employer].    Enjoys hiking and hunting.    And video games.     Social Determinants of Health     Financial Resource Strain: Not on file   Food Insecurity: No Food Insecurity (5/31/2023)    Hunger Vital Sign     Worried About Running Out of Food in the Last Year: Never true     Ran Out of Food in the Last Year: Never true   Transportation Needs: No Transportation Needs (5/31/2023)    PRAPARE - Transportation     Lack of Transportation (Medical): No     Lack of Transportation (Non-Medical): No   Physical Activity: Not on file    Stress: Not on file   Social Connections: Unknown (6/18/2024)    Received from [employer]     How often do you feel lonely or isolated from those around you? (Adult - for ages 18 years and over): Not on file   Intimate Partner Violence: Not on file   Housing Stability: Low Risk  (5/31/2023)    Housing Stability Vital Sign     Unable to Pay for Housing in the Last Year: No     Number of Places Lived in the Last Year: 1     Unstable Housing in the Last Year: No       No Known Allergies    Current Outpatient Medications   Medication Sig Dispense Refill    atorvastatin (LIPITOR) 10 mg tablet Take 1 tablet (10 mg total) by mouth daily 90 tablet 3     No current facility-administered medications for this visit.       (Not in a hospital admission)      Objective:     24 Hour Vitals Assessment:     There were no vitals filed for this visit.    Below not updated as this was a telemed visit    PHYSICIAN EXAM:    General: Appearance: alert, cooperative, no distress.  HEENT: Normocephalic, atraumatic. No scleral icterus. conjunctivae clear. EOMI.  Chest: No tenderness to palpation. No open wound noted.  Lungs: Diminished BS b/l, otherwise clear to auscultation bilaterally, Respirations unlabored.  Cardiac: Regular rate, +S1and S2  Abdomen: Soft, non-tender, non-distended. Bowel sounds are normal.   Extremities:  No edema, cyanosis, clubbing.  Skin: Skin color, turgor are normal. No rashes.  Lymphatics: no palpable supra-cervical, axillary, or inguinal adenopathy  Neurologic: Awake, Alert, and oriented, no gross focal deficits noted b/l.       DATA REVIEW:    Pathology Result:    Final Diagnosis   Date Value Ref Range Status   06/08/2023   Final    A.  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       Comment:     This is an appended report. These results have been appended to a previously preliminary verified report.        Image  Results:   Image result are reviewed and documented in Hematology/Oncology history    CT chest abdomen pelvis w contrast  Narrative: CT CHEST, ABDOMEN AND PELVIS WITH IV CONTRAST    INDICATION:   Malignant carcinoid tumor of the sigmoid colon. .    COMPARISON: 4/3/2024.    TECHNIQUE: CT examination of the chest, abdomen and pelvis was performed. Multiplanar 2D reformatted images were created from the source data.    This examination, like all CT scans performed in the [facility], was performed utilizing techniques to minimize radiation dose exposure, including the use of iterative reconstruction and automated exposure control. Radiation dose length   product (DLP) for this visit:    IV Contrast:  Enteric Contrast: Enteric contrast was not administered.    FINDINGS:    CHEST    LUNGS: Stable less than 5 mm pulmonary nodules throughout the lung. Stable 4 mm nodule abutting the fissure in the posterior right upper lobe image 93 series 3.. Stable 4 mm fissural nodule in the right middle lobe image 128 series 3. Stable 4 mm   subpleural nodule in the left lower lobe image 173 series 3. There is no tracheal or endobronchial lesion.    PLEURA:  Unremarkable.    HEART/GREAT VESSELS: Heart is unremarkable for patient's age. Minimal coronary artery calcification. No thoracic aortic aneurysm.    MEDIASTINUM AND FABRICIO:  Unremarkable.    CHEST WALL AND LOWER NECK:  Unremarkable. Right anterior chest wall Port-A-Cath    ABDOMEN    LIVER/BILIARY TREE:  Unremarkable.    GALLBLADDER:  No calcified gallstones. No pericholecystic inflammatory change.    SPLEEN:  Unremarkable.    PANCREAS:  Unremarkable.    ADRENAL GLANDS:  Unremarkable.    KIDNEYS/URETERS:  Unremarkable. No hydronephrosis.    STOMACH AND BOWEL: Normal caliber bowel loops. No obstruction. Redemonstration of mild wall thickening in the proximal sigmoid at site of known mass best seen on image 204 series 2..    APPENDIX:  No findings to suggest  "appendicitis.    ABDOMINOPELVIC CAVITY:  No ascites.  No pneumoperitoneum.  No lymphadenopathy. Interval decrease in size of peritoneal nodule abutting the small bowel in the anterior aspect of the abdomen now measuring 8 mm compared to 12 mm previously image 181 series   2.    VESSELS:  Atherosclerotic changes are present.  No evidence of aneurysm.    PELVIS    REPRODUCTIVE ORGANS:  Unremarkable for patient's age.    URINARY BLADDER: Mild bladder wall thickening.    ABDOMINAL WALL/INGUINAL REGIONS:  Unremarkable.    OSSEOUS STRUCTURES:  No acute fracture or destructive osseous lesion.  Impression: 1. Continued interval decrease in size of a peritoneal lesion in the anterior abdomen abutting the small bowel. No abdominal or pelvic lymphadenopathy. No thoracic lymphadenopathy. No new pulmonary lesions. Stable scattered pulmonary nodules.    2. Known sigmoid mass is stable without evidence of obstruction.    Electronically signed: 08/07/2024 09:55 PM Karson Dumont MD      LABS:  Lab data are reviewed and documented in HemOnc history.       Lab Results   Component Value Date    HGB 16.7 07/25/2024    HCT 51.1 (H) 07/25/2024    MCV 96 07/25/2024     07/25/2024    WBC 7.17 07/25/2024    NRBC 0 07/25/2024     Lab Results   Component Value Date    K 4.1 07/25/2024     07/25/2024    CO2 26 07/25/2024    BUN 11 07/25/2024    CREATININE 0.89 07/25/2024    GLUF 102 (H) 02/08/2024    CALCIUM 9.3 07/25/2024    CORRECTEDCA 9.1 06/09/2023    AST 16 07/25/2024    ALT 22 07/25/2024    ALKPHOS 65 07/25/2024    EGFR 106 07/25/2024       No results found for: \"IRON\", \"TIBC\", \"FERRITIN\"    No results found for: \"ADMVVVCJ52\"    No results for input(s): \"WBC\", \"CREAT\", \"PLT\" in the last 72 hours.        By:  Josue Bledsoe MD, 8/12/2024, 10:14 AM                                  "

## 2024-08-06 ENCOUNTER — HOSPITAL ENCOUNTER (OUTPATIENT)
Dept: CT IMAGING | Facility: HOSPITAL | Age: 44
Discharge: HOME | End: 2024-08-06
Attending: INTERNAL MEDICINE
Payer: OTHER GOVERNMENT

## 2024-08-06 DIAGNOSIS — C7A.025 MALIGNANT CARCINOID TUMOR OF SIGMOID COLON (HCC): ICD-10-CM

## 2024-08-06 PROCEDURE — 74177 CT ABD & PELVIS W/CONTRAST: CPT

## 2024-08-06 PROCEDURE — 71260 CT THORAX DX C+: CPT

## 2024-08-06 RX ADMIN — IOHEXOL 100 ML: 350 INJECTION, SOLUTION INTRAVENOUS at 09:00

## 2024-08-12 ENCOUNTER — TELEPHONE (OUTPATIENT)
Dept: HEMATOLOGY ONCOLOGY | Facility: CLINIC | Age: 44
End: 2024-08-12

## 2024-08-12 ENCOUNTER — TELEMEDICINE (OUTPATIENT)
Dept: HEMATOLOGY ONCOLOGY | Facility: CLINIC | Age: 44
End: 2024-08-12
Payer: OTHER GOVERNMENT

## 2024-08-12 DIAGNOSIS — C7A.025 MALIGNANT CARCINOID TUMOR OF SIGMOID COLON (HCC): Primary | ICD-10-CM

## 2024-08-12 DIAGNOSIS — C80.1 SMALL CELL CARCINOMA (HCC): Chronic | ICD-10-CM

## 2024-08-12 DIAGNOSIS — Z71.6 ENCOUNTER FOR TOBACCO USE CESSATION COUNSELING: Chronic | ICD-10-CM

## 2024-08-12 PROCEDURE — 99215 OFFICE O/P EST HI 40 MIN: CPT | Performed by: INTERNAL MEDICINE

## 2024-08-12 PROCEDURE — 99406 BEHAV CHNG SMOKING 3-10 MIN: CPT | Performed by: INTERNAL MEDICINE

## 2024-08-12 RX ORDER — SODIUM CHLORIDE 9 MG/ML
20 INJECTION, SOLUTION INTRAVENOUS ONCE
OUTPATIENT
Start: 2024-10-25

## 2024-08-12 RX ORDER — SODIUM CHLORIDE 9 MG/ML
20 INJECTION, SOLUTION INTRAVENOUS ONCE
OUTPATIENT
Start: 2024-08-30

## 2024-08-12 RX ORDER — SODIUM CHLORIDE 9 MG/ML
20 INJECTION, SOLUTION INTRAVENOUS ONCE
OUTPATIENT
Start: 2024-09-27

## 2024-08-12 NOTE — TELEPHONE ENCOUNTER
Called and spoke with patient to check out over the phone. Scheduled CT scan, patient acknowledged.

## 2024-08-29 ENCOUNTER — HOSPITAL ENCOUNTER (OUTPATIENT)
Dept: INFUSION CENTER | Facility: HOSPITAL | Age: 44
End: 2024-08-29
Payer: OTHER GOVERNMENT

## 2024-08-29 VITALS — TEMPERATURE: 96.9 F

## 2024-08-29 DIAGNOSIS — C80.1 SMALL CELL CARCINOMA (HCC): Chronic | ICD-10-CM

## 2024-08-29 DIAGNOSIS — Z45.2 ENCOUNTER FOR CARE RELATED TO PORT-A-CATH: Primary | ICD-10-CM

## 2024-08-29 LAB
ALBUMIN SERPL BCG-MCNC: 4.1 G/DL (ref 3.5–5)
ALP SERPL-CCNC: 68 U/L (ref 34–104)
ALT SERPL W P-5'-P-CCNC: 23 U/L (ref 7–52)
ANION GAP SERPL CALCULATED.3IONS-SCNC: 6 MMOL/L (ref 4–13)
AST SERPL W P-5'-P-CCNC: 20 U/L (ref 13–39)
BASOPHILS # BLD AUTO: 0.02 THOUSANDS/ÂΜL (ref 0–0.1)
BASOPHILS NFR BLD AUTO: 0 % (ref 0–1)
BILIRUB SERPL-MCNC: 0.41 MG/DL (ref 0.2–1)
BUN SERPL-MCNC: 10 MG/DL (ref 5–25)
CALCIUM SERPL-MCNC: 9 MG/DL (ref 8.4–10.2)
CHLORIDE SERPL-SCNC: 104 MMOL/L (ref 96–108)
CO2 SERPL-SCNC: 27 MMOL/L (ref 21–32)
CREAT SERPL-MCNC: 0.93 MG/DL (ref 0.6–1.3)
EOSINOPHIL # BLD AUTO: 0.15 THOUSAND/ÂΜL (ref 0–0.61)
EOSINOPHIL NFR BLD AUTO: 2 % (ref 0–6)
ERYTHROCYTE [DISTWIDTH] IN BLOOD BY AUTOMATED COUNT: 13.4 % (ref 11.6–15.1)
GFR SERPL CREATININE-BSD FRML MDRD: 101 ML/MIN/1.73SQ M
GLUCOSE P FAST SERPL-MCNC: 102 MG/DL (ref 65–99)
GLUCOSE SERPL-MCNC: 102 MG/DL (ref 65–140)
HCT VFR BLD AUTO: 51.7 % (ref 36.5–49.3)
HGB BLD-MCNC: 17.2 G/DL (ref 12–17)
IMM GRANULOCYTES # BLD AUTO: 0.03 THOUSAND/UL (ref 0–0.2)
IMM GRANULOCYTES NFR BLD AUTO: 0 % (ref 0–2)
LYMPHOCYTES # BLD AUTO: 1.57 THOUSANDS/ÂΜL (ref 0.6–4.47)
LYMPHOCYTES NFR BLD AUTO: 19 % (ref 14–44)
MCH RBC QN AUTO: 31.3 PG (ref 26.8–34.3)
MCHC RBC AUTO-ENTMCNC: 33.3 G/DL (ref 31.4–37.4)
MCV RBC AUTO: 94 FL (ref 82–98)
MONOCYTES # BLD AUTO: 0.87 THOUSAND/ÂΜL (ref 0.17–1.22)
MONOCYTES NFR BLD AUTO: 10 % (ref 4–12)
NEUTROPHILS # BLD AUTO: 5.69 THOUSANDS/ÂΜL (ref 1.85–7.62)
NEUTS SEG NFR BLD AUTO: 69 % (ref 43–75)
NRBC BLD AUTO-RTO: 0 /100 WBCS
PLATELET # BLD AUTO: 189 THOUSANDS/UL (ref 149–390)
PMV BLD AUTO: 10.9 FL (ref 8.9–12.7)
POTASSIUM SERPL-SCNC: 4.1 MMOL/L (ref 3.5–5.3)
PROT SERPL-MCNC: 6.9 G/DL (ref 6.4–8.4)
RBC # BLD AUTO: 5.49 MILLION/UL (ref 3.88–5.62)
SODIUM SERPL-SCNC: 137 MMOL/L (ref 135–147)
TSH SERPL DL<=0.05 MIU/L-ACNC: 1.72 UIU/ML (ref 0.45–4.5)
WBC # BLD AUTO: 8.33 THOUSAND/UL (ref 4.31–10.16)

## 2024-08-29 PROCEDURE — 85025 COMPLETE CBC W/AUTO DIFF WBC: CPT | Performed by: INTERNAL MEDICINE

## 2024-08-29 PROCEDURE — 84443 ASSAY THYROID STIM HORMONE: CPT | Performed by: INTERNAL MEDICINE

## 2024-08-29 PROCEDURE — 80053 COMPREHEN METABOLIC PANEL: CPT | Performed by: INTERNAL MEDICINE

## 2024-08-29 NOTE — PROGRESS NOTES
Patient arrived for lab draw from port. Pt offered no complaints today.  Port accessed, brisk blood return noted. Labs drawn and sent. Port flushed and de accessed. AVS declined and aware of next appt tomorrow at 8am.

## 2024-08-30 ENCOUNTER — HOSPITAL ENCOUNTER (OUTPATIENT)
Dept: INFUSION CENTER | Facility: HOSPITAL | Age: 44
End: 2024-08-30
Attending: INTERNAL MEDICINE
Payer: OTHER GOVERNMENT

## 2024-08-30 VITALS
SYSTOLIC BLOOD PRESSURE: 123 MMHG | DIASTOLIC BLOOD PRESSURE: 82 MMHG | TEMPERATURE: 97.1 F | OXYGEN SATURATION: 97 % | RESPIRATION RATE: 18 BRPM | HEART RATE: 84 BPM

## 2024-08-30 DIAGNOSIS — C80.1 SMALL CELL CARCINOMA (HCC): Primary | Chronic | ICD-10-CM

## 2024-08-30 PROCEDURE — 96413 CHEMO IV INFUSION 1 HR: CPT

## 2024-08-30 RX ORDER — SODIUM CHLORIDE 9 MG/ML
20 INJECTION, SOLUTION INTRAVENOUS ONCE
Status: COMPLETED | OUTPATIENT
Start: 2024-08-30 | End: 2024-08-30

## 2024-08-30 RX ADMIN — DURVALUMAB 1500 MG: 500 INJECTION, SOLUTION INTRAVENOUS at 08:59

## 2024-08-30 RX ADMIN — SODIUM CHLORIDE 20 ML/HR: 0.9 INJECTION, SOLUTION INTRAVENOUS at 08:18

## 2024-08-30 NOTE — PROGRESS NOTES
Kwaku Plascencia  tolerated imfinzi treatment well with no complications.      Kwaku Plascencia is aware of future appt on 9/27 0800    AVS printed and given to Kwaku Plascencia:  No (Declined by Kwaku Plascencia)

## 2024-08-30 NOTE — PROGRESS NOTES
Telemedicine consent    Patient: Kwaku Plascencia  Provider: Josue Bledsoe MD  Provider located at [address] [place] [address] [facility] [address] [id number]    The patient was identified by name and date of birth. Kwaku Plascencia was informed that this is a telemedicine visit and that the visit is being conducted through the [employer] platform. He agrees to proceed..  My office door was closed. No one else was in the room.  He acknowledged consent and understanding of privacy and security of the video platform. The patient has agreed to participate and understands they can discontinue the visit at any time.    Patient is aware this is a billable service.                           Hematology/Oncology Outpatient Office Note    Date of Service: 2024    [facility] [address] [address] [phone]    Reason for Consultation:   No chief complaint on file.      Cancer Stage at diagnosis: IV    Referral Physician: No ref. provider found    Primary Care Physician:  Theron Stein MD     Nickname: Kwaku    Spouse: Naz Dinh ECO    Today's ECO    Goals and Barriers:  Current Goal: Minimize effects of disease burden, extend life.   Barriers to accomplishing this: None    Patient's Capacity to Self Care:  Patient is able to self care    Code Status: not addressed today    Advanced directives: not addressed today    ASSESSMENT & PLAN      Diagnosis ICD-10-CM Associated Orders   1. 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 Encounter for tobacco use cessation counseling  Z71.6             This is a 43 y.o. c PMHx notable for obesity, being seen in consultation for large cell with neuroendocrine features found in the colon    Discussion of decision making  Oncology history updated, accordingly, during this visit  Goals of care/patient communication  I discussed  with the patient the clinical course leading up to their cancer diagnosis. I reviewed relevant office notes, imaging reports and pathology result as well.  I told the patient that this is a case of curable versus incurable disease and what this means. We discussed that the goal of anti-cancer therapy is to provide best quality of life, extend overall survival, and progression free survival as shown in clinical trials. We also discussed that there might be a point when the cancer will no longer respond to this anti-neoplastic therapy.   I explained the risks/benefits of the proposed cancer therapy: Carboplatin-Etoposide +/- Durvalumab and after discussion including understanding risks of possible life-threatening complications and therapy-related malignancy development, informed consent for blood products and treatment has been signed and obtained.  TNM/Staging At Diagnosis  [id number]  Cancer Staging   IV  Disease Features/Tumor Markers/Genetics  Tumor Marker:   7/17/2023: CEA 2.2  CGA: 49.4  Notable Path Features:   6/8/2023: Poorly differentiated malignancy, pending external expert consultation positive for synaptophysin, chromogranin, CD56, TTF-1, BCL2, and cyclin D1 with a high Ki-67 proliferative rate (approximately 90%) compatible with a poorly differentiated malignancy with features suggesting neuroendocrine differentiation. 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 The intramural abscess abuts the superior aspect of the urinary  bladder without evidence of a colovesical fistula.  6/27/2023 MRI Abd w/wo c: Benign flash-filling 11 mm hepatic and angioma in the caudate lobe of the liver accounting for the enhancing nodule in that location on recent CT examinations  7/19/2023 CT Chest w/o c: There are multiple pulmonary nodules, which will be described on series 4:  Image 68, left lower lobe, solid, smooth bordered, 3 mm .  Image 63, left upper lobe, solid, smooth bordered, 4 mm .  Image 43, right upper lobe, solid, smooth bordered, 4 mm, may represent a fissural lymph node.  Image 55, right upper lobe, solid, smooth bordered, 4 mm, may represent a fissural lymph node  7/19/2023 MRI Brain w/wo c: No evidence of intracranial metastasis, A few foci of T2/FLAIR hyperintensity primarily involving left frontoparietal subcortical and deep white matter (maybe migraine related)  8/8/2023 PET/CT: Three new hypermetabolic solid nodules/masses in the abdomen as described, compatible with progression of disease. Mild uptake along the left anterior ninth rib corresponding to a mild fracture deformity. Findings are indeterminate and may represent the sequela of trauma or metastatic disease. Stable sub-4 mm pulmonary nodules   -3.1 x 3.1 cm hypermetabolic mass in the left anterior abdomen abutting a loop of small bowel (series 4, image 147; SUV max 12).   -1.3 x 1.2 cm hypermetabolic soft tissue nodule in the left mid abdomen posterior to a loop of small bowel (series 4, image 154; SUV max 17)   -1.8 x 2.1 cm hypermetabolic soft tissue nodule anterior to the left psoas muscle (series 4, image 160; SUV max 9.7)  10/2/2023: CT CAP w/c: excellent TN. Multiple pulmonary nodules without significant change when compared to the previous CT chest. Some which may represent fissural lymph nodes. No evidence of metastatic disease in the chest abdomen and pelvis.  Improvement of the lower abdominal/pelvic lymphadenopathy when compared to the previous PET/CT.  CARIS NGS:  11/9/2023 CARIS NGS: TMB 94 muts/Mb, MSI-proficient, BRCA 1 exon 18 mutation  BRAF exon 15 mutation, MSH2/3, NF1 exon 3, PIK3CA, POLE exon 9, PTEN exon 1, RB1, SMAD2, TP53 exon 6, XPO1 exon 15  12/22/2023 CT CAP w/c stable: There is no new measurable metastatic disease. Small less than 6 mm nodules in the both lungs, remains stable. The previously noted rounded lesion in the anterior abdomen which demonstrated marginal uptake on the PET scan is decreasing in size, now measuring 1.1 x 1.6 cm (image 184 series 2). Proximal sigmoid colonic thickening as seen on the previous. Enhancing cavernous hemangioma caudate lobe  Genetic testing was negative  4/3/2024 CT CAP w/c NM. Continued decrease in size of the rounded lesion in the anterior abdominal peritoneal cavity measuring 1 cm (previously 1.1 x 1.6 cm). Interval improvement of sigmoid colonic thickening. Stable tiny pulmonary nodules.  8/6/2024 CT CAP w/c : ongoing NM. Continued interval decrease in size of a peritoneal lesion in the anterior abdomen abutting the small bowel (measuring 8 mm compared to 12 mm previously). No abdominal or pelvic lymphadenopathy. No thoracic lymphadenopathy. No new pulmonary lesions. Stable scattered pulmonary nodules. Known sigmoid mass is stable without evidence of obstruction.    Discussion of decision making    I personally reviewed the following lab results, the image studies, pathology, other specialty/physicians consult notes and recommendations, and outside medical records. I had a lengthy discussion with the patient and shared the work-up findings. We discussed the diagnosis and management plan as below. I spent 42 minutes reviewing the records (labs, clinician notes, outside records, medical history, ordering medicine/tests/procedures, interpreting the imaging/labs previously done) and coordination of care as well as direct time with the patient today, of which greater than 50% of the time was spent in counseling and  coordination of care with the patient/family.    I discussed the risks of ongoing cigarette smoking and reviewed the benefits of quitting and risk of new lung primary, heart disease, stroke, among other risks and cancers. Reviewed options including support, quit date, medications, and family support. Patient voiced understanding and willingness to try to quit. Patient was told to notify their family practitioner for additional management. Greater than 3 minutes were needed for discussion of tobacco dependence and quitting counselling.      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 Discuss with [facility]    Follow Up: Every 4 weeks while on systemic therapy (all virtual except post CT visits) scheduled thru 12/10/2024    Infusion chairs are at the [facility]    All questions were answered to the patient's satisfaction during this encounter. The patient knows the contact information for our office and knows to reach out for any relevant concerns related to this encounter. They are to call for any temperature 100.4 or higher, new symptoms including but not restricted to shaking chills, decreased appetite, nausea, vomiting, diarrhea, increased fatigue, shortness of breath or chest pain, confusion, and not feeling the strength to come to the clinic. For all other listed problems and medical diagnosis in their chart - they are managed by PCP and/or other specialists, which the patient acknowledges. Thank you very much for your consultation and making us a part of this patient's care. We are continuing to follow closely with you. Please do not hesitate to reach out to me  with any additional questions or concerns.    Josue Bledsoe MD  Hematology & Medical Oncology Staff Physician             Disclaimer: This document was prepared using [employer] Direct technology. If a word or phrase is confusing, or does not make sense, this is likely due to recognition error which was not discovered during this clinician's review. If you believe an error has occurred, please contact me through [facility] service line for lee?cation.      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)   6/22/2023 Initial Diagnosis    Malignant neoplasm of sigmoid colon (HCC)     9/3/2023 -  Cancer Staged    Staging form: Colon and Rectum, AJCC 8th Edition  - Clinical: Stage Unknown (cTX, cN0, pM1) - Signed by Josue Bledsoe MD on 9/3/2023  Total positive nodes: 0       Small cell carcinoma (HCC)   7/18/2023 Initial Diagnosis    Small cell carcinoma (HCC)     7/26/2023 -  Chemotherapy    alteplase (CATHFLO), 2 mg, Intracatheter, Every 1 Minute as needed, 16 of 20 cycles  etoposide (TOPOSAR), 80 mg/m2 = 160.8 mg, Intravenous, Once, 4 of 4 cycles  Administration: 160.8 mg (7/26/2023), 160.8 mg (7/27/2023), 160.8 mg (7/28/2023), 160.8 mg (8/16/2023), 160.8 mg (8/17/2023), 160.8 mg (8/18/2023), 160.8 mg (9/6/2023), 160.8 mg (9/7/2023), 160.8 mg (9/8/2023), 160.8 mg (9/27/2023), 160.8 mg (9/28/2023), 160.8 mg (9/29/2023)  CARBOplatin (PARAPLATIN) IVPB (GOG AUC DOSING), 750 mg, Intravenous, Once, 4 of 4 cycles  Administration: 750 mg (7/26/2023), 700.5 mg (8/16/2023), 694 mg (9/6/2023), 694 mg (9/27/2023)  durvalumab (IMFINZI) IVPB, 1,500 mg, Intravenous, Once, 16 of 20 cycles  Administration: 1,500 mg (7/26/2023), 1,500 mg (10/20/2023), 1,500 mg (8/16/2023), 1,500 mg (9/6/2023), 1,500 mg (9/27/2023), 1,500 mg (11/17/2023), 1,500 mg (12/15/2023), 1,500 mg (1/12/2024), 1,500 mg (2/9/2024), 1,500 mg (3/8/2024), 1,500 mg (4/5/2024), 1,500 mg  (5/3/2024), 1,500 mg (5/31/2024), 1,500 mg (6/28/2024), 1,500 mg (7/26/2024), 1,500 mg (8/30/2024)  aprepitant (CINVANTI) in  mL IVPB, 130 mg, Intravenous, Once, 4 of 4 cycles  Administration: 130 mg (7/26/2023), 130 mg (8/16/2023), 130 mg (9/6/2023), 130 mg (9/27/2023)       5/21/2023: BRBPR and pain in the lower abdomen for a week; leading to a ER visit, dx of diverticulitis     SUBJECTIVE  (INTERVAL HISTORY)      Clotting History None    Bleeding History None   Cancer History Colon   Family Cancer History pGrandfather (lung, smoker)   H/O Blood/Plt Transfusion None   Tobacco/etoh/drug abuse 1/2 PPD x 25 years (working on quitting and thinking about it; has nicotine patches), no etoh abuse or rec drug use       Cancer Screening history n/a   Occupation IT       Interval events:    Still at 1/2 PPD, trying to quit by chewing things in general: no changes    No acute issues at this time. Work is good. Has 1-2 days of fatigue after his infusions.    I have reviewed the relevant past medical, surgical, social and family history. I have also reviewed allergies and medications for this patient.    Review of Systems    Baseline weight: 200 lbs    Denies F/C, N/V, SOB, CP, LH, HA, rash, itching, gen weakness, melena, hematuria, hematochezia, falls, diarrhea, or constipation       A 10-point review of system was performed, pertinent positive and negative were detailed as above. Otherwise, the 10-point review of system was negative.      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, chemotherapy, immunotherapy    Mixed hyperlipidemia 10/19/2023    10/19/2023-cholesterol 226, HDL 29    Pulmonary nodules 03/23/2024    Per coding guidelines from note dated 10/16/2023       Past Surgical History:   Procedure Laterality Date    COLON SIGMOID RESECTION LAPAROSCOPIC N/A 6/8/2023    Procedure: RESECTION COLON SIGMOID LAPAROSCOPIC  HAND-ASSISTED;  Surgeon: Roland Connelly MD;  Location: [facility];  Service: General    IR DRAINAGE TUBE CHECK/CHANGE/REPOSITION/REINSERTION/UPSIZE  6/5/2023    IR DRAINAGE TUBE PLACEMENT  5/30/2023    IR PORT PLACEMENT  7/28/2023       Family History   Problem Relation Age of Onset    No Known Problems Mother     Hypertension Father     Diabetes Father     Heart attack Father 42    Stroke Father        Social History     Socioeconomic History    Marital status: /Civil Union     Spouse name: Not on file    Number of children: Not on file    Years of education: Not on file    Highest education level: Not on file   Occupational History    Not on file   Tobacco Use    Smoking status: Every Day     Current packs/day: 0.75     Average packs/day: 0.8 packs/day for 25.0 years (18.8 ttl pk-yrs)     Types: Cigarettes     Passive exposure: Current (wife smokes)    Smokeless tobacco: Not on file    Tobacco comments:     Patient states Chantix was not effective.   Vaping Use    Vaping status: Never Used   Substance and Sexual Activity    Alcohol use: Not Currently    Drug use: Never    Sexual activity: Not on file   Other Topics Concern    Not on file   Social History Narrative     since 2011.    4 children aged 18, 17, 16, 6 as of 10/23.  Second child is a girl, the rest boys.    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    Wife is in the [employer].    Enjoys hiking and hunting.    And video games.     Social Determinants of Health     Financial Resource Strain: Not on file   Food Insecurity: No Food Insecurity (5/31/2023)    Hunger Vital Sign     Worried About Running Out of Food in the Last Year: Never true     Ran Out of Food in the Last Year: Never true   Transportation Needs: No Transportation Needs (5/31/2023)    PRAPARE - Transportation     Lack of Transportation (Medical): No     Lack of Transportation (Non-Medical): No   Physical Activity: Not on file   Stress: Not on file   Social Connections: Unknown  (6/18/2024)    Received from [facility]     How often do you feel lonely or isolated from those around you? (Adult - for ages 18 years and over): Not on file   Intimate Partner Violence: Not on file   Housing Stability: Low Risk  (5/31/2023)    Housing Stability Vital Sign     Unable to Pay for Housing in the Last Year: No     Number of Places Lived in the Last Year: 1     Unstable Housing in the Last Year: No       No Known Allergies    Current Outpatient Medications   Medication Sig Dispense Refill    atorvastatin (LIPITOR) 10 mg tablet Take 1 tablet (10 mg total) by mouth daily 90 tablet 3     No current facility-administered medications for this visit.       (Not in a hospital admission)      Objective:     24 Hour Vitals Assessment:     There were no vitals filed for this visit.    Below not updated as this was a telemed visit    PHYSICIAN EXAM:    General: Appearance: alert, cooperative, no distress.  HEENT: Normocephalic, atraumatic. No scleral icterus. conjunctivae clear. EOMI.  Chest: No tenderness to palpation. No open wound noted.  Lungs: Diminished BS b/l, otherwise clear to auscultation bilaterally, Respirations unlabored.  Cardiac: Regular rate, +S1and S2  Abdomen: Soft, non-tender, non-distended. Bowel sounds are normal.   Extremities:  No edema, cyanosis, clubbing.  Skin: Skin color, turgor are normal. No rashes.  Lymphatics: no palpable supra-cervical, axillary, or inguinal adenopathy  Neurologic: Awake, Alert, and oriented, no gross focal deficits noted b/l.       DATA REVIEW:    Pathology Result:    Final Diagnosis   Date Value Ref Range Status   06/08/2023   Final    A.  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       Comment:     This is an appended report. These results have been appended to a previously preliminary verified report.        Image Results:   Image result are reviewed and documented in  Hematology/Oncology history    CT chest abdomen pelvis w contrast  Narrative: CT CHEST, ABDOMEN AND PELVIS WITH IV CONTRAST    INDICATION:   Malignant carcinoid tumor of the sigmoid colon. .    COMPARISON: 4/3/2024.    TECHNIQUE: CT examination of the chest, abdomen and pelvis was performed. Multiplanar 2D reformatted images were created from the source data.    This examination, like all CT scans performed in the [facility], was performed utilizing techniques to minimize radiation dose exposure, including the use of iterative reconstruction and automated exposure control. Radiation dose length   product (DLP) for this visit:    IV Contrast:  Enteric Contrast: Enteric contrast was not administered.    FINDINGS:    CHEST    LUNGS: Stable less than 5 mm pulmonary nodules throughout the lung. Stable 4 mm nodule abutting the fissure in the posterior right upper lobe image 93 series 3.. Stable 4 mm fissural nodule in the right middle lobe image 128 series 3. Stable 4 mm   subpleural nodule in the left lower lobe image 173 series 3. There is no tracheal or endobronchial lesion.    PLEURA:  Unremarkable.    HEART/GREAT VESSELS: Heart is unremarkable for patient's age. Minimal coronary artery calcification. No thoracic aortic aneurysm.    MEDIASTINUM AND FABRICIO:  Unremarkable.    CHEST WALL AND LOWER NECK:  Unremarkable. Right anterior chest wall Port-A-Cath    ABDOMEN    LIVER/BILIARY TREE:  Unremarkable.    GALLBLADDER:  No calcified gallstones. No pericholecystic inflammatory change.    SPLEEN:  Unremarkable.    PANCREAS:  Unremarkable.    ADRENAL GLANDS:  Unremarkable.    KIDNEYS/URETERS:  Unremarkable. No hydronephrosis.    STOMACH AND BOWEL: Normal caliber bowel loops. No obstruction. Redemonstration of mild wall thickening in the proximal sigmoid at site of known mass best seen on image 204 series 2..    APPENDIX:  No findings to suggest appendicitis.    ABDOMINOPELVIC CAVITY:  No ascites.  No  "pneumoperitoneum.  No lymphadenopathy. Interval decrease in size of peritoneal nodule abutting the small bowel in the anterior aspect of the abdomen now measuring 8 mm compared to 12 mm previously image 181 series   2.    VESSELS:  Atherosclerotic changes are present.  No evidence of aneurysm.    PELVIS    REPRODUCTIVE ORGANS:  Unremarkable for patient's age.    URINARY BLADDER: Mild bladder wall thickening.    ABDOMINAL WALL/INGUINAL REGIONS:  Unremarkable.    OSSEOUS STRUCTURES:  No acute fracture or destructive osseous lesion.  Impression: 1. Continued interval decrease in size of a peritoneal lesion in the anterior abdomen abutting the small bowel. No abdominal or pelvic lymphadenopathy. No thoracic lymphadenopathy. No new pulmonary lesions. Stable scattered pulmonary nodules.    2. Known sigmoid mass is stable without evidence of obstruction.    Electronically signed: 08/07/2024 09:55 PM Karson Dumont MD      LABS:  Lab data are reviewed and documented in HemOnc history.       Lab Results   Component Value Date    HGB 17.2 (H) 08/29/2024    HCT 51.7 (H) 08/29/2024    MCV 94 08/29/2024     08/29/2024    WBC 8.33 08/29/2024    NRBC 0 08/29/2024     Lab Results   Component Value Date    K 4.1 08/29/2024     08/29/2024    CO2 27 08/29/2024    BUN 10 08/29/2024    CREATININE 0.93 08/29/2024    GLUF 102 (H) 08/29/2024    CALCIUM 9.0 08/29/2024    CORRECTEDCA 9.1 06/09/2023    AST 20 08/29/2024    ALT 23 08/29/2024    ALKPHOS 68 08/29/2024    EGFR 101 08/29/2024       No results found for: \"IRON\", \"TIBC\", \"FERRITIN\"    No results found for: \"DMARUGYY24\"    No results for input(s): \"WBC\", \"CREAT\", \"PLT\" in the last 72 hours.          By:  Josue Bledsoe MD, 9/9/2024, 9:58 AM                                  "

## 2024-09-09 ENCOUNTER — TELEMEDICINE (OUTPATIENT)
Dept: HEMATOLOGY ONCOLOGY | Facility: CLINIC | Age: 44
End: 2024-09-09
Payer: OTHER GOVERNMENT

## 2024-09-09 DIAGNOSIS — Z71.6 ENCOUNTER FOR TOBACCO USE CESSATION COUNSELING: Chronic | ICD-10-CM

## 2024-09-09 DIAGNOSIS — C7A.025 MALIGNANT CARCINOID TUMOR OF SIGMOID COLON (HCC): Primary | ICD-10-CM

## 2024-09-09 PROCEDURE — 99215 OFFICE O/P EST HI 40 MIN: CPT | Performed by: INTERNAL MEDICINE

## 2024-09-09 PROCEDURE — 99406 BEHAV CHNG SMOKING 3-10 MIN: CPT | Performed by: INTERNAL MEDICINE

## 2024-09-26 ENCOUNTER — HOSPITAL ENCOUNTER (OUTPATIENT)
Dept: INFUSION CENTER | Facility: HOSPITAL | Age: 44
End: 2024-09-26
Attending: INTERNAL MEDICINE
Payer: OTHER GOVERNMENT

## 2024-09-26 VITALS — TEMPERATURE: 96.8 F

## 2024-09-26 DIAGNOSIS — C80.1 SMALL CELL CARCINOMA (HCC): Chronic | ICD-10-CM

## 2024-09-26 DIAGNOSIS — Z45.2 ENCOUNTER FOR CARE RELATED TO PORT-A-CATH: Primary | ICD-10-CM

## 2024-09-26 LAB
ALBUMIN SERPL BCG-MCNC: 4.1 G/DL (ref 3.5–5)
ALP SERPL-CCNC: 67 U/L (ref 34–104)
ALT SERPL W P-5'-P-CCNC: 21 U/L (ref 7–52)
ANION GAP SERPL CALCULATED.3IONS-SCNC: 5 MMOL/L (ref 4–13)
AST SERPL W P-5'-P-CCNC: 17 U/L (ref 13–39)
BASOPHILS # BLD AUTO: 0.03 THOUSANDS/ΜL (ref 0–0.1)
BASOPHILS NFR BLD AUTO: 0 % (ref 0–1)
BILIRUB SERPL-MCNC: 0.55 MG/DL (ref 0.2–1)
BUN SERPL-MCNC: 10 MG/DL (ref 5–25)
CALCIUM SERPL-MCNC: 9 MG/DL (ref 8.4–10.2)
CHLORIDE SERPL-SCNC: 102 MMOL/L (ref 96–108)
CO2 SERPL-SCNC: 28 MMOL/L (ref 21–32)
CREAT SERPL-MCNC: 0.84 MG/DL (ref 0.6–1.3)
EOSINOPHIL # BLD AUTO: 0.16 THOUSAND/ΜL (ref 0–0.61)
EOSINOPHIL NFR BLD AUTO: 2 % (ref 0–6)
ERYTHROCYTE [DISTWIDTH] IN BLOOD BY AUTOMATED COUNT: 14.1 % (ref 11.6–15.1)
GFR SERPL CREATININE-BSD FRML MDRD: 108 ML/MIN/1.73SQ M
GLUCOSE SERPL-MCNC: 101 MG/DL (ref 65–140)
HCT VFR BLD AUTO: 50.7 % (ref 36.5–49.3)
HGB BLD-MCNC: 16.5 G/DL (ref 12–17)
IMM GRANULOCYTES # BLD AUTO: 0.04 THOUSAND/UL (ref 0–0.2)
IMM GRANULOCYTES NFR BLD AUTO: 0 % (ref 0–2)
LYMPHOCYTES # BLD AUTO: 1.79 THOUSANDS/ΜL (ref 0.6–4.47)
LYMPHOCYTES NFR BLD AUTO: 19 % (ref 14–44)
MCH RBC QN AUTO: 30.7 PG (ref 26.8–34.3)
MCHC RBC AUTO-ENTMCNC: 32.5 G/DL (ref 31.4–37.4)
MCV RBC AUTO: 94 FL (ref 82–98)
MONOCYTES # BLD AUTO: 0.92 THOUSAND/ΜL (ref 0.17–1.22)
MONOCYTES NFR BLD AUTO: 10 % (ref 4–12)
NEUTROPHILS # BLD AUTO: 6.27 THOUSANDS/ΜL (ref 1.85–7.62)
NEUTS SEG NFR BLD AUTO: 69 % (ref 43–75)
NRBC BLD AUTO-RTO: 0 /100 WBCS
PLATELET # BLD AUTO: 169 THOUSANDS/UL (ref 149–390)
PMV BLD AUTO: 12 FL (ref 8.9–12.7)
POTASSIUM SERPL-SCNC: 4.1 MMOL/L (ref 3.5–5.3)
PROT SERPL-MCNC: 6.9 G/DL (ref 6.4–8.4)
RBC # BLD AUTO: 5.38 MILLION/UL (ref 3.88–5.62)
SODIUM SERPL-SCNC: 135 MMOL/L (ref 135–147)
TSH SERPL DL<=0.05 MIU/L-ACNC: 1.73 UIU/ML (ref 0.45–4.5)
WBC # BLD AUTO: 9.21 THOUSAND/UL (ref 4.31–10.16)

## 2024-09-26 PROCEDURE — 85025 COMPLETE CBC W/AUTO DIFF WBC: CPT | Performed by: INTERNAL MEDICINE

## 2024-09-26 PROCEDURE — 80053 COMPREHEN METABOLIC PANEL: CPT | Performed by: INTERNAL MEDICINE

## 2024-09-26 PROCEDURE — 84443 ASSAY THYROID STIM HORMONE: CPT | Performed by: INTERNAL MEDICINE

## 2024-09-26 NOTE — PROGRESS NOTES
Kwaku Plascencia  tolerated treatment well with no complications.  Labs obtained via port without difficulty.    Kwaku Plascencia is aware of future appt on 9/27 at 8 am.     AVS declined by Kwaku Plascencia.

## 2024-09-27 ENCOUNTER — HOSPITAL ENCOUNTER (OUTPATIENT)
Dept: INFUSION CENTER | Facility: HOSPITAL | Age: 44
End: 2024-09-27
Attending: INTERNAL MEDICINE
Payer: OTHER GOVERNMENT

## 2024-09-27 VITALS
SYSTOLIC BLOOD PRESSURE: 138 MMHG | TEMPERATURE: 97.2 F | HEART RATE: 84 BPM | DIASTOLIC BLOOD PRESSURE: 90 MMHG | RESPIRATION RATE: 18 BRPM | OXYGEN SATURATION: 97 %

## 2024-09-27 DIAGNOSIS — C80.1 SMALL CELL CARCINOMA (HCC): Primary | Chronic | ICD-10-CM

## 2024-09-27 PROCEDURE — 96413 CHEMO IV INFUSION 1 HR: CPT

## 2024-09-27 RX ORDER — SODIUM CHLORIDE 9 MG/ML
20 INJECTION, SOLUTION INTRAVENOUS ONCE
Status: COMPLETED | OUTPATIENT
Start: 2024-09-27 | End: 2024-09-27

## 2024-09-27 RX ADMIN — DURVALUMAB 1500 MG: 500 INJECTION, SOLUTION INTRAVENOUS at 09:04

## 2024-09-27 RX ADMIN — SODIUM CHLORIDE 20 ML/HR: 0.9 INJECTION, SOLUTION INTRAVENOUS at 08:39

## 2024-09-27 NOTE — PROGRESS NOTES
Kwaku Carlos Plascencia  tolerated imfinzi well with no complications.      Kwaku Plascencia is aware of future appt on 10-25-24 at 800  AVS declined

## 2024-09-29 NOTE — PROGRESS NOTES
Telemedicine consent    Patient: Kwaku Plascencia  Provider: Josue Bledsoe MD  Provider located at [address] [place] [address] [facility] [address] [id number]    The patient was identified by name and date of birth. Kwaku Plascencia was informed that this is a telemedicine visit and that the visit is being conducted through the [employer] platform. He agrees to proceed..  My office door was closed. No one else was in the room.  He acknowledged consent and understanding of privacy and security of the video platform. The patient has agreed to participate and understands they can discontinue the visit at any time.    Patient is aware this is a billable service.                           Hematology/Oncology Outpatient Office Note    Date of Service: 10/9/2024    [facility] [address] [address] [phone]    Reason for Consultation:   No chief complaint on file.      Cancer Stage at diagnosis: IV    Referral Physician: No ref. provider found    Primary Care Physician:  Theron Stein MD     Nickname: Kwaku    Spouse: Naz Dinh ECO    Today's ECO    Goals and Barriers:  Current Goal: Minimize effects of disease burden, extend life.   Barriers to accomplishing this: None    Patient's Capacity to Self Care:  Patient is able to self care    Code Status: not addressed today    Advanced directives: not addressed today    ASSESSMENT & PLAN      Diagnosis ICD-10-CM Associated Orders   1. 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 Encounter for tobacco use cessation counseling  Z71.6             This is a 43 y.o. c PMHx notable for obesity, being seen in consultation for large cell with neuroendocrine features found in the colon    Discussion of decision making  Oncology history updated, accordingly, during this visit  Goals of care/patient communication  I discussed  with the patient the clinical course leading up to their cancer diagnosis. I reviewed relevant office notes, imaging reports and pathology result as well.  I told the patient that this is a case of curable versus incurable disease and what this means. We discussed that the goal of anti-cancer therapy is to provide best quality of life, extend overall survival, and progression free survival as shown in clinical trials. We also discussed that there might be a point when the cancer will no longer respond to this anti-neoplastic therapy.   I explained the risks/benefits of the proposed cancer therapy: Carboplatin-Etoposide +/- Durvalumab and after discussion including understanding risks of possible life-threatening complications and therapy-related malignancy development, informed consent for blood products and treatment has been signed and obtained.  TNM/Staging At Diagnosis  [id number]  Cancer Staging   IV  Disease Features/Tumor Markers/Genetics  Tumor Marker:   7/17/2023: CEA 2.2  CGA: 49.4  Notable Path Features:   6/8/2023: Poorly differentiated malignancy, pending external expert consultation positive for synaptophysin, chromogranin, CD56, TTF-1, BCL2, and cyclin D1 with a high Ki-67 proliferative rate (approximately 90%) compatible with a poorly differentiated malignancy with features suggesting neuroendocrine differentiation. 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 The intramural abscess abuts the superior aspect of the urinary  bladder without evidence of a colovesical fistula.  6/27/2023 MRI Abd w/wo c: Benign flash-filling 11 mm hepatic and angioma in the caudate lobe of the liver accounting for the enhancing nodule in that location on recent CT examinations  7/19/2023 CT Chest w/o c: There are multiple pulmonary nodules, which will be described on series 4:  Image 68, left lower lobe, solid, smooth bordered, 3 mm .  Image 63, left upper lobe, solid, smooth bordered, 4 mm .  Image 43, right upper lobe, solid, smooth bordered, 4 mm, may represent a fissural lymph node.  Image 55, right upper lobe, solid, smooth bordered, 4 mm, may represent a fissural lymph node  7/19/2023 MRI Brain w/wo c: No evidence of intracranial metastasis, A few foci of T2/FLAIR hyperintensity primarily involving left frontoparietal subcortical and deep white matter (maybe migraine related)  8/8/2023 PET/CT: Three new hypermetabolic solid nodules/masses in the abdomen as described, compatible with progression of disease. Mild uptake along the left anterior ninth rib corresponding to a mild fracture deformity. Findings are indeterminate and may represent the sequela of trauma or metastatic disease. Stable sub-4 mm pulmonary nodules   -3.1 x 3.1 cm hypermetabolic mass in the left anterior abdomen abutting a loop of small bowel (series 4, image 147; SUV max 12).   -1.3 x 1.2 cm hypermetabolic soft tissue nodule in the left mid abdomen posterior to a loop of small bowel (series 4, image 154; SUV max 17)   -1.8 x 2.1 cm hypermetabolic soft tissue nodule anterior to the left psoas muscle (series 4, image 160; SUV max 9.7)  10/2/2023: CT CAP w/c: excellent MD. Multiple pulmonary nodules without significant change when compared to the previous CT chest. Some which may represent fissural lymph nodes. No evidence of metastatic disease in the chest abdomen and pelvis.  Improvement of the lower abdominal/pelvic lymphadenopathy when compared to the previous PET/CT.  CARIS NGS:  11/9/2023 CARIS NGS: TMB 94 muts/Mb, MSI-proficient, BRCA 1 exon 18 mutation  BRAF exon 15 mutation, MSH2/3, NF1 exon 3, PIK3CA, POLE exon 9, PTEN exon 1, RB1, SMAD2, TP53 exon 6, XPO1 exon 15  12/22/2023 CT CAP w/c stable: There is no new measurable metastatic disease. Small less than 6 mm nodules in the both lungs, remains stable. The previously noted rounded lesion in the anterior abdomen which demonstrated marginal uptake on the PET scan is decreasing in size, now measuring 1.1 x 1.6 cm (image 184 series 2). Proximal sigmoid colonic thickening as seen on the previous. Enhancing cavernous hemangioma caudate lobe  Genetic testing was negative  4/3/2024 CT CAP w/c ND. Continued decrease in size of the rounded lesion in the anterior abdominal peritoneal cavity measuring 1 cm (previously 1.1 x 1.6 cm). Interval improvement of sigmoid colonic thickening. Stable tiny pulmonary nodules.  8/6/2024 CT CAP w/c : ongoing ND. Continued interval decrease in size of a peritoneal lesion in the anterior abdomen abutting the small bowel (measuring 8 mm compared to 12 mm previously). No abdominal or pelvic lymphadenopathy. No thoracic lymphadenopathy. No new pulmonary lesions. Stable scattered pulmonary nodules. Known sigmoid mass is stable without evidence of obstruction.    Discussion of decision making    I personally reviewed the following lab results, the image studies, pathology, other specialty/physicians consult notes and recommendations, and outside medical records. I had a lengthy discussion with the patient and shared the work-up findings. We discussed the diagnosis and management plan as below. I spent 41 minutes reviewing the records (labs, clinician notes, outside records, medical history, ordering medicine/tests/procedures, interpreting the imaging/labs previously done) and coordination of care as well as direct time with the patient today, of which greater than 50% of the time was spent in counseling and  coordination of care with the patient/family.    I discussed the risks of ongoing cigarette smoking and reviewed the benefits of quitting and risk of new lung primary, heart disease, stroke, among other risks and cancers. Reviewed options including support, quit date, medications, and family support. Patient voiced understanding and willingness to try to quit. Patient was told to notify their family practitioner for additional management. Greater than 3 minutes were needed for discussion of tobacco dependence and quitting counselling.      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 Discuss with [facility]    Follow Up: Every 4 weeks while on systemic therapy (all virtual except post CT visits) scheduled thru 12/10/2024    Infusion chairs are at the [facility]    All questions were answered to the patient's satisfaction during this encounter. The patient knows the contact information for our office and knows to reach out for any relevant concerns related to this encounter. They are to call for any temperature 100.4 or higher, new symptoms including but not restricted to shaking chills, decreased appetite, nausea, vomiting, diarrhea, increased fatigue, shortness of breath or chest pain, confusion, and not feeling the strength to come to the clinic. For all other listed problems and medical diagnosis in their chart - they are managed by PCP and/or other specialists, which the patient acknowledges. Thank you very much for your consultation and making us a part of this patient's care. We are continuing to follow closely with you. Please do not hesitate to reach out to me  with any additional questions or concerns.    Josue Bledsoe MD  Hematology & Medical Oncology Staff Physician             Disclaimer: This document was prepared using [employer] technology. If a word or phrase is confusing, or does not make sense, this is likely due to recognition error which was not discovered during this clinician's review. If you believe an error has occurred, please contact me through [facility] service line for lee?cation.      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)   6/22/2023 Initial Diagnosis    Malignant neoplasm of sigmoid colon (HCC)     9/3/2023 -  Cancer Staged    Staging form: Colon and Rectum, AJCC 8th Edition  - Clinical: Stage Unknown (cTX, cN0, pM1) - Signed by Josue Bledsoe MD on 9/3/2023  Total positive nodes: 0       Small cell carcinoma (HCC)   7/18/2023 Initial Diagnosis    Small cell carcinoma (HCC)     7/26/2023 -  Chemotherapy    alteplase (CATHFLO), 2 mg, Intracatheter, Every 1 Minute as needed, 17 of 20 cycles  etoposide (TOPOSAR), 80 mg/m2 = 160.8 mg, Intravenous, Once, 4 of 4 cycles  Administration: 160.8 mg (7/26/2023), 160.8 mg (7/27/2023), 160.8 mg (7/28/2023), 160.8 mg (8/16/2023), 160.8 mg (8/17/2023), 160.8 mg (8/18/2023), 160.8 mg (9/6/2023), 160.8 mg (9/7/2023), 160.8 mg (9/8/2023), 160.8 mg (9/27/2023), 160.8 mg (9/28/2023), 160.8 mg (9/29/2023)  CARBOplatin (PARAPLATIN) IVPB (GOG AUC DOSING), 750 mg, Intravenous, Once, 4 of 4 cycles  Administration: 750 mg (7/26/2023), 700.5 mg (8/16/2023), 694 mg (9/6/2023), 694 mg (9/27/2023)  durvalumab (IMFINZI) IVPB, 1,500 mg, Intravenous, Once, 17 of 20 cycles  Administration: 1,500 mg (7/26/2023), 1,500 mg (10/20/2023), 1,500 mg (8/16/2023), 1,500 mg (9/6/2023), 1,500 mg (9/27/2023), 1,500 mg (11/17/2023), 1,500 mg (12/15/2023), 1,500 mg (1/12/2024), 1,500 mg (2/9/2024), 1,500 mg (3/8/2024), 1,500 mg (4/5/2024), 1,500 mg  (5/3/2024), 1,500 mg (5/31/2024), 1,500 mg (6/28/2024), 1,500 mg (7/26/2024), 1,500 mg (8/30/2024), 1,500 mg (9/27/2024)  aprepitant (CINVANTI) in  mL IVPB, 130 mg, Intravenous, Once, 4 of 4 cycles  Administration: 130 mg (7/26/2023), 130 mg (8/16/2023), 130 mg (9/6/2023), 130 mg (9/27/2023)       5/21/2023: BRBPR and pain in the lower abdomen for a week; leading to a ER visit, dx of diverticulitis     SUBJECTIVE  (INTERVAL HISTORY)      Clotting History None    Bleeding History None   Cancer History Colon   Family Cancer History pGrandfather (lung, smoker)   H/O Blood/Plt Transfusion None   Tobacco/etoh/drug abuse 1/2 PPD x 25 years (working on quitting and thinking about it; has nicotine patches), no etoh abuse or rec drug use       Cancer Screening history n/a   Occupation IT       Interval events:    Picked up a vape and is working to cut down his smoking, now is down to 4-5 cigs/day.     No acute issues at this time. Work is good. Has 1-2 days of fatigue after his infusions, getting a bit less.    I have reviewed the relevant past medical, surgical, social and family history. I have also reviewed allergies and medications for this patient.    Review of Systems    Baseline weight: 200 lbs    Denies F/C, N/V, SOB, CP, LH, HA, rash, itching, gen weakness, melena, hematuria, hematochezia, falls, diarrhea, or constipation       A 10-point review of system was performed, pertinent positive and negative were detailed as above. Otherwise, the 10-point review of system was negative.      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, chemotherapy, immunotherapy    Mixed hyperlipidemia 10/19/2023    10/19/2023-cholesterol 226, HDL 29    Pulmonary nodules 03/23/2024    Per coding guidelines from note dated 10/16/2023       Past Surgical History:   Procedure Laterality Date    COLON SIGMOID RESECTION LAPAROSCOPIC N/A 6/8/2023     Procedure: RESECTION COLON SIGMOID LAPAROSCOPIC HAND-ASSISTED;  Surgeon: Roland Connelly MD;  Location: [facility];  Service: General    IR DRAINAGE TUBE CHECK/CHANGE/REPOSITION/REINSERTION/UPSIZE  6/5/2023    IR DRAINAGE TUBE PLACEMENT  5/30/2023    IR PORT PLACEMENT  7/28/2023       Family History   Problem Relation Age of Onset    No Known Problems Mother     Hypertension Father     Diabetes Father     Heart attack Father 42    Stroke Father        Social History     Socioeconomic History    Marital status: /Civil Union     Spouse name: Not on file    Number of children: Not on file    Years of education: Not on file    Highest education level: Not on file   Occupational History    Not on file   Tobacco Use    Smoking status: Every Day     Current packs/day: 0.75     Average packs/day: 0.8 packs/day for 25.0 years (18.8 ttl pk-yrs)     Types: Cigarettes     Passive exposure: Current (wife smokes)    Smokeless tobacco: Not on file    Tobacco comments:     Patient states Chantix was not effective.   Vaping Use    Vaping status: Never Used   Substance and Sexual Activity    Alcohol use: Not Currently    Drug use: Never    Sexual activity: Not on file   Other Topics Concern    Not on file   Social History Narrative     since 2011.    4 children aged 18, 17, 16, 6 as of 10/23.  Second child is a girl, the rest boys.    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    Wife is in the [employer].    Enjoys hiking and hunting.    And video games.     Social Determinants of Health     Financial Resource Strain: Not on file   Food Insecurity: No Food Insecurity (5/31/2023)    Hunger Vital Sign     Worried About Running Out of Food in the Last Year: Never true     Ran Out of Food in the Last Year: Never true   Transportation Needs: No Transportation Needs (5/31/2023)    PRAPARE - Transportation     Lack of Transportation (Medical): No     Lack of Transportation (Non-Medical): No   Physical Activity: Not on file    Stress: Not on file   Social Connections: Unknown (6/18/2024)    Received from [facility]     How often do you feel lonely or isolated from those around you? (Adult - for ages 18 years and over): Not on file   Intimate Partner Violence: Not on file   Housing Stability: Low Risk  (5/31/2023)    Housing Stability Vital Sign     Unable to Pay for Housing in the Last Year: No     Number of Places Lived in the Last Year: 1     Unstable Housing in the Last Year: No       No Known Allergies    Current Outpatient Medications   Medication Sig Dispense Refill    atorvastatin (LIPITOR) 10 mg tablet Take 1 tablet (10 mg total) by mouth daily 90 tablet 3     No current facility-administered medications for this visit.       (Not in a hospital admission)      Objective:     24 Hour Vitals Assessment:     There were no vitals filed for this visit.    Below not updated as this was a telemed visit    PHYSICIAN EXAM:    General: Appearance: alert, cooperative, no distress.  HEENT: Normocephalic, atraumatic. No scleral icterus. conjunctivae clear. EOMI.  Chest: No tenderness to palpation. No open wound noted.  Lungs: Diminished BS b/l, otherwise clear to auscultation bilaterally, Respirations unlabored.  Cardiac: Regular rate, +S1and S2  Abdomen: Soft, non-tender, non-distended. Bowel sounds are normal.   Extremities:  No edema, cyanosis, clubbing.  Skin: Skin color, turgor are normal. No rashes.  Lymphatics: no palpable supra-cervical, axillary, or inguinal adenopathy  Neurologic: Awake, Alert, and oriented, no gross focal deficits noted b/l.       DATA REVIEW:    Pathology Result:    Final Diagnosis   Date Value Ref Range Status   06/08/2023   Final    A.  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       Comment:     This is an appended report. These results have been appended to a previously preliminary verified report.        Image  Results:   Image result are reviewed and documented in Hematology/Oncology history    CT chest abdomen pelvis w contrast  Narrative: CT CHEST, ABDOMEN AND PELVIS WITH IV CONTRAST    INDICATION:   Malignant carcinoid tumor of the sigmoid colon. .    COMPARISON: 4/3/2024.    TECHNIQUE: CT examination of the chest, abdomen and pelvis was performed. Multiplanar 2D reformatted images were created from the source data.    This examination, like all CT scans performed in the [facility], was performed utilizing techniques to minimize radiation dose exposure, including the use of iterative reconstruction and automated exposure control. Radiation dose length   product (DLP) for this visit:    IV Contrast:  Enteric Contrast: Enteric contrast was not administered.    FINDINGS:    CHEST    LUNGS: Stable less than 5 mm pulmonary nodules throughout the lung. Stable 4 mm nodule abutting the fissure in the posterior right upper lobe image 93 series 3.. Stable 4 mm fissural nodule in the right middle lobe image 128 series 3. Stable 4 mm   subpleural nodule in the left lower lobe image 173 series 3. There is no tracheal or endobronchial lesion.    PLEURA:  Unremarkable.    HEART/GREAT VESSELS: Heart is unremarkable for patient's age. Minimal coronary artery calcification. No thoracic aortic aneurysm.    MEDIASTINUM AND FABRICIO:  Unremarkable.    CHEST WALL AND LOWER NECK:  Unremarkable. Right anterior chest wall Port-A-Cath    ABDOMEN    LIVER/BILIARY TREE:  Unremarkable.    GALLBLADDER:  No calcified gallstones. No pericholecystic inflammatory change.    SPLEEN:  Unremarkable.    PANCREAS:  Unremarkable.    ADRENAL GLANDS:  Unremarkable.    KIDNEYS/URETERS:  Unremarkable. No hydronephrosis.    STOMACH AND BOWEL: Normal caliber bowel loops. No obstruction. Redemonstration of mild wall thickening in the proximal sigmoid at site of known mass best seen on image 204 series 2..    APPENDIX:  No findings to suggest  "appendicitis.    ABDOMINOPELVIC CAVITY:  No ascites.  No pneumoperitoneum.  No lymphadenopathy. Interval decrease in size of peritoneal nodule abutting the small bowel in the anterior aspect of the abdomen now measuring 8 mm compared to 12 mm previously image 181 series   2.    VESSELS:  Atherosclerotic changes are present.  No evidence of aneurysm.    PELVIS    REPRODUCTIVE ORGANS:  Unremarkable for patient's age.    URINARY BLADDER: Mild bladder wall thickening.    ABDOMINAL WALL/INGUINAL REGIONS:  Unremarkable.    OSSEOUS STRUCTURES:  No acute fracture or destructive osseous lesion.  Impression: 1. Continued interval decrease in size of a peritoneal lesion in the anterior abdomen abutting the small bowel. No abdominal or pelvic lymphadenopathy. No thoracic lymphadenopathy. No new pulmonary lesions. Stable scattered pulmonary nodules.    2. Known sigmoid mass is stable without evidence of obstruction.    Electronically signed: 08/07/2024 09:55 PM Karson Dumont MD      LABS:  Lab data are reviewed and documented in HemOnc history.       Lab Results   Component Value Date    HGB 16.5 09/26/2024    HCT 50.7 (H) 09/26/2024    MCV 94 09/26/2024     09/26/2024    WBC 9.21 09/26/2024    NRBC 0 09/26/2024     Lab Results   Component Value Date    K 4.1 09/26/2024     09/26/2024    CO2 28 09/26/2024    BUN 10 09/26/2024    CREATININE 0.84 09/26/2024    GLUF 102 (H) 08/29/2024    CALCIUM 9.0 09/26/2024    CORRECTEDCA 9.1 06/09/2023    AST 17 09/26/2024    ALT 21 09/26/2024    ALKPHOS 67 09/26/2024    EGFR 108 09/26/2024       No results found for: \"IRON\", \"TIBC\", \"FERRITIN\"    No results found for: \"[id number]\"    No results for input(s): \"WBC\", \"CREAT\", \"PLT\" in the last 72 hours.          By:  Josue Bledsoe MD, 10/9/2024, 9:47 AM                                  "

## 2024-10-09 ENCOUNTER — TELEMEDICINE (OUTPATIENT)
Dept: HEMATOLOGY ONCOLOGY | Facility: CLINIC | Age: 44
End: 2024-10-09
Payer: OTHER GOVERNMENT

## 2024-10-09 DIAGNOSIS — Z71.6 ENCOUNTER FOR TOBACCO USE CESSATION COUNSELING: Chronic | ICD-10-CM

## 2024-10-09 DIAGNOSIS — C7A.025 MALIGNANT CARCINOID TUMOR OF SIGMOID COLON (HCC): Primary | ICD-10-CM

## 2024-10-09 PROCEDURE — 99215 OFFICE O/P EST HI 40 MIN: CPT | Performed by: INTERNAL MEDICINE

## 2024-10-09 PROCEDURE — 99406 BEHAV CHNG SMOKING 3-10 MIN: CPT | Performed by: INTERNAL MEDICINE

## 2024-10-24 ENCOUNTER — HOSPITAL ENCOUNTER (OUTPATIENT)
Dept: INFUSION CENTER | Facility: HOSPITAL | Age: 44
End: 2024-10-24
Payer: OTHER GOVERNMENT

## 2024-10-24 DIAGNOSIS — C80.1 SMALL CELL CARCINOMA (HCC): Chronic | ICD-10-CM

## 2024-10-24 DIAGNOSIS — Z45.2 ENCOUNTER FOR CARE RELATED TO PORT-A-CATH: Primary | ICD-10-CM

## 2024-10-24 LAB
ALBUMIN SERPL BCG-MCNC: 4.3 G/DL (ref 3.5–5)
ALP SERPL-CCNC: 68 U/L (ref 34–104)
ALT SERPL W P-5'-P-CCNC: 25 U/L (ref 7–52)
ANION GAP SERPL CALCULATED.3IONS-SCNC: 6 MMOL/L (ref 4–13)
AST SERPL W P-5'-P-CCNC: 21 U/L (ref 13–39)
BASOPHILS # BLD AUTO: 0.02 THOUSANDS/ΜL (ref 0–0.1)
BASOPHILS NFR BLD AUTO: 0 % (ref 0–1)
BILIRUB SERPL-MCNC: 0.59 MG/DL (ref 0.2–1)
BUN SERPL-MCNC: 12 MG/DL (ref 5–25)
CALCIUM SERPL-MCNC: 9.3 MG/DL (ref 8.4–10.2)
CHLORIDE SERPL-SCNC: 102 MMOL/L (ref 96–108)
CO2 SERPL-SCNC: 26 MMOL/L (ref 21–32)
CREAT SERPL-MCNC: 0.96 MG/DL (ref 0.6–1.3)
EOSINOPHIL # BLD AUTO: 0.14 THOUSAND/ΜL (ref 0–0.61)
EOSINOPHIL NFR BLD AUTO: 2 % (ref 0–6)
ERYTHROCYTE [DISTWIDTH] IN BLOOD BY AUTOMATED COUNT: 14.1 % (ref 11.6–15.1)
GFR SERPL CREATININE-BSD FRML MDRD: 97 ML/MIN/1.73SQ M
GLUCOSE SERPL-MCNC: 95 MG/DL (ref 65–140)
HCT VFR BLD AUTO: 52.2 % (ref 36.5–49.3)
HGB BLD-MCNC: 17 G/DL (ref 12–17)
IMM GRANULOCYTES # BLD AUTO: 0.03 THOUSAND/UL (ref 0–0.2)
IMM GRANULOCYTES NFR BLD AUTO: 0 % (ref 0–2)
LYMPHOCYTES # BLD AUTO: 1.69 THOUSANDS/ΜL (ref 0.6–4.47)
LYMPHOCYTES NFR BLD AUTO: 20 % (ref 14–44)
MCH RBC QN AUTO: 31.4 PG (ref 26.8–34.3)
MCHC RBC AUTO-ENTMCNC: 32.6 G/DL (ref 31.4–37.4)
MCV RBC AUTO: 97 FL (ref 82–98)
MONOCYTES # BLD AUTO: 0.74 THOUSAND/ΜL (ref 0.17–1.22)
MONOCYTES NFR BLD AUTO: 9 % (ref 4–12)
NEUTROPHILS # BLD AUTO: 5.95 THOUSANDS/ΜL (ref 1.85–7.62)
NEUTS SEG NFR BLD AUTO: 69 % (ref 43–75)
NRBC BLD AUTO-RTO: 0 /100 WBCS
PLATELET # BLD AUTO: 190 THOUSANDS/UL (ref 149–390)
PMV BLD AUTO: 11.2 FL (ref 8.9–12.7)
POTASSIUM SERPL-SCNC: 4.2 MMOL/L (ref 3.5–5.3)
PROT SERPL-MCNC: 7.1 G/DL (ref 6.4–8.4)
RBC # BLD AUTO: 5.41 MILLION/UL (ref 3.88–5.62)
SODIUM SERPL-SCNC: 134 MMOL/L (ref 135–147)
TSH SERPL DL<=0.05 MIU/L-ACNC: 2.08 UIU/ML (ref 0.45–4.5)
WBC # BLD AUTO: 8.57 THOUSAND/UL (ref 4.31–10.16)

## 2024-10-24 PROCEDURE — 84443 ASSAY THYROID STIM HORMONE: CPT | Performed by: INTERNAL MEDICINE

## 2024-10-24 PROCEDURE — 85025 COMPLETE CBC W/AUTO DIFF WBC: CPT | Performed by: INTERNAL MEDICINE

## 2024-10-24 PROCEDURE — 80053 COMPREHEN METABOLIC PANEL: CPT | Performed by: INTERNAL MEDICINE

## 2024-10-24 NOTE — PROGRESS NOTES
Kwaku Plascencia  tolerated treatment well with no complications.  Labs obtained via port without difficulty.    Kwaku Plascencia is aware of future appt on 10/25 at 8 am.     AVS declined by Kwaku Plascencia.

## 2024-10-25 ENCOUNTER — HOSPITAL ENCOUNTER (OUTPATIENT)
Dept: INFUSION CENTER | Facility: HOSPITAL | Age: 44
End: 2024-10-25
Attending: INTERNAL MEDICINE
Payer: OTHER GOVERNMENT

## 2024-10-25 VITALS
OXYGEN SATURATION: 97 % | SYSTOLIC BLOOD PRESSURE: 149 MMHG | RESPIRATION RATE: 16 BRPM | HEART RATE: 82 BPM | TEMPERATURE: 97 F | DIASTOLIC BLOOD PRESSURE: 89 MMHG

## 2024-10-25 DIAGNOSIS — C80.1 SMALL CELL CARCINOMA (HCC): Primary | Chronic | ICD-10-CM

## 2024-10-25 PROCEDURE — 96413 CHEMO IV INFUSION 1 HR: CPT

## 2024-10-25 RX ORDER — SODIUM CHLORIDE 9 MG/ML
20 INJECTION, SOLUTION INTRAVENOUS ONCE
Status: COMPLETED | OUTPATIENT
Start: 2024-10-25 | End: 2024-10-25

## 2024-10-25 RX ADMIN — DURVALUMAB 1500 MG: 500 INJECTION, SOLUTION INTRAVENOUS at 09:09

## 2024-10-25 RX ADMIN — SODIUM CHLORIDE 20 ML/HR: 0.9 INJECTION, SOLUTION INTRAVENOUS at 09:00

## 2024-11-15 ENCOUNTER — HOSPITAL ENCOUNTER (OUTPATIENT)
Dept: CT IMAGING | Facility: HOSPITAL | Age: 44
End: 2024-11-15
Attending: INTERNAL MEDICINE
Payer: OTHER GOVERNMENT

## 2024-11-15 DIAGNOSIS — C7A.025 MALIGNANT CARCINOID TUMOR OF SIGMOID COLON (HCC): ICD-10-CM

## 2024-11-15 DIAGNOSIS — C80.1 SMALL CELL CARCINOMA (HCC): Primary | ICD-10-CM

## 2024-11-15 PROCEDURE — 74177 CT ABD & PELVIS W/CONTRAST: CPT

## 2024-11-15 PROCEDURE — 71260 CT THORAX DX C+: CPT

## 2024-11-15 RX ORDER — SODIUM CHLORIDE 9 MG/ML
20 INJECTION, SOLUTION INTRAVENOUS ONCE
OUTPATIENT
Start: 2024-12-20

## 2024-11-15 RX ORDER — SODIUM CHLORIDE 9 MG/ML
20 INJECTION, SOLUTION INTRAVENOUS ONCE
Status: CANCELLED | OUTPATIENT
Start: 2024-11-22

## 2024-11-15 RX ADMIN — IOHEXOL 100 ML: 350 INJECTION, SOLUTION INTRAVENOUS at 08:47

## 2024-11-21 ENCOUNTER — HOSPITAL ENCOUNTER (OUTPATIENT)
Dept: INFUSION CENTER | Facility: HOSPITAL | Age: 44
End: 2024-11-21
Payer: OTHER GOVERNMENT

## 2024-11-21 VITALS — TEMPERATURE: 97.5 F

## 2024-11-21 DIAGNOSIS — C80.1 SMALL CELL CARCINOMA (HCC): ICD-10-CM

## 2024-11-21 DIAGNOSIS — Z45.2 ENCOUNTER FOR CARE RELATED TO PORT-A-CATH: Primary | ICD-10-CM

## 2024-11-21 LAB
ALBUMIN SERPL BCG-MCNC: 4.3 G/DL (ref 3.5–5)
ALP SERPL-CCNC: 65 U/L (ref 34–104)
ALT SERPL W P-5'-P-CCNC: 28 U/L (ref 7–52)
ANION GAP SERPL CALCULATED.3IONS-SCNC: 6 MMOL/L (ref 4–13)
AST SERPL W P-5'-P-CCNC: 20 U/L (ref 13–39)
BASOPHILS # BLD AUTO: 0.02 THOUSANDS/ÂΜL (ref 0–0.1)
BASOPHILS NFR BLD AUTO: 0 % (ref 0–1)
BILIRUB SERPL-MCNC: 0.8 MG/DL (ref 0.2–1)
BUN SERPL-MCNC: 12 MG/DL (ref 5–25)
CALCIUM SERPL-MCNC: 9 MG/DL (ref 8.4–10.2)
CHLORIDE SERPL-SCNC: 103 MMOL/L (ref 96–108)
CO2 SERPL-SCNC: 28 MMOL/L (ref 21–32)
CREAT SERPL-MCNC: 0.89 MG/DL (ref 0.6–1.3)
EOSINOPHIL # BLD AUTO: 0.12 THOUSAND/ÂΜL (ref 0–0.61)
EOSINOPHIL NFR BLD AUTO: 1 % (ref 0–6)
ERYTHROCYTE [DISTWIDTH] IN BLOOD BY AUTOMATED COUNT: 12.8 % (ref 11.6–15.1)
GFR SERPL CREATININE-BSD FRML MDRD: 106 ML/MIN/1.73SQ M
GLUCOSE SERPL-MCNC: 90 MG/DL (ref 65–140)
HCT VFR BLD AUTO: 50.9 % (ref 36.5–49.3)
HGB BLD-MCNC: 16.6 G/DL (ref 12–17)
IMM GRANULOCYTES # BLD AUTO: 0.02 THOUSAND/UL (ref 0–0.2)
IMM GRANULOCYTES NFR BLD AUTO: 0 % (ref 0–2)
LYMPHOCYTES # BLD AUTO: 1.68 THOUSANDS/ÂΜL (ref 0.6–4.47)
LYMPHOCYTES NFR BLD AUTO: 20 % (ref 14–44)
MCH RBC QN AUTO: 30.7 PG (ref 26.8–34.3)
MCHC RBC AUTO-ENTMCNC: 32.6 G/DL (ref 31.4–37.4)
MCV RBC AUTO: 94 FL (ref 82–98)
MONOCYTES # BLD AUTO: 0.84 THOUSAND/ÂΜL (ref 0.17–1.22)
MONOCYTES NFR BLD AUTO: 10 % (ref 4–12)
NEUTROPHILS # BLD AUTO: 5.68 THOUSANDS/ÂΜL (ref 1.85–7.62)
NEUTS SEG NFR BLD AUTO: 69 % (ref 43–75)
NRBC BLD AUTO-RTO: 0 /100 WBCS
PLATELET # BLD AUTO: 191 THOUSANDS/UL (ref 149–390)
PMV BLD AUTO: 11.3 FL (ref 8.9–12.7)
POTASSIUM SERPL-SCNC: 3.9 MMOL/L (ref 3.5–5.3)
PROT SERPL-MCNC: 7 G/DL (ref 6.4–8.4)
RBC # BLD AUTO: 5.4 MILLION/UL (ref 3.88–5.62)
SODIUM SERPL-SCNC: 137 MMOL/L (ref 135–147)
T4 FREE SERPL-MCNC: 0.9 NG/DL (ref 0.61–1.12)
TSH SERPL DL<=0.05 MIU/L-ACNC: 6.74 UIU/ML (ref 0.45–4.5)
WBC # BLD AUTO: 8.36 THOUSAND/UL (ref 4.31–10.16)

## 2024-11-21 PROCEDURE — 85025 COMPLETE CBC W/AUTO DIFF WBC: CPT | Performed by: INTERNAL MEDICINE

## 2024-11-21 PROCEDURE — 84443 ASSAY THYROID STIM HORMONE: CPT | Performed by: INTERNAL MEDICINE

## 2024-11-21 PROCEDURE — 84439 ASSAY OF FREE THYROXINE: CPT | Performed by: INTERNAL MEDICINE

## 2024-11-21 PROCEDURE — 80053 COMPREHEN METABOLIC PANEL: CPT | Performed by: INTERNAL MEDICINE

## 2024-11-21 NOTE — PROGRESS NOTES
Kwaku Carlos Plascencia  tolerated port access and labs drawn well with no complications.  Blood return noted and lab drawn successfully.      Kwaku Plascencia is aware of future appt on 11/22/24 at 0800.     AVS  No (Declined by Kwaku Plascencia) pt states he has mychart and has access to his appointment schedule

## 2024-11-22 ENCOUNTER — HOSPITAL ENCOUNTER (OUTPATIENT)
Dept: INFUSION CENTER | Facility: HOSPITAL | Age: 44
End: 2024-11-22
Attending: INTERNAL MEDICINE
Payer: OTHER GOVERNMENT

## 2024-11-22 VITALS
OXYGEN SATURATION: 93 % | HEART RATE: 90 BPM | RESPIRATION RATE: 17 BRPM | SYSTOLIC BLOOD PRESSURE: 134 MMHG | TEMPERATURE: 96.9 F | DIASTOLIC BLOOD PRESSURE: 90 MMHG

## 2024-11-22 DIAGNOSIS — C80.1 SMALL CELL CARCINOMA (HCC): Primary | Chronic | ICD-10-CM

## 2024-11-22 PROCEDURE — 96413 CHEMO IV INFUSION 1 HR: CPT

## 2024-11-22 RX ORDER — SODIUM CHLORIDE 9 MG/ML
20 INJECTION, SOLUTION INTRAVENOUS ONCE
Status: COMPLETED | OUTPATIENT
Start: 2024-11-22 | End: 2024-11-22

## 2024-11-22 RX ADMIN — DURVALUMAB 1500 MG: 500 INJECTION, SOLUTION INTRAVENOUS at 09:12

## 2024-11-22 RX ADMIN — SODIUM CHLORIDE 20 ML/HR: 0.9 INJECTION, SOLUTION INTRAVENOUS at 09:09

## 2024-11-22 NOTE — PROGRESS NOTES
Kwaku Plascencia  presents for imfinzi infusion.  TSH elevated, T4 WNL.  OK to proceed with infusion per Allison Siddiqui RN.  Tx tolerated treatment well with no complications.      Kwaku Plascencia is aware of future appt on 12/19 at 8 am.     AVS declined by Kwaku Plascencia.

## 2024-11-30 NOTE — PROGRESS NOTES
Telemedicine consent    Patient: Kwaku Plascencia  Provider: Josue Bledsoe MD  Provider located at [address] [place] [address] [facility] [address] [id number]    The patient was identified by name and date of birth. Kwaku Plascencia was informed that this is a telemedicine visit and that the visit is being conducted through the Epic Embedded platform. He agrees to proceed..  My office door was closed. No one else was in the room.  He acknowledged consent and understanding of privacy and security of the video platform. The patient has agreed to participate and understands they can discontinue the visit at any time.    Patient is aware this is a billable service.                             Hematology/Oncology Outpatient Office Note    Date of Service: 12/10/2024    [facility] [address] [address] [phone]    Reason for Consultation:   No chief complaint on file.      Cancer Stage at diagnosis: IV    Referral Physician: No ref. provider found    Primary Care Physician:  Theron Stein MD     Nickname: Kwaku    Spouse: Naz Dinh ECO    Today's ECO    Goals and Barriers:  Current Goal: Minimize effects of disease burden, extend life.   Barriers to accomplishing this: None    Patient's Capacity to Self Care:  Patient is able to self care    Code Status: not addressed today    Advanced directives: not addressed today    ASSESSMENT & PLAN      Diagnosis ICD-10-CM Associated Orders   1. 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 Small cell carcinoma (HCC)  C80.1       3. Encounter for tobacco use cessation counseling  Z71.6             This is a 43 y.o. c PMHx notable for obesity, being seen in consultation for large cell with neuroendocrine features found in the colon    Discussion of decision making  Oncology history updated, accordingly, during this  visit  Goals of care/patient communication  I discussed with the patient the clinical course leading up to their cancer diagnosis. I reviewed relevant office notes, imaging reports and pathology result as well.  I told the patient that this is a case of curable versus incurable disease and what this means. We discussed that the goal of anti-cancer therapy is to provide best quality of life, extend overall survival, and progression free survival as shown in clinical trials. We also discussed that there might be a point when the cancer will no longer respond to this anti-neoplastic therapy.   I explained the risks/benefits of the proposed cancer therapy: Carboplatin-Etoposide +/- Durvalumab and after discussion including understanding risks of possible life-threatening complications and therapy-related malignancy development, informed consent for blood products and treatment has been signed and obtained.  TNM/Staging At Diagnosis  [id number]  Cancer Staging   IV  Disease Features/Tumor Markers/Genetics  Tumor Marker:   7/17/2023: CEA 2.2  CGA: 49.4  Notable Path Features:   6/8/2023: Poorly differentiated malignancy, pending external expert consultation positive for synaptophysin, chromogranin, CD56, TTF-1, BCL2, and cyclin D1 with a high Ki-67 proliferative rate (approximately 90%) compatible with a poorly differentiated malignancy with features suggesting neuroendocrine differentiation. 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 The  intramural abscess abuts the superior aspect of the urinary bladder without evidence of a colovesical fistula.  6/27/2023 MRI Abd w/wo c: Benign flash-filling 11 mm hepatic and angioma in the caudate lobe of the liver accounting for the enhancing nodule in that location on recent CT examinations  7/19/2023 CT Chest w/o c: There are multiple pulmonary nodules, which will be described on series 4:  Image 68, left lower lobe, solid, smooth bordered, 3 mm .  Image 63, left upper lobe, solid, smooth bordered, 4 mm .  Image 43, right upper lobe, solid, smooth bordered, 4 mm, may represent a fissural lymph node.  Image 55, right upper lobe, solid, smooth bordered, 4 mm, may represent a fissural lymph node  7/19/2023 MRI Brain w/wo c: No evidence of intracranial metastasis, A few foci of T2/FLAIR hyperintensity primarily involving left frontoparietal subcortical and deep white matter (maybe migraine related)  8/8/2023 PET/CT: Three new hypermetabolic solid nodules/masses in the abdomen as described, compatible with progression of disease. Mild uptake along the left anterior ninth rib corresponding to a mild fracture deformity. Findings are indeterminate and may represent the sequela of trauma or metastatic disease. Stable sub-4 mm pulmonary nodules   -3.1 x 3.1 cm hypermetabolic mass in the left anterior abdomen abutting a loop of small bowel (series 4, image 147; SUV max 12).   -1.3 x 1.2 cm hypermetabolic soft tissue nodule in the left mid abdomen posterior to a loop of small bowel (series 4, image 154; SUV max 17)   -1.8 x 2.1 cm hypermetabolic soft tissue nodule anterior to the left psoas muscle (series 4, image 160; SUV max 9.7)  10/2/2023: CT CAP w/c: excellent WV. Multiple pulmonary nodules without significant change when compared to the previous CT chest. Some which may represent fissural lymph nodes. No evidence of metastatic disease in the chest abdomen and pelvis.  Improvement of the lower abdominal/pelvic  lymphadenopathy when compared to the previous PET/CT.  CARIS NGS: 11/9/2023 CARIS NGS: TMB 94 muts/Mb, MSI-proficient, BRCA 1 exon 18 mutation  BRAF exon 15 mutation, MSH2/3, NF1 exon 3, PIK3CA, POLE exon 9, PTEN exon 1, RB1, SMAD2, TP53 exon 6, XPO1 exon 15  12/22/2023 CT CAP w/c stable: There is no new measurable metastatic disease. Small less than 6 mm nodules in the both lungs, remains stable. The previously noted rounded lesion in the anterior abdomen which demonstrated marginal uptake on the PET scan is decreasing in size, now measuring 1.1 x 1.6 cm (image 184 series 2). Proximal sigmoid colonic thickening as seen on the previous. Enhancing cavernous hemangioma caudate lobe  Genetic testing was negative  4/3/2024 CT CAP w/c VT. Continued decrease in size of the rounded lesion in the anterior abdominal peritoneal cavity measuring 1 cm (previously 1.1 x 1.6 cm). Interval improvement of sigmoid colonic thickening. Stable tiny pulmonary nodules.  8/6/2024 CT CAP w/c : ongoing VT. Continued interval decrease in size of a peritoneal lesion in the anterior abdomen abutting the small bowel (measuring 8 mm compared to 12 mm previously). No abdominal or pelvic lymphadenopathy. No thoracic lymphadenopathy. No new pulmonary lesions. Stable scattered pulmonary nodules. Known sigmoid mass is stable without evidence of obstruction.  11/15/2024: CT CAP w/c. Near CR. No definite residual/recurrent disease in the chest, abdomen, or pelvis. In particular, the previously identified peritoneal lesion in the ventral left paracentral abdomen and previously identified sigmoid colon mass are no longer visualized. Sub-4 mm pulmonary nodules, not significantly changed    Discussion of decision making    I personally reviewed the following lab results, the image studies, pathology, other specialty/physicians consult notes and recommendations, and outside medical records. I had a lengthy discussion with the patient and shared the work-up  findings. We discussed the diagnosis and management plan as below. I spent 41 minutes reviewing the records (labs, clinician notes, outside records, medical history, ordering medicine/tests/procedures, interpreting the imaging/labs previously done) and coordination of care as well as direct time with the patient today, of which greater than 50% of the time was spent in counseling and coordination of care with the patient/family.    I discussed the risks of ongoing cigarette smoking and reviewed the benefits of quitting and risk of new lung primary, heart disease, stroke, among other risks and cancers. Reviewed options including support, quit date, medications, and family support. Patient voiced understanding and willingness to try to quit. Patient was told to notify their family practitioner for additional management. Greater than 3 minutes were needed for discussion of tobacco dependence and quitting counselling.      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 [place] [facility]    Follow Up: Every 8 weeks while on systemic therapy (all virtual except post CT visits) scheduled thru more*, will see him once in between CT scans    Infusion chairs are at the [facility]    All questions were answered to the patient's satisfaction during this encounter. The patient knows the contact information for our office and knows to reach out for any relevant concerns related to this encounter. They are to call for any temperature 100.4 or higher, new symptoms including but not restricted to shaking chills, decreased appetite, nausea, vomiting, diarrhea, increased fatigue,  shortness of breath or chest pain, confusion, and not feeling the strength to come to the clinic. For all other listed problems and medical diagnosis in their chart - they are managed by PCP and/or other specialists, which the patient acknowledges. Thank you very much for your consultation and making us a part of this patient's care. We are continuing to follow closely with you. Please do not hesitate to reach out to me with any additional questions or concerns.    Josue Bledsoe MD  Hematology & Medical Oncology Staff Physician             Disclaimer: This document was prepared using [employer] technology. If a word or phrase is confusing, or does not make sense, this is likely due to recognition error which was not discovered during this clinician's review. If you believe an error has occurred, please contact me through [employer] service line for lee?cation.      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)   6/22/2023 Initial Diagnosis    Malignant neoplasm of sigmoid colon (HCC)     9/3/2023 -  Cancer Staged    Staging form: Colon and Rectum, AJCC 8th Edition  - Clinical: Stage Unknown (cTX, cN0, pM1) - Signed by Josue Bledsoe MD on 9/3/2023  Total positive nodes: 0       Small cell carcinoma (HCC)   7/18/2023 Initial Diagnosis    Small cell carcinoma (HCC)     7/26/2023 -  Chemotherapy    alteplase (CATHFLO), 2 mg, Intracatheter, Every 1 Minute as needed, 19 of 22 cycles  etoposide (TOPOSAR), 80 mg/m2 = 160.8 mg, Intravenous, Once, 4 of 4 cycles  Administration: 160.8 mg (7/26/2023), 160.8 mg (7/27/2023), 160.8 mg (7/28/2023), 160.8 mg (8/16/2023), 160.8 mg (8/17/2023), 160.8 mg (8/18/2023), 160.8 mg (9/6/2023), 160.8 mg (9/7/2023), 160.8 mg (9/8/2023), 160.8 mg (9/27/2023), 160.8 mg (9/28/2023), 160.8 mg (9/29/2023)  CARBOplatin (PARAPLATIN) IVPB (GOG AUC DOSING), 750 mg, Intravenous, Once, 4 of 4 cycles  Administration: 750  mg (7/26/2023), 700.5 mg (8/16/2023), 694 mg (9/6/2023), 694 mg (9/27/2023)  durvalumab (IMFINZI) IVPB, 1,500 mg, Intravenous, Once, 19 of 22 cycles  Administration: 1,500 mg (7/26/2023), 1,500 mg (10/20/2023), 1,500 mg (8/16/2023), 1,500 mg (9/6/2023), 1,500 mg (9/27/2023), 1,500 mg (11/17/2023), 1,500 mg (12/15/2023), 1,500 mg (1/12/2024), 1,500 mg (2/9/2024), 1,500 mg (3/8/2024), 1,500 mg (4/5/2024), 1,500 mg (5/3/2024), 1,500 mg (5/31/2024), 1,500 mg (6/28/2024), 1,500 mg (7/26/2024), 1,500 mg (8/30/2024), 1,500 mg (9/27/2024), 1,500 mg (10/25/2024), 1,500 mg (11/22/2024)  aprepitant (CINVANTI) in  mL IVPB, 130 mg, Intravenous, Once, 4 of 4 cycles  Administration: 130 mg (7/26/2023), 130 mg (8/16/2023), 130 mg (9/6/2023), 130 mg (9/27/2023)       5/21/2023: BRBPR and pain in the lower abdomen for a week; leading to a ER visit, dx of diverticulitis     SUBJECTIVE  (INTERVAL HISTORY)      Clotting History None    Bleeding History None   Cancer History Colon   Family Cancer History pGrandfather (lung, smoker)   H/O Blood/Plt Transfusion None   Tobacco/etoh/drug abuse 1/2 PPD x 25 years (working on quitting and thinking about it; has nicotine patches), no etoh abuse or rec drug use       Cancer Screening history n/a   Occupation IT       Interval events:    Picked up a vape and is working to cut down his smoking, now hasn't smoked since 11/20/2024 .     No acute issues at this time. Work is good. Has 1-2 days of fatigue after his infusions, getting a bit less.    I have reviewed the relevant past medical, surgical, social and family history. I have also reviewed allergies and medications for this patient.    Review of Systems    Baseline weight: 200 lbs    Denies F/C, N/V, SOB, CP, LH, HA, rash, itching, gen weakness, melena, hematuria, hematochezia, falls, diarrhea, or constipation       A 10-point review of system was performed, pertinent positive and negative were detailed as above. Otherwise, the 10-point  review of system was negative.      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, chemotherapy, immunotherapy    Mixed hyperlipidemia 10/19/2023    10/19/2023-cholesterol 226, HDL 29    Pulmonary nodules 03/23/2024    Per coding guidelines from note dated 10/16/2023       Past Surgical History:   Procedure Laterality Date    COLON SIGMOID RESECTION LAPAROSCOPIC N/A 6/8/2023    Procedure: RESECTION COLON SIGMOID LAPAROSCOPIC HAND-ASSISTED;  Surgeon: Roland Connelly MD;  Location: [facility];  Service: General    IR DRAINAGE TUBE CHECK/CHANGE/REPOSITION/REINSERTION/UPSIZE  6/5/2023    IR DRAINAGE TUBE PLACEMENT  5/30/2023    IR PORT PLACEMENT  7/28/2023       Family History   Problem Relation Age of Onset    No Known Problems Mother     Hypertension Father     Diabetes Father     Heart attack Father 42    Stroke Father        Social History     Socioeconomic History    Marital status: /Civil Union     Spouse name: Not on file    Number of children: Not on file    Years of education: Not on file    Highest education level: Not on file   Occupational History    Not on file   Tobacco Use    Smoking status: Every Day     Current packs/day: 0.75     Average packs/day: 0.8 packs/day for 25.0 years (18.8 ttl pk-yrs)     Types: Cigarettes     Passive exposure: Current (wife smokes)    Smokeless tobacco: Not on file    Tobacco comments:     Patient states Chantix was not effective.   Vaping Use    Vaping status: Never Used   Substance and Sexual Activity    Alcohol use: Not Currently    Drug use: Never    Sexual activity: Not on file   Other Topics Concern    Not on file   Social History Narrative     since 2011.    4 children aged 18, 17, 16, 6 as of 10/23.  Second child is a girl, the rest boys.    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    Wife is in the [employer].    Enjoys hiking and hunting.    And video games.      Social Drivers of Health     Financial Resource Strain: Not on file   Food Insecurity: No Food Insecurity (5/31/2023)    Hunger Vital Sign     Worried About Running Out of Food in the Last Year: Never true     Ran Out of Food in the Last Year: Never true   Transportation Needs: No Transportation Needs (5/31/2023)    PRAPARE - Transportation     Lack of Transportation (Medical): No     Lack of Transportation (Non-Medical): No   Physical Activity: Not on file   Stress: Not on file   Social Connections: Unknown (6/18/2024)    Received from [employer]     How often do you feel lonely or isolated from those around you? (Adult - for ages 18 years and over): Not on file   Intimate Partner Violence: Not on file   Housing Stability: Low Risk  (5/31/2023)    Housing Stability Vital Sign     Unable to Pay for Housing in the Last Year: No     Number of Places Lived in the Last Year: 1     Unstable Housing in the Last Year: No       No Known Allergies    Current Outpatient Medications   Medication Sig Dispense Refill    atorvastatin (LIPITOR) 10 mg tablet Take 1 tablet (10 mg total) by mouth daily 90 tablet 3     No current facility-administered medications for this visit.       (Not in a hospital admission)      Objective:     24 Hour Vitals Assessment:     There were no vitals filed for this visit.    Below not updated as this was a telemed visit    PHYSICIAN EXAM:    General: Appearance: alert, cooperative, no distress.  HEENT: Normocephalic, atraumatic. No scleral icterus. conjunctivae clear. EOMI.  Chest: No tenderness to palpation. No open wound noted.  Lungs: Diminished BS b/l, otherwise clear to auscultation bilaterally, Respirations unlabored.  Cardiac: Regular rate, +S1and S2  Abdomen: Soft, non-tender, non-distended. Bowel sounds are normal.   Extremities:  No edema, cyanosis, clubbing.  Skin: Skin color, turgor are normal. No rashes.  Lymphatics: no palpable supra-cervical, axillary, or  inguinal adenopathy  Neurologic: Awake, Alert, and oriented, no gross focal deficits noted b/l.       DATA REVIEW:    Pathology Result:    Final Diagnosis   Date Value Ref Range Status   06/08/2023   Final    A.  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       Comment:     This is an appended report. These results have been appended to a previously preliminary verified report.        Image Results:   Image result are reviewed and documented in Hematology/Oncology history    CT chest abdomen pelvis w contrast  Narrative: CT CHEST, ABDOMEN AND PELVIS WITH IV CONTRAST    INDICATION: C7A.025: Malignant carcinoid tumor of the sigmoid colon.    COMPARISON: CT chest abdomen pelvis 8/6/2024, 4/3/2024, PET/CT 8/8/2023, CT chest 7/19/2023    TECHNIQUE: CT examination of the chest, abdomen and pelvis was performed. Multiplanar 2D reformatted images were created from the source data.    This examination, like all CT scans performed in the [facility], was performed utilizing techniques to minimize radiation dose exposure, including the use of iterative reconstruction and automated exposure control. Radiation dose length   product (DLP) for this visit: 740.65 mGy-cm    IV Contrast: 100 mL of iohexol (OMNIPAQUE)  Enteric Contrast: Not administered.    FINDINGS:    CHEST    LUNGS: Sub-4 mm pulmonary nodules in the right upper and middle lobes marked on series 3, not significantly changed from CT chest 7/19/2023. No new nodules. No focal pulmonary opacification. No tracheal or endobronchial lesion.    PLEURA: Unremarkable.    HEART/GREAT VESSELS: Heart is unremarkable for patient's age. No thoracic aortic aneurysm.    MEDIASTINUM AND FABRICIO: Unremarkable.    CHEST WALL AND LOWER NECK: Right chest port terminates at the cavoatrial junction.    ABDOMEN    LIVER/BILIARY TREE: Hepatic steatosis without focal abnormality.    GALLBLADDER: No calcified  gallstones. No pericholecystic inflammatory change.    SPLEEN: Unremarkable.    PANCREAS: Unremarkable.    ADRENAL GLANDS: Unremarkable.    KIDNEYS/URETERS: Unremarkable. No hydronephrosis.    STOMACH AND BOWEL: Previously identified proximal sigmoid colon mass, which has been decreasing in size over the past several studies, is no longer definitely identified with only questionable mild wall thickening in this location. No intestinal   obstruction. A few scattered diverticula are noted    APPENDIX: Normal.    ABDOMINOPELVIC CAVITY: No ascites. No pneumoperitoneum. No lymphadenopathy. Previously seen 8 mm peritoneal lesion abutting small bowel in the ventral left paracentral abdomen, which has been decreasing in size over the past several studies, is no longer   visualized.    VESSELS: Unremarkable for patient's age.    PELVIS    REPRODUCTIVE ORGANS: Unremarkable for patient's age.    URINARY BLADDER: Unremarkable.    ABDOMINAL WALL/INGUINAL REGIONS: Unremarkable.    BONES: No acute fracture or suspicious osseous lesion.  Impression: 1.  No definite residual/recurrent disease in the chest, abdomen, or pelvis. In particular, the previously identified peritoneal lesion in the ventral left paracentral abdomen and previously identified sigmoid colon mass are no longer visualized.    2.  Sub-4 mm pulmonary nodules, not significantly changed from CT chest 7/19/2023.    Resident: EDUAR GALEAS I, the attending radiologist, have reviewed the images and agree with the final report above.    Workstation performed: [id number]      LABS:  Lab data [facility]       Lab Results   Component Value Date    HGB 16.6 11/21/2024    HCT 50.9 (H) 11/21/2024    MCV 94 11/21/2024     11/21/2024    WBC 8.36 11/21/2024    NRBC 0 11/21/2024     Lab Results   Component Value Date    K 3.9 11/21/2024     11/21/2024    CO2 28 11/21/2024    BUN 12 11/21/2024    CREATININE 0.89 11/21/2024     "GLUF 102 (H) 08/29/2024    CALCIUM 9.0 11/21/2024    CORRECTEDCA 9.1 06/09/2023    AST 20 11/21/2024    ALT 28 11/21/2024    ALKPHOS 65 11/21/2024    EGFR 106 11/21/2024       No results found for: \"IRON\", \"TIBC\", \"FERRITIN\"    No results found for: \"[id number]\"    No results for input(s): \"WBC\", \"CREAT\", \"PLT\" in the last 72 hours.          By:  Josue Bledsoe MD, 12/10/2024, 8:30 AM                                  "

## 2024-12-10 ENCOUNTER — TELEMEDICINE (OUTPATIENT)
Dept: HEMATOLOGY ONCOLOGY | Facility: CLINIC | Age: 44
End: 2024-12-10
Payer: OTHER GOVERNMENT

## 2024-12-10 DIAGNOSIS — Z71.6 ENCOUNTER FOR TOBACCO USE CESSATION COUNSELING: Chronic | ICD-10-CM

## 2024-12-10 DIAGNOSIS — C80.1 SMALL CELL CARCINOMA (HCC): Chronic | ICD-10-CM

## 2024-12-10 DIAGNOSIS — C7A.025 MALIGNANT CARCINOID TUMOR OF SIGMOID COLON (HCC): Primary | ICD-10-CM

## 2024-12-10 PROCEDURE — 99406 BEHAV CHNG SMOKING 3-10 MIN: CPT | Performed by: INTERNAL MEDICINE

## 2024-12-10 PROCEDURE — 99215 OFFICE O/P EST HI 40 MIN: CPT | Performed by: INTERNAL MEDICINE

## 2024-12-10 RX ORDER — SODIUM CHLORIDE 9 MG/ML
20 INJECTION, SOLUTION INTRAVENOUS ONCE
OUTPATIENT
Start: 2025-02-14

## 2024-12-10 RX ORDER — SODIUM CHLORIDE 9 MG/ML
20 INJECTION, SOLUTION INTRAVENOUS ONCE
OUTPATIENT
Start: 2025-01-17

## 2024-12-19 ENCOUNTER — HOSPITAL ENCOUNTER (OUTPATIENT)
Dept: INFUSION CENTER | Facility: HOSPITAL | Age: 44
End: 2024-12-19
Payer: OTHER GOVERNMENT

## 2024-12-19 VITALS — TEMPERATURE: 97.9 F

## 2024-12-19 DIAGNOSIS — C80.1 SMALL CELL CARCINOMA (HCC): Chronic | ICD-10-CM

## 2024-12-19 DIAGNOSIS — Z45.2 ENCOUNTER FOR CARE RELATED TO PORT-A-CATH: Primary | ICD-10-CM

## 2024-12-19 LAB
ALBUMIN SERPL BCG-MCNC: 4.2 G/DL (ref 3.5–5)
ALP SERPL-CCNC: 64 U/L (ref 34–104)
ALT SERPL W P-5'-P-CCNC: 26 U/L (ref 7–52)
ANION GAP SERPL CALCULATED.3IONS-SCNC: 6 MMOL/L (ref 4–13)
AST SERPL W P-5'-P-CCNC: 18 U/L (ref 13–39)
BASOPHILS # BLD AUTO: 0.03 THOUSANDS/ΜL (ref 0–0.1)
BASOPHILS NFR BLD AUTO: 0 % (ref 0–1)
BILIRUB SERPL-MCNC: 0.7 MG/DL (ref 0.2–1)
BUN SERPL-MCNC: 15 MG/DL (ref 5–25)
CALCIUM SERPL-MCNC: 9.1 MG/DL (ref 8.4–10.2)
CHLORIDE SERPL-SCNC: 101 MMOL/L (ref 96–108)
CO2 SERPL-SCNC: 29 MMOL/L (ref 21–32)
CREAT SERPL-MCNC: 0.97 MG/DL (ref 0.6–1.3)
EOSINOPHIL # BLD AUTO: 0.12 THOUSAND/ΜL (ref 0–0.61)
EOSINOPHIL NFR BLD AUTO: 2 % (ref 0–6)
ERYTHROCYTE [DISTWIDTH] IN BLOOD BY AUTOMATED COUNT: 12.4 % (ref 11.6–15.1)
GFR SERPL CREATININE-BSD FRML MDRD: 95 ML/MIN/1.73SQ M
GLUCOSE SERPL-MCNC: 116 MG/DL (ref 65–140)
HCT VFR BLD AUTO: 47.4 % (ref 36.5–49.3)
HGB BLD-MCNC: 15.9 G/DL (ref 12–17)
IMM GRANULOCYTES # BLD AUTO: 0.02 THOUSAND/UL (ref 0–0.2)
IMM GRANULOCYTES NFR BLD AUTO: 0 % (ref 0–2)
LYMPHOCYTES # BLD AUTO: 1.63 THOUSANDS/ΜL (ref 0.6–4.47)
LYMPHOCYTES NFR BLD AUTO: 21 % (ref 14–44)
MCH RBC QN AUTO: 31.1 PG (ref 26.8–34.3)
MCHC RBC AUTO-ENTMCNC: 33.5 G/DL (ref 31.4–37.4)
MCV RBC AUTO: 93 FL (ref 82–98)
MONOCYTES # BLD AUTO: 0.94 THOUSAND/ΜL (ref 0.17–1.22)
MONOCYTES NFR BLD AUTO: 12 % (ref 4–12)
NEUTROPHILS # BLD AUTO: 5.12 THOUSANDS/ΜL (ref 1.85–7.62)
NEUTS SEG NFR BLD AUTO: 65 % (ref 43–75)
NRBC BLD AUTO-RTO: 0 /100 WBCS
PLATELET # BLD AUTO: 180 THOUSANDS/UL (ref 149–390)
PMV BLD AUTO: 11.4 FL (ref 8.9–12.7)
POTASSIUM SERPL-SCNC: 4 MMOL/L (ref 3.5–5.3)
PROT SERPL-MCNC: 7.2 G/DL (ref 6.4–8.4)
RBC # BLD AUTO: 5.12 MILLION/UL (ref 3.88–5.62)
SODIUM SERPL-SCNC: 136 MMOL/L (ref 135–147)
TSH SERPL DL<=0.05 MIU/L-ACNC: 2.53 UIU/ML (ref 0.45–4.5)
WBC # BLD AUTO: 7.86 THOUSAND/UL (ref 4.31–10.16)

## 2024-12-19 PROCEDURE — 84443 ASSAY THYROID STIM HORMONE: CPT | Performed by: INTERNAL MEDICINE

## 2024-12-19 PROCEDURE — 80053 COMPREHEN METABOLIC PANEL: CPT | Performed by: INTERNAL MEDICINE

## 2024-12-19 PROCEDURE — 85025 COMPLETE CBC W/AUTO DIFF WBC: CPT | Performed by: INTERNAL MEDICINE

## 2024-12-19 NOTE — PROGRESS NOTES
Kwaku Plascencia  tolerated port flush and central labs well with no complications.      Kwaku Plascencia is aware of future appt on 12-20-24

## 2024-12-20 ENCOUNTER — HOSPITAL ENCOUNTER (OUTPATIENT)
Dept: INFUSION CENTER | Facility: HOSPITAL | Age: 44
End: 2024-12-20
Attending: INTERNAL MEDICINE
Payer: OTHER GOVERNMENT

## 2024-12-20 VITALS
RESPIRATION RATE: 18 BRPM | SYSTOLIC BLOOD PRESSURE: 150 MMHG | DIASTOLIC BLOOD PRESSURE: 90 MMHG | TEMPERATURE: 97.4 F | HEART RATE: 82 BPM

## 2024-12-20 DIAGNOSIS — C80.1 SMALL CELL CARCINOMA (HCC): Primary | Chronic | ICD-10-CM

## 2024-12-20 RX ORDER — SODIUM CHLORIDE 9 MG/ML
20 INJECTION, SOLUTION INTRAVENOUS ONCE
Status: COMPLETED | OUTPATIENT
Start: 2024-12-20 | End: 2024-12-20

## 2024-12-20 RX ADMIN — SODIUM CHLORIDE 20 ML/HR: 0.9 INJECTION, SOLUTION INTRAVENOUS at 08:46

## 2024-12-20 RX ADMIN — DURVALUMAB 1500 MG: 500 INJECTION, SOLUTION INTRAVENOUS at 08:50

## 2024-12-20 NOTE — PROGRESS NOTES
Kwaku Plascencia  tolerated treatment well with no complications.      Kwaku Plascencia is aware of future appt on 1/16 at 8:30 am.     AVS declined by Kwaku Plascencia.

## 2025-01-16 ENCOUNTER — HOSPITAL ENCOUNTER (OUTPATIENT)
Dept: INFUSION CENTER | Facility: HOSPITAL | Age: 45
End: 2025-01-16
Payer: OTHER GOVERNMENT

## 2025-01-16 VITALS — TEMPERATURE: 97.8 F

## 2025-01-16 DIAGNOSIS — Z45.2 ENCOUNTER FOR CARE RELATED TO PORT-A-CATH: Primary | ICD-10-CM

## 2025-01-16 DIAGNOSIS — C80.1 SMALL CELL CARCINOMA (HCC): ICD-10-CM

## 2025-01-16 LAB
ALBUMIN SERPL BCG-MCNC: 4.5 G/DL (ref 3.5–5)
ALP SERPL-CCNC: 57 U/L (ref 34–104)
ALT SERPL W P-5'-P-CCNC: 38 U/L (ref 7–52)
ANION GAP SERPL CALCULATED.3IONS-SCNC: 6 MMOL/L (ref 4–13)
AST SERPL W P-5'-P-CCNC: 24 U/L (ref 13–39)
BASOPHILS # BLD AUTO: 0.02 THOUSANDS/ΜL (ref 0–0.1)
BASOPHILS NFR BLD AUTO: 0 % (ref 0–1)
BILIRUB SERPL-MCNC: 0.92 MG/DL (ref 0.2–1)
BUN SERPL-MCNC: 14 MG/DL (ref 5–25)
CALCIUM SERPL-MCNC: 9.4 MG/DL (ref 8.4–10.2)
CHLORIDE SERPL-SCNC: 101 MMOL/L (ref 96–108)
CO2 SERPL-SCNC: 29 MMOL/L (ref 21–32)
CREAT SERPL-MCNC: 0.88 MG/DL (ref 0.6–1.3)
EOSINOPHIL # BLD AUTO: 0.08 THOUSAND/ΜL (ref 0–0.61)
EOSINOPHIL NFR BLD AUTO: 1 % (ref 0–6)
ERYTHROCYTE [DISTWIDTH] IN BLOOD BY AUTOMATED COUNT: 12.7 % (ref 11.6–15.1)
GFR SERPL CREATININE-BSD FRML MDRD: 105 ML/MIN/1.73SQ M
GLUCOSE SERPL-MCNC: 96 MG/DL (ref 65–140)
HCT VFR BLD AUTO: 47.9 % (ref 36.5–49.3)
HGB BLD-MCNC: 16 G/DL (ref 12–17)
IMM GRANULOCYTES # BLD AUTO: 0.01 THOUSAND/UL (ref 0–0.2)
IMM GRANULOCYTES NFR BLD AUTO: 0 % (ref 0–2)
LYMPHOCYTES # BLD AUTO: 1.34 THOUSANDS/ΜL (ref 0.6–4.47)
LYMPHOCYTES NFR BLD AUTO: 21 % (ref 14–44)
MCH RBC QN AUTO: 30.9 PG (ref 26.8–34.3)
MCHC RBC AUTO-ENTMCNC: 33.4 G/DL (ref 31.4–37.4)
MCV RBC AUTO: 93 FL (ref 82–98)
MONOCYTES # BLD AUTO: 0.6 THOUSAND/ΜL (ref 0.17–1.22)
MONOCYTES NFR BLD AUTO: 9 % (ref 4–12)
NEUTROPHILS # BLD AUTO: 4.4 THOUSANDS/ΜL (ref 1.85–7.62)
NEUTS SEG NFR BLD AUTO: 69 % (ref 43–75)
NRBC BLD AUTO-RTO: 0 /100 WBCS
PLATELET # BLD AUTO: 211 THOUSANDS/UL (ref 149–390)
PMV BLD AUTO: 10.4 FL (ref 8.9–12.7)
POTASSIUM SERPL-SCNC: 4.3 MMOL/L (ref 3.5–5.3)
PROT SERPL-MCNC: 7.3 G/DL (ref 6.4–8.4)
RBC # BLD AUTO: 5.17 MILLION/UL (ref 3.88–5.62)
SODIUM SERPL-SCNC: 136 MMOL/L (ref 135–147)
TSH SERPL DL<=0.05 MIU/L-ACNC: 3.27 UIU/ML (ref 0.45–4.5)
WBC # BLD AUTO: 6.45 THOUSAND/UL (ref 4.31–10.16)

## 2025-01-16 PROCEDURE — 84443 ASSAY THYROID STIM HORMONE: CPT | Performed by: INTERNAL MEDICINE

## 2025-01-16 PROCEDURE — 80053 COMPREHEN METABOLIC PANEL: CPT | Performed by: INTERNAL MEDICINE

## 2025-01-16 PROCEDURE — 36593 DECLOT VASCULAR DEVICE: CPT

## 2025-01-16 PROCEDURE — 85025 COMPLETE CBC W/AUTO DIFF WBC: CPT | Performed by: INTERNAL MEDICINE

## 2025-01-16 RX ADMIN — ALTEPLASE 2 MG: 2.2 INJECTION, POWDER, LYOPHILIZED, FOR SOLUTION INTRAVENOUS at 08:48

## 2025-01-16 NOTE — PROGRESS NOTES
Port checked, able to obtain brisk blood return . Lab work drawn per md order. Port flushed and deaccessed after labs drawn. Pt to return 1/17 for treatment. AVS declined.

## 2025-01-16 NOTE — PROGRESS NOTES
Received for labs. No complaints offered. RPAC accessed without issue. No blood return noted, flushes without issue. Attempted to flush again and remains without blood return. Cathflo inserted in line. Will reassess in 30 min.

## 2025-01-17 ENCOUNTER — HOSPITAL ENCOUNTER (OUTPATIENT)
Dept: INFUSION CENTER | Facility: HOSPITAL | Age: 45
End: 2025-01-17
Attending: INTERNAL MEDICINE
Payer: OTHER GOVERNMENT

## 2025-01-17 VITALS
TEMPERATURE: 97.3 F | DIASTOLIC BLOOD PRESSURE: 84 MMHG | RESPIRATION RATE: 18 BRPM | HEART RATE: 73 BPM | OXYGEN SATURATION: 96 % | SYSTOLIC BLOOD PRESSURE: 150 MMHG

## 2025-01-17 DIAGNOSIS — C80.1 SMALL CELL CARCINOMA (HCC): Primary | ICD-10-CM

## 2025-01-17 PROCEDURE — 96413 CHEMO IV INFUSION 1 HR: CPT

## 2025-01-17 RX ORDER — SODIUM CHLORIDE 9 MG/ML
20 INJECTION, SOLUTION INTRAVENOUS ONCE
Status: COMPLETED | OUTPATIENT
Start: 2025-01-17 | End: 2025-01-17

## 2025-01-17 RX ADMIN — SODIUM CHLORIDE 20 ML/HR: 0.9 INJECTION, SOLUTION INTRAVENOUS at 08:34

## 2025-01-17 RX ADMIN — DURVALUMAB 1500 MG: 500 INJECTION, SOLUTION INTRAVENOUS at 09:09

## 2025-01-17 NOTE — PROGRESS NOTES
Recent labs reviewed.  Patient tolerated Imfinzi chemotherapy treatment without reaction or issues.       Kwaku Plascencia is aware of future appt on 2/13/25 at 0830.     AVS -  No (Declined by Kwaku Plascencia) Patient has Mychart.    Patient ambulated off unit without incident.  All personal belongings taken with patient.

## 2025-01-31 NOTE — PROGRESS NOTES
Telemedicine consent    Patient: Kwaku Plascencia  Provider: Josue Bledsoe MD  Provider located at [address] [place] [address] [facility] [address] [address]    The patient was identified by name and date of birth. Kwaku Plascencia was informed that this is a telemedicine visit and that the visit is being conducted through the Epic Embedded platform. He agrees to proceed..  My office door was closed. No one else was in the room.  He acknowledged consent and understanding of privacy and security of the video platform. The patient has agreed to participate and understands they can discontinue the visit at any time.    Patient is aware this is a billable service.                             Hematology/Oncology Outpatient Office Note    Date of Service: 2/10/2025    [facility] [address] [address] [phone]    Reason for Consultation:   No chief complaint on file.      Cancer Stage at diagnosis: IV    Referral Physician: No ref. provider found    Primary Care Physician:  Theron Stein MD     Nickname: Kwaku    Spouse: Naz Dinh ECO    Today's ECO    Goals and Barriers:  Current Goal: Minimize effects of disease burden, extend life.   Barriers to accomplishing this: None    Patient's Capacity to Self Care:  Patient is able to self care    Code Status: not addressed today    Advanced directives: not addressed today    ASSESSMENT & PLAN      Diagnosis ICD-10-CM Associated Orders   1. 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 Encounter for tobacco use cessation counseling  Z71.6             This is a 44 y.o. c PMHx notable for obesity, being seen in consultation for large cell with neuroendocrine features found in the colon    Discussion of decision making  Oncology history updated, accordingly, during this visit  Goals of care/patient communication  I  discussed with the patient the clinical course leading up to their cancer diagnosis. I reviewed relevant office notes, imaging reports and pathology result as well.  I told the patient that this is a case of curable versus incurable disease and what this means. We discussed that the goal of anti-cancer therapy is to provide best quality of life, extend overall survival, and progression free survival as shown in clinical trials. We also discussed that there might be a point when the cancer will no longer respond to this anti-neoplastic therapy.   I explained the risks/benefits of the proposed cancer therapy: Carboplatin-Etoposide +/- Durvalumab and after discussion including understanding risks of possible life-threatening complications and therapy-related malignancy development, informed consent for blood products and treatment has been signed and obtained.  TNM/Staging At Diagnosis  [id number]  Cancer Staging   IV  Disease Features/Tumor Markers/Genetics  Tumor Marker:   7/17/2023: CEA 2.2  CGA: 49.4  Notable Path Features:   6/8/2023: Poorly differentiated malignancy, pending external expert consultation positive for synaptophysin, chromogranin, CD56, TTF-1, BCL2, and cyclin D1 with a high Ki-67 proliferative rate (approximately 90%) compatible with a poorly differentiated malignancy with features suggesting neuroendocrine differentiation. 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 The intramural abscess abuts the superior aspect of the  urinary bladder without evidence of a colovesical fistula.  6/27/2023 MRI Abd w/wo c: Benign flash-filling 11 mm hepatic and angioma in the caudate lobe of the liver accounting for the enhancing nodule in that location on recent CT examinations  7/19/2023 CT Chest w/o c: There are multiple pulmonary nodules, which will be described on series 4:  Image 68, left lower lobe, solid, smooth bordered, 3 mm .  Image 63, left upper lobe, solid, smooth bordered, 4 mm .  Image 43, right upper lobe, solid, smooth bordered, 4 mm, may represent a fissural lymph node.  Image 55, right upper lobe, solid, smooth bordered, 4 mm, may represent a fissural lymph node  7/19/2023 MRI Brain w/wo c: No evidence of intracranial metastasis, A few foci of T2/FLAIR hyperintensity primarily involving left frontoparietal subcortical and deep white matter (maybe migraine related)  8/8/2023 PET/CT: Three new hypermetabolic solid nodules/masses in the abdomen as described, compatible with progression of disease. Mild uptake along the left anterior ninth rib corresponding to a mild fracture deformity. Findings are indeterminate and may represent the sequela of trauma or metastatic disease. Stable sub-4 mm pulmonary nodules   -3.1 x 3.1 cm hypermetabolic mass in the left anterior abdomen abutting a loop of small bowel (series 4, image 147; SUV max 12).   -1.3 x 1.2 cm hypermetabolic soft tissue nodule in the left mid abdomen posterior to a loop of small bowel (series 4, image 154; SUV max 17)   -1.8 x 2.1 cm hypermetabolic soft tissue nodule anterior to the left psoas muscle (series 4, image 160; SUV max 9.7)  10/2/2023: CT CAP w/c: excellent OR. Multiple pulmonary nodules without significant change when compared to the previous CT chest. Some which may represent fissural lymph nodes. No evidence of metastatic disease in the chest abdomen and pelvis.  Improvement of the lower abdominal/pelvic lymphadenopathy when compared to the previous  PET/CT.  CARIS NGS: 11/9/2023 CARIS NGS: TMB 94 muts/Mb, MSI-proficient, BRCA 1 exon 18 mutation  BRAF exon 15 mutation, MSH2/3, NF1 exon 3, PIK3CA, POLE exon 9, PTEN exon 1, RB1, SMAD2, TP53 exon 6, XPO1 exon 15  12/22/2023 CT CAP w/c stable: There is no new measurable metastatic disease. Small less than 6 mm nodules in the both lungs, remains stable. The previously noted rounded lesion in the anterior abdomen which demonstrated marginal uptake on the PET scan is decreasing in size, now measuring 1.1 x 1.6 cm (image 184 series 2). Proximal sigmoid colonic thickening as seen on the previous. Enhancing cavernous hemangioma caudate lobe  Genetic testing was negative  4/3/2024 CT CAP w/c [place]. Continued decrease in size of the rounded lesion in the anterior abdominal peritoneal cavity measuring 1 cm (previously 1.1 x 1.6 cm). Interval improvement of sigmoid colonic thickening. Stable tiny pulmonary nodules.  8/6/2024 CT CAP w/c : ongoing [place]. Continued interval decrease in size of a peritoneal lesion in the anterior abdomen abutting the small bowel (measuring 8 mm compared to 12 mm previously). No abdominal or pelvic lymphadenopathy. No thoracic lymphadenopathy. No new pulmonary lesions. Stable scattered pulmonary nodules. Known sigmoid mass is stable without evidence of obstruction.  11/15/2024: CT CAP w/c. Near CR. No definite residual/recurrent disease in the chest, abdomen, or pelvis. In particular, the previously identified peritoneal lesion in the ventral left paracentral abdomen and previously identified sigmoid colon mass are no longer visualized. Sub-4 mm pulmonary nodules, not significantly changed  2/3/2025 CT CAP w/c: Ongoing near CR. No interval change since previous study. No abdominal or pelvic lymphadenopathy. No peritoneal lesions. Mild luminal dilation of the proximal sigmoid just proximal to the known sigmoid lesion measuring 5 cm in diameter is nonspecific and may represent peristaltic activity. No  evidence of proximal obstruction  Known sigmoid lesion measuring 5 cm in diameter without obvious obstructive changes     Discussion of decision making    I personally reviewed the following lab results, the image studies, pathology, other specialty/physicians consult notes and recommendations, and outside medical records. I had a lengthy discussion with the patient and shared the work-up findings. We discussed the diagnosis and management plan as below. I spent 42 minutes reviewing the records (labs, clinician notes, outside records, medical history, ordering medicine/tests/procedures, interpreting the imaging/labs previously done) and coordination of care as well as direct time with the patient today, of which greater than 50% of the time was spent in counseling and coordination of care with the patient/family.    I discussed the risks of ongoing cigarette smoking and reviewed the benefits of quitting and risk of new lung primary, heart disease, stroke, among other risks and cancers. Reviewed options including support, quit date, medications, and family support. Patient voiced understanding and willingness to try to quit. Patient was told to notify their family practitioner for additional management. Greater than 3 minutes were needed for discussion of tobacco dependence and quitting counselling.      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 Discuss with [facility] [place] [facility]*    Follow Up: Every 8 weeks while on systemic therapy (all virtual except post CT visits) schedule more*, will see him once in between CT scans    Infusion chairs are at the [facility]    All questions were  answered to the patient's satisfaction during this encounter. The patient knows the contact information for our office and knows to reach out for any relevant concerns related to this encounter. They are to call for any temperature 100.4 or higher, new symptoms including but not restricted to shaking chills, decreased appetite, nausea, vomiting, diarrhea, increased fatigue, shortness of breath or chest pain, confusion, and not feeling the strength to come to the clinic. For all other listed problems and medical diagnosis in their chart - they are managed by PCP and/or other specialists, which the patient acknowledges. Thank you very much for your consultation and making us a part of this patient's care. We are continuing to follow closely with you. Please do not hesitate to reach out to me with any additional questions or concerns.    Josue Bledsoe MD  Hematology & Medical Oncology Staff Physician             Disclaimer: This document was prepared using [employer] Direct technology. If a word or phrase is confusing, or does not make sense, this is likely due to recognition error which was not discovered during this clinician's review. If you believe an error has occurred, please contact me through [employer] service line for lee?cation.      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)   6/22/2023 Initial Diagnosis    Malignant neoplasm of sigmoid colon (HCC)     9/3/2023 -  Cancer Staged    Staging form: Colon and Rectum, AJCC 8th Edition  - Clinical: Stage Unknown (cTX, cN0, pM1) - Signed by Josue Bledsoe MD on 9/3/2023  Total positive nodes: 0       Small cell carcinoma (HCC)   7/18/2023 Initial Diagnosis    Small cell carcinoma (HCC)     7/26/2023 -  Chemotherapy    alteplase (CATHFLO), 2 mg, Intracatheter, Every 1 Minute as needed, 21 of 24 cycles  etoposide (TOPOSAR), 80 mg/m2 = 160.8 mg, Intravenous, Once, 4 of 4  cycles  Administration: 160.8 mg (7/26/2023), 160.8 mg (7/27/2023), 160.8 mg (7/28/2023), 160.8 mg (8/16/2023), 160.8 mg (8/17/2023), 160.8 mg (8/18/2023), 160.8 mg (9/6/2023), 160.8 mg (9/7/2023), 160.8 mg (9/8/2023), 160.8 mg (9/27/2023), 160.8 mg (9/28/2023), 160.8 mg (9/29/2023)  CARBOplatin (PARAPLATIN) IVPB ([facility] AUC DOSING), 750 mg, Intravenous, Once, 4 of 4 cycles  Administration: 750 mg (7/26/2023), 700.5 mg (8/16/2023), 694 mg (9/6/2023), 694 mg (9/27/2023)  durvalumab (IMFINZI) IVPB, 1,500 mg, Intravenous, Once, 21 of 24 cycles  Administration: 1,500 mg (7/26/2023), 1,500 mg (10/20/2023), 1,500 mg (8/16/2023), 1,500 mg (9/6/2023), 1,500 mg (9/27/2023), 1,500 mg (11/17/2023), 1,500 mg (12/15/2023), 1,500 mg (1/12/2024), 1,500 mg (2/9/2024), 1,500 mg (3/8/2024), 1,500 mg (4/5/2024), 1,500 mg (5/3/2024), 1,500 mg (5/31/2024), 1,500 mg (6/28/2024), 1,500 mg (7/26/2024), 1,500 mg (8/30/2024), 1,500 mg (9/27/2024), 1,500 mg (10/25/2024), 1,500 mg (11/22/2024), 1,500 mg (12/20/2024), 1,500 mg (1/17/2025)  aprepitant (CINVANTI) in  mL IVPB, 130 mg, Intravenous, Once, 4 of 4 cycles  Administration: 130 mg (7/26/2023), 130 mg (8/16/2023), 130 mg (9/6/2023), 130 mg (9/27/2023)       5/21/2023: BRBPR and pain in the lower abdomen for a week; leading to a ER visit, dx of diverticulitis     SUBJECTIVE  (INTERVAL HISTORY)      Clotting History None    Bleeding History None   Cancer History Colon   Family Cancer History pGrandfather (lung, smoker)   H/O Blood/Plt Transfusion None   Tobacco/etoh/drug abuse 1/2 PPD x 25 years (working on quitting and thinking about it; has nicotine patches), no etoh abuse or rec drug use       Cancer Screening history n/a   Occupation IT       Interval events:    Doing vaping and hasn't smoked since 11/20/2024. Having numbness in his thumbs/feet (intermittent, no clear pattern).    No acute issues at this time. Work is good. Has 1-2 days of fatigue after his infusions, getting a bit  less.    I have reviewed the relevant past medical, surgical, social and family history. I have also reviewed allergies and medications for this patient.    Review of Systems    Baseline weight: 200 lbs    Denies F/C, N/V, SOB, CP, LH, HA, rash, itching, gen weakness, melena, hematuria, hematochezia, falls, diarrhea, or constipation       A 10-point review of system was performed, pertinent positive and negative were detailed as above. Otherwise, the 10-point review of system was negative.      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, chemotherapy, immunotherapy    Mixed hyperlipidemia 10/19/2023    10/19/2023-cholesterol 226, HDL 29    Pulmonary nodules 03/23/2024    Per coding guidelines from note dated 10/16/2023       Past Surgical History:   Procedure Laterality Date    COLON SIGMOID RESECTION LAPAROSCOPIC N/A 6/8/2023    Procedure: RESECTION COLON SIGMOID LAPAROSCOPIC HAND-ASSISTED;  Surgeon: Roland Connelly MD;  Location: [facility];  Service: General    IR DRAINAGE TUBE CHECK/CHANGE/REPOSITION/REINSERTION/UPSIZE  6/5/2023    IR DRAINAGE TUBE PLACEMENT  5/30/2023    IR PORT PLACEMENT  7/28/2023       Family History   Problem Relation Age of Onset    No Known Problems Mother     Hypertension Father     Diabetes Father     Heart attack Father 42    Stroke Father        Social History     Socioeconomic History    Marital status: /Civil Union     Spouse name: Not on file    Number of children: Not on file    Years of education: Not on file    Highest education level: Not on file   Occupational History    Not on file   Tobacco Use    Smoking status: Every Day     Current packs/day: 0.75     Average packs/day: 0.8 packs/day for 25.0 years (18.8 ttl pk-yrs)     Types: Cigarettes     Passive exposure: Current (wife smokes)    Smokeless tobacco: Not on file    Tobacco comments:     Patient states Chantix was not  effective.   Vaping Use    Vaping status: Never Used   Substance and Sexual Activity    Alcohol use: Not Currently    Drug use: Never    Sexual activity: Not on file   Other Topics Concern    Not on file   Social History Narrative     since 2011.    4 children aged 18, 17, 16, 6 as of 10/23.  Second child is a girl, the rest boys.    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    Wife is in the [employer].    Enjoys hiking and hunting.    And video games.     Social Drivers of Health     Financial Resource Strain: Not on file   Food Insecurity: No Food Insecurity (5/31/2023)    Hunger Vital Sign     Worried About Running Out of Food in the Last Year: Never true     Ran Out of Food in the Last Year: Never true   Transportation Needs: No Transportation Needs (5/31/2023)    PRAPARE - Transportation     Lack of Transportation (Medical): No     Lack of Transportation (Non-Medical): No   Physical Activity: Not on file   Stress: Not on file   Social Connections: Unknown (6/18/2024)    Received from [facility]    Social Connections     How often do you feel lonely or isolated from those around you? (Adult - for ages 18 years and over): Not on file   Intimate Partner Violence: Not on file   Housing Stability: Low Risk  (5/31/2023)    Housing Stability Vital Sign     Unable to Pay for Housing in the Last Year: No     Number of Places Lived in the Last Year: 1     Unstable Housing in the Last Year: No       No Known Allergies    Current Outpatient Medications   Medication Sig Dispense Refill    atorvastatin (LIPITOR) 10 mg tablet Take 1 tablet (10 mg total) by mouth daily 90 tablet 3     No current facility-administered medications for this visit.       (Not in a hospital admission)      Objective:     24 Hour Vitals Assessment:     There were no vitals filed for this visit.    Below not updated as this was a telemed visit    PHYSICIAN EXAM:    General: Appearance: alert, cooperative, no distress.  HEENT: Normocephalic,  atraumatic. No scleral icterus. conjunctivae clear. EOMI.  Chest: No tenderness to palpation. No open wound noted.  Lungs: Diminished BS b/l, otherwise clear to auscultation bilaterally, Respirations unlabored.  Cardiac: Regular rate, +S1and S2  Abdomen: Soft, non-tender, non-distended. Bowel sounds are normal.   Extremities:  No edema, cyanosis, clubbing.  Skin: Skin color, turgor are normal. No rashes.  Lymphatics: no palpable supra-cervical, axillary, or inguinal adenopathy  Neurologic: Awake, Alert, and oriented, no gross focal deficits noted b/l.       DATA REVIEW:    Pathology Result:    Final Diagnosis   Date Value Ref Range Status   06/08/2023   Final    A.  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       Comment:     This is an appended report. These results have been appended to a previously preliminary verified report.        Image Results:   Image result are reviewed and documented in Hematology/Oncology history    CT chest abdomen pelvis w contrast  Narrative: CT CHEST, ABDOMEN AND PELVIS WITH IV CONTRAST    INDICATION:   Malignant carcinoid tumor of the sigmoid colon. .    COMPARISON: 11/15/2024.    TECHNIQUE: CT examination of the chest, abdomen and pelvis was performed. Multiplanar 2D reformatted images were created from the source data.    This examination, like all CT scans performed in the [facility], was performed utilizing techniques to minimize radiation dose exposure, including the use of iterative reconstruction and automated exposure control. Radiation dose length   product (DLP) for this visit:    IV Contrast: 100 cc of Omnipaque 350  Enteric Contrast: Enteric contrast was not administered.    FINDINGS:    CHEST    LUNGS: Stable pulmonary nodules. Stable 4 mm fissural nodule in the right middle lobe image 58 series 4. Stable 4 mm subpleural nodule in the right middle lobe image 74 series 4. Stable 3 mm  subpleural nodule in the left upper lobe image 64 series 4.   Stable 4 mm subpleural nodule in the left lower lobe image 88 series 4. There is no tracheal or endobronchial lesion.    PLEURA:  Unremarkable.    HEART/GREAT VESSELS: Heart is unremarkable for patient's age. Minimal coronary artery calcification. No thoracic aortic aneurysm.    MEDIASTINUM AND FABRICIO:  Unremarkable.    CHEST WALL AND LOWER NECK:  Unremarkable. Right anterior chest wall Port-A-Cath    ABDOMEN    LIVER/BILIARY TREE:  Liver is diffusely decreased in density consistent with fatty change.  No CT evidence of suspicious hepatic mass.  Normal hepatic contours.  No biliary dilatation.    GALLBLADDER:  No calcified gallstones. No pericholecystic inflammatory change.    SPLEEN: Borderline splenomegaly measuring 13 cm.    PANCREAS:  Unremarkable.    ADRENAL GLANDS:  Unremarkable.    KIDNEYS/URETERS:  Unremarkable. No hydronephrosis.    STOMACH AND BOWEL: Normal caliber bowel loops. No obstruction. Mild luminal dilation of the proximal sigmoid colon just proximal to the known sigmoid lesion measuring 5 cm in diameter without obvious obstructive changes possibly peristaltic in nature   best seen on image 202 series 2.     APPENDIX:  No findings to suggest appendicitis.    ABDOMINOPELVIC CAVITY:  No ascites.  No pneumoperitoneum.  No lymphadenopathy. Previous peritoneal lesions are no longer visualized.    VESSELS:  Atherosclerotic changes are present.  No evidence of aneurysm.    PELVIS    REPRODUCTIVE ORGANS:  Unremarkable for patient's age.    URINARY BLADDER: Mild bladder wall thickening.    ABDOMINAL WALL/INGUINAL REGIONS:  Unremarkable.    OSSEOUS STRUCTURES:  No acute fracture or destructive osseous lesion.  Impression: 1. No interval change since previous study. No abdominal or pelvic lymphadenopathy. No peritoneal lesions. Mild luminal dilation of the proximal sigmoid just proximal to the known sigmoid lesion measuring 5 cm in diameter is  "nonspecific and may represent   peristaltic activity. No evidence of proximal obstruction.    2. Stable scattered pulmonary nodules    Electronically signed: 02/04/2025 12:04 PM Karson Dumont MD      LABS:  Lab data are reviewed and documented in [facility] history.       Lab Results   Component Value Date    HGB 16.0 01/16/2025    HCT 47.9 01/16/2025    MCV 93 01/16/2025     01/16/2025    WBC 6.45 01/16/2025    NRBC 0 01/16/2025     Lab Results   Component Value Date    K 4.3 01/16/2025     01/16/2025    CO2 29 01/16/2025    BUN 14 01/16/2025    CREATININE 0.88 01/16/2025    GLUF 102 (H) 08/29/2024    CALCIUM 9.4 01/16/2025    CORRECTEDCA 9.1 06/09/2023    AST 24 01/16/2025    ALT 38 01/16/2025    ALKPHOS 57 01/16/2025    EGFR 105 01/16/2025       No results found for: \"IRON\", \"TIBC\", \"FERRITIN\"    No results found for: \"[id number]\"    No results for input(s): \"WBC\", \"CREAT\", \"PLT\" in the last 72 hours.          By:  Josue Bledsoe MD, 2/10/2025, 9:51 AM                                  "

## 2025-02-03 ENCOUNTER — HOSPITAL ENCOUNTER (OUTPATIENT)
Dept: CT IMAGING | Facility: HOSPITAL | Age: 45
Discharge: HOME | End: 2025-02-03
Attending: INTERNAL MEDICINE
Payer: OTHER GOVERNMENT

## 2025-02-03 DIAGNOSIS — C7A.025 MALIGNANT CARCINOID TUMOR OF SIGMOID COLON (HCC): ICD-10-CM

## 2025-02-03 PROCEDURE — 74177 CT ABD & PELVIS W/CONTRAST: CPT

## 2025-02-03 PROCEDURE — 71260 CT THORAX DX C+: CPT

## 2025-02-03 RX ADMIN — IOHEXOL 100 ML: 350 INJECTION, SOLUTION INTRAVENOUS at 08:59

## 2025-02-10 ENCOUNTER — TELEMEDICINE (OUTPATIENT)
Dept: HEMATOLOGY ONCOLOGY | Facility: CLINIC | Age: 45
End: 2025-02-10
Payer: OTHER GOVERNMENT

## 2025-02-10 DIAGNOSIS — C7A.025 MALIGNANT CARCINOID TUMOR OF SIGMOID COLON (HCC): Primary | ICD-10-CM

## 2025-02-10 DIAGNOSIS — C80.1 SMALL CELL CARCINOMA (HCC): ICD-10-CM

## 2025-02-10 DIAGNOSIS — Z71.6 ENCOUNTER FOR TOBACCO USE CESSATION COUNSELING: Chronic | ICD-10-CM

## 2025-02-10 PROCEDURE — 99215 OFFICE O/P EST HI 40 MIN: CPT | Performed by: INTERNAL MEDICINE

## 2025-02-10 RX ORDER — SODIUM CHLORIDE 9 MG/ML
20 INJECTION, SOLUTION INTRAVENOUS ONCE
OUTPATIENT
Start: 2025-03-21

## 2025-02-10 RX ORDER — SODIUM CHLORIDE 9 MG/ML
20 INJECTION, SOLUTION INTRAVENOUS ONCE
OUTPATIENT
Start: 2025-05-16

## 2025-02-10 RX ORDER — SODIUM CHLORIDE 9 MG/ML
20 INJECTION, SOLUTION INTRAVENOUS ONCE
OUTPATIENT
Start: 2025-04-18

## 2025-02-13 ENCOUNTER — HOSPITAL ENCOUNTER (OUTPATIENT)
Dept: INFUSION CENTER | Facility: HOSPITAL | Age: 45
End: 2025-02-13
Payer: OTHER GOVERNMENT

## 2025-02-13 VITALS — TEMPERATURE: 96.8 F

## 2025-02-13 DIAGNOSIS — Z45.2 ENCOUNTER FOR CARE RELATED TO PORT-A-CATH: Primary | ICD-10-CM

## 2025-02-13 DIAGNOSIS — E03.2 DRUG-INDUCED HYPOTHYROIDISM: ICD-10-CM

## 2025-02-13 DIAGNOSIS — C7A.025 MALIGNANT CARCINOID TUMOR OF SIGMOID COLON (HCC): Primary | ICD-10-CM

## 2025-02-13 DIAGNOSIS — C80.1 SMALL CELL CARCINOMA (HCC): ICD-10-CM

## 2025-02-13 LAB
ALBUMIN SERPL BCG-MCNC: 4.5 G/DL (ref 3.5–5)
ALP SERPL-CCNC: 64 U/L (ref 34–104)
ALT SERPL W P-5'-P-CCNC: 35 U/L (ref 7–52)
ANION GAP SERPL CALCULATED.3IONS-SCNC: 8 MMOL/L (ref 4–13)
AST SERPL W P-5'-P-CCNC: 22 U/L (ref 13–39)
BASOPHILS # BLD AUTO: 0.04 THOUSANDS/ÂΜL (ref 0–0.1)
BASOPHILS NFR BLD AUTO: 1 % (ref 0–1)
BILIRUB SERPL-MCNC: 0.54 MG/DL (ref 0.2–1)
BUN SERPL-MCNC: 12 MG/DL (ref 5–25)
CALCIUM SERPL-MCNC: 9.1 MG/DL (ref 8.4–10.2)
CHLORIDE SERPL-SCNC: 103 MMOL/L (ref 96–108)
CO2 SERPL-SCNC: 27 MMOL/L (ref 21–32)
CREAT SERPL-MCNC: 1.03 MG/DL (ref 0.6–1.3)
EOSINOPHIL # BLD AUTO: 0.14 THOUSAND/ÂΜL (ref 0–0.61)
EOSINOPHIL NFR BLD AUTO: 2 % (ref 0–6)
ERYTHROCYTE [DISTWIDTH] IN BLOOD BY AUTOMATED COUNT: 13.1 % (ref 11.6–15.1)
GFR SERPL CREATININE-BSD FRML MDRD: 89 ML/MIN/1.73SQ M
GLUCOSE SERPL-MCNC: 107 MG/DL (ref 65–140)
HCT VFR BLD AUTO: 48.5 % (ref 36.5–49.3)
HGB BLD-MCNC: 16.3 G/DL (ref 12–17)
IMM GRANULOCYTES # BLD AUTO: 0.01 THOUSAND/UL (ref 0–0.2)
IMM GRANULOCYTES NFR BLD AUTO: 0 % (ref 0–2)
LYMPHOCYTES # BLD AUTO: 1.56 THOUSANDS/ÂΜL (ref 0.6–4.47)
LYMPHOCYTES NFR BLD AUTO: 26 % (ref 14–44)
MCH RBC QN AUTO: 31.5 PG (ref 26.8–34.3)
MCHC RBC AUTO-ENTMCNC: 33.6 G/DL (ref 31.4–37.4)
MCV RBC AUTO: 94 FL (ref 82–98)
MONOCYTES # BLD AUTO: 0.67 THOUSAND/ÂΜL (ref 0.17–1.22)
MONOCYTES NFR BLD AUTO: 11 % (ref 4–12)
NEUTROPHILS # BLD AUTO: 3.57 THOUSANDS/ÂΜL (ref 1.85–7.62)
NEUTS SEG NFR BLD AUTO: 60 % (ref 43–75)
NRBC BLD AUTO-RTO: 0 /100 WBCS
PLATELET # BLD AUTO: 198 THOUSANDS/UL (ref 149–390)
PMV BLD AUTO: 11.2 FL (ref 8.9–12.7)
POTASSIUM SERPL-SCNC: 4.3 MMOL/L (ref 3.5–5.3)
PROT SERPL-MCNC: 7.3 G/DL (ref 6.4–8.4)
RBC # BLD AUTO: 5.17 MILLION/UL (ref 3.88–5.62)
SODIUM SERPL-SCNC: 138 MMOL/L (ref 135–147)
T4 FREE SERPL-MCNC: 0.5 NG/DL (ref 0.61–1.12)
TSH SERPL DL<=0.05 MIU/L-ACNC: 49.26 UIU/ML (ref 0.45–4.5)
WBC # BLD AUTO: 5.99 THOUSAND/UL (ref 4.31–10.16)

## 2025-02-13 PROCEDURE — 80053 COMPREHEN METABOLIC PANEL: CPT | Performed by: INTERNAL MEDICINE

## 2025-02-13 PROCEDURE — 85025 COMPLETE CBC W/AUTO DIFF WBC: CPT | Performed by: INTERNAL MEDICINE

## 2025-02-13 PROCEDURE — 84443 ASSAY THYROID STIM HORMONE: CPT | Performed by: INTERNAL MEDICINE

## 2025-02-13 PROCEDURE — 84439 ASSAY OF FREE THYROXINE: CPT | Performed by: INTERNAL MEDICINE

## 2025-02-13 RX ORDER — LEVOTHYROXINE SODIUM 25 UG/1
25 TABLET ORAL DAILY
Qty: 30 TABLET | Refills: 1 | Status: SHIPPED | OUTPATIENT
Start: 2025-02-13 | End: 2025-03-15

## 2025-02-13 NOTE — PROGRESS NOTES
Central labs done. Port flushed per protocol. AVS declined, he is aware of his appointment 2-14-25 at 830

## 2025-02-14 ENCOUNTER — HOSPITAL ENCOUNTER (OUTPATIENT)
Dept: INFUSION CENTER | Facility: HOSPITAL | Age: 45
End: 2025-02-14
Attending: INTERNAL MEDICINE
Payer: OTHER GOVERNMENT

## 2025-02-14 VITALS
TEMPERATURE: 96.8 F | OXYGEN SATURATION: 98 % | RESPIRATION RATE: 16 BRPM | HEART RATE: 78 BPM | SYSTOLIC BLOOD PRESSURE: 147 MMHG | DIASTOLIC BLOOD PRESSURE: 84 MMHG

## 2025-02-14 DIAGNOSIS — C80.1 SMALL CELL CARCINOMA (HCC): Primary | ICD-10-CM

## 2025-02-14 PROCEDURE — 96413 CHEMO IV INFUSION 1 HR: CPT

## 2025-02-14 RX ORDER — SODIUM CHLORIDE 9 MG/ML
20 INJECTION, SOLUTION INTRAVENOUS ONCE
Status: COMPLETED | OUTPATIENT
Start: 2025-02-14 | End: 2025-02-14

## 2025-02-14 RX ADMIN — DURVALUMAB 1500 MG: 500 INJECTION, SOLUTION INTRAVENOUS at 09:12

## 2025-02-14 RX ADMIN — SODIUM CHLORIDE 20 ML/HR: 0.9 INJECTION, SOLUTION INTRAVENOUS at 09:09

## 2025-02-14 NOTE — PROGRESS NOTES
TSH resulted at 49.263 and T4 resulted at 0.50 Dr. العلي recommended to start levothyroxine and okay to be treated as scheduled on 2/14/25.

## 2025-02-14 NOTE — PROGRESS NOTES
Received message from  vinicio De La Fuente RN that patient is unable to attend treatment on 3/14/25 and is asking if he can be treated on 3/21/25. Reviewed that will move all treatment dates back a week. Patient agreeable.

## 2025-02-14 NOTE — PROGRESS NOTES
Recent labs reviewed.  Patient tolerated Imfinzi treatment without reaction or issues.  Kwakumaia Gonzalez Thais  tolerated treatment well with no complications.  Patient is requesting that his next treatment be scheduled for 3/21.  Message sent to Allison Siddiqui RN date changed on plan.    Kwaku Plascencia is aware of future appt on 3/21/25 at 0900.     AVS -  No (Declined by Kwaku Plascencia) Patient has Mychart.    Patient ambulated off unit without incident.  All personal belongings taken with patient.

## 2025-02-17 ENCOUNTER — OFFICE VISIT (OUTPATIENT)
Dept: FAMILY MEDICINE CLINIC | Facility: CLINIC | Age: 45
End: 2025-02-17
Payer: OTHER GOVERNMENT

## 2025-02-17 VITALS
WEIGHT: 211.2 LBS | BODY MASS INDEX: 33.15 KG/M2 | HEART RATE: 100 BPM | DIASTOLIC BLOOD PRESSURE: 80 MMHG | HEIGHT: 67 IN | SYSTOLIC BLOOD PRESSURE: 130 MMHG | TEMPERATURE: 97.8 F | OXYGEN SATURATION: 96 %

## 2025-02-17 DIAGNOSIS — E03.9 ACQUIRED HYPOTHYROIDISM: ICD-10-CM

## 2025-02-17 DIAGNOSIS — C7A.025 MALIGNANT CARCINOID TUMOR OF SIGMOID COLON (HCC): ICD-10-CM

## 2025-02-17 DIAGNOSIS — E66.811 OBESITY, CLASS I, BMI 30.0-34.9 (SEE ACTUAL BMI): ICD-10-CM

## 2025-02-17 DIAGNOSIS — Z23 NEED FOR COVID-19 VACCINE: ICD-10-CM

## 2025-02-17 DIAGNOSIS — F17.201 TOBACCO ABUSE, IN REMISSION: ICD-10-CM

## 2025-02-17 DIAGNOSIS — E78.2 MIXED HYPERLIPIDEMIA: ICD-10-CM

## 2025-02-17 DIAGNOSIS — Z00.00 HEALTH MAINTENANCE EXAMINATION: Primary | ICD-10-CM

## 2025-02-17 PROBLEM — Z71.6 ENCOUNTER FOR TOBACCO USE CESSATION COUNSELING: Chronic | Status: RESOLVED | Noted: 2024-02-06 | Resolved: 2025-02-17

## 2025-02-17 PROBLEM — Z72.0 TOBACCO ABUSE: Status: RESOLVED | Noted: 2017-03-09 | Resolved: 2025-02-17

## 2025-02-17 PROCEDURE — 90480 ADMN SARSCOV2 VAC 1/ONLY CMP: CPT | Performed by: FAMILY MEDICINE

## 2025-02-17 PROCEDURE — 99396 PREV VISIT EST AGE 40-64: CPT | Performed by: FAMILY MEDICINE

## 2025-02-17 PROCEDURE — 99214 OFFICE O/P EST MOD 30 MIN: CPT | Performed by: FAMILY MEDICINE

## 2025-02-17 PROCEDURE — 91320 SARSCV2 VAC 30MCG TRS-SUC IM: CPT | Performed by: FAMILY MEDICINE

## 2025-02-17 NOTE — PROGRESS NOTES
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  COVID booster and flu shot given.  Check lipid profile with his next blood draw for his thyroid.  Congratulated on smoking cessation.  Recheck 1 year or as needed.      History of Present Illness     HPI  Here for annual physical exam and follow-up of hyperlipidemia, carcinoid, and now hypothyroidism.  Hypothyroidism likely related to the current use of durvalumab which she is getting for his carcinoid tumor.  Most recent CT done 2 weeks ago revealed no interval change from previous study.  5 cm lesion in the sigmoid colon.  He is not smoking since just prior to .  Overall, feeling well.      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, chemotherapy, immunotherapy   • Mixed hyperlipidemia 10/19/2023    10/19/2023-cholesterol 226, HDL 29   • Pulmonary nodules 2024    Per coding guidelines from note dated 10/16/2023     Past Surgical History:   Procedure Laterality Date   • COLON SIGMOID RESECTION LAPAROSCOPIC N/A 2023    Procedure: RESECTION COLON SIGMOID LAPAROSCOPIC HAND-ASSISTED;  Surgeon: Roland Connelly MD;  Location: [facility];  Service: General   • IR DRAINAGE  "TUBE CHECK/CHANGE/REPOSITION/REINSERTION/UPSIZE  6/5/2023   • IR DRAINAGE TUBE PLACEMENT  5/30/2023   • IR PORT PLACEMENT  7/28/2023     Social History     Social History Narrative     since 2011.    4 children aged 19, 18, 17, 8 as of 2/25.  Second child is a girl, the rest boys. Oldest in basic training for the [employer].  Third child enlisted in the [employer].      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    Wife is in the [employer].    Enjoys hiking and hunting.    And video games.     Current Outpatient Medications on File Prior to Visit   Medication Sig   • levothyroxine (Synthroid) 25 mcg tablet Take 1 tablet (25 mcg total) by mouth daily   • atorvastatin (LIPITOR) 10 mg tablet Take 1 tablet (10 mg total) by mouth daily (Patient not taking: Reported on 2/17/2025)     No Known Allergies  Immunization History   Administered Date(s) Administered   • COVID-19 PFIZER VACCINE 0.3 ML IM 02/23/2021, 03/16/2021   • INFLUENZA 03/09/2017, 10/16/2023   • Influenza, injectable, quadrivalent, preservative free 0.5 mL 10/16/2023   • Pneumococcal Conjugate Vaccine 20-valent (Pcv20), Polysace 10/16/2023   • Tdap 05/28/2013     Objective   /80   Pulse 100   Temp 97.8 °F (36.6 °C) (Temporal)   Ht 5' 6.5\" (1.689 m)   Wt 95.8 kg (211 lb 3.2 oz)   SpO2 96%   BMI 33.58 kg/m²     Physical Exam  Healthy appearing individual in no acute distress.  Extraocular motions are intact.   Hearing is grossly intact.  Throat reveals no erythema.  Teeth are in good repair.  No neck nodes or thyromegaly.  Lungs are clear.  Heart regular with no murmurs or gallops.  Abdomen is soft and nontender.  No leg edema.  Skin reveals no apparent rash.  Neurologic grossly within normal limits.  Normal mood and affect.  Musculoskeletal exam grossly within normal limits.      "

## 2025-02-17 NOTE — PATIENT INSTRUCTIONS
Health maintenance is performed.  COVID booster and flu shot given.  Check lipid profile with his next blood draw for his thyroid.  Congratulated on smoking cessation.  Recheck 1 year or as needed.

## 2025-03-20 ENCOUNTER — RESULTS FOLLOW-UP (OUTPATIENT)
Dept: HEMATOLOGY ONCOLOGY | Facility: CLINIC | Age: 45
End: 2025-03-20

## 2025-03-20 ENCOUNTER — HOSPITAL ENCOUNTER (OUTPATIENT)
Dept: INFUSION CENTER | Facility: HOSPITAL | Age: 45
Discharge: HOME | End: 2025-03-20
Payer: OTHER GOVERNMENT

## 2025-03-20 DIAGNOSIS — C80.1 SMALL CELL CARCINOMA (HCC): ICD-10-CM

## 2025-03-20 DIAGNOSIS — E03.2 DRUG-INDUCED HYPOTHYROIDISM: ICD-10-CM

## 2025-03-20 DIAGNOSIS — Z45.2 ENCOUNTER FOR CARE RELATED TO PORT-A-CATH: Primary | ICD-10-CM

## 2025-03-20 DIAGNOSIS — C7A.025 MALIGNANT CARCINOID TUMOR OF SIGMOID COLON (HCC): Primary | ICD-10-CM

## 2025-03-20 LAB
ALBUMIN SERPL BCG-MCNC: 4.7 G/DL (ref 3.5–5)
ALP SERPL-CCNC: 54 U/L (ref 34–104)
ALT SERPL W P-5'-P-CCNC: 45 U/L (ref 7–52)
ANION GAP SERPL CALCULATED.3IONS-SCNC: 8 MMOL/L (ref 4–13)
AST SERPL W P-5'-P-CCNC: 31 U/L (ref 13–39)
BASOPHILS # BLD AUTO: 0.03 THOUSANDS/ÂΜL (ref 0–0.1)
BASOPHILS NFR BLD AUTO: 1 % (ref 0–1)
BILIRUB SERPL-MCNC: 0.9 MG/DL (ref 0.2–1)
BUN SERPL-MCNC: 16 MG/DL (ref 5–25)
CALCIUM SERPL-MCNC: 9.2 MG/DL (ref 8.4–10.2)
CHLORIDE SERPL-SCNC: 99 MMOL/L (ref 96–108)
CO2 SERPL-SCNC: 28 MMOL/L (ref 21–32)
CREAT SERPL-MCNC: 0.99 MG/DL (ref 0.6–1.3)
EOSINOPHIL # BLD AUTO: 0.08 THOUSAND/ÂΜL (ref 0–0.61)
EOSINOPHIL NFR BLD AUTO: 1 % (ref 0–6)
ERYTHROCYTE [DISTWIDTH] IN BLOOD BY AUTOMATED COUNT: 13.2 % (ref 11.6–15.1)
GFR SERPL CREATININE-BSD FRML MDRD: 93 ML/MIN/1.73SQ M
GLUCOSE SERPL-MCNC: 130 MG/DL (ref 65–140)
HCT VFR BLD AUTO: 47.3 % (ref 36.5–49.3)
HGB BLD-MCNC: 15.3 G/DL (ref 12–17)
IMM GRANULOCYTES # BLD AUTO: 0.02 THOUSAND/UL (ref 0–0.2)
IMM GRANULOCYTES NFR BLD AUTO: 0 % (ref 0–2)
LYMPHOCYTES # BLD AUTO: 1.4 THOUSANDS/ÂΜL (ref 0.6–4.47)
LYMPHOCYTES NFR BLD AUTO: 23 % (ref 14–44)
MCH RBC QN AUTO: 30.6 PG (ref 26.8–34.3)
MCHC RBC AUTO-ENTMCNC: 32.3 G/DL (ref 31.4–37.4)
MCV RBC AUTO: 95 FL (ref 82–98)
MONOCYTES # BLD AUTO: 0.57 THOUSAND/ÂΜL (ref 0.17–1.22)
MONOCYTES NFR BLD AUTO: 9 % (ref 4–12)
NEUTROPHILS # BLD AUTO: 4 THOUSANDS/ÂΜL (ref 1.85–7.62)
NEUTS SEG NFR BLD AUTO: 66 % (ref 43–75)
NRBC BLD AUTO-RTO: 0 /100 WBCS
PLATELET # BLD AUTO: 209 THOUSANDS/UL (ref 149–390)
PMV BLD AUTO: 10.4 FL (ref 8.9–12.7)
POTASSIUM SERPL-SCNC: 3.8 MMOL/L (ref 3.5–5.3)
PROT SERPL-MCNC: 7.2 G/DL (ref 6.4–8.4)
RBC # BLD AUTO: 5 MILLION/UL (ref 3.88–5.62)
SODIUM SERPL-SCNC: 135 MMOL/L (ref 135–147)
T4 FREE SERPL-MCNC: 0.49 NG/DL (ref 0.61–1.12)
TSH SERPL DL<=0.05 MIU/L-ACNC: 51.25 UIU/ML (ref 0.45–4.5)
WBC # BLD AUTO: 6.1 THOUSAND/UL (ref 4.31–10.16)

## 2025-03-20 PROCEDURE — 84439 ASSAY OF FREE THYROXINE: CPT | Performed by: INTERNAL MEDICINE

## 2025-03-20 PROCEDURE — 85025 COMPLETE CBC W/AUTO DIFF WBC: CPT | Performed by: INTERNAL MEDICINE

## 2025-03-20 PROCEDURE — 84443 ASSAY THYROID STIM HORMONE: CPT | Performed by: INTERNAL MEDICINE

## 2025-03-20 PROCEDURE — 80053 COMPREHEN METABOLIC PANEL: CPT | Performed by: INTERNAL MEDICINE

## 2025-03-20 RX ORDER — LEVOTHYROXINE SODIUM 50 UG/1
50 TABLET ORAL DAILY
Qty: 30 TABLET | Refills: 2 | Status: SHIPPED | OUTPATIENT
Start: 2025-03-20 | End: 2025-04-19

## 2025-03-20 NOTE — TELEPHONE ENCOUNTER
----- Message from Rahel CRAFT sent at 3/20/2025  2:57 PM EDT -----  Called patient and scheduled visit.  ----- Message -----  From: Ale Siddiqui RN  Sent: 3/20/2025  10:49 AM EDT  To: [facility]    I need an appt telemed made mid July please  ----- Message -----  From: Josue Bledsoe MD  Sent: 3/20/2025  10:41 AM EDT  To: Ale Siddiqui RN; Debbie Crook MA    I need an appt telemed made mid July please  ----- Message -----  From: Lab, Background User  Sent: 3/20/2025  10:31 AM EDT  To: Josue Bledsoe MD

## 2025-03-20 NOTE — PROGRESS NOTES
Port flushed freely.  Good blood return noted. Pts labs drawn and great blood return. Port flushed again and Port deaccessed without issue.  Patient tolerated well.    Kwaku Plascencia is aware of future appt on 03/21/25 at 0900.     AVS  No (Declined by Kwaku Plascencia) pt has mychart       Patient ambulated off unit without incident.  All personal belongings taken with patient.

## 2025-03-21 ENCOUNTER — HOSPITAL ENCOUNTER (OUTPATIENT)
Dept: INFUSION CENTER | Facility: HOSPITAL | Age: 45
End: 2025-03-21
Attending: INTERNAL MEDICINE
Payer: OTHER GOVERNMENT

## 2025-03-21 ENCOUNTER — TELEPHONE (OUTPATIENT)
Age: 45
End: 2025-03-21

## 2025-03-21 VITALS
SYSTOLIC BLOOD PRESSURE: 166 MMHG | TEMPERATURE: 97 F | DIASTOLIC BLOOD PRESSURE: 74 MMHG | RESPIRATION RATE: 18 BRPM | HEART RATE: 85 BPM | OXYGEN SATURATION: 98 %

## 2025-03-21 DIAGNOSIS — C80.1 SMALL CELL CARCINOMA (HCC): Primary | ICD-10-CM

## 2025-03-21 PROCEDURE — 96413 CHEMO IV INFUSION 1 HR: CPT

## 2025-03-21 RX ORDER — SODIUM CHLORIDE 9 MG/ML
20 INJECTION, SOLUTION INTRAVENOUS ONCE
Status: COMPLETED | OUTPATIENT
Start: 2025-03-21 | End: 2025-03-21

## 2025-03-21 RX ADMIN — SODIUM CHLORIDE 20 ML/HR: 0.9 INJECTION, SOLUTION INTRAVENOUS at 09:29

## 2025-03-21 RX ADMIN — DURVALUMAB 1500 MG: 500 INJECTION, SOLUTION INTRAVENOUS at 09:32

## 2025-03-21 NOTE — TELEPHONE ENCOUNTER
Patient has ASAP referral. Unable to find urgent consult opening within time frame for ASAP    Please attempt to contact pt a total of 3 times to schedule (once per day). If unable to reach patient or patient declines scheduling, send in-basket message to referring provider to make them aware and close referral.

## 2025-03-21 NOTE — PROGRESS NOTES
Kwaku Plascencia  tolerated treatment well with no complications.      Kwaku Plascencia is aware of future appt on 4/17 at 8:30 am.     AVS declined by Kwaku Plascencia.

## 2025-04-17 ENCOUNTER — HOSPITAL ENCOUNTER (OUTPATIENT)
Dept: INFUSION CENTER | Facility: HOSPITAL | Age: 45
End: 2025-04-17
Payer: OTHER GOVERNMENT

## 2025-04-17 DIAGNOSIS — Z45.2 ENCOUNTER FOR CARE RELATED TO PORT-A-CATH: Primary | ICD-10-CM

## 2025-04-17 DIAGNOSIS — C80.1 SMALL CELL CARCINOMA (HCC): ICD-10-CM

## 2025-04-17 LAB
ALBUMIN SERPL BCG-MCNC: 4.5 G/DL (ref 3.5–5)
ALP SERPL-CCNC: 60 U/L (ref 34–104)
ALT SERPL W P-5'-P-CCNC: 54 U/L (ref 7–52)
ANION GAP SERPL CALCULATED.3IONS-SCNC: 8 MMOL/L (ref 4–13)
AST SERPL W P-5'-P-CCNC: 33 U/L (ref 13–39)
BASOPHILS # BLD AUTO: 0.02 THOUSANDS/ÂΜL (ref 0–0.1)
BASOPHILS NFR BLD AUTO: 0 % (ref 0–1)
BILIRUB SERPL-MCNC: 0.86 MG/DL (ref 0.2–1)
BUN SERPL-MCNC: 12 MG/DL (ref 5–25)
CALCIUM SERPL-MCNC: 9.2 MG/DL (ref 8.4–10.2)
CHLORIDE SERPL-SCNC: 101 MMOL/L (ref 96–108)
CO2 SERPL-SCNC: 29 MMOL/L (ref 21–32)
CREAT SERPL-MCNC: 1 MG/DL (ref 0.6–1.3)
EOSINOPHIL # BLD AUTO: 0.11 THOUSAND/ÂΜL (ref 0–0.61)
EOSINOPHIL NFR BLD AUTO: 2 % (ref 0–6)
ERYTHROCYTE [DISTWIDTH] IN BLOOD BY AUTOMATED COUNT: 12.8 % (ref 11.6–15.1)
GFR SERPL CREATININE-BSD FRML MDRD: 92 ML/MIN/1.73SQ M
GLUCOSE SERPL-MCNC: 103 MG/DL (ref 65–140)
HCT VFR BLD AUTO: 47 % (ref 36.5–49.3)
HGB BLD-MCNC: 15.7 G/DL (ref 12–17)
IMM GRANULOCYTES # BLD AUTO: 0.02 THOUSAND/UL (ref 0–0.2)
IMM GRANULOCYTES NFR BLD AUTO: 0 % (ref 0–2)
LYMPHOCYTES # BLD AUTO: 1.52 THOUSANDS/ÂΜL (ref 0.6–4.47)
LYMPHOCYTES NFR BLD AUTO: 26 % (ref 14–44)
MCH RBC QN AUTO: 31.2 PG (ref 26.8–34.3)
MCHC RBC AUTO-ENTMCNC: 33.4 G/DL (ref 31.4–37.4)
MCV RBC AUTO: 93 FL (ref 82–98)
MONOCYTES # BLD AUTO: 0.69 THOUSAND/ÂΜL (ref 0.17–1.22)
MONOCYTES NFR BLD AUTO: 12 % (ref 4–12)
NEUTROPHILS # BLD AUTO: 3.5 THOUSANDS/ÂΜL (ref 1.85–7.62)
NEUTS SEG NFR BLD AUTO: 60 % (ref 43–75)
NRBC BLD AUTO-RTO: 0 /100 WBCS
PLATELET # BLD AUTO: 193 THOUSANDS/UL (ref 149–390)
PMV BLD AUTO: 11.1 FL (ref 8.9–12.7)
POTASSIUM SERPL-SCNC: 3.8 MMOL/L (ref 3.5–5.3)
PROT SERPL-MCNC: 7.4 G/DL (ref 6.4–8.4)
RBC # BLD AUTO: 5.03 MILLION/UL (ref 3.88–5.62)
SODIUM SERPL-SCNC: 138 MMOL/L (ref 135–147)
T4 FREE SERPL-MCNC: 1.02 NG/DL (ref 0.61–1.12)
TSH SERPL DL<=0.05 MIU/L-ACNC: 16.59 UIU/ML (ref 0.45–4.5)
WBC # BLD AUTO: 5.86 THOUSAND/UL (ref 4.31–10.16)

## 2025-04-17 PROCEDURE — 85025 COMPLETE CBC W/AUTO DIFF WBC: CPT | Performed by: INTERNAL MEDICINE

## 2025-04-17 PROCEDURE — 84439 ASSAY OF FREE THYROXINE: CPT | Performed by: INTERNAL MEDICINE

## 2025-04-17 PROCEDURE — 80053 COMPREHEN METABOLIC PANEL: CPT | Performed by: INTERNAL MEDICINE

## 2025-04-17 PROCEDURE — 84443 ASSAY THYROID STIM HORMONE: CPT | Performed by: INTERNAL MEDICINE

## 2025-04-17 NOTE — PROGRESS NOTES
Kwaku Plascencia  tolerated  central lab draw treatment well with no complications.      Kwaku Plascencia is aware of future appt on 4/28 0900    AVS printed and given to Kwaku Plascencia:  No (Declined by Kwaku Plascencia)

## 2025-04-18 ENCOUNTER — HOSPITAL ENCOUNTER (OUTPATIENT)
Dept: INFUSION CENTER | Facility: HOSPITAL | Age: 45
End: 2025-04-18
Attending: INTERNAL MEDICINE
Payer: OTHER GOVERNMENT

## 2025-04-18 VITALS
RESPIRATION RATE: 16 BRPM | TEMPERATURE: 97.8 F | SYSTOLIC BLOOD PRESSURE: 144 MMHG | OXYGEN SATURATION: 96 % | HEART RATE: 81 BPM | DIASTOLIC BLOOD PRESSURE: 87 MMHG

## 2025-04-18 DIAGNOSIS — C80.1 SMALL CELL CARCINOMA (HCC): Primary | ICD-10-CM

## 2025-04-18 PROCEDURE — 96413 CHEMO IV INFUSION 1 HR: CPT

## 2025-04-18 RX ORDER — SODIUM CHLORIDE 9 MG/ML
20 INJECTION, SOLUTION INTRAVENOUS ONCE
Status: COMPLETED | OUTPATIENT
Start: 2025-04-18 | End: 2025-04-18

## 2025-04-18 RX ADMIN — SODIUM CHLORIDE 20 ML/HR: 0.9 INJECTION, SOLUTION INTRAVENOUS at 09:25

## 2025-04-18 RX ADMIN — DURVALUMAB 1500 MG: 500 INJECTION, SOLUTION INTRAVENOUS at 09:53

## 2025-04-18 NOTE — PROGRESS NOTES
Kwaku Plascencia  tolerated Imfinzi treatment well with no complications.      Kwaku Plascencia is aware of future appt on 5/15 at 8:30AM.     AVS printed and given to Kwaku Plascencia: No (Declined by Kwaku Plascencia).

## 2025-05-06 ENCOUNTER — CONSULT (OUTPATIENT)
Dept: ENDOCRINOLOGY | Facility: CLINIC | Age: 45
End: 2025-05-06
Payer: OTHER GOVERNMENT

## 2025-05-06 VITALS
DIASTOLIC BLOOD PRESSURE: 82 MMHG | OXYGEN SATURATION: 97 % | TEMPERATURE: 97.7 F | HEART RATE: 99 BPM | WEIGHT: 221.2 LBS | BODY MASS INDEX: 34.72 KG/M2 | HEIGHT: 67 IN | SYSTOLIC BLOOD PRESSURE: 122 MMHG | RESPIRATION RATE: 18 BRPM

## 2025-05-06 DIAGNOSIS — E03.2 DRUG-INDUCED HYPOTHYROIDISM: ICD-10-CM

## 2025-05-06 DIAGNOSIS — C80.1 SMALL CELL CARCINOMA (HCC): ICD-10-CM

## 2025-05-06 PROCEDURE — 99243 OFF/OP CNSLTJ NEW/EST LOW 30: CPT | Performed by: STUDENT IN AN ORGANIZED HEALTH CARE EDUCATION/TRAINING PROGRAM

## 2025-05-06 RX ORDER — LEVOTHYROXINE SODIUM 112 UG/1
112 TABLET ORAL DAILY
Qty: 90 TABLET | Refills: 1 | Status: SHIPPED | OUTPATIENT
Start: 2025-05-06

## 2025-05-06 NOTE — PROGRESS NOTES
Name: Kwaku Plascencia      : 1980      MRN: [id number]  Encounter Provider: Ercia Jaramillo MD  Encounter Date: 2025   Encounter department: [facility] [place] [facility]    Chief Complaint   Patient presents with   • Hypothyroidism   • Establish Care   :  Assessment & Plan  Drug-induced hypothyroidism    Hypothyroidism in setting of immune checkpoint inhibitors, which caused significant elevation of TSH with hypothyroidism symptoms, started on LT4 replacement therapy, doses was increased to 100 mcg daily with the most recent TSH level was 16.590 3 weeks ago.   I believe that he will need increase in his levothyroxine dose, I increased levothyroxine to 112 mcg daily, A prescription for levothyroxine 112 mcg daily has been provided,  Blood work will be ordered prior to his next Imfinzi injection to assess his response to the new levothyroxine dosage. If there is no improvement in his TSH levels, a further increase in the levothyroxine dosage may be considered.  Given family history of type 1 diabetes, thyroid autoantibody for evaluation for Hashimoto thyroiditis was ordered as well.      Follow-up  The patient will follow up in 4 months.  Orders:  •  Ambulatory Referral to Endocrinology  •  levothyroxine (Euthyrox) 112 mcg tablet; Take 1 tablet (112 mcg total) by mouth daily  •  TSH, 3rd generation with Free T4 reflex; Future  •  Thyroid Antibodies Panel    Small cell carcinoma (HCC)    Orders:  •  Ambulatory Referral to Endocrinology          History of Present Illness   History of Present Illness    Kwaku Plascencia is a 44 y.o. male who is referred by his oncologist, Dr. Bledsoe, for hypothyroidism.  He has been on immune checkpoint inhibitors Durvalumab ( Imfinzi) since , thyroid function test has been followed prior to each infusion, TFT in February showed TSH of 49.263 with free T4 of 0.50,   He reports was experiencing fatigue, necessitating daytime naps, and  "has noticed an increase in appetite and weight gain. He reports no changes in hair loss, sleep disturbances, constipation, dry skin, or dry mouth.he was started on levothyroxine 25 mcg daily, dosage was subsequently increased to 50 mcg daily as his TFT showed TSH of 51.249 with free T4 of 0.49 and recently increased to 100 mcg daily as TFT showed TSH of 16.590.  He takes his medication in the morning on an empty stomach and waits for more than an hour before consuming food or other medications. He typically skips breakfast and does not eat until lunchtime.  He has no known family history of hypothyroidism     He has been on Imfinzi since July 2023 for a carcinoid tumor of the sigmoid colon, He receives Imfinzi every 4 weeks, and his thyroid function is monitored prior to each injection.    FAMILY HISTORY  His father has type 1 diabetes. He is unsure about any family history of hypothyroidism.          Pertinent Medical History            Review of Systems as per [facility]  Medical History Reviewed by provider this encounter:     .  Current Outpatient Medications on File Prior to Visit   Medication Sig Dispense Refill   • atorvastatin (LIPITOR) 10 mg tablet Take 1 tablet (10 mg total) by mouth daily (Patient not taking: Reported on 2/17/2025) 90 tablet 3   • [DISCONTINUED] levothyroxine 50 mcg tablet Take 1 tablet (50 mcg total) by mouth daily 30 tablet 2     No current facility-administered medications on file prior to visit.         Medical History Reviewed by provider this encounter:     .    Objective   /82   Pulse 99   Temp 97.7 °F (36.5 °C)   Resp 18   Ht 5' 6.5\" (1.689 m)   Wt 100 kg (221 lb 3.2 oz)   SpO2 97%   BMI 35.17 kg/m²      Body mass index is 35.17 kg/m².  Wt Readings from Last 3 Encounters:   05/06/25 100 kg (221 lb 3.2 oz)   02/17/25 95.8 kg (211 lb 3.2 oz)   04/05/24 96.3 kg (212 lb 4.9 oz)     Physical Exam  Constitutional:       General: He is not in acute distress.     Appearance: He is " "not ill-appearing.   HENT:      Head: Normocephalic and atraumatic.   Neck:      Thyroid: No thyromegaly or thyroid tenderness.   Cardiovascular:      Rate and Rhythm: Normal rate.   Pulmonary:      Effort: Pulmonary effort is normal. No respiratory distress.   Neurological:      Mental Status: He is oriented to person, place, and time.       Physical Exam  Thyroid size appears normal. No nodules are palpable.    Vital Signs  Weight is 221 pounds.    Results  Laboratory Studies  TSH was 6.737 in November 2024. TSH was 0.263 in February 2025 and when repeated it was 4.9. TSH was 16.590 in April 2025. Free T4 was low at 0.5 and then slightly lower at 0.49.  Labs:   No results found for: \"HGBA1C\"  Lab Results   Component Value Date    CREATININE 1.00 04/17/2025    CREATININE 0.99 03/20/2025    CREATININE 1.03 02/13/2025    BUN 12 04/17/2025    K 3.8 04/17/2025     04/17/2025    CO2 29 04/17/2025     eGFR   Date Value Ref Range Status   04/17/2025 92 ml/min/1.73sq m Final     Lab Results   Component Value Date    HDL 31 (L) 02/08/2024    TRIG 138 02/08/2024     Lab Results   Component Value Date    ALT 54 (H) 04/17/2025    AST 33 04/17/2025    ALKPHOS 60 04/17/2025     Lab Results   Component Value Date    BJF1OWJJCLMK 16.590 (H) 04/17/2025    EHO5BPMZBKGQ 51.249 (H) 03/20/2025    [id number] 49.263 (H) 02/13/2025     Lab Results   Component Value Date    FREET4 1.02 04/17/2025       There are no Patient Instructions on file for this visit.    Discussed with the patient and all questioned fully answered. He will call me if any problems arise.      "

## 2025-05-14 ENCOUNTER — TELEPHONE (OUTPATIENT)
Dept: INFUSION CENTER | Facility: HOSPITAL | Age: 45
End: 2025-05-14

## 2025-05-14 NOTE — TELEPHONE ENCOUNTER
Patient calling to cancel upcoming infusion appts on 5/15 and 5/16.  He is working on alternative insurance at the moment and does not know when this will be in effect. Patient will call back to schedule treatment.

## 2025-05-15 ENCOUNTER — HOSPITAL ENCOUNTER (OUTPATIENT)
Dept: INFUSION CENTER | Facility: HOSPITAL | Age: 45
End: 2025-05-15

## 2025-05-16 ENCOUNTER — HOSPITAL ENCOUNTER (OUTPATIENT)
Dept: INFUSION CENTER | Facility: HOSPITAL | Age: 45
End: 2025-05-16
Attending: INTERNAL MEDICINE

## 2025-06-27 ENCOUNTER — HOSPITAL ENCOUNTER (OUTPATIENT)
Dept: CT IMAGING | Facility: HOSPITAL | Age: 45
End: 2025-06-27
Attending: INTERNAL MEDICINE
Payer: OTHER GOVERNMENT

## 2025-06-27 DIAGNOSIS — C7A.025 MALIGNANT CARCINOID TUMOR OF SIGMOID COLON (HCC): ICD-10-CM

## 2025-06-27 PROCEDURE — 74177 CT ABD & PELVIS W/CONTRAST: CPT

## 2025-06-27 PROCEDURE — 71260 CT THORAX DX C+: CPT

## 2025-06-27 RX ADMIN — IOHEXOL 100 ML: 350 INJECTION, SOLUTION INTRAVENOUS at 08:45

## 2025-07-08 NOTE — ASSESSMENT & PLAN NOTE
This is a 44 y.o. c PMHx notable for obesity, being seen in consultation for large cell with neuroendocrine features found in the colonon maintenance IO

## 2025-07-08 NOTE — PROGRESS NOTES
Do not bill today, he is working on getting his insurance    Name: Kwaku Plascencia      : 1980      MRN: [id number]  Encounter Provider: Josue Bledsoe MD  Encounter Date: 2025   Encounter department: [facility] [place]  Administrative Statements   Encounter provider Josue Bledsoe MD    The Patient is located at Home and in the following state in which I hold an active license [place].    The patient was identified by name and date of birth. Kwaku Plascencia was informed that this is a telemedicine visit and that the visit is being conducted through the [employer] platform. He agrees to proceed..  My office door was closed. No one else was in the room.  He acknowledged consent and understanding of privacy and security of the video platform. The patient has agreed to participate and understands they can discontinue the visit at any time.    I have spent a total time of 42 minutes in caring for this patient on the day of the visit/encounter including Diagnostic results, Impressions, Counseling / Coordination of care, Documenting in the medical record, and Reviewing/placing orders in the medical record (including tests, medications, and/or procedures), not including the time spent for establishing the audio/video connection.      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 c PMHx notable for obesity, being seen in consultation for large cell with neuroendocrine features found in the colonon maintenance IO         Assessment & Plan        No follow-ups on file.    History of Present Illness   No chief complaint on file.  History of Present Illness          Objective   There were no vitals taken for this visit.      Results        Cancer Stage at diagnosis: IV    Referral Physician: No ref. provider found    Primary Care Physician:  Theron Stein MD     Nickname: Kwaku    Spouse: Naz    Original ECOG:  0    Today's ECO    Goals and Barriers:  Current Goal: Minimize effects of disease burden, extend life.   Barriers to accomplishing this: None    Patient's Capacity to Self Care:  Patient is able to self care    Code Status: not addressed today    Advanced directives: not addressed today      This is a 44 y.o. c PMHx notable for obesity, being seen in consultation for large cell with neuroendocrine features found in the colonon maintenance IO    Discussion of decision making  Oncology history updated, accordingly, during this visit  Goals of care/patient communication  I discussed with the patient the clinical course leading up to their cancer diagnosis. I reviewed relevant office notes, imaging reports and pathology result as well.  I told the patient that this is a case of curable versus incurable disease and what this means. We discussed that the goal of anti-cancer therapy is to provide best quality of life, extend overall survival, and progression free survival as shown in clinical trials. We also discussed that there might be a point when the cancer will no longer respond to this anti-neoplastic therapy.   I explained the risks/benefits of the proposed cancer therapy: Carboplatin-Etoposide +/- Durvalumab and after discussion including understanding risks of possible life-threatening complications and therapy-related malignancy development, informed consent for blood products and treatment has been signed and obtained.  TNM/Staging At Diagnosis  [id number]  Cancer Staging   IV  Disease Features/Tumor Markers/Genetics  Tumor Marker:   2023: CEA 2.2  CGA: 49.4  Notable Path Features:   2023: Poorly differentiated malignancy, pending external expert consultation positive for synaptophysin, chromogranin, CD56, TTF-1, BCL2, and cyclin D1 with a high Ki-67 proliferative rate (approximately 90%) compatible with a poorly differentiated malignancy with features suggesting neuroendocrine differentiation.  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 The intramural abscess abuts the superior aspect of the urinary bladder without evidence of a colovesical fistula.  6/27/2023 MRI Abd w/wo c: Benign flash-filling 11 mm hepatic and angioma in the caudate lobe of the liver accounting for the enhancing nodule in that location on recent CT examinations  7/19/2023 CT Chest w/o c: There are multiple pulmonary nodules, which will be described on series 4:  Image 68, left lower lobe, solid, smooth bordered, 3 mm .  Image 63, left upper lobe, solid, smooth bordered, 4 mm .  Image 43, right upper lobe, solid, smooth bordered, 4 mm, may represent a fissural lymph node.  Image 55, right upper lobe, solid, smooth bordered, 4 mm, may represent a fissural lymph node  7/19/2023 MRI Brain w/wo c: No evidence of intracranial metastasis, A few foci of T2/FLAIR hyperintensity primarily involving left frontoparietal subcortical and deep white matter (maybe migraine related)  8/8/2023 PET/CT: Three new hypermetabolic solid nodules/masses in the abdomen as described, compatible with progression of disease. Mild uptake along the left anterior ninth rib corresponding to a mild fracture deformity. Findings are indeterminate and may represent the sequela of trauma or metastatic disease. Stable sub-4 mm pulmonary nodules   -3.1 x 3.1 cm hypermetabolic mass in the left anterior abdomen abutting a loop of small bowel (series 4, image 147; SUV max 12).   -1.3 x 1.2 cm  hypermetabolic soft tissue nodule in the left mid abdomen posterior to a loop of small bowel (series 4, image 154; SUV max 17)   -1.8 x 2.1 cm hypermetabolic soft tissue nodule anterior to the left psoas muscle (series 4, image 160; SUV max 9.7)  10/2/2023: CT CAP w/c: excellent SD. Multiple pulmonary nodules without significant change when compared to the previous CT chest. Some which may represent fissural lymph nodes. No evidence of metastatic disease in the chest abdomen and pelvis.  Improvement of the lower abdominal/pelvic lymphadenopathy when compared to the previous PET/CT.  CARIS NGS: 11/9/2023 CARIS NGS: TMB 94 muts/Mb, MSI-proficient, BRCA 1 exon 18 mutation  BRAF exon 15 mutation, MSH2/3, NF1 exon 3, PIK3CA, POLE exon 9, PTEN exon 1, RB1, SMAD2, TP53 exon 6, XPO1 exon 15  12/22/2023 CT CAP w/c stable: There is no new measurable metastatic disease. Small less than 6 mm nodules in the both lungs, remains stable. The previously noted rounded lesion in the anterior abdomen which demonstrated marginal uptake on the PET scan is decreasing in size, now measuring 1.1 x 1.6 cm (image 184 series 2). Proximal sigmoid colonic thickening as seen on the previous. Enhancing cavernous hemangioma caudate lobe  Genetic testing was negative  4/3/2024 CT CAP w/c SD. Continued decrease in size of the rounded lesion in the anterior abdominal peritoneal cavity measuring 1 cm (previously 1.1 x 1.6 cm). Interval improvement of sigmoid colonic thickening. Stable tiny pulmonary nodules.  8/6/2024 CT CAP w/c : ongoing SD. Continued interval decrease in size of a peritoneal lesion in the anterior abdomen abutting the small bowel (measuring 8 mm compared to 12 mm previously). No abdominal or pelvic lymphadenopathy. No thoracic lymphadenopathy. No new pulmonary lesions. Stable scattered pulmonary nodules. Known sigmoid mass is stable without evidence of obstruction.  11/15/2024: CT CAP w/c. ongoing  SD. No definite residual/recurrent  disease in the chest, abdomen, or pelvis. In particular, the previously identified peritoneal lesion in the ventral left paracentral abdomen and previously identified sigmoid colon mass are no longer visualized. Sub-4 mm pulmonary nodules, not significantly changed  2/3/2025 CT CAP w/c: Ongoing AK. No interval change since previous study. No abdominal or pelvic lymphadenopathy. No peritoneal lesions. Mild luminal dilation of the proximal sigmoid just proximal to the known sigmoid lesion measuring 5 cm in diameter is nonspecific and may represent peristaltic activity. No evidence of proximal obstruction  Known sigmoid lesion measuring 5 cm in diameter without obvious obstructive changes   6/27/2025 CT CAP w/c: stable. No significant change since previous study. No abdominal or pelvic lymphadenopathy. No peritoneal lesions. Known sigmoid mass is relatively stable without pericolic fatty infiltration. No obstructive changes. 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 Discuss with [facility] [place]*    Follow Up: Every 8 weeks while on systemic therapy (all virtual except post CT visits) schedule more*, will see him once in between CT scans    Infusion chairs [facility] at the [facility]    All questions were answered to the patient's satisfaction during this encounter. The patient knows the contact information for our office and knows to reach out for any relevant concerns related to this encounter. They are to call for any temperature 100.4 or higher, new symptoms  including but not restricted to shaking chills, decreased appetite, nausea, vomiting, diarrhea, increased fatigue, shortness of breath or chest pain, confusion, and not feeling the strength to come to the clinic. For all other listed problems and medical diagnosis in their chart - they are managed by PCP and/or other specialists, which the patient acknowledges. Thank you very much for your consultation and making us a part of this patient's care. We are continuing to follow closely with you. Please do not hesitate to reach out to me with any additional questions or concerns.    Josue Bledsoe MD  Hematology & Medical Oncology Staff Physician             Disclaimer: This document was prepared using [employer] technology. If a word or phrase is confusing, or does not make sense, this is likely due to recognition error which was not discovered during this clinician's review. If you believe an error has occurred, please contact me through [facility] service line for lee?cation.      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)   6/22/2023 Initial Diagnosis    Malignant neoplasm of sigmoid colon (HCC)     9/3/2023 -  Cancer Staged    Staging form: Colon and Rectum, AJCC 8th Edition  - Clinical: Stage Unknown (cTX, cN0, pM1) - Signed by Josue Bledsoe MD on 9/3/2023  Total positive nodes: 0       Small cell carcinoma (HCC)   7/18/2023 Initial Diagnosis    Small cell carcinoma (HCC)     7/26/2023 -  Chemotherapy    etoposide (TOPOSAR), 80 mg/m2 = 160.8 mg, 4 of 4 cycles  Administration: 160.8 mg (7/26/2023), 160.8 mg (7/27/2023), 160.8 mg (7/28/2023), 160.8 mg (8/16/2023), 160.8 mg (8/17/2023), 160.8 mg (8/18/2023), 160.8 mg (9/6/2023), 160.8 mg (9/7/2023), 160.8 mg (9/8/2023), 160.8 mg (9/27/2023), 160.8 mg (9/28/2023), 160.8 mg (9/29/2023)  CARBOplatin (PARAPLATIN) IVPB (GOG AUC DOSING), 750 mg, 4 of 4 cycles  Administration: 750 mg  (7/26/2023), 700.5 mg (8/16/2023), 694 mg (9/6/2023), 694 mg (9/27/2023)  durvalumab (IMFINZI) IVPB, 1,500 mg, 24 of 28 cycles  Administration: 1,500 mg (7/26/2023), 1,500 mg (10/20/2023), 1,500 mg (8/16/2023), 1,500 mg (9/6/2023), 1,500 mg (9/27/2023), 1,500 mg (11/17/2023), 1,500 mg (12/15/2023), 1,500 mg (1/12/2024), 1,500 mg (2/9/2024), 1,500 mg (3/8/2024), 1,500 mg (4/5/2024), 1,500 mg (5/3/2024), 1,500 mg (5/31/2024), 1,500 mg (6/28/2024), 1,500 mg (7/26/2024), 1,500 mg (8/30/2024), 1,500 mg (9/27/2024), 1,500 mg (10/25/2024), 1,500 mg (11/22/2024), 1,500 mg (12/20/2024), 1,500 mg (1/17/2025), 1,500 mg (2/14/2025), 1,500 mg (3/21/2025), 1,500 mg (4/18/2025)       5/21/2023: BRBPR and pain in the lower abdomen for a week; leading to a ER visit, dx of diverticulitis     May - July 2025: not on tx. HE has held it due to a divorce from his wife and trying to get on a new insurance  SUBJECTIVE  (Past HISTORY)      Clotting History None    Bleeding History None   Cancer History Colon   Family Cancer History pGrandfather (lung, smoker)   H/O Blood/Plt Transfusion None   Tobacco/etoh/drug abuse 1/2 PPD x 25 years (working on quitting and thinking about it; has nicotine patches), no etoh abuse or rec drug use       Cancer Screening history n/a   Occupation IT       Past events:    Doing vaping and hasn't smoked since 11/20/2024. Having numbness in his thumbs/feet (intermittent, no clear pattern).    No acute issues at this time. Work is good. Has 1-2 days of fatigue after his infusions, getting a bit less.    *    I have reviewed the relevant past medical, surgical, social and family history. I have also reviewed allergies and medications for this patient.    Review of Systems    Baseline weight: 200 lbs    Denies F/C, N/V, SOB, CP, LH, HA, rash, itching, gen weakness, melena, hematuria, hematochezia, falls, diarrhea, or constipation       A 10-point review of system was performed, pertinent positive and negative were  detailed as above. Otherwise, the 10-point review of system was negative.      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, chemotherapy, immunotherapy    Mixed hyperlipidemia 10/19/2023    10/19/2023-cholesterol 226, HDL 29    Pulmonary nodules 03/23/2024    Per coding guidelines from note dated 10/16/2023       Past Surgical History:   Procedure Laterality Date    COLON SIGMOID RESECTION LAPAROSCOPIC N/A 6/8/2023    Procedure: RESECTION COLON SIGMOID LAPAROSCOPIC HAND-ASSISTED;  Surgeon: Roland Connelly MD;  Location: [facility];  Service: General    IR DRAINAGE TUBE CHECK/CHANGE/REPOSITION/REINSERTION/UPSIZE  6/5/2023    IR DRAINAGE TUBE PLACEMENT  5/30/2023    IR PORT PLACEMENT  7/28/2023       Family History   Problem Relation Name Age of Onset    No Known Problems Mother      Hypertension Father      Diabetes Father      Heart attack Father  42    Stroke Father         Social History     Socioeconomic History    Marital status: /Civil Union     Spouse name: Not on file    Number of children: Not on file    Years of education: Not on file    Highest education level: Not on file   Occupational History    Not on file   Tobacco Use    Smoking status: Former     Current packs/day: 0.75     Average packs/day: 0.8 packs/day for 25.0 years (18.8 ttl pk-yrs)     Types: Cigarettes     Passive exposure: Current (wife smokes)    Smokeless tobacco: Not on file    Tobacco comments:     Patient states Chantix was not effective.   Vaping Use    Vaping status: Never Used   Substance and Sexual Activity    Alcohol use: Not Currently    Drug use: Never    Sexual activity: Not on file   Other Topics Concern    Not on file   Social History Narrative     since 2011.    4 children aged 19, 18, 17, 8 as of 2/25.  Second child is a girl, the rest boys. Oldest in basic  training for the [employer].  Third child enlisted in the [employer].      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    Wife is in the [employer].    Enjoys hiking and hunting.    And video games.     Social Drivers of Health     Financial Resource Strain: Not on file   Food Insecurity: No Food Insecurity (5/31/2023)    Hunger Vital Sign     Worried About Running Out of Food in the Last Year: Never true     Ran Out of Food in the Last Year: Never true   Transportation Needs: No Transportation Needs (5/31/2023)    PRAPARE - Transportation     Lack of Transportation (Medical): No     Lack of Transportation (Non-Medical): No   Physical Activity: Not on file   Stress: Not on file   Social Connections: Unknown (6/18/2024)    Received from [facility]     How often do you feel lonely or isolated from those around you? (Adult - for ages 18 years and over): Not on file   Intimate Partner Violence: Not on file   Housing Stability: Low Risk  (5/31/2023)    Housing Stability Vital Sign     Unable to Pay for Housing in the Last Year: No     Number of Places Lived in the Last Year: 1     Unstable Housing in the Last Year: No       No Known Allergies    Current Outpatient Medications   Medication Sig Dispense Refill    atorvastatin (LIPITOR) 10 mg tablet Take 1 tablet (10 mg total) by mouth daily (Patient not taking: Reported on 2/17/2025) 90 tablet 3    levothyroxine (Euthyrox) 112 mcg tablet Take 1 tablet (112 mcg total) by mouth daily 90 tablet 1     No current facility-administered medications for this visit.       (Not in a hospital admission)      Objective:     24 Hour Vitals Assessment:     There were no vitals filed for this visit.    Below not updated as this was a telemed visit    PHYSICIAN EXAM:    General: Appearance: alert, cooperative, no distress.  HEENT: Normocephalic, atraumatic. No scleral icterus. conjunctivae clear. EOMI.  Chest: No tenderness to palpation. No open wound noted.  Lungs: Diminished BS  b/l, otherwise clear to auscultation bilaterally, Respirations unlabored.  Cardiac: Regular rate, +S1and S2  Abdomen: Soft, non-tender, non-distended. Bowel sounds are normal.   Extremities:  No edema, cyanosis, clubbing.  Skin: Skin color, turgor are normal. No rashes.  Lymphatics: no palpable supra-cervical, axillary, or inguinal adenopathy  Neurologic: Awake, Alert, and oriented, no gross focal deficits noted b/l.       DATA REVIEW:    Pathology Result:    Final Diagnosis   Date Value Ref Range Status   06/08/2023   Final    A.  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       Comment:     This is an appended report. These results have been appended to a previously preliminary verified report.        Image Results:   Image result are reviewed and documented in Hematology/Oncology history    CT chest abdomen pelvis w contrast  Narrative: CT CHEST, ABDOMEN AND PELVIS WITH IV CONTRAST    INDICATION:   Malignant carcinoid tumor of the sigmoid colon. .    COMPARISON: 2/3/2025    TECHNIQUE: CT examination of the chest, abdomen and pelvis was performed. Multiplanar 2D reformatted images were created from the source data.    This examination, like all CT scans performed in the [facility], was performed utilizing techniques to minimize radiation dose exposure, including the use of iterative reconstruction and automated exposure control. Radiation dose length   product (DLP) for this visit:    IV Contrast: 100 cc of Omnipaque 350  Enteric Contrast: Enteric contrast was not administered.    FINDINGS:    CHEST    LUNGS: Stable pulmonary nodules. Stable 4 mm fissural nodule in the right middle lobe image 115 series 3. Stable 4 mm subpleural nodule in the right middle lobe image 144 series 3. Stable 3 mm subpleural nodule in the left upper lobe image 125 series 3..   Stable 4 mm subpleural nodule in the left lower lobe image 172 series 3..  There is no tracheal or endobronchial lesion.    PLEURA:  Unremarkable.    HEART/GREAT VESSELS: Heart is unremarkable for patient's age. Minimal coronary artery calcification. No thoracic aortic aneurysm.    MEDIASTINUM AND FABRICIO:  Unremarkable.    CHEST WALL AND LOWER NECK: No chest wall mass. No axillary lymphadenopathy. Gynecomastia. Right anterior chest wall Port-A-Cath    ABDOMEN    LIVER/BILIARY TREE:  Liver is diffusely decreased in density consistent with fatty change.  No CT evidence of suspicious hepatic mass.  Normal hepatic contours.  No biliary dilatation.    GALLBLADDER:  No calcified gallstones. No pericholecystic inflammatory change.    SPLEEN: Stable borderline splenomegaly measuring 13 cm.    PANCREAS:  Unremarkable.    ADRENAL GLANDS:  Unremarkable.    KIDNEYS/URETERS:  Unremarkable. No hydronephrosis.    STOMACH AND BOWEL: Normal caliber small bowel loops. No obstruction. Relatively stable proximal sigmoid without pericolic fatty infiltration    APPENDIX:  No findings to suggest appendicitis.    ABDOMINOPELVIC CAVITY:  No ascites.  No pneumoperitoneum.  No lymphadenopathy. Previous peritoneal lesions are no longer visualized.    VESSELS:  Atherosclerotic changes are present.  No evidence of aneurysm.    PELVIS    REPRODUCTIVE ORGANS:  Unremarkable for patient's age.    URINARY BLADDER: Mild bladder wall thickening.    ABDOMINAL WALL/INGUINAL REGIONS:  Unremarkable.    OSSEOUS STRUCTURES:  No acute fracture or destructive osseous lesion.  Impression: 1. No significant change since previous study. No abdominal or pelvic lymphadenopathy. No peritoneal lesions. Known sigmoid mass is relatively stable without pericolic fatty infiltration. No obstructive changes    2. Stable scattered pulmonary nodules. Continued surveillance as per oncology    Electronically signed: 06/30/2025 07:43 AM Karson Dumont MD      LABS:  Lab data are reviewed and documented in HemOnc history.       Lab Results   Component  "Value Date    HGB 15.7 04/17/2025    HCT 47.0 04/17/2025    MCV 93 04/17/2025     04/17/2025    WBC 5.86 04/17/2025    NRBC 0 04/17/2025     Lab Results   Component Value Date    K 3.8 04/17/2025     04/17/2025    CO2 29 04/17/2025    BUN 12 04/17/2025    CREATININE 1.00 04/17/2025    GLUF 102 (H) 08/29/2024    CALCIUM 9.2 04/17/2025    CORRECTEDCA 9.1 06/09/2023    AST 33 04/17/2025    ALT 54 (H) 04/17/2025    ALKPHOS 60 04/17/2025    EGFR 92 04/17/2025       No results found for: \"IRON\", \"TIBC\", \"FERRITIN\"    No results found for: \"LWVIWDGR90\"    No results for input(s): \"WBC\", \"CREAT\", \"PLT\" in the last 72 hours.          By:  Josue Bledsoe MD, 7/8/2025, 2:36 PM                                  "

## 2025-07-18 ENCOUNTER — TELEPHONE (OUTPATIENT)
Dept: HEMATOLOGY ONCOLOGY | Facility: CLINIC | Age: 45
End: 2025-07-18

## 2025-07-18 ENCOUNTER — TELEMEDICINE (OUTPATIENT)
Dept: HEMATOLOGY ONCOLOGY | Facility: CLINIC | Age: 45
End: 2025-07-18

## 2025-07-18 DIAGNOSIS — C7A.025 MALIGNANT CARCINOID TUMOR OF SIGMOID COLON (HCC): Primary | ICD-10-CM

## 2025-07-18 DIAGNOSIS — C80.1 SMALL CELL CARCINOMA (HCC): ICD-10-CM

## 2025-07-18 PROCEDURE — NC001 PR NO CHARGE: Performed by: INTERNAL MEDICINE

## 2025-07-18 NOTE — TELEPHONE ENCOUNTER
Called pt to make him aware of his scheduled fu appt on 9/12/25 and 1/9/26, along with his upcoming CT scan on 11/7/25. Pt is aware of all date and times. [place] # is

## (undated) DEVICE — TRAY FOLEY 16FR URIMETER SURESTEP

## (undated) DEVICE — 3M™ IOBAN™ 2 ANTIMICROBIAL INCISE DRAPE 6650EZ: Brand: IOBAN™ 2

## (undated) DEVICE — PACK PBDS LAP CHOLE RF

## (undated) DEVICE — LEGGINGS: Brand: CONVERTORS

## (undated) DEVICE — ACCESS PLATFORM FOR MINIMALLY INVASIVE SURGERY.: Brand: GELPORT® LAPAROSCOPIC  SYSTEM

## (undated) DEVICE — SUT MONOCRYL 4-0 PS-2 27 IN Y426H

## (undated) DEVICE — SUT SILK 3-0 SH CR/8 18 IN C013D

## (undated) DEVICE — ANTI-FOG SOLUTION WITH FOAM PAD: Brand: DEVON

## (undated) DEVICE — ECHELON FLEX POWERED PLUS ARTICULATING ENDOSCOPIC LINEAR CUTTER , 60MM: Brand: ECHELON FLEX

## (undated) DEVICE — ENDOPATH ECHELON ENDOSCOPIC LINEAR CUTTER RELOADS, WHITE, 60MM: Brand: ECHELON ENDOPATH

## (undated) DEVICE — POOLE SUCTION HANDLE W/TUBING: Brand: CARDINAL HEALTH

## (undated) DEVICE — SPONGE STICK WITH PVP-I: Brand: KENDALL

## (undated) DEVICE — SUT VICRYL 3-0 SH 27 IN J416H

## (undated) DEVICE — TUBING SMOKE EVAC W/FILTRATION DEVICE PLUMEPORT ACTIV

## (undated) DEVICE — SUT PDS II 1 CTX-B 36 IN ZB371

## (undated) DEVICE — STERILE SURGICAL LUBRICANT,  TUBE: Brand: SURGILUBE

## (undated) DEVICE — CLOSURE DEVICE ENDO CLOSE

## (undated) DEVICE — HARMONIC 1100 SHEARS, 36CM SHAFT LENGTH: Brand: HARMONIC

## (undated) DEVICE — SPONGE LAP 18 X 18 IN

## (undated) DEVICE — GLOVE INDICATOR PI UNDERGLOVE SZ 7.5 BLUE

## (undated) DEVICE — SUT VICRYL 0 REEL 54 IN J287G

## (undated) DEVICE — TROCAR: Brand: KII FIOS FIRST ENTRY

## (undated) DEVICE — INTENDED FOR TISSUE SEPARATION, AND OTHER PROCEDURES THAT REQUIRE A SHARP SURGICAL BLADE TO PUNCTURE OR CUT.: Brand: BARD-PARKER ® CARBON RIB-BACK BLADES

## (undated) DEVICE — CHLORAPREP HI-LITE 26ML ORANGE

## (undated) DEVICE — ENDOPATH 5 MM GRASPERS WITH RATCHET HANDLES: Brand: ENDOPATH

## (undated) DEVICE — DRAPE,UNDERBUTTOCKS,PCH,STERILE: Brand: MEDLINE

## (undated) DEVICE — TROCAR 11MM KIT OPTICAL SEPARATOR SYSTEM

## (undated) DEVICE — PLUMEPEN PRO 10FT

## (undated) DEVICE — 4-PORT MANIFOLD: Brand: NEPTUNE 2

## (undated) DEVICE — STAPLER SKIN 35 WIDE ULC APPOSE

## (undated) DEVICE — ASTOUND IMPERVIOUS SURGICAL GOWN: Brand: CONVERTORS

## (undated) DEVICE — GLOVE SRG BIOGEL 7